# Patient Record
Sex: MALE | Race: WHITE | Employment: OTHER | URBAN - METROPOLITAN AREA
[De-identification: names, ages, dates, MRNs, and addresses within clinical notes are randomized per-mention and may not be internally consistent; named-entity substitution may affect disease eponyms.]

---

## 2017-03-31 ENCOUNTER — CARE COORDINATOR VISIT (OUTPATIENT)
Dept: FAMILY MEDICINE CLINIC | Age: 77
End: 2017-03-31

## 2017-03-31 RX ORDER — LANCETS
EACH MISCELLANEOUS
Qty: 100 EACH | Refills: 3 | Status: SHIPPED | OUTPATIENT
Start: 2017-03-31 | End: 2018-08-03 | Stop reason: SDUPTHER

## 2017-06-07 RX ORDER — LEVOTHYROXINE SODIUM 88 UG/1
TABLET ORAL
Qty: 90 TABLET | Refills: 0 | Status: SHIPPED | OUTPATIENT
Start: 2017-06-07 | End: 2017-09-09 | Stop reason: SDUPTHER

## 2017-06-07 RX ORDER — ISOSORBIDE MONONITRATE 30 MG/1
30 TABLET, EXTENDED RELEASE ORAL DAILY
Qty: 90 TABLET | Refills: 0 | Status: SHIPPED | OUTPATIENT
Start: 2017-06-07 | End: 2017-09-09 | Stop reason: SDUPTHER

## 2017-06-08 DIAGNOSIS — I10 BENIGN ESSENTIAL HYPERTENSION: Chronic | ICD-10-CM

## 2017-06-09 RX ORDER — LOSARTAN POTASSIUM AND HYDROCHLOROTHIAZIDE 25; 100 MG/1; MG/1
TABLET ORAL
Qty: 90 TABLET | Refills: 1 | Status: SHIPPED | OUTPATIENT
Start: 2017-06-09 | End: 2017-06-21

## 2017-06-09 RX ORDER — PRAVASTATIN SODIUM 40 MG
TABLET ORAL
Qty: 90 TABLET | Refills: 1 | Status: SHIPPED | OUTPATIENT
Start: 2017-06-09 | End: 2017-12-19 | Stop reason: SDUPTHER

## 2017-06-09 RX ORDER — CLOPIDOGREL BISULFATE 75 MG/1
TABLET ORAL
Qty: 90 TABLET | Refills: 1 | Status: SHIPPED | OUTPATIENT
Start: 2017-06-09 | End: 2017-12-19 | Stop reason: SDUPTHER

## 2017-06-09 RX ORDER — METOPROLOL SUCCINATE 50 MG/1
TABLET, EXTENDED RELEASE ORAL
Qty: 90 TABLET | Refills: 1 | Status: SHIPPED | OUTPATIENT
Start: 2017-06-09 | End: 2017-12-13 | Stop reason: SDUPTHER

## 2017-06-20 ENCOUNTER — OFFICE VISIT (OUTPATIENT)
Dept: FAMILY MEDICINE CLINIC | Age: 77
End: 2017-06-20

## 2017-06-20 VITALS
WEIGHT: 192 LBS | HEIGHT: 71 IN | DIASTOLIC BLOOD PRESSURE: 72 MMHG | OXYGEN SATURATION: 97 % | BODY MASS INDEX: 26.88 KG/M2 | HEART RATE: 63 BPM | SYSTOLIC BLOOD PRESSURE: 130 MMHG | TEMPERATURE: 97.7 F

## 2017-06-20 DIAGNOSIS — I65.23 BILATERAL CAROTID ARTERY OCCLUSION: ICD-10-CM

## 2017-06-20 DIAGNOSIS — R09.89 DIMINISHED PULSES IN LOWER EXTREMITY: ICD-10-CM

## 2017-06-20 DIAGNOSIS — E78.00 PURE HYPERCHOLESTEROLEMIA: ICD-10-CM

## 2017-06-20 DIAGNOSIS — E06.3 HYPOTHYROIDISM, ACQUIRED, AUTOIMMUNE: ICD-10-CM

## 2017-06-20 DIAGNOSIS — E11.9 TYPE 2 DIABETES MELLITUS WITHOUT COMPLICATION, WITHOUT LONG-TERM CURRENT USE OF INSULIN (HCC): Primary | ICD-10-CM

## 2017-06-20 DIAGNOSIS — I10 BENIGN ESSENTIAL HYPERTENSION: ICD-10-CM

## 2017-06-20 DIAGNOSIS — I25.118 ATHEROSCLEROSIS OF NATIVE CORONARY ARTERY OF NATIVE HEART WITH STABLE ANGINA PECTORIS (HCC): ICD-10-CM

## 2017-06-20 LAB
A/G RATIO: 1.6 (ref 1.1–2.2)
ALBUMIN SERPL-MCNC: 4.4 G/DL (ref 3.4–5)
ALP BLD-CCNC: 80 U/L (ref 40–129)
ALT SERPL-CCNC: 21 U/L (ref 10–40)
ANION GAP SERPL CALCULATED.3IONS-SCNC: 15 MMOL/L (ref 3–16)
AST SERPL-CCNC: 16 U/L (ref 15–37)
BASOPHILS ABSOLUTE: 0 K/UL (ref 0–0.2)
BASOPHILS RELATIVE PERCENT: 0.6 %
BILIRUB SERPL-MCNC: 0.6 MG/DL (ref 0–1)
BUN BLDV-MCNC: 14 MG/DL (ref 7–20)
CALCIUM SERPL-MCNC: 9.8 MG/DL (ref 8.3–10.6)
CHLORIDE BLD-SCNC: 89 MMOL/L (ref 99–110)
CHOLESTEROL, TOTAL: 203 MG/DL (ref 0–199)
CO2: 25 MMOL/L (ref 21–32)
CREAT SERPL-MCNC: 0.9 MG/DL (ref 0.8–1.3)
EOSINOPHILS ABSOLUTE: 0.5 K/UL (ref 0–0.6)
EOSINOPHILS RELATIVE PERCENT: 5.9 %
GFR AFRICAN AMERICAN: >60
GFR NON-AFRICAN AMERICAN: >60
GLOBULIN: 2.8 G/DL
GLUCOSE BLD-MCNC: 113 MG/DL (ref 70–99)
HCT VFR BLD CALC: 37.7 % (ref 40.5–52.5)
HDLC SERPL-MCNC: 46 MG/DL (ref 40–60)
HEMOGLOBIN: 12.8 G/DL (ref 13.5–17.5)
LDL CHOLESTEROL CALCULATED: 121 MG/DL
LYMPHOCYTES ABSOLUTE: 1.4 K/UL (ref 1–5.1)
LYMPHOCYTES RELATIVE PERCENT: 17.4 %
MCH RBC QN AUTO: 31 PG (ref 26–34)
MCHC RBC AUTO-ENTMCNC: 33.8 G/DL (ref 31–36)
MCV RBC AUTO: 91.5 FL (ref 80–100)
MONOCYTES ABSOLUTE: 0.6 K/UL (ref 0–1.3)
MONOCYTES RELATIVE PERCENT: 7.5 %
NEUTROPHILS ABSOLUTE: 5.4 K/UL (ref 1.7–7.7)
NEUTROPHILS RELATIVE PERCENT: 68.6 %
PDW BLD-RTO: 13.5 % (ref 12.4–15.4)
PLATELET # BLD: 202 K/UL (ref 135–450)
PMV BLD AUTO: 9.5 FL (ref 5–10.5)
POTASSIUM SERPL-SCNC: 4.3 MMOL/L (ref 3.5–5.1)
RBC # BLD: 4.12 M/UL (ref 4.2–5.9)
SODIUM BLD-SCNC: 129 MMOL/L (ref 136–145)
TOTAL PROTEIN: 7.2 G/DL (ref 6.4–8.2)
TRIGL SERPL-MCNC: 178 MG/DL (ref 0–150)
TSH REFLEX: 4.04 UIU/ML (ref 0.27–4.2)
VITAMIN B-12: 508 PG/ML (ref 211–911)
VLDLC SERPL CALC-MCNC: 36 MG/DL
WBC # BLD: 7.8 K/UL (ref 4–11)

## 2017-06-20 PROCEDURE — 99214 OFFICE O/P EST MOD 30 MIN: CPT | Performed by: FAMILY MEDICINE

## 2017-06-20 PROCEDURE — 36415 COLL VENOUS BLD VENIPUNCTURE: CPT | Performed by: FAMILY MEDICINE

## 2017-06-21 LAB
ESTIMATED AVERAGE GLUCOSE: 137 MG/DL
HBA1C MFR BLD: 6.4 %

## 2017-06-22 RX ORDER — LOSARTAN POTASSIUM 100 MG/1
100 TABLET ORAL DAILY
Qty: 90 TABLET | Refills: 1 | Status: SHIPPED | OUTPATIENT
Start: 2017-06-22 | End: 2017-12-19 | Stop reason: SDUPTHER

## 2017-06-23 ENCOUNTER — HOSPITAL ENCOUNTER (OUTPATIENT)
Dept: VASCULAR LAB | Age: 77
Discharge: HOME | End: 2017-06-23
Attending: FAMILY MEDICINE | Admitting: FAMILY MEDICINE

## 2017-06-23 DIAGNOSIS — I65.23 OCCLUSION AND STENOSIS OF BILATERAL CAROTID ARTERIES: ICD-10-CM

## 2017-08-01 ENCOUNTER — OFFICE VISIT (OUTPATIENT)
Dept: CARDIOLOGY CLINIC | Age: 77
End: 2017-08-01

## 2017-08-01 VITALS
SYSTOLIC BLOOD PRESSURE: 120 MMHG | BODY MASS INDEX: 25.9 KG/M2 | HEIGHT: 71 IN | WEIGHT: 185 LBS | OXYGEN SATURATION: 97 % | HEART RATE: 72 BPM | DIASTOLIC BLOOD PRESSURE: 66 MMHG

## 2017-08-01 DIAGNOSIS — E78.00 PURE HYPERCHOLESTEROLEMIA: ICD-10-CM

## 2017-08-01 DIAGNOSIS — I25.118 ATHEROSCLEROSIS OF NATIVE CORONARY ARTERY OF NATIVE HEART WITH STABLE ANGINA PECTORIS (HCC): ICD-10-CM

## 2017-08-01 DIAGNOSIS — I10 BENIGN ESSENTIAL HYPERTENSION: Primary | ICD-10-CM

## 2017-08-01 DIAGNOSIS — I65.23 BILATERAL CAROTID ARTERY OCCLUSION: ICD-10-CM

## 2017-08-01 PROCEDURE — 99214 OFFICE O/P EST MOD 30 MIN: CPT | Performed by: INTERNAL MEDICINE

## 2017-08-01 RX ORDER — HYDROCHLOROTHIAZIDE 25 MG/1
25 TABLET ORAL DAILY
Qty: 90 TABLET | Refills: 3 | Status: SHIPPED | OUTPATIENT
Start: 2017-08-01 | End: 2017-08-11

## 2017-08-11 ENCOUNTER — OFFICE VISIT (OUTPATIENT)
Dept: FAMILY MEDICINE CLINIC | Age: 77
End: 2017-08-11

## 2017-08-11 VITALS
TEMPERATURE: 98.1 F | HEART RATE: 72 BPM | SYSTOLIC BLOOD PRESSURE: 126 MMHG | WEIGHT: 186 LBS | BODY MASS INDEX: 25.94 KG/M2 | DIASTOLIC BLOOD PRESSURE: 78 MMHG | OXYGEN SATURATION: 98 %

## 2017-08-11 DIAGNOSIS — E87.1 HYPONATREMIA: ICD-10-CM

## 2017-08-11 DIAGNOSIS — I10 BENIGN ESSENTIAL HYPERTENSION: Primary | ICD-10-CM

## 2017-08-11 DIAGNOSIS — M25.561 ACUTE PAIN OF RIGHT KNEE: ICD-10-CM

## 2017-08-11 LAB
ANION GAP SERPL CALCULATED.3IONS-SCNC: 17 MMOL/L (ref 3–16)
BUN BLDV-MCNC: 19 MG/DL (ref 7–20)
CALCIUM SERPL-MCNC: 9.8 MG/DL (ref 8.3–10.6)
CHLORIDE BLD-SCNC: 97 MMOL/L (ref 99–110)
CO2: 25 MMOL/L (ref 21–32)
CREAT SERPL-MCNC: 0.9 MG/DL (ref 0.8–1.3)
GFR AFRICAN AMERICAN: >60
GFR NON-AFRICAN AMERICAN: >60
GLUCOSE BLD-MCNC: 119 MG/DL (ref 70–99)
POTASSIUM SERPL-SCNC: 4.4 MMOL/L (ref 3.5–5.1)
SODIUM BLD-SCNC: 139 MMOL/L (ref 136–145)

## 2017-08-11 PROCEDURE — 36415 COLL VENOUS BLD VENIPUNCTURE: CPT | Performed by: FAMILY MEDICINE

## 2017-08-11 PROCEDURE — 99213 OFFICE O/P EST LOW 20 MIN: CPT | Performed by: FAMILY MEDICINE

## 2017-09-09 RX ORDER — ISOSORBIDE MONONITRATE 30 MG/1
TABLET, EXTENDED RELEASE ORAL
Qty: 90 TABLET | Refills: 1 | Status: SHIPPED | OUTPATIENT
Start: 2017-09-09 | End: 2017-12-18

## 2017-09-09 RX ORDER — LEVOTHYROXINE SODIUM 88 UG/1
TABLET ORAL
Qty: 90 TABLET | Refills: 1 | Status: SHIPPED | OUTPATIENT
Start: 2017-09-09 | End: 2018-03-02 | Stop reason: SDUPTHER

## 2017-09-11 RX ORDER — LEVOTHYROXINE SODIUM 88 UG/1
TABLET ORAL
Qty: 90 TABLET | Refills: 0 | Status: SHIPPED | OUTPATIENT
Start: 2017-09-11 | End: 2017-12-19 | Stop reason: SDUPTHER

## 2017-09-11 RX ORDER — ISOSORBIDE MONONITRATE 30 MG/1
30 TABLET, EXTENDED RELEASE ORAL DAILY
Qty: 90 TABLET | Refills: 0 | Status: SHIPPED | OUTPATIENT
Start: 2017-09-11 | End: 2018-01-11 | Stop reason: SDUPTHER

## 2017-12-13 RX ORDER — METOPROLOL SUCCINATE 50 MG/1
TABLET, EXTENDED RELEASE ORAL
Qty: 90 TABLET | Refills: 1 | Status: ON HOLD | OUTPATIENT
Start: 2017-12-13 | End: 2018-02-05 | Stop reason: HOSPADM

## 2017-12-19 ENCOUNTER — OFFICE VISIT (OUTPATIENT)
Dept: FAMILY MEDICINE CLINIC | Age: 77
End: 2017-12-19

## 2017-12-19 VITALS
HEART RATE: 72 BPM | DIASTOLIC BLOOD PRESSURE: 72 MMHG | TEMPERATURE: 97.6 F | BODY MASS INDEX: 26.22 KG/M2 | OXYGEN SATURATION: 99 % | WEIGHT: 188 LBS | SYSTOLIC BLOOD PRESSURE: 136 MMHG

## 2017-12-19 DIAGNOSIS — E06.3 HYPOTHYROIDISM, ACQUIRED, AUTOIMMUNE: ICD-10-CM

## 2017-12-19 DIAGNOSIS — I25.118 ATHEROSCLEROSIS OF NATIVE CORONARY ARTERY OF NATIVE HEART WITH STABLE ANGINA PECTORIS (HCC): ICD-10-CM

## 2017-12-19 DIAGNOSIS — E11.9 TYPE 2 DIABETES MELLITUS WITHOUT COMPLICATION, WITHOUT LONG-TERM CURRENT USE OF INSULIN (HCC): ICD-10-CM

## 2017-12-19 DIAGNOSIS — I10 BENIGN ESSENTIAL HYPERTENSION: ICD-10-CM

## 2017-12-19 DIAGNOSIS — R06.09 DYSPNEA ON EXERTION: Primary | ICD-10-CM

## 2017-12-19 DIAGNOSIS — E55.9 VITAMIN D DEFICIENCY DISEASE: ICD-10-CM

## 2017-12-19 LAB
BASOPHILS ABSOLUTE: 0 K/UL (ref 0–0.2)
BASOPHILS RELATIVE PERCENT: 0.6 %
EOSINOPHILS ABSOLUTE: 0.3 K/UL (ref 0–0.6)
EOSINOPHILS RELATIVE PERCENT: 4.5 %
HBA1C MFR BLD: 6.3 %
HCT VFR BLD CALC: 38.5 % (ref 40.5–52.5)
HEMOGLOBIN: 12.8 G/DL (ref 13.5–17.5)
LYMPHOCYTES ABSOLUTE: 1.3 K/UL (ref 1–5.1)
LYMPHOCYTES RELATIVE PERCENT: 16.8 %
MCH RBC QN AUTO: 31.2 PG (ref 26–34)
MCHC RBC AUTO-ENTMCNC: 33.2 G/DL (ref 31–36)
MCV RBC AUTO: 94.1 FL (ref 80–100)
MONOCYTES ABSOLUTE: 0.5 K/UL (ref 0–1.3)
MONOCYTES RELATIVE PERCENT: 6.9 %
NEUTROPHILS ABSOLUTE: 5.5 K/UL (ref 1.7–7.7)
NEUTROPHILS RELATIVE PERCENT: 71.2 %
PDW BLD-RTO: 13.5 % (ref 12.4–15.4)
PLATELET # BLD: 202 K/UL (ref 135–450)
PMV BLD AUTO: 9.6 FL (ref 5–10.5)
RBC # BLD: 4.09 M/UL (ref 4.2–5.9)
TSH REFLEX: 3.35 UIU/ML (ref 0.27–4.2)
VITAMIN D 25-HYDROXY: 40.6 NG/ML
WBC # BLD: 7.7 K/UL (ref 4–11)

## 2017-12-19 PROCEDURE — G8427 DOCREV CUR MEDS BY ELIG CLIN: HCPCS | Performed by: FAMILY MEDICINE

## 2017-12-19 PROCEDURE — 1123F ACP DISCUSS/DSCN MKR DOCD: CPT | Performed by: FAMILY MEDICINE

## 2017-12-19 PROCEDURE — 1036F TOBACCO NON-USER: CPT | Performed by: FAMILY MEDICINE

## 2017-12-19 PROCEDURE — 93000 ELECTROCARDIOGRAM COMPLETE: CPT | Performed by: FAMILY MEDICINE

## 2017-12-19 PROCEDURE — 36415 COLL VENOUS BLD VENIPUNCTURE: CPT | Performed by: FAMILY MEDICINE

## 2017-12-19 PROCEDURE — G8417 CALC BMI ABV UP PARAM F/U: HCPCS | Performed by: FAMILY MEDICINE

## 2017-12-19 PROCEDURE — 83036 HEMOGLOBIN GLYCOSYLATED A1C: CPT | Performed by: FAMILY MEDICINE

## 2017-12-19 PROCEDURE — 99214 OFFICE O/P EST MOD 30 MIN: CPT | Performed by: FAMILY MEDICINE

## 2017-12-19 PROCEDURE — G8598 ASA/ANTIPLAT THER USED: HCPCS | Performed by: FAMILY MEDICINE

## 2017-12-19 PROCEDURE — G8483 FLU IMM NO ADMIN DOC REA: HCPCS | Performed by: FAMILY MEDICINE

## 2017-12-19 PROCEDURE — 4040F PNEUMOC VAC/ADMIN/RCVD: CPT | Performed by: FAMILY MEDICINE

## 2017-12-19 RX ORDER — CLOPIDOGREL BISULFATE 75 MG/1
TABLET ORAL
Qty: 90 TABLET | Refills: 1 | Status: ON HOLD | OUTPATIENT
Start: 2017-12-19 | End: 2018-01-18 | Stop reason: HOSPADM

## 2017-12-19 RX ORDER — PRAVASTATIN SODIUM 40 MG
TABLET ORAL
Qty: 90 TABLET | Refills: 1 | Status: SHIPPED | OUTPATIENT
Start: 2017-12-19 | End: 2018-03-02 | Stop reason: SDUPTHER

## 2017-12-19 RX ORDER — LOSARTAN POTASSIUM 100 MG/1
100 TABLET ORAL DAILY
Qty: 90 TABLET | Refills: 1 | Status: ON HOLD | OUTPATIENT
Start: 2017-12-19 | End: 2018-02-05 | Stop reason: HOSPADM

## 2017-12-19 ASSESSMENT — PATIENT HEALTH QUESTIONNAIRE - PHQ9
SUM OF ALL RESPONSES TO PHQ QUESTIONS 1-9: 0
1. LITTLE INTEREST OR PLEASURE IN DOING THINGS: 0
SUM OF ALL RESPONSES TO PHQ9 QUESTIONS 1 & 2: 0
2. FEELING DOWN, DEPRESSED OR HOPELESS: 0

## 2017-12-19 NOTE — PROGRESS NOTES
Assessment and plan  1. Dyspnea on exertion - ?angina equivalent  EKG 12 Lead    Stress test, myoview   2. Type 2 diabetes mellitus without complication, without long-term current use of insulin (HCC) Stable  POCT glycosylated hemoglobin (Hb A1C)   3. Benign essential hypertension Stable  CBC Auto Differential   4. Hypothyroidism, acquired, autoimmune  - status pending result of lab TSH with Reflex   5. Atherosclerosis of native coronary artery of native heart with stable angina pectoris ([facility] [address]     6. Vitamin D deficiency disease  - status pending result of lab Vitamin D 25 Hydroxy     Healthy Family prevention recommendations given. Continue all current prescription medications as listed below. RTC 6 months or sooner prn Pending results of above. Subjective  Patient returns for reevaluation of his medical problems including coronary artery disease, diabetes, hypertension, hypothyroidism, vitamin D deficiency. His A1c this morning is 6.3. His blood pressures been good. He denies any fatigue. He has not been having any chest pain. However, he does report a one-month history of dyspnea on exertion. He gets it whenever he walks and her [place]. However it's not consistent and that he does not always experienced this dyspnea. It only occurs with exertion that is outside. He denies any associated chest pain, chest tightness, shoulder or jaw symptoms. When he stops and rests the dyspnea resolves fairly promptly. He has noted when he does some of his other kind of exercises in the house that he has not gotten chest pain or shortness of breath out of the ordinary. Perhaps this is related to the colder air. He denies any worsening fatigue or increased edema.     Social History   Substance Use Topics    Smoking status: Former Smoker     Packs/day: 2.00     Years: 20.00     Types: Cigarettes     Quit date: 1/1/1975    Smokeless tobacco: Never Used    Alcohol use No     Past history:  He did see his eye

## 2017-12-27 ENCOUNTER — HOSPITAL ENCOUNTER (OUTPATIENT)
Dept: NON INVASIVE DIAGNOSTICS | Age: 77
Discharge: HOME | End: 2017-12-27
Attending: FAMILY MEDICINE | Admitting: FAMILY MEDICINE

## 2017-12-27 DIAGNOSIS — R06.09 OTHER FORMS OF DYSPNEA: ICD-10-CM

## 2017-12-27 LAB
LV EF: 47 %
LVEF MODALITY: NORMAL

## 2018-01-11 ENCOUNTER — OFFICE VISIT (OUTPATIENT)
Dept: CARDIOLOGY CLINIC | Age: 78
End: 2018-01-11

## 2018-01-11 VITALS
BODY MASS INDEX: 26.46 KG/M2 | HEART RATE: 67 BPM | DIASTOLIC BLOOD PRESSURE: 60 MMHG | OXYGEN SATURATION: 96 % | SYSTOLIC BLOOD PRESSURE: 156 MMHG | HEIGHT: 71 IN | WEIGHT: 189 LBS

## 2018-01-11 DIAGNOSIS — I10 BENIGN ESSENTIAL HYPERTENSION: Primary | ICD-10-CM

## 2018-01-11 DIAGNOSIS — I65.23 BILATERAL CAROTID ARTERY OCCLUSION: ICD-10-CM

## 2018-01-11 DIAGNOSIS — I25.119 CORONARY ARTERY DISEASE INVOLVING NATIVE CORONARY ARTERY OF NATIVE HEART WITH ANGINA PECTORIS (HCC): ICD-10-CM

## 2018-01-11 DIAGNOSIS — E78.2 MIXED HYPERLIPIDEMIA: ICD-10-CM

## 2018-01-11 DIAGNOSIS — R06.02 SOB (SHORTNESS OF BREATH): ICD-10-CM

## 2018-01-11 DIAGNOSIS — R07.2 PRECORDIAL PAIN: ICD-10-CM

## 2018-01-11 PROCEDURE — G8417 CALC BMI ABV UP PARAM F/U: HCPCS | Performed by: INTERNAL MEDICINE

## 2018-01-11 PROCEDURE — G8598 ASA/ANTIPLAT THER USED: HCPCS | Performed by: INTERNAL MEDICINE

## 2018-01-11 PROCEDURE — G8483 FLU IMM NO ADMIN DOC REA: HCPCS | Performed by: INTERNAL MEDICINE

## 2018-01-11 PROCEDURE — 99215 OFFICE O/P EST HI 40 MIN: CPT | Performed by: INTERNAL MEDICINE

## 2018-01-11 PROCEDURE — 4040F PNEUMOC VAC/ADMIN/RCVD: CPT | Performed by: INTERNAL MEDICINE

## 2018-01-11 PROCEDURE — G8427 DOCREV CUR MEDS BY ELIG CLIN: HCPCS | Performed by: INTERNAL MEDICINE

## 2018-01-11 PROCEDURE — 1123F ACP DISCUSS/DSCN MKR DOCD: CPT | Performed by: INTERNAL MEDICINE

## 2018-01-11 PROCEDURE — 1036F TOBACCO NON-USER: CPT | Performed by: INTERNAL MEDICINE

## 2018-01-11 RX ORDER — M-VIT,TX,IRON,MINS/CALC/FOLIC 27MG-0.4MG
1 TABLET ORAL DAILY
COMMUNITY
End: 2018-02-14

## 2018-01-11 RX ORDER — CHLORAL HYDRATE 500 MG
1000 CAPSULE ORAL DAILY
COMMUNITY
End: 2018-02-19

## 2018-01-11 RX ORDER — ISOSORBIDE MONONITRATE 30 MG/1
60 TABLET, EXTENDED RELEASE ORAL DAILY
Qty: 90 TABLET | Refills: 3 | Status: ON HOLD | OUTPATIENT
Start: 2018-01-11 | End: 2018-02-05 | Stop reason: HOSPADM

## 2018-01-11 NOTE — PROGRESS NOTES
[address]   Cardiac Followup    Referring Provider:  Anat Tavera MD     Chief Complaint   Patient presents with    Follow-up    Abnormal Test Results     from Dr Tamy Perera of [facility]    Chest Pain     tight-hx    Shoulder Pain     right      Subjective: Mr Evie Mahan being seen today for cardiology follow up of his CAD, HTN, HLD; c/o chest pain     History of Present Illness:    Mr. Evie Mahan is a 71KO male hx of embolic R. CVA in 1/78, also hx of CAD s/p 4 stents prior (most recent October 2000), HTN, DM, mild carotid artery disease, hyperlipidemia, and hypothyroidism. He had some myalgias with statins but he is taking 40 mg pravachol with coenzyme Q10 and tolerating. Note ECHO in August 2009 showed normal EF=55% with mild diastolic noncompliance. Note nuclear stress test in February 2006. He states he is able to walk with minimal leg cramping. He did trial off of his statins for a short time and also tried lower dose 20mg but this did not have any relief. He is back on 40mg tablet. Admitted to [facility]. 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 Most recent EKG 04/12/2016 showed NSR; first degree AVB; possible LVH. Note most recent ECHO 04/12/2016 with EF of 55-60%, mild MR/TR. Most recent carotid Doppler studies 04/13/2016 with no noted stenosis bilaterally (no major change from 8/14). Patient is currently on Plavix and ASA with plavix being new. Note event monitor 05/03-06/02/2016 with NSR, with occasional PVC's and overall normal study. Patient reports to leaving the house that day and had Left sided numbness/weakness acutely. Recent BLE LUCY 6/23/17 Right LUCY 1.33 left LUCY 1.31  Carotid US 6/23/2017 less than 50% bilateral.  He reports PCP stopped his HCTZ in past and is not sure why? His blood pressure is elevated today 156/60.  He states his home blood pressure (TOPROL-XL) 50 MG XL tablet TAKE 1 TABLET BY MOUTH ONCE DAILY 6/20/12  Yes Rudy Tomlin MD   levothyroxine (SYNTHROID) 75 MCG tablet TAKE 1 TABLET BY MOUTH ONCE DAILY 2/8/12  Yes Rudy Tomlin MD   losartan-hydrochlorothiazide (HYZAAR) 100-25 MG per tablet Take 1 tablet by mouth daily for 360 days. 1/18/12 1/12/13 Yes Johnnie Mauro MD   aspirin 81 MG EC tablet Take 81 mg by mouth daily. 2 daily     Yes Historical Provider, MD   Blood Glucose Monitoring Suppl (ONE TOUCH ULTRA) JUAN FRANCISCO by Does not apply route. 6/22/10  Yes Rudy Tomlin MD   Multiple Vitamins-Minerals (MULTIVITAL PO) Take  by mouth daily. 5/24/10  Yes Historical Provider, MD   Omega-3 Fatty Acids (FISH OIL) 1200 MG CAPS Take  by mouth 2 times daily. 5/24/10  Yes Historical Provider, MD   Ascorbic Acid (VITAMIN C) 500 MG tablet Take 500 mg by mouth daily. Yes Historical Provider, MD   Cholecalciferol (VITAMIN D3) 1000 UNIT CAPS Take  by mouth daily. 5/24/10  Yes Historical Provider, MD   vitamin E 200 UNIT capsule Take 200 Units by mouth daily. Yes Historical Provider, MD        Allergies:  Lipitor and Simvastatin     Review of Systems:   · Constitutional: there has been no unanticipated weight loss. There's been no change in energy level, sleep pattern, or activity level. · Eyes: No visual changes or diplopia. No scleral icterus. · ENT: No Headaches, hearing loss or vertigo. No mouth sores or sore throat. · Cardiovascular: Reviewed in HPI  · Respiratory: No cough or wheezing, no sputum production. No hematemesis. · Gastrointestinal: No abdominal pain, appetite loss, blood in stools. No change in bowel or bladder habits. · Genitourinary: No dysuria, trouble voiding, or hematuria. · Musculoskeletal:  No gait disturbance, weakness or joint complaints. · Integumentary: No rash or pruritis. · Neurological: No headache, diplopia, change in muscle strength, numbness or tingling.  No change in gait, balance, coordination, mood, affect, concerning for unstable angina clinically since NO pain for several years and new now. Typical symptoms for ischemic pain with exertion and known history of remote stents. Most recent exercise stress test 12/27/17 showed small area of ischemia lateral apex, EF 47% with hypokinesis. He needs invasive LHC to assess coronary arteries directly. 2. DM w/o Complication Type II : managed per PCP; Most recent TahN0d=5.4 on 6/24/17 which is stable. 3. Carotid artery occlusion :   Carotid Doppler studies 04/13/2016 with no noted stenosis bilaterally (no major change from 8/14). Most recent  Carotid US 6/23/2017 showed moderate disease but less than 50% bilateral. No neuro symptoms concerning at this time. 4. Cerebrovascular disease : Admitted to [facility]. 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 Stable and doing better overall and will continue present medical regimen. Will continue DAPT with plavix and baby aspirin. 5. Hypertension : Stable and will continue present medical regimen. 6. Hyperlipidemia:  Most recent labs from 6/20/17 .     HDL46  not at goal but close. Unable to increase statin therapy due to leg cramping. Plan:   1. Based on these findings above (see #1) I recommend left heart cath for definitive evaluation of coronary arteries. Risks, benefits, expectations, and alternative treatments were discussed. Questions appropriately answered. Dru Kareen agrees to proceed and verbalized understanding. 2. Medications reviewed. Take sl Nitro for persistent chest pain (sit down when you take it as it can drop your blood pressure. If after taking 3 doses - 1 every 5 minutes - if pain is not relieved go to ED or call 911)  3. Increase Imdur to 60 mg daily for both BP lowering and anti-anginal effects.    4. Follow up with me after cardiac

## 2018-01-11 NOTE — PATIENT INSTRUCTIONS
Plan:   1. Based on these findings I recommend left heart cath for definitive evaluation of coronary arteries. Risks, benefits, expectations, and alternative treatments were discussed. Questions appropriately answered. Ellan Gaucher agrees to proceed and verbalized understanding. 2. Medications reviewed. Take sl Nitro for persistent chest pain (sit down when you take it as it can drop your blood pressure. If after taking 3 doses - 1 every 5 minutes - if pain is not relieved go to ED or call 911)  3. Increase Imdur to 60 mg daily  4.  Follow up with me after cardiac cath

## 2018-01-12 ENCOUNTER — TELEPHONE (OUTPATIENT)
Dept: CARDIOLOGY CLINIC | Age: 78
End: 2018-01-12

## 2018-01-12 NOTE — TELEPHONE ENCOUNTER
Spoke to pt. Informed patient to hold metformin 48 hours prior to procedure. NPO after 12 midnight. Take all other medications including plavix and BP meds the morning of procedure with sips of water.   KATHRYN

## 2018-01-16 ENCOUNTER — TELEPHONE (OUTPATIENT)
Dept: CARDIOLOGY CLINIC | Age: 78
End: 2018-01-16

## 2018-01-24 ENCOUNTER — OFFICE VISIT (OUTPATIENT)
Dept: CARDIOTHORACIC SURGERY | Age: 78
End: 2018-01-24

## 2018-01-24 VITALS
DIASTOLIC BLOOD PRESSURE: 62 MMHG | WEIGHT: 189.5 LBS | SYSTOLIC BLOOD PRESSURE: 142 MMHG | BODY MASS INDEX: 26.53 KG/M2 | OXYGEN SATURATION: 98 % | HEIGHT: 71 IN | HEART RATE: 68 BPM | TEMPERATURE: 97 F

## 2018-01-24 DIAGNOSIS — I10 ESSENTIAL HYPERTENSION: ICD-10-CM

## 2018-01-24 DIAGNOSIS — I63.9 ACUTE ISCHEMIC STROKE (HCC): Primary | ICD-10-CM

## 2018-01-24 PROCEDURE — 99204 OFFICE O/P NEW MOD 45 MIN: CPT | Performed by: THORACIC SURGERY (CARDIOTHORACIC VASCULAR SURGERY)

## 2018-01-24 NOTE — PROGRESS NOTES
Review of Systems:  Constitutional:  No fatigue, night sweats, headaches, weight loss. Eyes:  + glasses. No glaucoma, cataracts. ENMT:  No nosebleeds, deviated septum. Cardiac:  + shoulder pain with exertion. + history of heart disease with stent placement x 2 in 2000. No arrhythmias. Vascular:  No claudication, varicosities. GI:  No PUD, heartburn. :  No kidney stones, frequent UTIs  Musculoskeletal:  No arthritis, gout. Respiratory: + SOB with exertion. No emphysema, asthma. Integumentary:  No dermatitis, itching, rash. Neurological:  + history of CVA and TIA. No seizures. Psychiatric:  No depression, anxiety. Endocrine: + diabetes with neuropathy. No thyroid issues. Hematologic:  No bleeding, easy bruising. Immunologic:  No known cancer, steroid therapies.

## 2018-01-25 ENCOUNTER — TELEPHONE (OUTPATIENT)
Dept: CARDIOTHORACIC SURGERY | Age: 78
End: 2018-01-25

## 2018-01-26 NOTE — PROGRESS NOTES
Date Taking? Authorizing Provider   Multiple Vitamins-Minerals (THERAPEUTIC MULTIVITAMIN-MINERALS) tablet Take 1 tablet by mouth daily   Yes Historical Provider, MD   Omega-3 Fatty Acids (FISH OIL) 1000 MG CAPS Take 1,000 mg by mouth daily   Yes Historical Provider, MD   isosorbide mononitrate (IMDUR) 30 MG extended release tablet Take 2 tablets by mouth daily 1/11/18  Yes Nikko Rangel MD   pravastatin (PRAVACHOL) 40 MG tablet TAKE 1 TABLET EVERY DAY 12/19/17  Yes Danielle Lyman MD   metFORMIN (GLUCOPHAGE) 1000 MG tablet TAKE 1 TABLET TWICE DAILY WITH MEALS 12/19/17  Yes Danielle Lyman MD   losartan (COZAAR) 100 MG tablet Take 1 tablet by mouth daily 12/19/17  Yes Danielle Lyman MD   metoprolol succinate (TOPROL XL) 50 MG extended release tablet TAKE 1 TABLET EVERY DAY 12/13/17  Yes Danielle Lyman MD   glucose blood VI test strips (ACCU-CHEK DIONTE PLUS) strip Test two times daily DX: E11.9 12/13/17  Yes Danielle Lyman MD   levothyroxine (SYNTHROID) 88 MCG tablet TAKE 1 TABLET EVERY DAY 9/9/17  Yes Danielle Lyman MD   ACCU-CHEK FASTCLIX LANCETS MISC Test BID  Dx: E11.9 3/31/17  Yes Danielle Lyman MD   Blood Glucose Monitoring Suppl (ACCU-CHEK DIONTE) JUAN FRANCISCO Test BID  Dx: E11.9 3/31/17  Yes Danielle Lyman MD   aspirin 81 MG tablet Take 81 mg by mouth daily   Yes Historical Provider, MD   acetaminophen (TYLENOL) 500 MG tablet Take 2 tablets by mouth 3 times daily as needed for Pain or Fever 4/14/16  Yes Dru Durand MD   nitroGLYCERIN (NITROSTAT) 0.4 MG SL tablet Place 1 tablet under the tongue every 5 minutes as needed for Chest pain 2/5/16  Yes Nikko Rangel MD   Cholecalciferol (VITAMIN D) 2000 UNITS CAPS capsule Take  by mouth daily. Yes Historical Provider, MD   ONE TOUCH ULTRASOFT LANCETS MISC CHECK 2 TIMES DAILY 11/26/14  Yes Danielle Lyman MD   Coenzyme Q10 (CO Q 10 PO) Take 1 capsule by mouth daily.      Yes Historical Provider, MD   Blood Glucose Monitoring Suppl (ONE TOUCH ULTRA) JUAN FRANCISCO by Does BP RUE:  BP LUE:   Admission Weight: 189 lb 8 oz (86 kg)   Hand dominance:    General appearance: NAD, well nourished  Eyes: anicteric, PERRLA  ENMT: no scars or lesions, no nasal deformity, normal dentition, no cyanosis of oral mucosa  Neck: no masses, no thyroid enlargement, no JVD. Respiratory: effort is unlabored, symmetric, no crackles, wheezes or rubs. No palpable/percussable abnormalities. Cardiovascular: regular, no murmur. PMI normal, no thrill. No carotid bruits. No edema or varicosities. Abdominal aorta cannot be appreciated given body habitus. GI: abdomen soft, nondistended, no organomegaly. No masses. Lymphatic: no cervical/supraclavicular adenopathy  Musculoskeletal: strength and tone normal. Full ROM. No scoliosis. Extremities: warm and pink. No clubbing or petechiae. Skin: no dermatitis or ulceration. No nodularity or induration. Neuro: CN grossly intact. Sensation and motor function grossly intact. Psychiatric: oriented, appropriate mood/affect. MEDICAL DECISION MAKING/TESTING  Studies personally reviewed. Cath:     LM 20%  LAD 70% mid              D1 90% prox  Cx 90% mid              OM2 side branch 90% ostium  RCA 80% prox, 95% mid  Collaterals LAD to RCA  Multiple stents noted to be patent  LVEF 50-55%       Echo: Left ventricle systolic function is mildly reduced with an estimated   ejection fraction of 50-55%. There is mild hypokinesis of the mid   anteroseptal wall.   Mild concentric left ventricular hypertrophy.   Normal left ventricular diastolic filling pressure.   Mild mitral regurgitation. CXR:     CT:     Carotid duplex:       Labs:   CBC: No results for input(s): WBC, HGB, HCT, MCV, PLT in the last 72 hours. BMP: No results for input(s): NA, K, CL, CO2, PHOS, BUN, CREATININE, CALCIUM, MG in the last 72 hours. Cardiac Enzymes: No results for input(s): CKTOTAL, CKMB, CKMBINDEX, TROPONINI in the last 72 hours.   PT/INR: No results for input(s): PROTIME, INR in the last 72 hours. APTT: No results for input(s): APTT in the last 72 hours. Liver Profile:  Lab Results   Component Value Date    AST 14 01/18/2018    ALT 17 01/18/2018    BILIDIR 0.14 03/13/2013    BILITOT 0.5 01/18/2018    ALKPHOS 82 01/18/2018     Lab Results   Component Value Date    CHOL 203 06/20/2017    HDL 46 06/20/2017    HDL 39 01/18/2012    TRIG 178 06/20/2017     UA:   Lab Results   Component Value Date    NITRITE n 02/15/2013    COLORU yellow 02/15/2013    PHUR 5.5 02/15/2013    CLARITYU clear 02/15/2013    SPECGRAV 1.020 02/15/2013    LEUKOCYTESUR n 02/15/2013    BILIRUBINUR n 02/15/2013    BLOODU n 02/15/2013    GLUCOSEU trace 02/15/2013       History obtained: chart, pt    Risk factors:     STS Cardiac Surgery Risk profile:  CABG     Mortality:   Morbidity and Mortality:    Long LOS:    Short LOS:    Permanent Stroke:    Prolonged Ventilation:    Deep Sternal Infection:    Renal Failure:    Reoperation:      Diagnosis:  Proximal coronary artery disease    Plan: As discussed with the patient's patient have multiple affected both vessels will require coronary artery bypass ×4 LIMA to LAD reverse saphenous vein to PDA and reverse saphenous vein to diagonal and reverse saphenous vein to OM patient understand the risk and benefit include not limited to bleeding infection stroke and death agreed to proceed      Typical periop/postop course reviewed including initial limitations on driving/heavy lifting. Risks, benefits and postoperative complications discussed including bleeding, infection, stroke, death, postop pulmonary and renal issues.     Pj Fermin MD FACS

## 2018-01-28 PROBLEM — I20.0 UNSTABLE ANGINA (HCC): Status: ACTIVE | Noted: 2018-01-28

## 2018-01-30 PROBLEM — R07.9 CHEST PAIN WITH HIGH RISK FOR CARDIAC ETIOLOGY: Status: ACTIVE | Noted: 2018-01-11

## 2018-02-05 PROBLEM — I20.0 UNSTABLE ANGINA (HCC): Status: RESOLVED | Noted: 2018-01-28 | Resolved: 2018-02-05

## 2018-02-09 ENCOUNTER — TELEPHONE (OUTPATIENT)
Dept: CARDIOTHORACIC SURGERY | Age: 78
End: 2018-02-09

## 2018-02-09 NOTE — TELEPHONE ENCOUNTER
Contacted pt to discuss his recovery thus far. States is doing well. Denies shortness of breath. Using IS 3-4 times daily and no longer has a productive cough. States incisions are healing well without redness or drainage. HR regular. Denies palpitations. Minimal edema in feet. Appetite fair and having BMs at home. Was constipated in the hospital.  Biggest complaint is inability to sleep. Suggested that he take pain med prior to going to bed and first thing in the am.  Also suggested he limit naps during the day. Encouraged increased use of IS and to increase his activity.   Has f/u 2/13/18.  Renata Hinds

## 2018-02-13 ENCOUNTER — OFFICE VISIT (OUTPATIENT)
Dept: CARDIOTHORACIC SURGERY | Age: 78
End: 2018-02-13

## 2018-02-13 VITALS
HEIGHT: 71 IN | BODY MASS INDEX: 24.47 KG/M2 | DIASTOLIC BLOOD PRESSURE: 66 MMHG | TEMPERATURE: 98 F | SYSTOLIC BLOOD PRESSURE: 136 MMHG | OXYGEN SATURATION: 98 % | WEIGHT: 174.8 LBS | HEART RATE: 92 BPM

## 2018-02-13 DIAGNOSIS — I10 ESSENTIAL HYPERTENSION: ICD-10-CM

## 2018-02-13 DIAGNOSIS — Z48.812 AFTERCARE FOLLOWING SURGERY OF THE CIRCULATORY SYSTEM: Primary | ICD-10-CM

## 2018-02-13 DIAGNOSIS — Z95.1 S/P CABG X 4: ICD-10-CM

## 2018-02-13 DIAGNOSIS — E78.2 MIXED HYPERLIPIDEMIA: ICD-10-CM

## 2018-02-13 DIAGNOSIS — I25.10 CORONARY ARTERY DISEASE INVOLVING NATIVE CORONARY ARTERY OF NATIVE HEART WITHOUT ANGINA PECTORIS: ICD-10-CM

## 2018-02-13 PROCEDURE — 99024 POSTOP FOLLOW-UP VISIT: CPT | Performed by: THORACIC SURGERY (CARDIOTHORACIC VASCULAR SURGERY)

## 2018-02-13 NOTE — PROGRESS NOTES
Progress Note    CC:  Postoperative follow-up    S/P  CABG surgery. Subjective:  He feels great. He has no aches and pains. His eating and sleeping habits are normal slow to return towards normal but her head in the right direction. He remains quite pleased with the results as to how he feels. Vital Signs:                                                 /66 (Site: Left Arm, Position: Sitting, Cuff Size: Medium Adult)   Pulse 92   Temp 98 °F (36.7 °C) (Oral)   Ht 5' 11\" (1.803 m)   Wt 174 lb 12.8 oz (79.3 kg)   SpO2 98%   BMI 24.38 kg/m²        CV:   Regular rate and rhythm with no rubs or murmurs. Pulm: Clear lung fields with no rales or rhonchi. Incisions:    Sternal incision is clean, dry and intact. Sternum is stable. Abd:  Soft  Ext: Leg incision is clean, dry and intact. No peripheral edema. Assessment/Plan:  Overall doing very well post CABG surgery. We discussed secondary risk prevention for cardiovascular disease. I gave the patient a copy of our protocol for the secondary risk prevention of cardiovascular disease. The patient may increase activities as he feels comfortable doing so. Follow-up with Efrain Mc and Ligia as prescribed. Follow-up with me in 1 month.     Dai Osuna MD  2/13/2018  3:47 PM

## 2018-02-13 NOTE — PATIENT INSTRUCTIONS
week, preferably daily. Make an appointment with your cardiologist Dr. Rodrigo Kitchen 3-4 weeks after your surgery. [x] WEIGHT REDUCTION: Your ideal body mass index is 18.5-24.9 kg/m2. Your Body mass index is 24.38 kg/m². You may calculate this by using the following formula: (weight in pounds X 0.45) / (height in inches X 0.0254) squared. A waist circumference of < 40 inches for men and < 35 inches for women is recommended. A 10% weight reduction can lower your risk of future events. Goal ~ 165 lbs. [x] DIABETES: Your HbA1c should be <7%. Diet, exercise, weight reduction and proper medications can reduce your body's tendency toward high blood sugar. Check with your PCP for assistance. [x] PROPER MEDICATIONS:  [x] Aspirin  [x] ACE inhibitor / ARB  (-opril / -sartan)  [x] Beta-blocker (-olol or -ilol)  [x] Statin    Risk factor management works. The person for whom this is most important is YOU. Post this information on your refrigerator or other prominent place as a reminder to you. Please call or write if you have further questions. We want you and your operation to last for many more years.     MAY LIFT MORE THAN 5 LBS ON 3/2/18

## 2018-02-19 ENCOUNTER — OFFICE VISIT (OUTPATIENT)
Dept: FAMILY MEDICINE CLINIC | Age: 78
End: 2018-02-19

## 2018-02-19 VITALS
WEIGHT: 176 LBS | DIASTOLIC BLOOD PRESSURE: 68 MMHG | OXYGEN SATURATION: 99 % | TEMPERATURE: 98 F | HEART RATE: 75 BPM | SYSTOLIC BLOOD PRESSURE: 126 MMHG | BODY MASS INDEX: 24.55 KG/M2

## 2018-02-19 DIAGNOSIS — I21.4 NSTEMI (NON-ST ELEVATED MYOCARDIAL INFARCTION) (HCC): Primary | ICD-10-CM

## 2018-02-19 DIAGNOSIS — I10 ESSENTIAL HYPERTENSION: ICD-10-CM

## 2018-02-19 DIAGNOSIS — R23.1 PALLOR: ICD-10-CM

## 2018-02-19 DIAGNOSIS — I25.10 CORONARY ARTERY DISEASE INVOLVING NATIVE CORONARY ARTERY OF NATIVE HEART WITHOUT ANGINA PECTORIS: ICD-10-CM

## 2018-02-19 DIAGNOSIS — R07.89 OTHER CHEST PAIN: ICD-10-CM

## 2018-02-19 PROCEDURE — 99214 OFFICE O/P EST MOD 30 MIN: CPT | Performed by: FAMILY MEDICINE

## 2018-02-19 PROCEDURE — 1123F ACP DISCUSS/DSCN MKR DOCD: CPT | Performed by: FAMILY MEDICINE

## 2018-02-19 PROCEDURE — 1111F DSCHRG MED/CURRENT MED MERGE: CPT | Performed by: FAMILY MEDICINE

## 2018-02-19 PROCEDURE — G8598 ASA/ANTIPLAT THER USED: HCPCS | Performed by: FAMILY MEDICINE

## 2018-02-19 PROCEDURE — G8427 DOCREV CUR MEDS BY ELIG CLIN: HCPCS | Performed by: FAMILY MEDICINE

## 2018-02-19 PROCEDURE — G8420 CALC BMI NORM PARAMETERS: HCPCS | Performed by: FAMILY MEDICINE

## 2018-02-19 PROCEDURE — 36415 COLL VENOUS BLD VENIPUNCTURE: CPT | Performed by: FAMILY MEDICINE

## 2018-02-19 PROCEDURE — 4040F PNEUMOC VAC/ADMIN/RCVD: CPT | Performed by: FAMILY MEDICINE

## 2018-02-19 PROCEDURE — 1036F TOBACCO NON-USER: CPT | Performed by: FAMILY MEDICINE

## 2018-02-19 PROCEDURE — G8483 FLU IMM NO ADMIN DOC REA: HCPCS | Performed by: FAMILY MEDICINE

## 2018-02-20 LAB
BASOPHILS ABSOLUTE: 0.1 K/UL (ref 0–0.2)
BASOPHILS RELATIVE PERCENT: 0.7 %
EOSINOPHILS ABSOLUTE: 0.6 K/UL (ref 0–0.6)
EOSINOPHILS RELATIVE PERCENT: 6 %
HCT VFR BLD CALC: 33.9 % (ref 40.5–52.5)
HEMOGLOBIN: 11.4 G/DL (ref 13.5–17.5)
LYMPHOCYTES ABSOLUTE: 1.2 K/UL (ref 1–5.1)
LYMPHOCYTES RELATIVE PERCENT: 11.2 %
MCH RBC QN AUTO: 31.6 PG (ref 26–34)
MCHC RBC AUTO-ENTMCNC: 33.6 G/DL (ref 31–36)
MCV RBC AUTO: 94.2 FL (ref 80–100)
MONOCYTES ABSOLUTE: 0.8 K/UL (ref 0–1.3)
MONOCYTES RELATIVE PERCENT: 8 %
NEUTROPHILS ABSOLUTE: 7.7 K/UL (ref 1.7–7.7)
NEUTROPHILS RELATIVE PERCENT: 74.1 %
PDW BLD-RTO: 13 % (ref 12.4–15.4)
PLATELET # BLD: 413 K/UL (ref 135–450)
PMV BLD AUTO: 9.7 FL (ref 5–10.5)
RBC # BLD: 3.6 M/UL (ref 4.2–5.9)
WBC # BLD: 10.5 K/UL (ref 4–11)

## 2018-03-02 RX ORDER — PRAVASTATIN SODIUM 40 MG
TABLET ORAL
Qty: 90 TABLET | Refills: 1 | Status: SHIPPED | OUTPATIENT
Start: 2018-03-02 | End: 2018-05-23 | Stop reason: SDUPTHER

## 2018-03-02 RX ORDER — LEVOTHYROXINE SODIUM 88 UG/1
88 TABLET ORAL DAILY
Qty: 90 TABLET | Refills: 1 | Status: SHIPPED | OUTPATIENT
Start: 2018-03-02 | End: 2018-05-23 | Stop reason: SDUPTHER

## 2018-03-05 ENCOUNTER — OFFICE VISIT (OUTPATIENT)
Dept: CARDIOLOGY CLINIC | Age: 78
End: 2018-03-05

## 2018-03-05 VITALS
OXYGEN SATURATION: 99 % | SYSTOLIC BLOOD PRESSURE: 118 MMHG | HEIGHT: 71 IN | WEIGHT: 182.5 LBS | HEART RATE: 96 BPM | DIASTOLIC BLOOD PRESSURE: 78 MMHG | BODY MASS INDEX: 25.55 KG/M2

## 2018-03-05 DIAGNOSIS — Z95.1 S/P CABG X 4: ICD-10-CM

## 2018-03-05 DIAGNOSIS — I25.10 CORONARY ARTERY DISEASE INVOLVING NATIVE CORONARY ARTERY OF NATIVE HEART WITHOUT ANGINA PECTORIS: Primary | ICD-10-CM

## 2018-03-05 DIAGNOSIS — E78.2 MIXED HYPERLIPIDEMIA: ICD-10-CM

## 2018-03-05 DIAGNOSIS — I10 ESSENTIAL HYPERTENSION: ICD-10-CM

## 2018-03-05 PROCEDURE — G8598 ASA/ANTIPLAT THER USED: HCPCS | Performed by: INTERNAL MEDICINE

## 2018-03-05 PROCEDURE — G8427 DOCREV CUR MEDS BY ELIG CLIN: HCPCS | Performed by: INTERNAL MEDICINE

## 2018-03-05 PROCEDURE — G8483 FLU IMM NO ADMIN DOC REA: HCPCS | Performed by: INTERNAL MEDICINE

## 2018-03-05 PROCEDURE — G8417 CALC BMI ABV UP PARAM F/U: HCPCS | Performed by: INTERNAL MEDICINE

## 2018-03-05 PROCEDURE — 1036F TOBACCO NON-USER: CPT | Performed by: INTERNAL MEDICINE

## 2018-03-05 PROCEDURE — 1111F DSCHRG MED/CURRENT MED MERGE: CPT | Performed by: INTERNAL MEDICINE

## 2018-03-05 PROCEDURE — 4040F PNEUMOC VAC/ADMIN/RCVD: CPT | Performed by: INTERNAL MEDICINE

## 2018-03-05 PROCEDURE — 1123F ACP DISCUSS/DSCN MKR DOCD: CPT | Performed by: INTERNAL MEDICINE

## 2018-03-05 PROCEDURE — 99214 OFFICE O/P EST MOD 30 MIN: CPT | Performed by: INTERNAL MEDICINE

## 2018-03-05 RX ORDER — FERROUS SULFATE 325(65) MG
325 TABLET ORAL
COMMUNITY

## 2018-03-05 RX ORDER — LOSARTAN POTASSIUM 25 MG/1
25 TABLET ORAL DAILY
Qty: 90 TABLET | Refills: 3 | Status: SHIPPED | OUTPATIENT
Start: 2018-03-05 | End: 2018-05-04 | Stop reason: DRUGHIGH

## 2018-03-05 RX ORDER — MULTIVIT WITH MINERALS/LUTEIN
250 TABLET ORAL 2 TIMES DAILY
COMMUNITY
End: 2018-05-21

## 2018-03-05 RX ORDER — METOPROLOL TARTRATE 50 MG/1
50 TABLET, FILM COATED ORAL 2 TIMES DAILY
Qty: 180 TABLET | Refills: 3 | Status: SHIPPED | OUTPATIENT
Start: 2018-03-05 | End: 2019-02-05 | Stop reason: SDUPTHER

## 2018-03-05 RX ORDER — ASPIRIN 81 MG/1
81 TABLET ORAL DAILY
Qty: 30 TABLET | Refills: 0 | Status: SHIPPED
Start: 2018-03-05

## 2018-03-05 NOTE — PROGRESS NOTES
Coenzyme Q10 (CO Q 10 PO) Take 1 capsule by mouth daily. Yes Historical Provider, MD   NITROSTAT 0.4 MG SL tablet PLACE 1 TABLET UNDER THE TONGUE EVERY 5 MINUTES ASNEEDED 7/12/12  Yes Vera Caban MD   metoprolol (TOPROL-XL) 50 MG XL tablet TAKE 1 TABLET BY MOUTH ONCE DAILY 6/20/12  Yes Vera Caban MD   levothyroxine (SYNTHROID) 75 MCG tablet TAKE 1 TABLET BY MOUTH ONCE DAILY 2/8/12  Yes Vera Caban MD   losartan-hydrochlorothiazide (HYZAAR) 100-25 MG per tablet Take 1 tablet by mouth daily for 360 days. 1/18/12 1/12/13 Yes Marv Hanna MD   aspirin 81 MG EC tablet Take 81 mg by mouth daily. 2 daily     Yes Historical Provider, MD   Blood Glucose Monitoring Suppl (ONE TOUCH ULTRA) JUAN FRANCISCO by Does not apply route. 6/22/10  Yes Vera Caban MD   Multiple Vitamins-Minerals (MULTIVITAL PO) Take  by mouth daily. 5/24/10  Yes Historical Provider, MD   Omega-3 Fatty Acids (FISH OIL) 1200 MG CAPS Take  by mouth 2 times daily. 5/24/10  Yes Historical Provider, MD   Ascorbic Acid (VITAMIN C) 500 MG tablet Take 500 mg by mouth daily. Yes Historical Provider, MD   Cholecalciferol (VITAMIN D3) 1000 UNIT CAPS Take  by mouth daily. 5/24/10  Yes Historical Provider, MD   vitamin E 200 UNIT capsule Take 200 Units by mouth daily. Yes Historical Provider, MD        Allergies:  Lipitor and Simvastatin     Review of Systems:   · Constitutional: there has been no unanticipated weight loss. There's been no change in energy level, sleep pattern, or activity level. · Eyes: No visual changes or diplopia. No scleral icterus. · ENT: No Headaches, hearing loss or vertigo. No mouth sores or sore throat. · Cardiovascular: Reviewed in HPI  · Respiratory: No cough or wheezing, no sputum production. No hematemesis. · Gastrointestinal: No abdominal pain, appetite loss, blood in stools. No change in bowel or bladder habits. · Genitourinary: No dysuria, trouble voiding, or hematuria.   · Musculoskeletal:  No gait

## 2018-03-05 NOTE — PATIENT INSTRUCTIONS
Plan:   1. No need for cardiac testing at this time. 2.   OK to wear compression stockings if needed keep feet elevated while sitting  3. Referral to cardiac rehab  4. Will decrease Aspirin to 81 mg 1 daily and increase Metoprolol to 50 mg twice a day  5.  Follow up in 3 months

## 2018-03-07 NOTE — COMMUNICATION BODY
[facility], [place]   Cardiac Followup    Referring Provider:  Ranulfo Ball MD     Chief Complaint   Patient presents with    Follow-Up from Hospital     s/p CABG x 4 1/31/18      Subjective: Mr Jenny Mandel being seen today for cardiology follow up of his CAD, HTN, HLD;   recent CABG; no complaints today     History of Present Illness:    Mr. Jenny Mandel is a 11EB male hx of embolic R. CVA in 4/34, also hx of CAD s/p 4 stents prior (most recent October 2000), HTN, DM, mild carotid artery disease, hyperlipidemia, and hypothyroidism. He had some myalgias with statins but he is taking 40 mg pravachol with coenzyme Q10 and tolerating. Note ECHO in August 2009 showed normal EF=55% with mild diastolic noncompliance. Note nuclear stress test in February 2006. He states he is able to walk with minimal leg cramping. He did trial off of his statins for a short time and also tried lower dose 20mg but this did not have any relief. He is back on 40mg tablet. Admitted to [facility]. 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 Most recent EKG 04/12/2016 showed NSR; first degree AVB; possible LVH. Note most recent ECHO 04/12/2016 with EF of 55-60%, mild MR/TR. Patient is currently on Plavix and ASA with plavix being new due to CVA. Note event monitor 05/03-06/02/2016 with NSR, with occasional PVC's and overall normal study. Hw reports to leaving the house that day and had left-sided numbness/weakness acutely. Most recent BLE LUCY 6/23/17 Right LUCY 1.33 left LUCY 1.31  Carotid US 6/23/2017 less than 50% bilateral (no change from 4/16 study). Most recent exercise stress test 12/27/17 showed small area of ischemia lateral apex, EF 47% with hypokinesis. This test was ordered by Dr. Andrae Fortune for chest pain. Patient complained of new-onset chest and arm pain, starting in November.  Due to abnormal nuc stress he underwent most

## 2018-03-13 ENCOUNTER — OFFICE VISIT (OUTPATIENT)
Dept: CARDIOTHORACIC SURGERY | Age: 78
End: 2018-03-13

## 2018-03-13 VITALS
DIASTOLIC BLOOD PRESSURE: 74 MMHG | SYSTOLIC BLOOD PRESSURE: 132 MMHG | BODY MASS INDEX: 25.7 KG/M2 | HEART RATE: 71 BPM | TEMPERATURE: 97.4 F | WEIGHT: 183.6 LBS | OXYGEN SATURATION: 99 % | HEIGHT: 71 IN

## 2018-03-13 DIAGNOSIS — Z48.812 AFTERCARE FOLLOWING SURGERY OF THE CIRCULATORY SYSTEM: Primary | ICD-10-CM

## 2018-03-13 DIAGNOSIS — I10 ESSENTIAL HYPERTENSION: ICD-10-CM

## 2018-03-13 DIAGNOSIS — I25.10 CORONARY ARTERY DISEASE INVOLVING NATIVE CORONARY ARTERY OF NATIVE HEART WITHOUT ANGINA PECTORIS: ICD-10-CM

## 2018-03-13 DIAGNOSIS — Z95.1 S/P CABG X 4: ICD-10-CM

## 2018-03-13 DIAGNOSIS — E78.2 MIXED HYPERLIPIDEMIA: ICD-10-CM

## 2018-03-13 PROCEDURE — 99024 POSTOP FOLLOW-UP VISIT: CPT | Performed by: THORACIC SURGERY (CARDIOTHORACIC VASCULAR SURGERY)

## 2018-04-02 ENCOUNTER — HOSPITAL ENCOUNTER (OUTPATIENT)
Dept: OTHER | Age: 78
Discharge: HOME | End: 2018-04-30
Attending: INTERNAL MEDICINE | Admitting: INTERNAL MEDICINE

## 2018-04-09 ENCOUNTER — OFFICE VISIT (OUTPATIENT)
Dept: FAMILY MEDICINE CLINIC | Age: 78
End: 2018-04-09

## 2018-04-09 VITALS
BODY MASS INDEX: 26.43 KG/M2 | WEIGHT: 188.8 LBS | DIASTOLIC BLOOD PRESSURE: 68 MMHG | TEMPERATURE: 98 F | HEIGHT: 71 IN | OXYGEN SATURATION: 99 % | SYSTOLIC BLOOD PRESSURE: 132 MMHG | HEART RATE: 79 BPM

## 2018-04-09 DIAGNOSIS — M79.89 SWELLING OF BOTH LOWER EXTREMITIES: ICD-10-CM

## 2018-04-09 DIAGNOSIS — I10 ESSENTIAL HYPERTENSION: Primary | ICD-10-CM

## 2018-04-09 DIAGNOSIS — R06.09 DYSPNEA ON EXERTION: ICD-10-CM

## 2018-04-09 LAB
GLUCOSE BLD-MCNC: 79 MG/DL (ref 70–99)
PERFORMED ON: NORMAL

## 2018-04-09 PROCEDURE — G8417 CALC BMI ABV UP PARAM F/U: HCPCS | Performed by: FAMILY MEDICINE

## 2018-04-09 PROCEDURE — G8598 ASA/ANTIPLAT THER USED: HCPCS | Performed by: FAMILY MEDICINE

## 2018-04-09 PROCEDURE — 1123F ACP DISCUSS/DSCN MKR DOCD: CPT | Performed by: FAMILY MEDICINE

## 2018-04-09 PROCEDURE — 36415 COLL VENOUS BLD VENIPUNCTURE: CPT | Performed by: FAMILY MEDICINE

## 2018-04-09 PROCEDURE — 1036F TOBACCO NON-USER: CPT | Performed by: FAMILY MEDICINE

## 2018-04-09 PROCEDURE — 99213 OFFICE O/P EST LOW 20 MIN: CPT | Performed by: FAMILY MEDICINE

## 2018-04-09 PROCEDURE — G8427 DOCREV CUR MEDS BY ELIG CLIN: HCPCS | Performed by: FAMILY MEDICINE

## 2018-04-09 PROCEDURE — 4040F PNEUMOC VAC/ADMIN/RCVD: CPT | Performed by: FAMILY MEDICINE

## 2018-04-09 RX ORDER — CHLORAL HYDRATE 500 MG
1000 CAPSULE ORAL DAILY
COMMUNITY
End: 2018-05-04

## 2018-04-09 RX ORDER — M-VIT,TX,IRON,MINS/CALC/FOLIC 27MG-0.4MG
1 TABLET ORAL DAILY
COMMUNITY
End: 2018-04-09

## 2018-04-09 RX ORDER — VITAMIN B COMPLEX
TABLET ORAL
COMMUNITY
End: 2018-07-02

## 2018-04-09 RX ORDER — HYDROCHLOROTHIAZIDE 25 MG/1
25 TABLET ORAL DAILY
COMMUNITY
End: 2018-04-09

## 2018-04-09 RX ORDER — CHLORTHALIDONE 25 MG/1
25 TABLET ORAL DAILY
Qty: 30 TABLET | Refills: 5 | Status: SHIPPED | OUTPATIENT
Start: 2018-04-09 | End: 2018-05-04

## 2018-04-10 LAB
ANION GAP SERPL CALCULATED.3IONS-SCNC: 17 MMOL/L (ref 3–16)
BASOPHILS ABSOLUTE: 0.1 K/UL (ref 0–0.2)
BASOPHILS RELATIVE PERCENT: 0.9 %
BUN BLDV-MCNC: 20 MG/DL (ref 7–20)
CALCIUM SERPL-MCNC: 9 MG/DL (ref 8.3–10.6)
CHLORIDE BLD-SCNC: 93 MMOL/L (ref 99–110)
CO2: 25 MMOL/L (ref 21–32)
CREAT SERPL-MCNC: 0.9 MG/DL (ref 0.8–1.3)
EOSINOPHILS ABSOLUTE: 0.4 K/UL (ref 0–0.6)
EOSINOPHILS RELATIVE PERCENT: 4.1 %
GFR AFRICAN AMERICAN: >60
GFR NON-AFRICAN AMERICAN: >60
GLUCOSE BLD-MCNC: 121 MG/DL (ref 70–99)
HCT VFR BLD CALC: 33.2 % (ref 40.5–52.5)
HEMOGLOBIN: 11.1 G/DL (ref 13.5–17.5)
LYMPHOCYTES ABSOLUTE: 1.4 K/UL (ref 1–5.1)
LYMPHOCYTES RELATIVE PERCENT: 13.2 %
MCH RBC QN AUTO: 29.3 PG (ref 26–34)
MCHC RBC AUTO-ENTMCNC: 33.5 G/DL (ref 31–36)
MCV RBC AUTO: 87.3 FL (ref 80–100)
MONOCYTES ABSOLUTE: 0.7 K/UL (ref 0–1.3)
MONOCYTES RELATIVE PERCENT: 6.4 %
NEUTROPHILS ABSOLUTE: 7.8 K/UL (ref 1.7–7.7)
NEUTROPHILS RELATIVE PERCENT: 75.4 %
PDW BLD-RTO: 15.3 % (ref 12.4–15.4)
PLATELET # BLD: 298 K/UL (ref 135–450)
PMV BLD AUTO: 9.6 FL (ref 5–10.5)
POTASSIUM SERPL-SCNC: 4.4 MMOL/L (ref 3.5–5.1)
PRO-BNP: 1658 PG/ML (ref 0–449)
RBC # BLD: 3.8 M/UL (ref 4.2–5.9)
SODIUM BLD-SCNC: 135 MMOL/L (ref 136–145)
WBC # BLD: 10.3 K/UL (ref 4–11)

## 2018-04-11 LAB
GLUCOSE BLD-MCNC: 104 MG/DL (ref 70–99)
PERFORMED ON: ABNORMAL

## 2018-04-18 LAB
GLUCOSE BLD-MCNC: 108 MG/DL (ref 70–99)
PERFORMED ON: ABNORMAL

## 2018-05-01 ENCOUNTER — HOSPITAL ENCOUNTER (OUTPATIENT)
Dept: OTHER | Age: 78
Discharge: HOME | End: 2018-05-31
Attending: INTERNAL MEDICINE | Admitting: INTERNAL MEDICINE

## 2018-05-04 ENCOUNTER — HOSPITAL ENCOUNTER (OUTPATIENT)
Dept: OTHER | Age: 78
Discharge: HOME | End: 2018-05-04
Attending: FAMILY MEDICINE | Admitting: FAMILY MEDICINE

## 2018-05-04 ENCOUNTER — OFFICE VISIT (OUTPATIENT)
Dept: FAMILY MEDICINE CLINIC | Age: 78
End: 2018-05-04

## 2018-05-04 VITALS
TEMPERATURE: 98.1 F | WEIGHT: 191 LBS | BODY MASS INDEX: 27.02 KG/M2 | OXYGEN SATURATION: 98 % | SYSTOLIC BLOOD PRESSURE: 134 MMHG | DIASTOLIC BLOOD PRESSURE: 70 MMHG | HEART RATE: 79 BPM

## 2018-05-04 DIAGNOSIS — R06.02 SOB (SHORTNESS OF BREATH): ICD-10-CM

## 2018-05-04 DIAGNOSIS — R25.2 BILATERAL LEG CRAMPS: ICD-10-CM

## 2018-05-04 DIAGNOSIS — I73.9 CLAUDICATION (HCC): ICD-10-CM

## 2018-05-04 DIAGNOSIS — I10 ESSENTIAL HYPERTENSION: Primary | ICD-10-CM

## 2018-05-04 DIAGNOSIS — M79.89 SWELLING OF BOTH LOWER EXTREMITIES: ICD-10-CM

## 2018-05-04 PROBLEM — R07.9 CHEST PAIN WITH HIGH RISK FOR CARDIAC ETIOLOGY: Status: RESOLVED | Noted: 2018-01-11 | Resolved: 2018-05-04

## 2018-05-04 PROCEDURE — G8598 ASA/ANTIPLAT THER USED: HCPCS | Performed by: FAMILY MEDICINE

## 2018-05-04 PROCEDURE — 4040F PNEUMOC VAC/ADMIN/RCVD: CPT | Performed by: FAMILY MEDICINE

## 2018-05-04 PROCEDURE — G8417 CALC BMI ABV UP PARAM F/U: HCPCS | Performed by: FAMILY MEDICINE

## 2018-05-04 PROCEDURE — 1123F ACP DISCUSS/DSCN MKR DOCD: CPT | Performed by: FAMILY MEDICINE

## 2018-05-04 PROCEDURE — 1036F TOBACCO NON-USER: CPT | Performed by: FAMILY MEDICINE

## 2018-05-04 PROCEDURE — 99214 OFFICE O/P EST MOD 30 MIN: CPT | Performed by: FAMILY MEDICINE

## 2018-05-04 PROCEDURE — G8427 DOCREV CUR MEDS BY ELIG CLIN: HCPCS | Performed by: FAMILY MEDICINE

## 2018-05-04 RX ORDER — LOSARTAN POTASSIUM 25 MG/1
50 TABLET ORAL DAILY
Qty: 1 TABLET | Refills: 0 | Status: SHIPPED
Start: 2018-05-04 | End: 2018-07-03 | Stop reason: DRUGHIGH

## 2018-05-04 RX ORDER — FUROSEMIDE 40 MG/1
40 TABLET ORAL 2 TIMES DAILY PRN
Qty: 60 TABLET | Refills: 5 | Status: SHIPPED | OUTPATIENT
Start: 2018-05-04 | End: 2018-11-21 | Stop reason: SDUPTHER

## 2018-05-07 ENCOUNTER — TELEPHONE (OUTPATIENT)
Dept: FAMILY MEDICINE CLINIC | Age: 78
End: 2018-05-07

## 2018-05-14 ENCOUNTER — HOSPITAL ENCOUNTER (OUTPATIENT)
Dept: VASCULAR LAB | Age: 78
Discharge: HOME | End: 2018-05-14
Attending: FAMILY MEDICINE | Admitting: FAMILY MEDICINE

## 2018-05-14 DIAGNOSIS — I73.9 PERIPHERAL VASCULAR DISEASE (HCC): ICD-10-CM

## 2018-05-21 ENCOUNTER — OFFICE VISIT (OUTPATIENT)
Dept: FAMILY MEDICINE CLINIC | Age: 78
End: 2018-05-21

## 2018-05-21 VITALS
HEART RATE: 80 BPM | OXYGEN SATURATION: 98 % | DIASTOLIC BLOOD PRESSURE: 72 MMHG | BODY MASS INDEX: 25.89 KG/M2 | SYSTOLIC BLOOD PRESSURE: 128 MMHG | WEIGHT: 183 LBS | TEMPERATURE: 98.7 F

## 2018-05-21 DIAGNOSIS — M79.89 SWELLING OF BOTH LOWER EXTREMITIES: ICD-10-CM

## 2018-05-21 DIAGNOSIS — I10 ESSENTIAL HYPERTENSION: ICD-10-CM

## 2018-05-21 DIAGNOSIS — R06.02 SHORT OF BREATH ON EXERTION: ICD-10-CM

## 2018-05-21 DIAGNOSIS — M79.604 PAIN IN BOTH LOWER EXTREMITIES: ICD-10-CM

## 2018-05-21 DIAGNOSIS — M79.605 PAIN IN BOTH LOWER EXTREMITIES: ICD-10-CM

## 2018-05-21 DIAGNOSIS — J90 PLEURAL EFFUSION: Primary | ICD-10-CM

## 2018-05-21 LAB
ANION GAP SERPL CALCULATED.3IONS-SCNC: 16 MMOL/L (ref 3–16)
BUN BLDV-MCNC: 20 MG/DL (ref 7–20)
CALCIUM SERPL-MCNC: 9.6 MG/DL (ref 8.3–10.6)
CHLORIDE BLD-SCNC: 94 MMOL/L (ref 99–110)
CO2: 29 MMOL/L (ref 21–32)
CREAT SERPL-MCNC: 1 MG/DL (ref 0.8–1.3)
GFR AFRICAN AMERICAN: >60
GFR NON-AFRICAN AMERICAN: >60
GLUCOSE BLD-MCNC: 139 MG/DL (ref 70–99)
POTASSIUM SERPL-SCNC: 4.8 MMOL/L (ref 3.5–5.1)
SODIUM BLD-SCNC: 139 MMOL/L (ref 136–145)

## 2018-05-21 PROCEDURE — G8598 ASA/ANTIPLAT THER USED: HCPCS | Performed by: FAMILY MEDICINE

## 2018-05-21 PROCEDURE — G8417 CALC BMI ABV UP PARAM F/U: HCPCS | Performed by: FAMILY MEDICINE

## 2018-05-21 PROCEDURE — 1036F TOBACCO NON-USER: CPT | Performed by: FAMILY MEDICINE

## 2018-05-21 PROCEDURE — 4040F PNEUMOC VAC/ADMIN/RCVD: CPT | Performed by: FAMILY MEDICINE

## 2018-05-21 PROCEDURE — G8427 DOCREV CUR MEDS BY ELIG CLIN: HCPCS | Performed by: FAMILY MEDICINE

## 2018-05-21 PROCEDURE — 1123F ACP DISCUSS/DSCN MKR DOCD: CPT | Performed by: FAMILY MEDICINE

## 2018-05-21 PROCEDURE — 99214 OFFICE O/P EST MOD 30 MIN: CPT | Performed by: FAMILY MEDICINE

## 2018-05-21 PROCEDURE — 36415 COLL VENOUS BLD VENIPUNCTURE: CPT | Performed by: FAMILY MEDICINE

## 2018-05-23 RX ORDER — PRAVASTATIN SODIUM 40 MG
TABLET ORAL
Qty: 90 TABLET | Refills: 1 | Status: SHIPPED | OUTPATIENT
Start: 2018-05-23 | End: 2018-07-03

## 2018-05-23 RX ORDER — LEVOTHYROXINE SODIUM 88 UG/1
TABLET ORAL
Qty: 90 TABLET | Refills: 1 | Status: SHIPPED | OUTPATIENT
Start: 2018-05-23 | End: 2019-03-13 | Stop reason: SDUPTHER

## 2018-05-24 ENCOUNTER — HOSPITAL ENCOUNTER (OUTPATIENT)
Dept: OTHER | Age: 78
Discharge: HOME | End: 2018-05-24
Attending: FAMILY MEDICINE | Admitting: FAMILY MEDICINE

## 2018-05-24 DIAGNOSIS — J91.8 PLEURAL EFFUSION ASSOCIATED WITH PANCREATITIS: ICD-10-CM

## 2018-05-24 DIAGNOSIS — K85.90 PLEURAL EFFUSION ASSOCIATED WITH PANCREATITIS: ICD-10-CM

## 2018-06-01 ENCOUNTER — HOSPITAL ENCOUNTER (OUTPATIENT)
Dept: OTHER | Age: 78
Discharge: HOME | End: 2018-06-30
Attending: INTERNAL MEDICINE | Admitting: INTERNAL MEDICINE

## 2018-06-05 ENCOUNTER — OFFICE VISIT (OUTPATIENT)
Dept: CARDIOLOGY CLINIC | Age: 78
End: 2018-06-05

## 2018-06-05 VITALS
WEIGHT: 179 LBS | HEART RATE: 77 BPM | BODY MASS INDEX: 25.62 KG/M2 | SYSTOLIC BLOOD PRESSURE: 134 MMHG | OXYGEN SATURATION: 97 % | DIASTOLIC BLOOD PRESSURE: 68 MMHG | HEIGHT: 70 IN

## 2018-06-05 DIAGNOSIS — Z95.1 S/P CABG X 4: ICD-10-CM

## 2018-06-05 DIAGNOSIS — E78.2 MIXED HYPERLIPIDEMIA: ICD-10-CM

## 2018-06-05 DIAGNOSIS — I10 ESSENTIAL HYPERTENSION: ICD-10-CM

## 2018-06-05 DIAGNOSIS — I25.10 CORONARY ARTERY DISEASE INVOLVING NATIVE CORONARY ARTERY OF NATIVE HEART WITHOUT ANGINA PECTORIS: Primary | ICD-10-CM

## 2018-06-05 PROCEDURE — 99214 OFFICE O/P EST MOD 30 MIN: CPT | Performed by: INTERNAL MEDICINE

## 2018-06-05 PROCEDURE — G8598 ASA/ANTIPLAT THER USED: HCPCS | Performed by: INTERNAL MEDICINE

## 2018-06-05 PROCEDURE — 1036F TOBACCO NON-USER: CPT | Performed by: INTERNAL MEDICINE

## 2018-06-05 PROCEDURE — 4040F PNEUMOC VAC/ADMIN/RCVD: CPT | Performed by: INTERNAL MEDICINE

## 2018-06-05 PROCEDURE — G8417 CALC BMI ABV UP PARAM F/U: HCPCS | Performed by: INTERNAL MEDICINE

## 2018-06-05 PROCEDURE — G8427 DOCREV CUR MEDS BY ELIG CLIN: HCPCS | Performed by: INTERNAL MEDICINE

## 2018-06-05 PROCEDURE — 1123F ACP DISCUSS/DSCN MKR DOCD: CPT | Performed by: INTERNAL MEDICINE

## 2018-06-05 RX ORDER — METOLAZONE 5 MG/1
5 TABLET ORAL
Qty: 30 TABLET | Refills: 3 | Status: SHIPPED | OUTPATIENT
Start: 2018-06-06 | End: 2018-12-11 | Stop reason: SDUPTHER

## 2018-06-05 RX ORDER — MAGNESIUM OXIDE 400 MG/1
400 TABLET ORAL DAILY
COMMUNITY

## 2018-06-05 RX ORDER — ASCORBIC ACID 500 MG
500 TABLET ORAL DAILY
COMMUNITY
End: 2018-07-03

## 2018-06-21 LAB
GLUCOSE BLD-MCNC: 129 MG/DL (ref 70–99)
PERFORMED ON: ABNORMAL

## 2018-07-01 ENCOUNTER — HOSPITAL ENCOUNTER (OUTPATIENT)
Dept: OTHER | Age: 78
Discharge: HOME | End: 2018-07-01
Attending: INTERNAL MEDICINE | Admitting: INTERNAL MEDICINE

## 2018-07-03 ENCOUNTER — OFFICE VISIT (OUTPATIENT)
Dept: FAMILY MEDICINE CLINIC | Age: 78
End: 2018-07-03

## 2018-07-03 VITALS
TEMPERATURE: 98.1 F | DIASTOLIC BLOOD PRESSURE: 70 MMHG | WEIGHT: 180 LBS | BODY MASS INDEX: 25.83 KG/M2 | OXYGEN SATURATION: 98 % | HEART RATE: 68 BPM | SYSTOLIC BLOOD PRESSURE: 134 MMHG

## 2018-07-03 DIAGNOSIS — I25.10 CORONARY ARTERY DISEASE INVOLVING NATIVE CORONARY ARTERY OF NATIVE HEART WITHOUT ANGINA PECTORIS: ICD-10-CM

## 2018-07-03 DIAGNOSIS — E06.3 HYPOTHYROIDISM, ACQUIRED, AUTOIMMUNE: ICD-10-CM

## 2018-07-03 DIAGNOSIS — R06.02 SOB (SHORTNESS OF BREATH): ICD-10-CM

## 2018-07-03 DIAGNOSIS — I10 ESSENTIAL HYPERTENSION: ICD-10-CM

## 2018-07-03 DIAGNOSIS — E11.9 TYPE 2 DIABETES MELLITUS WITHOUT COMPLICATION, WITHOUT LONG-TERM CURRENT USE OF INSULIN (HCC): Primary | ICD-10-CM

## 2018-07-03 DIAGNOSIS — E78.00 PURE HYPERCHOLESTEROLEMIA: ICD-10-CM

## 2018-07-03 LAB — HBA1C MFR BLD: 6.6 %

## 2018-07-03 PROCEDURE — 36415 COLL VENOUS BLD VENIPUNCTURE: CPT | Performed by: FAMILY MEDICINE

## 2018-07-03 PROCEDURE — 4040F PNEUMOC VAC/ADMIN/RCVD: CPT | Performed by: FAMILY MEDICINE

## 2018-07-03 PROCEDURE — 1123F ACP DISCUSS/DSCN MKR DOCD: CPT | Performed by: FAMILY MEDICINE

## 2018-07-03 PROCEDURE — G8598 ASA/ANTIPLAT THER USED: HCPCS | Performed by: FAMILY MEDICINE

## 2018-07-03 PROCEDURE — G8427 DOCREV CUR MEDS BY ELIG CLIN: HCPCS | Performed by: FAMILY MEDICINE

## 2018-07-03 PROCEDURE — 99214 OFFICE O/P EST MOD 30 MIN: CPT | Performed by: FAMILY MEDICINE

## 2018-07-03 PROCEDURE — 1036F TOBACCO NON-USER: CPT | Performed by: FAMILY MEDICINE

## 2018-07-03 PROCEDURE — 83036 HEMOGLOBIN GLYCOSYLATED A1C: CPT | Performed by: FAMILY MEDICINE

## 2018-07-03 PROCEDURE — G8417 CALC BMI ABV UP PARAM F/U: HCPCS | Performed by: FAMILY MEDICINE

## 2018-07-03 RX ORDER — LOSARTAN POTASSIUM 50 MG/1
50 TABLET ORAL DAILY
COMMUNITY
End: 2018-07-03 | Stop reason: SDUPTHER

## 2018-07-03 RX ORDER — LOSARTAN POTASSIUM 50 MG/1
50 TABLET ORAL DAILY
Qty: 90 TABLET | Refills: 1 | Status: SHIPPED | OUTPATIENT
Start: 2018-07-03 | End: 2018-10-22 | Stop reason: DRUGHIGH

## 2018-07-03 RX ORDER — PRAVASTATIN SODIUM 20 MG
20 TABLET ORAL DAILY
COMMUNITY
End: 2019-05-08 | Stop reason: ALTCHOICE

## 2018-07-03 RX ORDER — EZETIMIBE 10 MG/1
10 TABLET ORAL DAILY
Qty: 90 TABLET | Refills: 3 | Status: SHIPPED | OUTPATIENT
Start: 2018-07-03 | End: 2018-12-12 | Stop reason: SDUPTHER

## 2018-07-03 RX ORDER — EZETIMIBE 10 MG/1
10 TABLET ORAL DAILY
Qty: 90 TABLET | Refills: 3 | Status: SHIPPED | OUTPATIENT
Start: 2018-07-03 | End: 2018-07-03 | Stop reason: SDUPTHER

## 2018-07-03 NOTE — PATIENT INSTRUCTIONS
Please read the healthy family handout that you were given and share it with your family. Please compare this printed medication list with your medications at home to be sure they are the same. If you have any medications that are different please contact us immediately at [phone]. Also review your allergies that we have listed, these may also include medications that you have not been able to tolerate, make sure everything listed is correct. If you have any allergies that are different please contact us immediately at [phone].

## 2018-07-03 NOTE — PROGRESS NOTES
Assessment and plan  1. Type 2 diabetes mellitus without complication, without long-term current use of insulin (HCC)  Stable. At follow-up we'll discuss the addition of Jarndiance. - POCT glycosylated hemoglobin (Hb A1C)  - Vitamin B12  -  DIABETES FOOT EXAM    2. SOB (shortness of breath)  He is clearly improved with fluid reduction. I'm suspicious he has diastolic CHF. Reviewed use of Zaroxolyn t.i.w. p.r.n.  - Handicap placard  - Basic Metabolic Panel  - Brain Natriuretic Peptide    3. Coronary artery disease involving native coronary artery of native heart without angina pectoris  Stable    4. Pure hypercholesterolemia  Statin intolerance. Continue pravastatin 20 mg a day, add Zetia 10 mg a day    5. Essential hypertension  Stable    6. Hypothyroidism, acquired, autoimmune   - status pending result of lab  - TSH with Reflex    Healthy Family prevention recommendations given. Continue all current prescription medications as listed below. I recommended the Shingrix vaccine series. I also recommended Prevnar and Pneumovax. However he continues to decline all immunizations. RTC 4 months or sooner prn. Subjective  Patient returns for reevaluation of his medical problems including diabetes, hypothyroidism, dyspnea, pleural effusion, hypertension, hypercholesterolemia. His A1c today is 6.6%. He reports his dyspnea is clearly improving although he has residual symptoms. He saw Dr. Corine Flaherty who added Zaroxolyn to be used p.r.n. However the patient did not under stand how he was to take it so he has not started it yet. He continues on furosemide. He reports his edema is better. He is taking deeper breast and is less dyspneic but still has residual symptoms. His blood pressures been good. His last lipid his LDL was 81. However reports he had to reduce his pravastatin down to 20 mg due to myalgias. They've now improved. He  reports that he quit smoking about 43 years ago.  His smoking use tablet Take 20 mg by mouth daily      metFORMIN (GLUCOPHAGE) 1000 MG tablet TAKE 1 TABLET TWICE DAILY WITH MEALS 180 tablet 1    losartan (COZAAR) 50 MG tablet Take 1 tablet by mouth daily 90 tablet 1    ezetimibe (ZETIA) 10 MG tablet Take 1 tablet by mouth daily For cholesterol 90 tablet 3    magnesium oxide (MAG-OX) 400 MG tablet Take 400 mg by mouth daily      levothyroxine (SYNTHROID) 88 MCG tablet TAKE 1 TABLET EVERY DAY 90 tablet 1    furosemide (LASIX) 40 MG tablet Take 1 tablet by mouth 2 times daily as needed (swelling) 60 tablet 5    ferrous sulfate 325 (65 Fe) MG tablet Take 325 mg by mouth 2 times daily      aspirin 81 MG EC tablet Take 1 tablet by mouth daily 30 tablet 0    metoprolol tartrate (LOPRESSOR) 50 MG tablet Take 1 tablet by mouth 2 times daily 180 tablet 3    Cholecalciferol (VITAMIN D) 2000 UNITS CAPS capsule Take  by mouth daily. The note was completed using Dragon voice recognition transcription. Every effort was made to ensure accuracy; however, inadvertent  transcription errors may be present despite my best efforts to edit errors.

## 2018-07-04 LAB
ANION GAP SERPL CALCULATED.3IONS-SCNC: 15 MMOL/L (ref 3–16)
BUN BLDV-MCNC: 25 MG/DL (ref 7–20)
CALCIUM SERPL-MCNC: 10 MG/DL (ref 8.3–10.6)
CHLORIDE BLD-SCNC: 95 MMOL/L (ref 99–110)
CO2: 27 MMOL/L (ref 21–32)
CREAT SERPL-MCNC: 1 MG/DL (ref 0.8–1.3)
GFR AFRICAN AMERICAN: >60
GFR NON-AFRICAN AMERICAN: >60
GLUCOSE BLD-MCNC: 86 MG/DL (ref 70–99)
POTASSIUM SERPL-SCNC: 4.8 MMOL/L (ref 3.5–5.1)
PRO-BNP: 1568 PG/ML (ref 0–449)
SODIUM BLD-SCNC: 137 MMOL/L (ref 136–145)
TSH REFLEX: 3.38 UIU/ML (ref 0.27–4.2)
VITAMIN B-12: 582 PG/ML (ref 211–911)

## 2018-07-16 ENCOUNTER — HOSPITAL ENCOUNTER (OUTPATIENT)
Dept: CARDIAC REHAB | Age: 78
Setting detail: THERAPIES SERIES
Discharge: HOME | End: 2018-07-16
Payer: MEDICARE

## 2018-07-16 PROCEDURE — 93798 PHYS/QHP OP CAR RHAB W/ECG: CPT

## 2018-07-18 ENCOUNTER — HOSPITAL ENCOUNTER (OUTPATIENT)
Dept: CARDIAC REHAB | Age: 78
Setting detail: THERAPIES SERIES
Discharge: HOME | End: 2018-07-18
Payer: MEDICARE

## 2018-07-18 PROCEDURE — 93798 PHYS/QHP OP CAR RHAB W/ECG: CPT

## 2018-07-27 ENCOUNTER — APPOINTMENT (OUTPATIENT)
Dept: CARDIAC REHAB | Age: 78
End: 2018-07-27
Payer: MEDICARE

## 2018-07-30 ENCOUNTER — APPOINTMENT (OUTPATIENT)
Dept: CARDIAC REHAB | Age: 78
End: 2018-07-30
Payer: MEDICARE

## 2018-08-03 RX ORDER — LANCETS
EACH MISCELLANEOUS
Qty: 200 EACH | Refills: 3 | Status: SHIPPED | OUTPATIENT
Start: 2018-08-03 | End: 2018-09-18

## 2018-09-07 ENCOUNTER — TELEPHONE (OUTPATIENT)
Dept: FAMILY MEDICINE CLINIC | Age: 78
End: 2018-09-07

## 2018-09-07 NOTE — TELEPHONE ENCOUNTER
Maye NP with [facility] through patient's [facility] ins started seeing patient today, patient has 2+ pitted edema states he has not taken his furosemide 40 mg, or metolazone 5 mg x 3 days he tells her even without taking those medications he us up every 2 1/2 hrs peeing so that is why is stopped the water pills she said there was history of patient being on flomax in the past did not know if you would consider putting him back on, please advise.

## 2018-09-10 NOTE — TELEPHONE ENCOUNTER
Spoke to patient states he is working it out, it is getting better, weighs himself daily today up 2 lbs so he took his medication, as far as edema, only when he is on his feet to much then he comes in and puts them up for awhile, he appreciates you checking on him and he said he will call if he feels he needs anything.

## 2018-09-12 ENCOUNTER — TELEPHONE (OUTPATIENT)
Dept: FAMILY MEDICINE CLINIC | Age: 78
End: 2018-09-12

## 2018-09-12 NOTE — TELEPHONE ENCOUNTER
Nurse from patient's insurance had called last week and was advising of patient having increased urination at night. We had contacted the patient back on Monday and he had advised that he was doing ok. He called today and said he was up 4 times in the night last night. He said he does have swelling about everyday but tries to take the water pill earlier in the day.   He wasn't sure if you would want to put him on medication for this or not or do you want him to schedule appt to discuss

## 2018-09-18 ENCOUNTER — OFFICE VISIT (OUTPATIENT)
Dept: FAMILY MEDICINE CLINIC | Age: 78
End: 2018-09-18

## 2018-09-18 VITALS
DIASTOLIC BLOOD PRESSURE: 76 MMHG | HEART RATE: 64 BPM | BODY MASS INDEX: 26.11 KG/M2 | SYSTOLIC BLOOD PRESSURE: 152 MMHG | TEMPERATURE: 98.1 F | OXYGEN SATURATION: 98 % | WEIGHT: 182 LBS

## 2018-09-18 DIAGNOSIS — R06.02 SHORTNESS OF BREATH: Primary | ICD-10-CM

## 2018-09-18 PROBLEM — I50.32 CHRONIC DIASTOLIC CONGESTIVE HEART FAILURE (HCC): Status: ACTIVE | Noted: 2018-01-11

## 2018-09-18 PROCEDURE — 4040F PNEUMOC VAC/ADMIN/RCVD: CPT | Performed by: FAMILY MEDICINE

## 2018-09-18 PROCEDURE — 1101F PT FALLS ASSESS-DOCD LE1/YR: CPT | Performed by: FAMILY MEDICINE

## 2018-09-18 PROCEDURE — 99213 OFFICE O/P EST LOW 20 MIN: CPT | Performed by: FAMILY MEDICINE

## 2018-09-18 PROCEDURE — 1123F ACP DISCUSS/DSCN MKR DOCD: CPT | Performed by: FAMILY MEDICINE

## 2018-09-18 PROCEDURE — G8417 CALC BMI ABV UP PARAM F/U: HCPCS | Performed by: FAMILY MEDICINE

## 2018-09-18 PROCEDURE — 1036F TOBACCO NON-USER: CPT | Performed by: FAMILY MEDICINE

## 2018-09-18 PROCEDURE — G8427 DOCREV CUR MEDS BY ELIG CLIN: HCPCS | Performed by: FAMILY MEDICINE

## 2018-09-18 PROCEDURE — G8598 ASA/ANTIPLAT THER USED: HCPCS | Performed by: FAMILY MEDICINE

## 2018-09-18 ASSESSMENT — PATIENT HEALTH QUESTIONNAIRE - PHQ9
SUM OF ALL RESPONSES TO PHQ9 QUESTIONS 1 & 2: 0
SUM OF ALL RESPONSES TO PHQ QUESTIONS 1-9: 0
1. LITTLE INTEREST OR PLEASURE IN DOING THINGS: 0
SUM OF ALL RESPONSES TO PHQ QUESTIONS 1-9: 0
2. FEELING DOWN, DEPRESSED OR HOPELESS: 0

## 2018-09-18 NOTE — PROGRESS NOTES
Assessment and plan  1. Shortness of breath  Encouraged to take his metoprolol is alone every Monday Wednesday Friday in a.m., furosemide 40 mg every day, and around the middle that they elevate his legs above the level of his heart. Also use compression stockings. Take a second furosemide in the early afternoon only if needed    Return to clinic or call prn if these symptoms worsen or fail to improve and resolve as discussed    Subjective  Several weeks ago the patient developed increasing shortness of breath wheezing and fluid retention. He developed nocturia ×4-5. The insurance that a visiting nurse who recommended tamsulosin. However the patient reports just in the last week he has noted some improvement in all of symptoms in his nocturia is now down to a manageable level. However he does still have some symptomatic edema in his exercise tolerance is not quite back to where it had been when he finished cardiac rehab. He does have compression stockings and has noted that they do help. He also had stopped his metolazone. Objective  BP (!) 152/76   Pulse 64   Temp 98.1 °F (36.7 °C) (Oral)   Wt 182 lb (82.6 kg)   SpO2 98%   BMI 26.11 kg/m²   Patient is alert, oriented, and in no acute distress. Psych: mood and affect unremarkable                speech and thought processes intact  Neck:  No masses, trachea midline, phonation normal, thyroid unremarkable              No significant cervical or supraclavicular adenopathy  Chest:  No deformity of thorax              Respirations easy and unlabored with equal breath sounds              Lungs clear  Heart: Regular rhythm no murmur rub or gallop. He has 1-2+ pretibial pitting edema. Leyda Stanley MD    The note was completed using Dragon voice recognition transcription. Every effort was made to ensure accuracy; however, inadvertent  transcription errors may be present despite my best efforts to edit errors.

## 2018-10-22 ENCOUNTER — OFFICE VISIT (OUTPATIENT)
Dept: FAMILY MEDICINE CLINIC | Age: 78
End: 2018-10-22
Payer: MEDICARE

## 2018-10-22 VITALS
OXYGEN SATURATION: 97 % | TEMPERATURE: 98.1 F | DIASTOLIC BLOOD PRESSURE: 80 MMHG | BODY MASS INDEX: 26 KG/M2 | WEIGHT: 181.2 LBS | HEART RATE: 62 BPM | SYSTOLIC BLOOD PRESSURE: 152 MMHG

## 2018-10-22 DIAGNOSIS — R06.02 SHORTNESS OF BREATH: ICD-10-CM

## 2018-10-22 DIAGNOSIS — I10 ESSENTIAL HYPERTENSION: Primary | ICD-10-CM

## 2018-10-22 PROCEDURE — 1123F ACP DISCUSS/DSCN MKR DOCD: CPT | Performed by: FAMILY MEDICINE

## 2018-10-22 PROCEDURE — G8427 DOCREV CUR MEDS BY ELIG CLIN: HCPCS | Performed by: FAMILY MEDICINE

## 2018-10-22 PROCEDURE — 99214 OFFICE O/P EST MOD 30 MIN: CPT | Performed by: FAMILY MEDICINE

## 2018-10-22 PROCEDURE — G8598 ASA/ANTIPLAT THER USED: HCPCS | Performed by: FAMILY MEDICINE

## 2018-10-22 PROCEDURE — 1036F TOBACCO NON-USER: CPT | Performed by: FAMILY MEDICINE

## 2018-10-22 PROCEDURE — 4040F PNEUMOC VAC/ADMIN/RCVD: CPT | Performed by: FAMILY MEDICINE

## 2018-10-22 PROCEDURE — 1101F PT FALLS ASSESS-DOCD LE1/YR: CPT | Performed by: FAMILY MEDICINE

## 2018-10-22 PROCEDURE — G8483 FLU IMM NO ADMIN DOC REA: HCPCS | Performed by: FAMILY MEDICINE

## 2018-10-22 PROCEDURE — G8417 CALC BMI ABV UP PARAM F/U: HCPCS | Performed by: FAMILY MEDICINE

## 2018-10-22 RX ORDER — ALBUTEROL SULFATE 90 UG/1
1-2 AEROSOL, METERED RESPIRATORY (INHALATION) EVERY 4 HOURS PRN
Qty: 1 INHALER | Refills: 5 | Status: SHIPPED | OUTPATIENT
Start: 2018-10-22 | End: 2019-05-31 | Stop reason: SDUPTHER

## 2018-10-22 RX ORDER — MONTELUKAST SODIUM 10 MG/1
10 TABLET ORAL NIGHTLY
Qty: 30 TABLET | Refills: 5 | Status: SHIPPED | OUTPATIENT
Start: 2018-10-22 | End: 2019-05-08 | Stop reason: ALTCHOICE

## 2018-10-22 RX ORDER — INHALER, ASSIST DEVICES
SPACER (EA) MISCELLANEOUS
Qty: 1 DEVICE | Refills: 5 | Status: SHIPPED | OUTPATIENT
Start: 2018-10-22 | End: 2018-11-06

## 2018-10-22 RX ORDER — LOSARTAN POTASSIUM 50 MG/1
50 TABLET ORAL 2 TIMES DAILY
Qty: 1 TABLET | Refills: 1 | Status: SHIPPED
Start: 2018-10-22 | End: 2018-11-14 | Stop reason: SDUPTHER

## 2018-10-22 NOTE — PROGRESS NOTES
Assessment and plan  1. Essential hypertension-uncontrolled  Increase losartan up to 50 mg b.i.d.    2. Shortness of breath  I advised him I'm suspicious he has allergies and asthma. Start Singulair 10 mg q.h.s., albuterol with a spacer 1-2 puffs every 4 hours PRN, Mucinex at bedtime    Return to clinic as scheduled in 2 weeks or call prn    Subjective  Patient presents out of concern that his blood pressures been consistently above 568 systolic at home. He has some increased weakness. In addition for the past month he's been waking up about 3-4 hours into his sleep with chest tightness congestion and cough that produces thick white phlegm. He does note some wheezing. He started taking some guaifenesin and reports that that does help. He has some minor ongoing dyspnea on exertion during the day. He denies any fever or chills. He has some intermittent sneezing and head congestion. He denies any chest pain or tightness, denies any palpitations. Current meds include losartan 50 mg q.d., Lasix 40 b.i.d., metoprolol 50 b.i.d. He does think he may have had asthma as a child. He also has noted more allergy symptoms    Review of systems: As above    Objective  BP (!) 152/80   Pulse 62   Temp 98.1 °F (36.7 °C) (Oral)   Wt 181 lb 3.2 oz (82.2 kg)   SpO2 97%   BMI 26.00 kg/m²   Patient is alert, oriented, and in no acute distress.   His weight is down 1 pound since last visit  Eyes:  Conjunctivae and lids are clear             Pupils are equal, round, and reactive, irises nondeformed  Ears:  External ears and nose unremarkable, canals are clear, TMs clear   Mouth:  Lips, gums, tongue, oral mucosa unremarkable  Throat: Palates unremarkable               Pharynx is mildly irritated  Nose: clear  Neck:  No masses, trachea midline, phonation normal, thyroid unremarkable              No significant cervical or supraclavicular adenopathy  Chest:  No deformity of thorax              Respirations easy and unlabored

## 2018-11-06 ENCOUNTER — OFFICE VISIT (OUTPATIENT)
Dept: FAMILY MEDICINE CLINIC | Age: 78
End: 2018-11-06
Payer: MEDICARE

## 2018-11-06 VITALS
BODY MASS INDEX: 25.97 KG/M2 | WEIGHT: 181 LBS | DIASTOLIC BLOOD PRESSURE: 71 MMHG | HEART RATE: 62 BPM | SYSTOLIC BLOOD PRESSURE: 139 MMHG | TEMPERATURE: 97.5 F | OXYGEN SATURATION: 98 %

## 2018-11-06 DIAGNOSIS — I10 ESSENTIAL HYPERTENSION: ICD-10-CM

## 2018-11-06 DIAGNOSIS — I25.10 CORONARY ARTERY DISEASE INVOLVING NATIVE CORONARY ARTERY OF NATIVE HEART WITHOUT ANGINA PECTORIS: ICD-10-CM

## 2018-11-06 DIAGNOSIS — E11.9 TYPE 2 DIABETES MELLITUS WITHOUT COMPLICATION, WITHOUT LONG-TERM CURRENT USE OF INSULIN (HCC): Primary | ICD-10-CM

## 2018-11-06 DIAGNOSIS — R06.02 SHORTNESS OF BREATH: ICD-10-CM

## 2018-11-06 DIAGNOSIS — E78.2 MIXED HYPERLIPIDEMIA: ICD-10-CM

## 2018-11-06 LAB
A/G RATIO: 1.3 (ref 1.1–2.2)
ALBUMIN SERPL-MCNC: 4.1 G/DL (ref 3.4–5)
ALP BLD-CCNC: 164 U/L (ref 40–129)
ALT SERPL-CCNC: 23 U/L (ref 10–40)
ANION GAP SERPL CALCULATED.3IONS-SCNC: 16 MMOL/L (ref 3–16)
AST SERPL-CCNC: 21 U/L (ref 15–37)
BASOPHILS ABSOLUTE: 0 K/UL (ref 0–0.2)
BASOPHILS RELATIVE PERCENT: 0.7 %
BILIRUB SERPL-MCNC: 0.6 MG/DL (ref 0–1)
BUN BLDV-MCNC: 18 MG/DL (ref 7–20)
CALCIUM SERPL-MCNC: 9.9 MG/DL (ref 8.3–10.6)
CHLORIDE BLD-SCNC: 91 MMOL/L (ref 99–110)
CHOLESTEROL, TOTAL: 159 MG/DL (ref 0–199)
CO2: 25 MMOL/L (ref 21–32)
CREAT SERPL-MCNC: 1 MG/DL (ref 0.8–1.3)
EOSINOPHILS ABSOLUTE: 0.3 K/UL (ref 0–0.6)
EOSINOPHILS RELATIVE PERCENT: 4.4 %
GFR AFRICAN AMERICAN: >60
GFR NON-AFRICAN AMERICAN: >60
GLOBULIN: 3.2 G/DL
GLUCOSE BLD-MCNC: 91 MG/DL (ref 70–99)
HBA1C MFR BLD: 6.5 %
HCT VFR BLD CALC: 38.2 % (ref 40.5–52.5)
HDLC SERPL-MCNC: 51 MG/DL (ref 40–60)
HEMOGLOBIN: 12.8 G/DL (ref 13.5–17.5)
LDL CHOLESTEROL CALCULATED: 91 MG/DL
LYMPHOCYTES ABSOLUTE: 0.7 K/UL (ref 1–5.1)
LYMPHOCYTES RELATIVE PERCENT: 10.2 %
MCH RBC QN AUTO: 31.3 PG (ref 26–34)
MCHC RBC AUTO-ENTMCNC: 33.5 G/DL (ref 31–36)
MCV RBC AUTO: 93.4 FL (ref 80–100)
MONOCYTES ABSOLUTE: 0.6 K/UL (ref 0–1.3)
MONOCYTES RELATIVE PERCENT: 8.9 %
NEUTROPHILS ABSOLUTE: 5 K/UL (ref 1.7–7.7)
NEUTROPHILS RELATIVE PERCENT: 75.8 %
PDW BLD-RTO: 14.3 % (ref 12.4–15.4)
PLATELET # BLD: 217 K/UL (ref 135–450)
PMV BLD AUTO: 9.5 FL (ref 5–10.5)
POTASSIUM SERPL-SCNC: 5 MMOL/L (ref 3.5–5.1)
RBC # BLD: 4.09 M/UL (ref 4.2–5.9)
SODIUM BLD-SCNC: 132 MMOL/L (ref 136–145)
TOTAL PROTEIN: 7.3 G/DL (ref 6.4–8.2)
TRIGL SERPL-MCNC: 87 MG/DL (ref 0–150)
VLDLC SERPL CALC-MCNC: 17 MG/DL
WBC # BLD: 6.6 K/UL (ref 4–11)

## 2018-11-06 PROCEDURE — 1101F PT FALLS ASSESS-DOCD LE1/YR: CPT | Performed by: FAMILY MEDICINE

## 2018-11-06 PROCEDURE — 1123F ACP DISCUSS/DSCN MKR DOCD: CPT | Performed by: FAMILY MEDICINE

## 2018-11-06 PROCEDURE — G8598 ASA/ANTIPLAT THER USED: HCPCS | Performed by: FAMILY MEDICINE

## 2018-11-06 PROCEDURE — 36415 COLL VENOUS BLD VENIPUNCTURE: CPT | Performed by: FAMILY MEDICINE

## 2018-11-06 PROCEDURE — G8483 FLU IMM NO ADMIN DOC REA: HCPCS | Performed by: FAMILY MEDICINE

## 2018-11-06 PROCEDURE — G8417 CALC BMI ABV UP PARAM F/U: HCPCS | Performed by: FAMILY MEDICINE

## 2018-11-06 PROCEDURE — G8427 DOCREV CUR MEDS BY ELIG CLIN: HCPCS | Performed by: FAMILY MEDICINE

## 2018-11-06 PROCEDURE — 4040F PNEUMOC VAC/ADMIN/RCVD: CPT | Performed by: FAMILY MEDICINE

## 2018-11-06 PROCEDURE — 83036 HEMOGLOBIN GLYCOSYLATED A1C: CPT | Performed by: FAMILY MEDICINE

## 2018-11-06 PROCEDURE — 1036F TOBACCO NON-USER: CPT | Performed by: FAMILY MEDICINE

## 2018-11-06 PROCEDURE — 99214 OFFICE O/P EST MOD 30 MIN: CPT | Performed by: FAMILY MEDICINE

## 2018-11-14 ENCOUNTER — TELEPHONE (OUTPATIENT)
Dept: FAMILY MEDICINE CLINIC | Age: 78
End: 2018-11-14

## 2018-11-14 RX ORDER — LOSARTAN POTASSIUM 100 MG/1
100 TABLET ORAL DAILY
Qty: 90 TABLET | Refills: 1 | Status: SHIPPED | OUTPATIENT
Start: 2018-11-14 | End: 2019-05-21 | Stop reason: SDUPTHER

## 2018-11-14 NOTE — TELEPHONE ENCOUNTER
Patient is needing a refill of losartan he is requesting the 100 mg once daily instead of taking 2 of the 50 mg, but he is also wanting to know if you could maybe switch him to a combination pill that he could have the water pill with it?    Needs 90 day supply to [facility]

## 2018-11-21 RX ORDER — FUROSEMIDE 40 MG/1
40 TABLET ORAL 2 TIMES DAILY PRN
Qty: 180 TABLET | Refills: 1 | Status: ON HOLD | OUTPATIENT
Start: 2018-11-21 | End: 2019-05-12 | Stop reason: HOSPADM

## 2018-12-11 ENCOUNTER — OFFICE VISIT (OUTPATIENT)
Dept: CARDIOLOGY CLINIC | Age: 78
End: 2018-12-11
Payer: MEDICARE

## 2018-12-11 VITALS
HEIGHT: 70 IN | OXYGEN SATURATION: 97 % | BODY MASS INDEX: 25.91 KG/M2 | HEART RATE: 66 BPM | DIASTOLIC BLOOD PRESSURE: 68 MMHG | WEIGHT: 181 LBS | SYSTOLIC BLOOD PRESSURE: 116 MMHG

## 2018-12-11 DIAGNOSIS — Z95.1 S/P CABG X 4: ICD-10-CM

## 2018-12-11 DIAGNOSIS — E78.2 MIXED HYPERLIPIDEMIA: ICD-10-CM

## 2018-12-11 DIAGNOSIS — I10 ESSENTIAL HYPERTENSION: ICD-10-CM

## 2018-12-11 DIAGNOSIS — I25.10 CORONARY ARTERY DISEASE INVOLVING NATIVE CORONARY ARTERY OF NATIVE HEART WITHOUT ANGINA PECTORIS: Primary | ICD-10-CM

## 2018-12-11 PROCEDURE — 99214 OFFICE O/P EST MOD 30 MIN: CPT | Performed by: INTERNAL MEDICINE

## 2018-12-11 PROCEDURE — 4040F PNEUMOC VAC/ADMIN/RCVD: CPT | Performed by: INTERNAL MEDICINE

## 2018-12-11 PROCEDURE — G8483 FLU IMM NO ADMIN DOC REA: HCPCS | Performed by: INTERNAL MEDICINE

## 2018-12-11 PROCEDURE — 1101F PT FALLS ASSESS-DOCD LE1/YR: CPT | Performed by: INTERNAL MEDICINE

## 2018-12-11 PROCEDURE — G8417 CALC BMI ABV UP PARAM F/U: HCPCS | Performed by: INTERNAL MEDICINE

## 2018-12-11 PROCEDURE — 1036F TOBACCO NON-USER: CPT | Performed by: INTERNAL MEDICINE

## 2018-12-11 PROCEDURE — G8598 ASA/ANTIPLAT THER USED: HCPCS | Performed by: INTERNAL MEDICINE

## 2018-12-11 PROCEDURE — G8427 DOCREV CUR MEDS BY ELIG CLIN: HCPCS | Performed by: INTERNAL MEDICINE

## 2018-12-11 PROCEDURE — 1123F ACP DISCUSS/DSCN MKR DOCD: CPT | Performed by: INTERNAL MEDICINE

## 2018-12-11 RX ORDER — METOLAZONE 5 MG/1
5 TABLET ORAL
Qty: 45 TABLET | Refills: 3 | Status: ON HOLD | OUTPATIENT
Start: 2018-12-12 | End: 2019-05-10

## 2018-12-11 NOTE — PATIENT INSTRUCTIONS
Rolan Mcmullen MD   metoprolol (TOPROL-XL) 50 MG XL tablet TAKE 1 TABLET BY MOUTH ONCE DAILY 6/20/12  Yes Oriana Lorenz MD   levothyroxine (SYNTHROID) 75 MCG tablet TAKE 1 TABLET BY MOUTH ONCE DAILY 2/8/12  Yes Oriana Lorenz MD   losartan-hydrochlorothiazide (HYZAAR) 100-25 MG per tablet Take 1 tablet by mouth daily for 360 days. 1/18/12 1/12/13 Yes Cuca Cao MD   aspirin 81 MG EC tablet Take 81 mg by mouth daily. 2 daily     Yes Historical Provider, MD   Blood Glucose Monitoring Suppl (ONE TOUCH ULTRA) JUAN FRANCISCO by Does not apply route. 6/22/10  Yes Oriana Lorenz MD   Multiple Vitamins-Minerals (MULTIVITAL PO) Take  by mouth daily. 5/24/10  Yes Historical Provider, MD   Omega-3 Fatty Acids (FISH OIL) 1200 MG CAPS Take  by mouth 2 times daily. 5/24/10  Yes Historical Provider, MD   Ascorbic Acid (VITAMIN C) 500 MG tablet Take 500 mg by mouth daily. Yes Historical Provider, MD   Cholecalciferol (VITAMIN D3) 1000 UNIT CAPS Take  by mouth daily. 5/24/10  Yes Historical Provider, MD   vitamin E 200 UNIT capsule Take 200 Units by mouth daily. Yes Historical Provider, MD        Allergies:  Lipitor and Simvastatin     Review of Systems:   · Constitutional: there has been no unanticipated weight loss. There's been no change in energy level, sleep pattern, or activity level. · Eyes: No visual changes or diplopia. No scleral icterus. · ENT: No Headaches, hearing loss or vertigo. No mouth sores or sore throat. · Cardiovascular: Reviewed in HPI  · Respiratory: No cough or wheezing, no sputum production. No hematemesis. · Gastrointestinal: No abdominal pain, appetite loss, blood in stools. No change in bowel or bladder habits. · Genitourinary: No dysuria, trouble voiding, or hematuria. · Musculoskeletal:  No gait disturbance, weakness or joint complaints. · Integumentary: No rash or pruritis. · Neurological: No headache, diplopia, change in muscle strength, numbness or tingling.  No change in gait, balance, been reviewed with patient. All questions answered. Thank you for allowing me to participate in the care of this individual.    Alfredo Suero.  Pete Mahmood M.D., [facility] [place]

## 2018-12-11 NOTE — PROGRESS NOTES
New-onset exertional CP since November concerning for unstable angina clinically since NO pain for several years and new now. He underwent most recent exercise stress test 12/27/17 showed small area of ischemia lateral apex, EF 47% with hypokinesis. He needs invasive C to assess coronary arteries directly. Found multi-vessel CAD on LHC. 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 Chest pain prior to surgery resolved. DM w/o Complication Type II : managed per PCP. HA1C=6.5 on 11/6/18.   3. Carotid artery occlusion :   Carotid Doppler studies 04/13/2016 with no noted stenosis bilaterally (no major change from 8/14). Most recent  Carotid US 6/23/2017 showed moderate disease but less than 50% bilateral. No neuro symptoms concerning at this time. 4. Cerebrovascular disease: Resolved. Admitted to [facility]. 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 Stable and doing better overall and will continue present medical regimen. He remains on baby aspirin. 5. Hypertension : Stable and will continue present medical regimen. 6. Hyperlipidemia:  Most recent labs from 11/6/18 see results above which I personally reviewed. Note LDL at goal 91. He tolerates pravachol 20mg qhs and leg cramps are improved. Plan:   1. Continue current medications  2. Try to elevate your legs while resting and wear compression stockings daily, avoid added salt and do not eat salty food, limit fluid to 2 liters per day  3. Metolazone 5 mg as needed for swelling - may use up to 3 times a week or may also take extra Lasix tablet  4. Follow up in 6 months  5. Risk factor modification was discussed including the importance and management of lipids, BP, diet, exercise, and tobacco cessation if current smoker.   6. Medications reviewed and refilled as warranted    Cost of prescription medications and patient compliance have

## 2018-12-12 RX ORDER — EZETIMIBE 10 MG/1
10 TABLET ORAL DAILY
Qty: 90 TABLET | Refills: 3 | Status: SHIPPED | OUTPATIENT
Start: 2018-12-12 | End: 2019-05-08

## 2018-12-12 NOTE — TELEPHONE ENCOUNTER
From: Red Gresham  Sent: 12/12/2018 9:23 AM EST  Subject: Medication Renewal Request    Red Gresham would like a refill of the following medications:     ezetimibe (ZETIA) 10 MG tablet Baron Kay MD]   Patient Comment: Should I be taking this medicine?     Preferred pharmacy: [address] [place] [address] [phone] - F [phone]

## 2018-12-28 ENCOUNTER — OFFICE VISIT (OUTPATIENT)
Dept: FAMILY MEDICINE CLINIC | Age: 78
End: 2018-12-28
Payer: MEDICARE

## 2018-12-28 VITALS
TEMPERATURE: 98.1 F | BODY MASS INDEX: 26.9 KG/M2 | DIASTOLIC BLOOD PRESSURE: 72 MMHG | HEART RATE: 84 BPM | SYSTOLIC BLOOD PRESSURE: 148 MMHG | WEIGHT: 187.5 LBS | OXYGEN SATURATION: 97 %

## 2018-12-28 DIAGNOSIS — J20.9 ACUTE BRONCHITIS, UNSPECIFIED ORGANISM: Primary | ICD-10-CM

## 2018-12-28 PROCEDURE — 1101F PT FALLS ASSESS-DOCD LE1/YR: CPT | Performed by: NURSE PRACTITIONER

## 2018-12-28 PROCEDURE — 1123F ACP DISCUSS/DSCN MKR DOCD: CPT | Performed by: NURSE PRACTITIONER

## 2018-12-28 PROCEDURE — G8598 ASA/ANTIPLAT THER USED: HCPCS | Performed by: NURSE PRACTITIONER

## 2018-12-28 PROCEDURE — G8427 DOCREV CUR MEDS BY ELIG CLIN: HCPCS | Performed by: NURSE PRACTITIONER

## 2018-12-28 PROCEDURE — G8417 CALC BMI ABV UP PARAM F/U: HCPCS | Performed by: NURSE PRACTITIONER

## 2018-12-28 PROCEDURE — 1036F TOBACCO NON-USER: CPT | Performed by: NURSE PRACTITIONER

## 2018-12-28 PROCEDURE — G8483 FLU IMM NO ADMIN DOC REA: HCPCS | Performed by: NURSE PRACTITIONER

## 2018-12-28 PROCEDURE — 99214 OFFICE O/P EST MOD 30 MIN: CPT | Performed by: NURSE PRACTITIONER

## 2018-12-28 PROCEDURE — 4040F PNEUMOC VAC/ADMIN/RCVD: CPT | Performed by: NURSE PRACTITIONER

## 2018-12-28 RX ORDER — PREDNISONE 20 MG/1
40 TABLET ORAL DAILY
Qty: 20 TABLET | Refills: 0 | Status: SHIPPED | OUTPATIENT
Start: 2018-12-28 | End: 2019-01-07

## 2018-12-28 RX ORDER — DOXYCYCLINE HYCLATE 100 MG/1
100 CAPSULE ORAL 2 TIMES DAILY
Qty: 20 CAPSULE | Refills: 0 | Status: SHIPPED | OUTPATIENT
Start: 2018-12-28 | End: 2019-01-07

## 2019-01-30 RX ORDER — BLOOD SUGAR DIAGNOSTIC
STRIP MISCELLANEOUS
Qty: 200 STRIP | Refills: 3 | Status: SHIPPED | OUTPATIENT
Start: 2019-01-30 | End: 2019-05-27

## 2019-02-05 RX ORDER — METOPROLOL TARTRATE 50 MG/1
50 TABLET, FILM COATED ORAL 2 TIMES DAILY
Qty: 180 TABLET | Refills: 3 | Status: SHIPPED | OUTPATIENT
Start: 2019-02-05 | End: 2020-02-27

## 2019-03-13 RX ORDER — LEVOTHYROXINE SODIUM 88 UG/1
TABLET ORAL
Qty: 90 TABLET | Refills: 1 | Status: SHIPPED | OUTPATIENT
Start: 2019-03-13 | End: 2019-08-24 | Stop reason: SDUPTHER

## 2019-05-08 ENCOUNTER — HOSPITAL ENCOUNTER (OUTPATIENT)
Age: 79
Discharge: HOME | End: 2019-05-08
Payer: MEDICARE

## 2019-05-08 ENCOUNTER — OFFICE VISIT (OUTPATIENT)
Dept: FAMILY MEDICINE CLINIC | Age: 79
End: 2019-05-08
Payer: MEDICARE

## 2019-05-08 ENCOUNTER — HOSPITAL ENCOUNTER (OUTPATIENT)
Dept: GENERAL RADIOLOGY | Age: 79
Discharge: HOME | End: 2019-05-08
Payer: MEDICARE

## 2019-05-08 VITALS
BODY MASS INDEX: 26.34 KG/M2 | SYSTOLIC BLOOD PRESSURE: 129 MMHG | DIASTOLIC BLOOD PRESSURE: 68 MMHG | TEMPERATURE: 99.1 F | OXYGEN SATURATION: 96 % | HEART RATE: 74 BPM | WEIGHT: 183.6 LBS

## 2019-05-08 DIAGNOSIS — R06.02 SOB (SHORTNESS OF BREATH): ICD-10-CM

## 2019-05-08 DIAGNOSIS — E11.9 TYPE 2 DIABETES MELLITUS WITHOUT COMPLICATION, WITHOUT LONG-TERM CURRENT USE OF INSULIN (HCC): Primary | ICD-10-CM

## 2019-05-08 DIAGNOSIS — I65.23 BILATERAL CAROTID ARTERY OCCLUSION: ICD-10-CM

## 2019-05-08 DIAGNOSIS — I25.10 CORONARY ARTERY DISEASE INVOLVING NATIVE CORONARY ARTERY OF NATIVE HEART WITHOUT ANGINA PECTORIS: ICD-10-CM

## 2019-05-08 DIAGNOSIS — L57.0 ACTINIC KERATOSIS: ICD-10-CM

## 2019-05-08 DIAGNOSIS — E06.3 HYPOTHYROIDISM, ACQUIRED, AUTOIMMUNE: ICD-10-CM

## 2019-05-08 DIAGNOSIS — E78.00 PURE HYPERCHOLESTEROLEMIA: ICD-10-CM

## 2019-05-08 DIAGNOSIS — I10 ESSENTIAL HYPERTENSION: ICD-10-CM

## 2019-05-08 LAB — HBA1C MFR BLD: 6.4 %

## 2019-05-08 PROCEDURE — G8427 DOCREV CUR MEDS BY ELIG CLIN: HCPCS | Performed by: FAMILY MEDICINE

## 2019-05-08 PROCEDURE — 36415 COLL VENOUS BLD VENIPUNCTURE: CPT | Performed by: FAMILY MEDICINE

## 2019-05-08 PROCEDURE — 1123F ACP DISCUSS/DSCN MKR DOCD: CPT | Performed by: FAMILY MEDICINE

## 2019-05-08 PROCEDURE — 83036 HEMOGLOBIN GLYCOSYLATED A1C: CPT | Performed by: FAMILY MEDICINE

## 2019-05-08 PROCEDURE — G8417 CALC BMI ABV UP PARAM F/U: HCPCS | Performed by: FAMILY MEDICINE

## 2019-05-08 PROCEDURE — 17003 DESTRUCT PREMALG LES 2-14: CPT | Performed by: FAMILY MEDICINE

## 2019-05-08 PROCEDURE — 4040F PNEUMOC VAC/ADMIN/RCVD: CPT | Performed by: FAMILY MEDICINE

## 2019-05-08 PROCEDURE — 1036F TOBACCO NON-USER: CPT | Performed by: FAMILY MEDICINE

## 2019-05-08 PROCEDURE — 99214 OFFICE O/P EST MOD 30 MIN: CPT | Performed by: FAMILY MEDICINE

## 2019-05-08 PROCEDURE — 71046 X-RAY EXAM CHEST 2 VIEWS: CPT

## 2019-05-08 PROCEDURE — 17000 DESTRUCT PREMALG LESION: CPT | Performed by: FAMILY MEDICINE

## 2019-05-08 PROCEDURE — G8599 NO ASA/ANTIPLAT THER USE RNG: HCPCS | Performed by: FAMILY MEDICINE

## 2019-05-08 RX ORDER — PRAVASTATIN SODIUM 40 MG
20 TABLET ORAL DAILY
COMMUNITY
End: 2019-09-05 | Stop reason: SDUPTHER

## 2019-05-08 ASSESSMENT — PATIENT HEALTH QUESTIONNAIRE - PHQ9
1. LITTLE INTEREST OR PLEASURE IN DOING THINGS: 0
SUM OF ALL RESPONSES TO PHQ9 QUESTIONS 1 & 2: 0
SUM OF ALL RESPONSES TO PHQ QUESTIONS 1-9: 0
SUM OF ALL RESPONSES TO PHQ QUESTIONS 1-9: 0
2. FEELING DOWN, DEPRESSED OR HOPELESS: 0

## 2019-05-08 NOTE — PROGRESS NOTES
Assessment and plan  1. Type 2 diabetes mellitus without complication, without long-term current use of insulin (HCC)  Stable  - POCT glycosylated hemoglobin (Hb A1C)    2. Essential hypertension  Stable  - Comprehensive Metabolic Panel    3. Coronary artery disease involving native coronary artery of native heart without angina pectoris  Stable  - CBC Auto Differential    4. SOB (shortness of breath)  Nocturnal asthma versus mild CHF. Further plans pending result  - XR CHEST STANDARD (2 VW); Future    5. Hypothyroidism, acquired, autoimmune   - status pending result of lab  - TSH with Reflex    6. Bilateral carotid artery occlusion  - Ultrasound carotid doppler; Future    7. Hypercholesteerolemia   Likely adverse effect of pravastatin. Hold for 2 weeks. If symptoms resolve resume at half a tab a day    8. Actinic keratosis  Lesion on the crown of head and left ear pinna were both thoroughly frozen with liquid nitrogen. Patient tolerated the procedure well. Healthy Family prevention recommendations given. Continue all current prescription medications as listed below. RTC 6 months or sooner prn. Subjective  Patient returns for reevaluation of his medical problems including diabetes coronary artery disease hypertension hypothyroidism and carotid artery stenosis. His A1c is 6.4%. His blood pressures have been good and he denies any angina. He has had some fatigue and is due to have his TSH checked. It has also been over a year since he had carotid Dopplers done. He has no cerebrovascular symptoms. He has several issues. One is he is redeveloping muscle cramps in his legs particularly when he walks. He had increased his pravastatin from 20-40 mg and he thought it got worse when he did this. He is also for the last 6 weeks redeveloped nocturnal dyspnea.   He's felt some chest tightness and wheezing with it.  3 nights ago he started using albuterol before bedtime and thought it helped for 2-3 tablet Take 1 tablet by mouth 2 times daily 180 tablet 3    ACCU-CHEK DIONTE PLUS strip TEST TWO TIMES DAILY 200 strip 3    metFORMIN (GLUCOPHAGE) 1000 MG tablet TAKE 1 TABLET TWICE DAILY WITH MEALS 180 tablet 1    metolazone (ZAROXOLYN) 5 MG tablet Take 1 tablet by mouth three times a week May take as needed for edema. May take up to 3 times in 1 week. 45 tablet 3    furosemide (LASIX) 40 MG tablet Take 1 tablet by mouth 2 times daily as needed (swelling) 180 tablet 1    losartan (COZAAR) 100 MG tablet Take 1 tablet by mouth daily 90 tablet 1    albuterol sulfate HFA (PROVENTIL HFA) 108 (90 Base) MCG/ACT inhaler Inhale 1-2 puffs into the lungs every 4 hours as needed for Wheezing or Shortness of Breath May substitute ProAir or Ventolin 1 Inhaler 5    aspirin 81 MG EC tablet Take 1 tablet by mouth daily 30 tablet 0    magnesium oxide (MAG-OX) 400 MG tablet Take 400 mg by mouth daily      ferrous sulfate 325 (65 Fe) MG tablet Take 325 mg by mouth 2 times daily      Cholecalciferol (VITAMIN D) 2000 UNITS CAPS capsule Take  by mouth daily. No current facility-administered medications for this visit. The note was completed using Dragon voice recognition transcription. Every effort was made to ensure accuracy; however, inadvertent  transcription errors may be present despite my best efforts to edit errors.

## 2019-05-08 NOTE — PATIENT INSTRUCTIONS
Please read the healthy family handout that you were given and share it with your family. Please compare this printed medication list with your medications at home to be sure they are the same. If you have any medications that are different please contact us immediately at [phone]. Also review your allergies that we have listed, these may also include medications that you have not been able to tolerate, make sure everything listed is correct. If you have any allergies that are different please contact us immediately at [phone].

## 2019-05-09 ENCOUNTER — HOSPITAL ENCOUNTER (INPATIENT)
Age: 79
LOS: 4 days | Discharge: HOME | DRG: 641 | End: 2019-05-13
Attending: EMERGENCY MEDICINE | Admitting: INTERNAL MEDICINE
Payer: MEDICARE

## 2019-05-09 DIAGNOSIS — E87.8 CHLORIDE, DECREASED LEVEL: ICD-10-CM

## 2019-05-09 DIAGNOSIS — E87.1 HYPONATREMIA: Primary | ICD-10-CM

## 2019-05-09 LAB
A/G RATIO: 1.2 (ref 1.1–2.2)
A/G RATIO: 1.3 (ref 1.1–2.2)
ALBUMIN SERPL-MCNC: 4.3 G/DL (ref 3.4–5)
ALBUMIN SERPL-MCNC: 4.4 G/DL (ref 3.4–5)
ALP BLD-CCNC: 151 U/L (ref 40–129)
ALP BLD-CCNC: 158 U/L (ref 40–129)
ALT SERPL-CCNC: 19 U/L (ref 10–40)
ALT SERPL-CCNC: 19 U/L (ref 10–40)
ANION GAP SERPL CALCULATED.3IONS-SCNC: 13 MMOL/L (ref 3–16)
ANION GAP SERPL CALCULATED.3IONS-SCNC: 14 MMOL/L (ref 3–16)
ANION GAP SERPL CALCULATED.3IONS-SCNC: 16 MMOL/L (ref 3–16)
AST SERPL-CCNC: 19 U/L (ref 15–37)
AST SERPL-CCNC: 19 U/L (ref 15–37)
BASOPHILS ABSOLUTE: 0 K/UL (ref 0–0.2)
BASOPHILS ABSOLUTE: 0 K/UL (ref 0–0.2)
BASOPHILS RELATIVE PERCENT: 0.2 %
BASOPHILS RELATIVE PERCENT: 0.3 %
BILIRUB SERPL-MCNC: 0.5 MG/DL (ref 0–1)
BILIRUB SERPL-MCNC: 0.5 MG/DL (ref 0–1)
BILIRUBIN URINE: NEGATIVE
BLOOD, URINE: NEGATIVE
BUN BLDV-MCNC: 20 MG/DL (ref 7–20)
BUN BLDV-MCNC: 21 MG/DL (ref 7–20)
BUN BLDV-MCNC: 23 MG/DL (ref 7–20)
CALCIUM SERPL-MCNC: 10 MG/DL (ref 8.3–10.6)
CALCIUM SERPL-MCNC: 9.2 MG/DL (ref 8.3–10.6)
CALCIUM SERPL-MCNC: 9.9 MG/DL (ref 8.3–10.6)
CHLORIDE BLD-SCNC: 78 MMOL/L (ref 99–110)
CHLORIDE BLD-SCNC: 80 MMOL/L (ref 99–110)
CHLORIDE BLD-SCNC: 81 MMOL/L (ref 99–110)
CLARITY: CLEAR
CO2: 26 MMOL/L (ref 21–32)
CO2: 28 MMOL/L (ref 21–32)
CO2: 28 MMOL/L (ref 21–32)
COLOR: YELLOW
CREAT SERPL-MCNC: 1 MG/DL (ref 0.8–1.3)
CREAT SERPL-MCNC: 1 MG/DL (ref 0.8–1.3)
CREAT SERPL-MCNC: 1.4 MG/DL (ref 0.8–1.3)
EKG ATRIAL RATE: 67 BPM
EKG DIAGNOSIS: NORMAL
EKG P AXIS: 72 DEGREES
EKG P-R INTERVAL: 216 MS
EKG Q-T INTERVAL: 422 MS
EKG QRS DURATION: 128 MS
EKG QTC CALCULATION (BAZETT): 445 MS
EKG R AXIS: -14 DEGREES
EKG T AXIS: 59 DEGREES
EKG VENTRICULAR RATE: 67 BPM
EOSINOPHILS ABSOLUTE: 0.2 K/UL (ref 0–0.6)
EOSINOPHILS ABSOLUTE: 0.2 K/UL (ref 0–0.6)
EOSINOPHILS RELATIVE PERCENT: 2.1 %
EOSINOPHILS RELATIVE PERCENT: 2.2 %
GFR AFRICAN AMERICAN: 59
GFR AFRICAN AMERICAN: >60
GFR AFRICAN AMERICAN: >60
GFR NON-AFRICAN AMERICAN: 49
GFR NON-AFRICAN AMERICAN: >60
GFR NON-AFRICAN AMERICAN: >60
GLOBULIN: 3.4 G/DL
GLOBULIN: 3.8 G/DL
GLUCOSE BLD-MCNC: 111 MG/DL (ref 70–99)
GLUCOSE BLD-MCNC: 113 MG/DL (ref 70–99)
GLUCOSE BLD-MCNC: 122 MG/DL (ref 70–99)
GLUCOSE BLD-MCNC: 128 MG/DL (ref 70–99)
GLUCOSE BLD-MCNC: 92 MG/DL (ref 70–99)
GLUCOSE URINE: NEGATIVE MG/DL
HCT VFR BLD CALC: 35.1 % (ref 40.5–52.5)
HCT VFR BLD CALC: 37.2 % (ref 40.5–52.5)
HEMOGLOBIN: 12.4 G/DL (ref 13.5–17.5)
HEMOGLOBIN: 12.9 G/DL (ref 13.5–17.5)
IRON SATURATION: 26 % (ref 20–50)
IRON: 83 UG/DL (ref 59–158)
KETONES, URINE: NEGATIVE MG/DL
LEUKOCYTE ESTERASE, URINE: NEGATIVE
LYMPHOCYTES ABSOLUTE: 0.6 K/UL (ref 1–5.1)
LYMPHOCYTES ABSOLUTE: 0.6 K/UL (ref 1–5.1)
LYMPHOCYTES RELATIVE PERCENT: 6.6 %
LYMPHOCYTES RELATIVE PERCENT: 7.1 %
MAGNESIUM: 2 MG/DL (ref 1.8–2.4)
MCH RBC QN AUTO: 31.6 PG (ref 26–34)
MCH RBC QN AUTO: 32.2 PG (ref 26–34)
MCHC RBC AUTO-ENTMCNC: 34.7 G/DL (ref 31–36)
MCHC RBC AUTO-ENTMCNC: 35.2 G/DL (ref 31–36)
MCV RBC AUTO: 90.8 FL (ref 80–100)
MCV RBC AUTO: 91.4 FL (ref 80–100)
MICROSCOPIC EXAMINATION: NORMAL
MONOCYTES ABSOLUTE: 0.4 K/UL (ref 0–1.3)
MONOCYTES ABSOLUTE: 0.5 K/UL (ref 0–1.3)
MONOCYTES RELATIVE PERCENT: 4.8 %
MONOCYTES RELATIVE PERCENT: 4.8 %
NEUTROPHILS ABSOLUTE: 7.8 K/UL (ref 1.7–7.7)
NEUTROPHILS ABSOLUTE: 8.3 K/UL (ref 1.7–7.7)
NEUTROPHILS RELATIVE PERCENT: 85.8 %
NEUTROPHILS RELATIVE PERCENT: 86.1 %
NITRITE, URINE: NEGATIVE
PDW BLD-RTO: 12.9 % (ref 12.4–15.4)
PDW BLD-RTO: 13.1 % (ref 12.4–15.4)
PERFORMED ON: ABNORMAL
PERFORMED ON: NORMAL
PH UA: 6 (ref 5–8)
PHOSPHORUS: 3.1 MG/DL (ref 2.5–4.9)
PLATELET # BLD: 262 K/UL (ref 135–450)
PLATELET # BLD: 266 K/UL (ref 135–450)
PMV BLD AUTO: 8.6 FL (ref 5–10.5)
PMV BLD AUTO: 8.7 FL (ref 5–10.5)
POTASSIUM SERPL-SCNC: 3.9 MMOL/L (ref 3.5–5.1)
POTASSIUM SERPL-SCNC: 3.9 MMOL/L (ref 3.5–5.1)
POTASSIUM SERPL-SCNC: 4.2 MMOL/L (ref 3.5–5.1)
PROTEIN UA: NEGATIVE MG/DL
RBC # BLD: 3.84 M/UL (ref 4.2–5.9)
RBC # BLD: 4.1 M/UL (ref 4.2–5.9)
SODIUM BLD-SCNC: 120 MMOL/L (ref 136–145)
SODIUM BLD-SCNC: 121 MMOL/L (ref 136–145)
SODIUM BLD-SCNC: 123 MMOL/L (ref 136–145)
SODIUM BLD-SCNC: 123 MMOL/L (ref 136–145)
SPECIFIC GRAVITY UA: 1.01 (ref 1–1.03)
T4 FREE: 1.6 NG/DL (ref 0.9–1.8)
TOTAL IRON BINDING CAPACITY: 322 UG/DL (ref 260–445)
TOTAL PROTEIN: 7.7 G/DL (ref 6.4–8.2)
TOTAL PROTEIN: 8.2 G/DL (ref 6.4–8.2)
TSH REFLEX: 4.27 UIU/ML (ref 0.27–4.2)
TSH SERPL DL<=0.05 MIU/L-ACNC: 3.69 UIU/ML (ref 0.27–4.2)
URINE REFLEX TO CULTURE: NORMAL
URINE TYPE: NORMAL
UROBILINOGEN, URINE: 0.2 E.U./DL
WBC # BLD: 9.1 K/UL (ref 4–11)
WBC # BLD: 9.6 K/UL (ref 4–11)

## 2019-05-09 PROCEDURE — 97530 THERAPEUTIC ACTIVITIES: CPT

## 2019-05-09 PROCEDURE — 83540 ASSAY OF IRON: CPT

## 2019-05-09 PROCEDURE — 83930 ASSAY OF BLOOD OSMOLALITY: CPT

## 2019-05-09 PROCEDURE — 2580000003 HC RX 258: Performed by: INTERNAL MEDICINE

## 2019-05-09 PROCEDURE — 93010 ELECTROCARDIOGRAM REPORT: CPT | Performed by: INTERNAL MEDICINE

## 2019-05-09 PROCEDURE — 6370000000 HC RX 637 (ALT 250 FOR IP): Performed by: INTERNAL MEDICINE

## 2019-05-09 PROCEDURE — 97161 PT EVAL LOW COMPLEX 20 MIN: CPT

## 2019-05-09 PROCEDURE — 83735 ASSAY OF MAGNESIUM: CPT

## 2019-05-09 PROCEDURE — 82607 VITAMIN B-12: CPT

## 2019-05-09 PROCEDURE — 83036 HEMOGLOBIN GLYCOSYLATED A1C: CPT

## 2019-05-09 PROCEDURE — 1200000000 HC SEMI PRIVATE

## 2019-05-09 PROCEDURE — 2580000003 HC RX 258: Performed by: PHYSICIAN ASSISTANT

## 2019-05-09 PROCEDURE — 97116 GAIT TRAINING THERAPY: CPT

## 2019-05-09 PROCEDURE — 82746 ASSAY OF FOLIC ACID SERUM: CPT

## 2019-05-09 PROCEDURE — 80053 COMPREHEN METABOLIC PANEL: CPT

## 2019-05-09 PROCEDURE — 84295 ASSAY OF SERUM SODIUM: CPT

## 2019-05-09 PROCEDURE — 93005 ELECTROCARDIOGRAM TRACING: CPT | Performed by: EMERGENCY MEDICINE

## 2019-05-09 PROCEDURE — 99284 EMERGENCY DEPT VISIT MOD MDM: CPT

## 2019-05-09 PROCEDURE — 84100 ASSAY OF PHOSPHORUS: CPT

## 2019-05-09 PROCEDURE — 85025 COMPLETE CBC W/AUTO DIFF WBC: CPT

## 2019-05-09 PROCEDURE — 83550 IRON BINDING TEST: CPT

## 2019-05-09 PROCEDURE — 81003 URINALYSIS AUTO W/O SCOPE: CPT

## 2019-05-09 PROCEDURE — 36415 COLL VENOUS BLD VENIPUNCTURE: CPT

## 2019-05-09 PROCEDURE — 97165 OT EVAL LOW COMPLEX 30 MIN: CPT

## 2019-05-09 PROCEDURE — 84443 ASSAY THYROID STIM HORMONE: CPT

## 2019-05-09 RX ORDER — SODIUM CHLORIDE 0.9 % (FLUSH) 0.9 %
10 SYRINGE (ML) INJECTION EVERY 12 HOURS SCHEDULED
Status: DISCONTINUED | OUTPATIENT
Start: 2019-05-09 | End: 2019-05-13 | Stop reason: HOSPADM

## 2019-05-09 RX ORDER — LEVOTHYROXINE SODIUM 88 UG/1
88 TABLET ORAL DAILY
Status: DISCONTINUED | OUTPATIENT
Start: 2019-05-09 | End: 2019-05-13 | Stop reason: HOSPADM

## 2019-05-09 RX ORDER — SODIUM CHLORIDE 9 MG/ML
1000 INJECTION, SOLUTION INTRAVENOUS CONTINUOUS
Status: DISCONTINUED | OUTPATIENT
Start: 2019-05-09 | End: 2019-05-10

## 2019-05-09 RX ORDER — PRAVASTATIN SODIUM 40 MG
40 TABLET ORAL NIGHTLY
Status: DISCONTINUED | OUTPATIENT
Start: 2019-05-09 | End: 2019-05-13 | Stop reason: HOSPADM

## 2019-05-09 RX ORDER — DEXTROSE MONOHYDRATE 50 MG/ML
100 INJECTION, SOLUTION INTRAVENOUS PRN
Status: DISCONTINUED | OUTPATIENT
Start: 2019-05-09 | End: 2019-05-13 | Stop reason: HOSPADM

## 2019-05-09 RX ORDER — DEXTROSE MONOHYDRATE 25 G/50ML
12.5 INJECTION, SOLUTION INTRAVENOUS PRN
Status: DISCONTINUED | OUTPATIENT
Start: 2019-05-09 | End: 2019-05-13 | Stop reason: HOSPADM

## 2019-05-09 RX ORDER — FERROUS SULFATE 325(65) MG
325 TABLET ORAL 2 TIMES DAILY
Status: DISCONTINUED | OUTPATIENT
Start: 2019-05-09 | End: 2019-05-13 | Stop reason: HOSPADM

## 2019-05-09 RX ORDER — LOSARTAN POTASSIUM 100 MG/1
100 TABLET ORAL DAILY
Status: DISCONTINUED | OUTPATIENT
Start: 2019-05-09 | End: 2019-05-13 | Stop reason: HOSPADM

## 2019-05-09 RX ORDER — NICOTINE POLACRILEX 4 MG
15 LOZENGE BUCCAL PRN
Status: DISCONTINUED | OUTPATIENT
Start: 2019-05-09 | End: 2019-05-13 | Stop reason: HOSPADM

## 2019-05-09 RX ORDER — ACETAMINOPHEN 325 MG/1
650 TABLET ORAL EVERY 4 HOURS PRN
Status: DISCONTINUED | OUTPATIENT
Start: 2019-05-09 | End: 2019-05-13 | Stop reason: HOSPADM

## 2019-05-09 RX ORDER — METOPROLOL TARTRATE 50 MG/1
50 TABLET, FILM COATED ORAL 2 TIMES DAILY
Status: DISCONTINUED | OUTPATIENT
Start: 2019-05-09 | End: 2019-05-13 | Stop reason: HOSPADM

## 2019-05-09 RX ORDER — ALBUTEROL SULFATE 90 UG/1
2 AEROSOL, METERED RESPIRATORY (INHALATION) EVERY 4 HOURS PRN
Status: DISCONTINUED | OUTPATIENT
Start: 2019-05-09 | End: 2019-05-13 | Stop reason: HOSPADM

## 2019-05-09 RX ORDER — SODIUM CHLORIDE 0.9 % (FLUSH) 0.9 %
10 SYRINGE (ML) INJECTION PRN
Status: DISCONTINUED | OUTPATIENT
Start: 2019-05-09 | End: 2019-05-13 | Stop reason: HOSPADM

## 2019-05-09 RX ORDER — ASPIRIN 81 MG/1
81 TABLET ORAL DAILY
Status: DISCONTINUED | OUTPATIENT
Start: 2019-05-10 | End: 2019-05-13 | Stop reason: HOSPADM

## 2019-05-09 RX ORDER — ONDANSETRON 2 MG/ML
4 INJECTION INTRAMUSCULAR; INTRAVENOUS EVERY 6 HOURS PRN
Status: DISCONTINUED | OUTPATIENT
Start: 2019-05-09 | End: 2019-05-13 | Stop reason: HOSPADM

## 2019-05-09 RX ADMIN — METFORMIN HYDROCHLORIDE 1000 MG: 500 TABLET ORAL at 18:49

## 2019-05-09 RX ADMIN — SODIUM CHLORIDE 1000 ML: 9 INJECTION, SOLUTION INTRAVENOUS at 22:24

## 2019-05-09 RX ADMIN — PRAVASTATIN SODIUM 40 MG: 40 TABLET ORAL at 21:18

## 2019-05-09 RX ADMIN — METOPROLOL TARTRATE 50 MG: 50 TABLET ORAL at 21:18

## 2019-05-09 RX ADMIN — FERROUS SULFATE TAB 325 MG (65 MG ELEMENTAL FE) 325 MG: 325 (65 FE) TAB at 21:18

## 2019-05-09 RX ADMIN — SODIUM CHLORIDE 1000 ML: 9 INJECTION, SOLUTION INTRAVENOUS at 14:57

## 2019-05-09 ASSESSMENT — ENCOUNTER SYMPTOMS
DIARRHEA: 0
VOMITING: 0
SHORTNESS OF BREATH: 0
COLOR CHANGE: 0
NAUSEA: 0
BACK PAIN: 0
ABDOMINAL PAIN: 0
COUGH: 0

## 2019-05-09 NOTE — ED PROVIDER NOTES
headaches. All other systems reviewed and are negative. Exceptas noted above in the ROS, all other systems were reviewed and negative. 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    Vitamin D deficiency disease 12/15         SURGICAL HISTORY:      Past Surgical History:   Procedure Laterality Date    CORONARY ANGIOPLASTY WITH STENT PLACEMENT  08/14/2000    RX Tristar 2.5 x 13 mCX  RX Tristar 2.5 x 13 pCX    CORONARY ANGIOPLASTY WITH STENT PLACEMENT  10/19/2000    Tetra 2.75 x 18 CX  Tetra 3.0 x 13 CX    OTHER SURGICAL HISTORY  1-23-12    phaco emulsification cataract right eye    TOOTH EXTRACTION           CURRENT MEDICATIONS:       Previous Medications    ACCU-CHEK DIONTE PLUS STRIP    TEST TWO TIMES DAILY    ALBUTEROL SULFATE HFA (PROVENTIL HFA) 108 (90 BASE) MCG/ACT INHALER    Inhale 1-2 puffs into the lungs every 4 hours as needed for Wheezing or Shortness of Breath May substitute ProAir or Ventolin    ASPIRIN 81 MG EC TABLET    Take 1 tablet by mouth daily    CHOLECALCIFEROL (VITAMIN D) 2000 UNITS CAPS CAPSULE    Take  by mouth daily.     FERROUS SULFATE 325 (65 FE) MG TABLET    Take 325 mg by mouth 2 times daily    FUROSEMIDE (LASIX) 40 MG TABLET    Take 1 tablet by mouth 2 times daily as needed (swelling)    LEVOTHYROXINE (SYNTHROID) 88 MCG TABLET    TAKE 1 TABLET EVERY DAY    LOSARTAN (COZAAR) 100 MG TABLET    Take 1 tablet by mouth daily    MAGNESIUM OXIDE (MAG-OX) 400 MG TABLET    Take 400 mg by mouth daily    METFORMIN (GLUCOPHAGE) 1000 MG TABLET    TAKE 1 TABLET TWICE DAILY WITH MEALS    METOLAZONE (ZAROXOLYN) 5 MG TABLET    Take 1 tablet by mouth three times a week May take 05/09/19 1334 -- 05/09/19 1334 05/09/19 1334 05/09/19 1334 -- 05/09/19 1334   (!) 184/86 97.8 °F (36.6 °C)  71 14 96 %  183 lb (83 kg)       Physical Exam    CONSTITUTIONAL: Awake and alert. Cooperative. Well-developed. Well-nourished. Non-toxic. No acute distress. HENT: Normocephalic. Atraumatic. External ears normal, without discharge. No nasal discharge. Oropharynx clear. Mucous membranes moist.  EYES: Conjunctiva non-injected. No scleral icterus. PERRL. EOM's grossly intact. NECK: Supple. Normal ROM. CARDIOVASCULAR: RRR. No Murmer. Intact distal pulses. PULMONARY/CHEST WALL: Effort normal. No tachypnea. Lungs clear to ausculation. ABDOMEN: Normal BS. Soft. Nondistended. No tenderness to palpate. No guarding. /ANORECTAL: Not assessed  MUSKULOSKELETAL: Normal ROM. No acute deformities. No edema. No tenderness to palpate. SKIN: Warm and dry. No rash. NEUROLOGICAL: Alert and oriented x 3. GCS 15. CN II-XII grossly intact. Strength is 5/5 in all extremities and sensation is intact. Normal gait.    PSYCHIATRIC: Normal affect        DIAGNOSTICRESULTS:     LABS:    Results for orders placed or performed during the hospital encounter of 05/09/19   CBC auto differential   Result Value Ref Range    WBC 9.6 4.0 - 11.0 K/uL    RBC 4.10 (L) 4.20 - 5.90 M/uL    Hemoglobin 12.9 (L) 13.5 - 17.5 g/dL    Hematocrit 37.2 (L) 40.5 - 52.5 %    MCV 90.8 80.0 - 100.0 fL    MCH 31.6 26.0 - 34.0 pg    MCHC 34.7 31.0 - 36.0 g/dL    RDW 12.9 12.4 - 15.4 %    Platelets 675 811 - 576 K/uL    MPV 8.6 5.0 - 10.5 fL    Neutrophils % 86.1 %    Lymphocytes % 6.6 %    Monocytes % 4.8 %    Eosinophils % 2.2 %    Basophils % 0.3 %    Neutrophils # 8.3 (H) 1.7 - 7.7 K/uL    Lymphocytes # 0.6 (L) 1.0 - 5.1 K/uL    Monocytes # 0.5 0.0 - 1.3 K/uL    Eosinophils # 0.2 0.0 - 0.6 K/uL    Basophils # 0.0 0.0 - 0.2 K/uL   Comprehensive metabolic panel   Result Value Ref Range    Sodium 120 (L) 136 - 145 mmol/L    Potassium 3.9 3.5 - 5.1 mmol/L Chloride 78 (L) 99 - 110 mmol/L    CO2 26 21 - 32 mmol/L    Anion Gap 16 3 - 16    Glucose 128 (H) 70 - 99 mg/dL    BUN 21 (H) 7 - 20 mg/dL    CREATININE 1.0 0.8 - 1.3 mg/dL    GFR Non-African American >60 >60    GFR African American >60 >60    Calcium 9.9 8.3 - 10.6 mg/dL    Total Protein 8.2 6.4 - 8.2 g/dL    Alb 4.4 3.4 - 5.0 g/dL    Albumin/Globulin Ratio 1.2 1.1 - 2.2    Total Bilirubin 0.5 0.0 - 1.0 mg/dL    Alkaline Phosphatase 151 (H) 40 - 129 U/L    ALT 19 10 - 40 U/L    AST 19 15 - 37 U/L    Globulin 3.8 g/dL   Magnesium   Result Value Ref Range    Magnesium 2.00 1.80 - 2.40 mg/dL   Phosphorus   Result Value Ref Range    Phosphorus 3.1 2.5 - 4.9 mg/dL   EKG 12 Lead   Result Value Ref Range    Ventricular Rate 67 BPM    Atrial Rate 67 BPM    P-R Interval 216 ms    QRS Duration 128 ms    Q-T Interval 422 ms    QTc Calculation (Bazett) 445 ms    P Axis 72 degrees    R Axis -14 degrees    T Axis 59 degrees    Diagnosis       Sinus rhythm with 1st degree A-V blockNon-specific intra-ventricular conduction blockCannot rule out Anteroseptal infarct , age undeterminedAbnormal ECGWhen compared with ECG of 03-FEB-2018 08:32,AL interval has increasedQRS duration has increasedMinimal criteria for Anteroseptal infarct are now PresentNonspecific T wave abnormality now evident in Inferior leads         RADIOLOGY:  All x-ray studies areviewed/reviewed by me. Formal interpretations per the radiologist are as follows:      Xr Chest Standard (2 Vw)    Result Date: 5/8/2019  EXAMINATION: TWO XRAY VIEWS OF THE CHEST 5/8/2019 4:28 pm COMPARISON: 05/24/2018 HISTORY: ORDERING SYSTEM PROVIDED HISTORY: SOB (shortness of breath) TECHNOLOGIST PROVIDED HISTORY: Reason for exam:->n/a Ordering Physician Provided Reason for Exam: sob Acuity: Acute Type of Exam: Initial FINDINGS: Status post coronary bypass. Heart size and pulmonary vessels stable. Small to moderate left pleural effusion with left basilar atelectasis.   Small right pleural effusion. These appear unchanged. Stable left larger than right pleural effusions with left basilar atelectasis       EKG: The Ekg interpreted by me in the absence of a cardiologist shows. normal sinus rhythm with a rate of 67      See also interpretation by Berkley Kaplan MD.      PROCEDURES:   N/A    CRITICAL CARE TIME:     None    CONSULTS:  IP CONSULT TO HOSPITALIST  IP CONSULT TO NEPHROLOGY      EMERGENCY DEPARTMENT COURSE and DIFFERENTIAL DIAGNOSIS/MDM:   Vitals:    Vitals:    05/09/19 1334 05/09/19 1336   BP:  (!) 184/86   Pulse: 71    Resp: 14    Temp: 97.8 °F (36.6 °C)    SpO2: 96%    Weight: 183 lb (83 kg)        Patient was given the following medications:  Medications   0.9 % sodium chloride infusion (has no administration in time range)         Patient was evaluated by both myself and Berkley Kaplan MD.  The patient was sent in by his PCP secondary to outpatient hyponatremia. The sodium level yesterday was reported to be 123. On repeat labs today the patient continues to be hyponatremic. Sodium is 120 and chloride is 78. His remaining labs including a CBC, CMP all within the magnesium and phosphorus levels are unremarkable. I will need to consult the hospitalist for admission. He is started on a normal saline infusion of 125 mL per hour. I spoke with Dr. Patience Longo. We thoroughly discussed the history, physical exam, laboratory and imaging studies, as well as, emergency department course. Based upon that discussion, we've decided to admit Prince Fernandez for further observation and evaluation of Rosalino Blanc's hyponatremia. As I have deemed necessary from their history, physical, and studies, I have considered and evaluated Prince Fernandez for the following diagnoses:  Electrolyte abnormality, anemia, kidney injury, arrhythmia, UTI, infection/sepsis. FINAL IMPRESSION:      1. Hyponatremia    2.  Chloride, decreased level          DISPOSITION/PLAN:   DISPOSITION Admitted                   (Please note thatportions of this note were completed with a voice recognition program.  Efforts were made to edit the dictations, but occasionally words are mis-transcribed.)    Jenny Austin PA-C (electronicallysigned)              Bessy Duque [place]  05/09/19 [id number]

## 2019-05-09 NOTE — ED NOTES
[address] [facility] Andriy Fischer called back      Margie Gonzalez  05/09/19 [id number]

## 2019-05-09 NOTE — LETTER
[address] [address]  Phone: [phone]             May 10, 2019    Patient: Jeovany Butcher   YOB: 1940   Date of Visit: 5/9/2019       To Whom It May Concern:    Patrick Fraga was seen and treated in our facility  beginning 5/9/2019 until 5/13/19. Please excuse his family from school.        Sincerely,       Deonna Mueller RN         Signature:__________________________________

## 2019-05-09 NOTE — PROGRESS NOTES
Occupational Therapy   Occupational Therapy Initial Assessment/Treatment/Discharge  Date: 2019   Patient Name: Juan Francisco Ding  MRN: [id number]     : 1940    Date of Service: 2019    Discharge Recommendations:  Home with assist PRN       Assessment   Performance deficits / Impairments: Decreased functional mobility ; Decreased high-level IADLs;Decreased safe awareness  Assessment: Pt 67 yo male admitted for low sodium levels. Pt currently S-mod I level for all transfers and ambulation. Pt verbalizes that he is functioning at baseline. No skilled OT services needed at this time. Prognosis: Good  Decision Making: Low Complexity  Patient Education: role of OT, safety, discharge planning  No Skilled OT: At baseline function  REQUIRES OT FOLLOW UP: No  Activity Tolerance  Activity Tolerance: Patient Tolerated treatment well  Safety Devices  Safety Devices in place: Yes  Type of devices: Call light within reach; Chair alarm in place; Left in chair;Gait belt;Nurse notified           Patient Diagnosis(es): The primary encounter diagnosis was Hyponatremia. A diagnosis of Chloride, decreased level was also pertinent to this visit. has a past medical history of Cerebrovascular disease, Family history of factor V deficiency, Ischemic cerebrovascular accident (CVA) of frontal lobe ([facility] [address] Routine health maintenance, Vasovagal reaction, and Vitamin D deficiency disease. has a past surgical history that includes Tooth Extraction; Coronary angioplasty with stent (2000); other surgical history (12); and Coronary angioplasty with stent (10/19/2000).            Restrictions  Restrictions/Precautions  Restrictions/Precautions: Up as Tolerated, Fall Risk  Position Activity Restriction  Other position/activity restrictions: Tele, IV    Subjective   General  Chart Reviewed: Yes  Patient assessed for rehabilitation services?: Yes  Family / Caregiver Present: Yes(daughter and spouse)  Subjective  Subjective: Pt agreeable to therapy  General Comment  Comments: RN approved therapy   Pain: Pt denies pain  Height and Weight  Height: 5' 11\" (180.3 cm)  Oxygen Therapy  SpO2: 98 %  O2 Device: None (Room air)  Social/Functional History  Social/Functional History  Lives With: Spouse(42 yo son at home)  Type of Home: House  Home Layout: Two level, Able to Live on Main level with bedroom/bathroom  Home Access: Stairs to enter with rails  Entrance Stairs - Number of Steps: 4  Bathroom Shower/Tub: Tub/Shower unit, Shower chair with back  Bathroom Toilet: Standard  Bathroom Equipment: Hand-held shower  Home Equipment: Rolling walker, Cane, [employer]  ADL Assistance: Independent  Homemaking Assistance: Independent  Homemaking Responsibilities: Yes  Meal Prep Responsibility: Secondary  Laundry Responsibility: Secondary  Cleaning Responsibility: Secondary  Shopping Responsibility: Secondary  Ambulation Assistance: Independent(take cane for long distances )  Transfer Assistance: Independent  Active : Yes  Occupation: Retired  Type of occupation: supervisor   Leisure & Hobbies: fish, watch tv       Objective   Vision: Impaired  Vision Exceptions: Wears glasses at all times  Hearing: Within functional limits    Orientation  Overall Orientation Status: Within Functional Limits  Observation/Palpation  Posture: Good  Balance  Sitting Balance: Independent  Standing Balance: Modified independent   Standing Balance  Time: ~1 minute, ~10 minutes   Activity: bathroom mobility, functional ambulation  Functional Mobility  Functional - Mobility Device: No device  Activity: To/from bathroom; Other  Assist Level: Supervision  Toilet Transfers  Toilet - Technique: Ambulating  Equipment Used: Standard toilet  Toilet Transfer: Supervision  ADL  Grooming: Modified independent   Toileting: Modified independent   Tone RUE  RUE Tone: Normotonic  Tone LUE  LUE Tone: Normotonic  Coordination  Movements Are Fluid And Coordinated: Yes     Bed mobility  Rolling to Right: Independent  Supine to Sit: Modified independent  Sit to Supine: Unable to assess  Scooting: Modified independent  Comment: Pt in bedside chair at end of session  Transfers  Sit to stand: Independent  Stand to sit: Independent     Cognition  Overall Cognitive Status: WFL                 LUE AROM (degrees)  LUE AROM : Exceptions  L Shoulder Flexion 0-180: ~90  RUE AROM (degrees)  RUE AROM : WFL  LUE Strength  Gross LUE Strength: WFL  RUE Strength  Gross RUE Strength: WFL                   Plan   Plan  Times per week: 1 x only      AM-PAC Score        AM-[facility] Inpatient Daily Activity Raw Score: 22  AM-PAC Inpatient ADL T-Scale Score : 47.1  ADL Inpatient CMS 0-100% Score: 25.8  ADL Inpatient CMS G-Code Modifier : CJ    Goals  Short term goals  Time Frame for Short term goals: 1 x only  Short term goal 1: Pt will complete functional transfer with S . GOAL MET  Short term goal 2: Pt will complete bathroom mobility with S. GOAL MET  Patient Goals   Patient goals : Pt wants to go home.         Therapy Time   Individual Concurrent Group Co-treatment   Time In 0098         Time Out 1700         Minutes 28         Timed Code Treatment Minutes: 18 Minutes(10 minutes for evaluation)       MARCELLA Miller/YONY

## 2019-05-09 NOTE — PROGRESS NOTES
Thanks for consulting [facility]ph[facility] for the care of Binu Burrows. Low sodium   Appears to be diuretics related shantal metolazone  Repeat stat  Reviewed labs and note   Urine lytes unlikely to be helpful in diagnosis  Agree with iv fluids  Repeat sodium stat    Full consult will follow  Please call with questions at-  24 Hrs Answering service [phone]  Perfect serve, or cell phone 7 am - [address] [facility], MD   [facility] nephrology  [facility]

## 2019-05-09 NOTE — H&P
Hospital Medicine History & Physical      PCP: Sascha Mary MD    Date of Admission: 5/9/2019    Date of Service: Pt seen/examined on 05/09/19 and Admitted to Inpatient with expected LOS greater than two midnights due to medical therapy of hyponatremia    Chief Complaint:  Edema, abnormal labs      History Of Present Illness:       66 y.o. male who presented to [facility] with abnormal labs. Patient went to see the primary care physician for regular follow-up on chronic medical issues. A long other issues, patient also has chronic ankle edema and is on diuretics. Last known left ventricular ejection fraction was about 50-55%. Is not aware of any congestive heart failure diagnosis. Currently patient has no other complaints. Was called by the primary care physician earlier today and was directed to the emergency room because of extremely low sodium. Sodium was repeated in the emergency room and again was found to be low. At that point, patient was admitted and hospitalist service was consulted.   Comfortable on complaint free at the time of this evaluation    Past Medical History:          Diagnosis Date    Cerebrovascular disease 1997    TIA     Family history of factor V deficiency     daughter and grand daughter    Ischemic cerebrovascular accident (CVA) of frontal lobe ([address] 4/16    Routine health maintenance     refuses immunizations; declines PSA    Vasovagal reaction     to needles    Vitamin D deficiency disease 12/15       Past Surgical History:          Procedure Laterality Date    CORONARY ANGIOPLASTY WITH STENT PLACEMENT  08/14/2000    RX Tristar 2.5 x 13 mCX  RX Tristar 2.5 x 13 pCX    CORONARY ANGIOPLASTY WITH STENT PLACEMENT  10/19/2000    Tetra 2.75 x 18 CX  Tetra 3.0 x 13 CX    OTHER SURGICAL HISTORY  1-23-12    phaco emulsification cataract right eye    TOOTH EXTRACTION         Medications Prior to Admission:      Prior to Admission medications    Medication Sig Start Date End Date Taking? Authorizing Provider   pravastatin (PRAVACHOL) 40 MG tablet Take 40 mg by mouth daily    Historical Provider, MD   levothyroxine (SYNTHROID) 88 MCG tablet TAKE 1 TABLET EVERY DAY 3/13/19   Earnest Espinosa MD   metoprolol tartrate (LOPRESSOR) 50 MG tablet Take 1 tablet by mouth 2 times daily 2/5/19   Gaynelle Siemens, MD   ACCU-CHEK DIONTE PLUS strip TEST TWO TIMES DAILY 1/30/19   Earnest Espinosa MD   metFORMIN (GLUCOPHAGE) 1000 MG tablet TAKE 1 TABLET TWICE DAILY WITH MEALS 1/2/19   Earnest Espinosa MD   metolazone (ZAROXOLYN) 5 MG tablet Take 1 tablet by mouth three times a week May take as needed for edema. May take up to 3 times in 1 week. 12/12/18   Gaynelle Siemens, MD   furosemide (LASIX) 40 MG tablet Take 1 tablet by mouth 2 times daily as needed (swelling) 11/21/18   Earnest Espinosa MD   losartan (COZAAR) 100 MG tablet Take 1 tablet by mouth daily 11/14/18   Earnest Espinosa MD   albuterol sulfate HFA (PROVENTIL HFA) 108 (90 Base) MCG/ACT inhaler Inhale 1-2 puffs into the lungs every 4 hours as needed for Wheezing or Shortness of Breath May substitute ProAir or Ventolin 10/22/18   Earnest Espinosa MD   magnesium oxide (MAG-OX) 400 MG tablet Take 400 mg by mouth daily    Historical Provider, MD   ferrous sulfate 325 (65 Fe) MG tablet Take 325 mg by mouth 2 times daily    Historical Provider, MD   aspirin 81 MG EC tablet Take 1 tablet by mouth daily 3/5/18   Gaynelle Siemens, MD   Cholecalciferol (VITAMIN D) 2000 UNITS CAPS capsule Take  by mouth daily. Historical Provider, MD       Allergies:  Lipitor and Simvastatin    Social History:      The patient currently lives at home with family    TOBACCO:   reports that he quit smoking about 44 years ago. His smoking use included cigarettes. He has a 40.00 pack-year smoking history. He has never used smokeless tobacco.  ETOH:   reports that he does not drink alcohol. Family History:      Reviewed in detail and negative for DM, CAD, Cancer, CVA. 19   BILITOT 0.5 0.5   ALKPHOS 158* 151*     No results for input(s): INR in the last 72 hours. No results for input(s): Otis Milch in the last 72 hours. Urinalysis:      Lab Results   Component Value Date    NITRU Negative 01/30/2018    BLOODU Negative 01/30/2018    SPECGRAV 1.015 01/30/2018    GLUCOSEU Negative 01/30/2018       Radiology:     CXR: I have reviewed the CXR with the following interpretation: Not done today  EKG:  I have reviewed the EKG with the following interpretation: Sinus rhythm with first-degree AV block    No orders to display       ASSESSMENT:    C/Brett Bashir 1106 Problems    Diagnosis Date Noted    Hyponatremia [E87.1] 05/09/2019    Mixed hyperlipidemia [E78.2] 01/11/2018    Type 2 diabetes mellitus without complication, without long-term current use of insulin ([facility] [address] [E11.9]     Coronary artery disease involving native coronary artery of native heart without angina pectoris [I25.10]     Hypothyroidism, acquired, 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 Patient directed to the emergency room by the primary care physician because of low sodium  Most likely hypovolemic  Diuretics on hold  Starting IV fluids with normal saline  Nephrology consulted for further insight  Repeat basic metabolic panel later tonight    Lower extremity edema  This may be chronic with a component of venous insufficiency. Patient's last echo was in January 2018 and showed a left ventricular ejection fraction of 50-55%. I will repeat echocardiogram to evaluate the necessity of aggressive diuresis. To the extent of what I could find in the medical records, patient does not have a diagnosis of systolic congestive heart failure. Hypothyroidism  Continue home dose of Synthroid  Repeat TSH ordered. Hypertension  Monitor vitals. Continue same management for now except for diuretics.     Coronary artery disease  Asymptomatic and

## 2019-05-09 NOTE — ED PROVIDER NOTES
I independently evaluated and obtained a history and physical on Brook Summers. All diagnostic, treatment, and disposition assistants were made to myself in conjunction the advanced practice provider. For further details of this patient's emergency department encounter, please see the advanced practice provider's documentation. History: 66 y.o. M who is on lasix for CHF who presents after having a low sodium on an outpatient lab visit. The patient denies any seizures, altered mental status, or symptoms at this time. Physician Exam: Pleasant elderly  male in no acute distress. Regular rate and rhythm. Intact distal pulses. 5 out of 5 strength in all 4 extremities. Normal mental status. MDM:  Laboratory evaluation shows hyponatremia at 120. The patient is asymptomatic at this time with no signs of neurologic deficit or cardiac dysrhythmia. I feel the likely culprit is probably the patient's loop diuretic. The patient is given a gentle infusion of normal saline attempt to slowly increase his sodium avoiding correctly too quickly given risk of CPM. The patient expresses understanding and agreement with this plan and is admitted for further care. FINAL IMPRESSION      1. Hyponatremia    2.  Chloride, decreased level               Sirena Lund MD  05/09/19 [id number]

## 2019-05-10 LAB
ALBUMIN SERPL-MCNC: 3.8 G/DL (ref 3.4–5)
ALP BLD-CCNC: 126 U/L (ref 40–129)
ALT SERPL-CCNC: 16 U/L (ref 10–40)
ANION GAP SERPL CALCULATED.3IONS-SCNC: 10 MMOL/L (ref 3–16)
ANION GAP SERPL CALCULATED.3IONS-SCNC: 10 MMOL/L (ref 3–16)
ANION GAP SERPL CALCULATED.3IONS-SCNC: 11 MMOL/L (ref 3–16)
ANION GAP SERPL CALCULATED.3IONS-SCNC: 12 MMOL/L (ref 3–16)
AST SERPL-CCNC: 18 U/L (ref 15–37)
BASOPHILS ABSOLUTE: 0 K/UL (ref 0–0.2)
BASOPHILS RELATIVE PERCENT: 0.4 %
BILIRUB SERPL-MCNC: 0.7 MG/DL (ref 0–1)
BILIRUBIN DIRECT: <0.2 MG/DL (ref 0–0.3)
BILIRUBIN, INDIRECT: NORMAL MG/DL (ref 0–1)
BUN BLDV-MCNC: 15 MG/DL (ref 7–20)
BUN BLDV-MCNC: 16 MG/DL (ref 7–20)
BUN BLDV-MCNC: 16 MG/DL (ref 7–20)
BUN BLDV-MCNC: 17 MG/DL (ref 7–20)
CALCIUM SERPL-MCNC: 9.1 MG/DL (ref 8.3–10.6)
CALCIUM SERPL-MCNC: 9.2 MG/DL (ref 8.3–10.6)
CALCIUM SERPL-MCNC: 9.2 MG/DL (ref 8.3–10.6)
CALCIUM SERPL-MCNC: 9.6 MG/DL (ref 8.3–10.6)
CHLORIDE BLD-SCNC: 85 MMOL/L (ref 99–110)
CHLORIDE BLD-SCNC: 87 MMOL/L (ref 99–110)
CHLORIDE BLD-SCNC: 87 MMOL/L (ref 99–110)
CHLORIDE BLD-SCNC: 89 MMOL/L (ref 99–110)
CO2: 26 MMOL/L (ref 21–32)
CO2: 27 MMOL/L (ref 21–32)
CO2: 28 MMOL/L (ref 21–32)
CO2: 28 MMOL/L (ref 21–32)
CORTISOL TOTAL: 13.4 UG/DL
CORTISOL TOTAL: 18.6 UG/DL
CREAT SERPL-MCNC: 0.7 MG/DL (ref 0.8–1.3)
CREAT SERPL-MCNC: 0.8 MG/DL (ref 0.8–1.3)
CREAT SERPL-MCNC: 0.8 MG/DL (ref 0.8–1.3)
CREAT SERPL-MCNC: 0.9 MG/DL (ref 0.8–1.3)
EOSINOPHILS ABSOLUTE: 0.2 K/UL (ref 0–0.6)
EOSINOPHILS RELATIVE PERCENT: 3.2 %
ESTIMATED AVERAGE GLUCOSE: 148.5 MG/DL
FOLATE: >20 NG/ML (ref 4.78–24.2)
GFR AFRICAN AMERICAN: >60
GFR NON-AFRICAN AMERICAN: >60
GLUCOSE BLD-MCNC: 105 MG/DL (ref 70–99)
GLUCOSE BLD-MCNC: 116 MG/DL (ref 70–99)
GLUCOSE BLD-MCNC: 117 MG/DL (ref 70–99)
GLUCOSE BLD-MCNC: 130 MG/DL (ref 70–99)
GLUCOSE BLD-MCNC: 173 MG/DL (ref 70–99)
GLUCOSE BLD-MCNC: 91 MG/DL (ref 70–99)
GLUCOSE BLD-MCNC: 97 MG/DL (ref 70–99)
GLUCOSE BLD-MCNC: 99 MG/DL (ref 70–99)
HBA1C MFR BLD: 6.8 %
HCT VFR BLD CALC: 34.1 % (ref 40.5–52.5)
HEMOGLOBIN: 11.9 G/DL (ref 13.5–17.5)
LYMPHOCYTES ABSOLUTE: 0.5 K/UL (ref 1–5.1)
LYMPHOCYTES RELATIVE PERCENT: 8.2 %
MAGNESIUM: 1.7 MG/DL (ref 1.8–2.4)
MCH RBC QN AUTO: 31.7 PG (ref 26–34)
MCHC RBC AUTO-ENTMCNC: 34.8 G/DL (ref 31–36)
MCV RBC AUTO: 91 FL (ref 80–100)
MONOCYTES ABSOLUTE: 0.5 K/UL (ref 0–1.3)
MONOCYTES RELATIVE PERCENT: 7.6 %
NEUTROPHILS ABSOLUTE: 5.1 K/UL (ref 1.7–7.7)
NEUTROPHILS RELATIVE PERCENT: 80.6 %
OSMOLALITY: 259 MOSM/KG (ref 278–305)
PDW BLD-RTO: 12.8 % (ref 12.4–15.4)
PERFORMED ON: ABNORMAL
PERFORMED ON: NORMAL
PLATELET # BLD: 238 K/UL (ref 135–450)
PMV BLD AUTO: 8.3 FL (ref 5–10.5)
POTASSIUM SERPL-SCNC: 3.8 MMOL/L (ref 3.5–5.1)
POTASSIUM SERPL-SCNC: 3.9 MMOL/L (ref 3.5–5.1)
POTASSIUM SERPL-SCNC: 4.3 MMOL/L (ref 3.5–5.1)
POTASSIUM SERPL-SCNC: 4.5 MMOL/L (ref 3.5–5.1)
PRO-BNP: 1229 PG/ML (ref 0–449)
RBC # BLD: 3.74 M/UL (ref 4.2–5.9)
SODIUM BLD-SCNC: 122 MMOL/L (ref 136–145)
SODIUM BLD-SCNC: 125 MMOL/L (ref 136–145)
SODIUM BLD-SCNC: 126 MMOL/L (ref 136–145)
SODIUM BLD-SCNC: 127 MMOL/L (ref 136–145)
SODIUM URINE: 89 MMOL/L
TOTAL PROTEIN: 7.1 G/DL (ref 6.4–8.2)
VITAMIN B-12: 877 PG/ML (ref 211–911)
WBC # BLD: 6.3 K/UL (ref 4–11)

## 2019-05-10 PROCEDURE — 84300 ASSAY OF URINE SODIUM: CPT

## 2019-05-10 PROCEDURE — 82533 TOTAL CORTISOL: CPT

## 2019-05-10 PROCEDURE — 80048 BASIC METABOLIC PNL TOTAL CA: CPT

## 2019-05-10 PROCEDURE — 83930 ASSAY OF BLOOD OSMOLALITY: CPT

## 2019-05-10 PROCEDURE — 83735 ASSAY OF MAGNESIUM: CPT

## 2019-05-10 PROCEDURE — 83880 ASSAY OF NATRIURETIC PEPTIDE: CPT

## 2019-05-10 PROCEDURE — 36415 COLL VENOUS BLD VENIPUNCTURE: CPT

## 2019-05-10 PROCEDURE — 2580000003 HC RX 258: Performed by: INTERNAL MEDICINE

## 2019-05-10 PROCEDURE — 83935 ASSAY OF URINE OSMOLALITY: CPT

## 2019-05-10 PROCEDURE — 6370000000 HC RX 637 (ALT 250 FOR IP): Performed by: INTERNAL MEDICINE

## 2019-05-10 PROCEDURE — 80076 HEPATIC FUNCTION PANEL: CPT

## 2019-05-10 PROCEDURE — 85025 COMPLETE CBC W/AUTO DIFF WBC: CPT

## 2019-05-10 PROCEDURE — 1200000000 HC SEMI PRIVATE

## 2019-05-10 PROCEDURE — 6360000002 HC RX W HCPCS: Performed by: INTERNAL MEDICINE

## 2019-05-10 RX ORDER — TOLVAPTAN 15 MG/1
7.5 TABLET ORAL ONCE
Status: COMPLETED | OUTPATIENT
Start: 2019-05-10 | End: 2019-05-10

## 2019-05-10 RX ADMIN — METFORMIN HYDROCHLORIDE 1000 MG: 500 TABLET ORAL at 18:33

## 2019-05-10 RX ADMIN — FERROUS SULFATE TAB 325 MG (65 MG ELEMENTAL FE) 325 MG: 325 (65 FE) TAB at 22:20

## 2019-05-10 RX ADMIN — MAGNESIUM GLUCONATE 500 MG ORAL TABLET 400 MG: 500 TABLET ORAL at 10:05

## 2019-05-10 RX ADMIN — FERROUS SULFATE TAB 325 MG (65 MG ELEMENTAL FE) 325 MG: 325 (65 FE) TAB at 10:05

## 2019-05-10 RX ADMIN — SODIUM CHLORIDE 1000 ML: 9 INJECTION, SOLUTION INTRAVENOUS at 06:47

## 2019-05-10 RX ADMIN — METOPROLOL TARTRATE 50 MG: 50 TABLET ORAL at 22:16

## 2019-05-10 RX ADMIN — ENOXAPARIN SODIUM 40 MG: 40 INJECTION SUBCUTANEOUS at 10:05

## 2019-05-10 RX ADMIN — METOPROLOL TARTRATE 50 MG: 50 TABLET ORAL at 10:05

## 2019-05-10 RX ADMIN — ASPIRIN 81 MG: 81 TABLET, COATED ORAL at 10:06

## 2019-05-10 RX ADMIN — METFORMIN HYDROCHLORIDE 1000 MG: 500 TABLET ORAL at 10:05

## 2019-05-10 RX ADMIN — PRAVASTATIN SODIUM 40 MG: 40 TABLET ORAL at 22:16

## 2019-05-10 RX ADMIN — Medication 10 ML: at 22:17

## 2019-05-10 RX ADMIN — LOSARTAN POTASSIUM 100 MG: 100 TABLET, FILM COATED ORAL at 10:05

## 2019-05-10 RX ADMIN — LEVOTHYROXINE SODIUM 88 MCG: 0.09 TABLET ORAL at 10:05

## 2019-05-10 RX ADMIN — TOLVAPTAN 7.5 MG: 15 TABLET ORAL at 10:05

## 2019-05-10 NOTE — PROGRESS NOTES
EXTRACTION         Level of Consciousness: Alert, Oriented, and Cooperative = 0    Level of Activity: Walking unassisted = 0    Respiratory Pattern: Regular Pattern; RR 8-20 = 0    Breath Sounds: Clear = 0    Sputum   ,  ,    Cough: Strong, spontaneous, non-productive = 0    Vital Signs   /71   Pulse 66   Temp 97.8 °F (36.6 °C) (Oral)   Resp 14   Ht 5' 11\" (1.803 m)   Wt 183 lb (83 kg)   SpO2 96%   BMI 25.52 kg/m²   SPO2 (COPD values may differ): Greater than or equal to 92% on room air = 0    Peak Flow (asthma only): not applicable = 0    RSI: 0-4 = See once and convert to home regimen or discontinue        Plan       Goals: medication delivery, mobilize retained secretions, volume expansion and improve oxygenation    Patient/caregiver was educated on the proper method of use for Respiratory Care Devices:  Yes      Level of patient/caregiver understanding able to:   ? Verbalize understanding   ? Demonstrate understanding       ? Teach back        ? Needs reinforcement       ? No available caregiver               ? Other:     Response to education:  Excellent     Is patient being placed on Home Treatment Regimen? Yes     Does the patient have everything they need prior to discharge? Yes     Comments: patient assessed and chart reviewed     Plan of Care: patient will be placed on home regimen unless condition worsens     Electronically signed by Renae Sanchez RCP on 5/9/2019 at 8:29 PM    Respiratory Protocol Guidelines     1. Assessment and treatment by Respiratory Therapy will be initiated for medication and therapeutic interventions upon initiation of aerosolized medication. 2. Physician will be contacted for respiratory rate (RR) greater than 35 breaths per minute. Therapy will be held for heart rate (HR) greater than 140 beats per minute, pending direction from physician.   3. Bronchodilators will be administered via Metered Dose Inhaler (MDI) with spacer when the following criteria are met:  a. Alert and cooperative     b. HR < 140 bpm  c. RR < 30 bpm                d. Can demonstrate a 2-3 second inspiratory hold  4. Bronchodilators will be administered via Hand Held Nebulizer [facility]) to patients when ANY of the following criteria are met  a. Incognizant or uncooperative          b. Patients treated with HHN at Home        c. Unable to demonstrate proper use of MDI with spacer     d. RR > 30 bpm   5. Bronchodilators will be delivered via Metered Dose Inhaler (MDI), HHN, Aerogen to intubated patients on mechanical ventilation. 6. Inhalation medication orders will be delivered and/or substituted as outlined below. Aerosolized Medications Ordering and Administration Guidelines:    1. All Medications will be ordered by a physician, and their frequency and/or modality will be adjusted as defined by the patients Respiratory Severity Index (RSI) score. 2. If the patient does not have documented COPD, consider discontinuing anticholinergics when RSI is less than 9.  3. If the bronchospasm worsens (increased RSI), then the bronchodilator frequency can be increased to a maximum of every 4 hours. If greater than every 4 hours is required, the physician will be contacted. 4. If the bronchospasm improves, the frequency of the bronchodilator can be decreased, based on the patient's RSI, but not less than home treatment regimen frequency. 5. Bronchodilator(s) will be discontinued if patient has a RSI less than 9 and has received no scheduled or as needed treatment for 72  Hrs. Patients Ordered on a Mucolytic Agent:    1. Must always be administered with a bronchodilator. 2. Discontinue if patient experiences worsened bronchospasm, or secretions have lessened to the point that the patient is able to clear them with a cough. Anti-inflammatory and Combination Medications:    1.  If the patient lacks prior history of lung disease, is not using inhaled anti-inflammatory medication at home, and lacks

## 2019-05-10 NOTE — CONSULTS
[facility]    [facility]              [phone]                 Plan :     Start samsca  No fluid restriction noted  He was on lasix and metolazone, no obvious hypovolemia  Appears idiosyncratic reaction to metolazone- may need to label as allergy  Goal of sodium 125 by pm today, and 128 by am tomorrow   RN to call us with each result for now  Dr Scarlet Amezquita will assume care of this patient 5/11/19- 5/12/19 and he is on call tonight- will sign out to him     Assessment :     Hyponatremia  Sodium was 120 on admission, now 122  Sodium was 132 on 11/18,   Not coming up fast  Got fluids, has edema, will DC fluid now and start samsca  And monitor sodium frequent  Has edema- diuretics held on admission     Urine- lytes ordered,   No alcoholism  Will check TSH- known hypothyroidism, cortisol     Was on metolazone and lasix- in future, would avoid thiazides if possible, may  Need to optimized with loop diuretics like torsemide  On losartan- rarely associated with hyponatrmia     Hypertension   BP: (133)/(76)  Pulse:  [62]   BP stable  Diuretics on hold    HFpEF ? Was on lasix and metolazone at home  Edema 2 +  Ech: 8/18- EF normal, mild LVH  Normal LV filling pressure  May have chronic venous congestion  Will check BNP  Repeat echo is pending      [facility] would like to thank Maira Gonzalez MD   for opportunity to serve this patient      Please call with questions at-   24 Hrs Answering service [phone] or  7 am- 5 pm via Perfect serve or cell phone  Dr.Sudhir Gayathri Talley          CC/reason for consult :     hyponatremia     HPI :     Rene Smith is a 66 y.o. male presented to   the hospital on 5/9/2019 with very low sodium. He went to PCP and got the labs done. His sodium  Was noted to be 120 because of which he was sent  To the ED. He is known to have CHF, followed by cardiology. On furosemide and metolazone at home. Denies,nausea vomiting, diarrhea, alcohol intake.   Also denies education: Not on file    Highest education level: Not on file   Occupational History    Not on file   Social Needs    Financial resource strain: Not on file    Food insecurity:     Worry: Not on file     Inability: Not on file    Transportation needs:     Medical: Not on file     Non-medical: Not on file   Tobacco Use    Smoking status: Former Smoker     Packs/day: 2.00     Years: 20.00     Pack years: 40.00     Types: Cigarettes     Last attempt to quit: 1975     Years since quittin.3    Smokeless tobacco: Never Used   Substance and Sexual Activity    Alcohol use: No    Drug use: No    Sexual activity: Not Currently   Lifestyle    Physical activity:     Days per week: Not on file     Minutes per session: Not on file    Stress: Not on file   Relationships    Social connections:     Talks on phone: Not on file     Gets together: Not on file     Attends Judaism service: Not on file     Active member of club or organization: Not on file     Attends meetings of clubs or organizations: Not on file     Relationship status: Not on file    Intimate partner violence:     Fear of current or ex partner: Not on file     Emotionally abused: Not on file     Physically abused: Not on file     Forced sexual activity: Not on file   Other Topics Concern    Not on file   Social History Narrative    Not on file           Problem Relation Age of Onset    Emphysema Mother     Stroke Father     Other Daughter         Factor V deficiency    Other Grandchild         Factor V deficiency          Medication:     Scheduled Meds:   tolvaptan  7.5 mg Oral Once    aspirin  81 mg Oral Daily    ferrous sulfate  325 mg Oral BID    levothyroxine  88 mcg Oral Daily    losartan  100 mg Oral Daily    magnesium oxide  400 mg Oral Daily    metFORMIN  1,000 mg Oral BID     metoprolol tartrate  50 mg Oral BID    pravastatin  40 mg Oral Nightly    sodium chloride flush  10 mL Intravenous 2 times per day    enoxaparin  40 mg Subcutaneous Daily    insulin lispro  0-12 Units Subcutaneous TID     insulin lispro  0-6 Units Subcutaneous Nightly     Continuous Infusions:   dextrose       PRN Meds:.albuterol sulfate HFA, sodium chloride flush, magnesium hydroxide, ondansetron, glucose, dextrose, glucagon (rDNA), dextrose, acetaminophen       Vitals :     Vitals:    05/10/19 0319   BP: 133/76   Pulse: 62   Resp: 16   Temp: 97.8 °F (36.6 °C)   SpO2: 97%       I & O :       Intake/Output Summary (Last 24 hours) at 5/10/2019 1525  Last data filed at 5/10/2019 0319  Gross per 24 hour   Intake 240 ml   Output 1475 ml   Net -1235 ml        Physical Examination :     General appearance: Anxious- no, distressed- no, in good spirits- yes  Very comfortable, conversant  HEENT: Lips- normal, teeth- ok , oral mucosa- moist  Neck : Mass- no, appears symmetrical, JVD- not visible  Respiratory: Respiratory effort-  Normal , wheeze- no, crackles - no  Cardiovascular:  Ausculation- No M/R/G, Edema 2+  Abdomen: visible mass- no, distention- no, scar- no, tenderness- no                            hepatosplenomegaly-  no  Musculoskeletal:  clubbing no,cyanosis- no , digital ischemia- no                           muscle strength- grossly normal , tone - grossly normal  Skin: rashes- no , ulcers- no, induration- no, tightening - no  Psychiatric:  Judgement and insight- normal           AAO X 3     LABS:     Recent Labs     05/08/19  1544 05/09/19  1324 05/10/19  0716   WBC 9.1 9.6 6.3   HGB 12.4* 12.9* 11.9*   HCT 35.1* 37.2* 34.1*    266 238     Recent Labs     05/09/19  1324 05/09/19  1720 05/10/19  0716   * 123*  121* 122*   K 3.9 3.9 3.9   CL 78* 80* 85*   CO2 26 28 27   BUN 21* 20 15   CREATININE 1.0 1.0 0.7*   GLUCOSE 128* 111* 105*   MG 2.00  --  1.70*   PHOS 3.1  --   --

## 2019-05-10 NOTE — PROGRESS NOTES
cyanosis or edema bilaterally. Full range of motion without deformity. Skin: Skin color, texture, turgor normal.  No rashes or lesions. Neurologic:  Neurovascularly intact without any focal sensory/motor deficits. Cranial nerves: II-XII intact, grossly non-focal.  Psychiatric: Alert and oriented, thought content appropriate, normal insight  Capillary Refill: Brisk,< 3 seconds   Peripheral Pulses: +2 palpable, equal bilaterally       Labs:   Recent Labs     05/08/19  1544 05/09/19  1324 05/10/19  0716   WBC 9.1 9.6 6.3   HGB 12.4* 12.9* 11.9*   HCT 35.1* 37.2* 34.1*    266 238     Recent Labs     05/09/19  1324 05/09/19  1720 05/10/19  0716   * 123*  121* 122*   K 3.9 3.9 3.9   CL 78* 80* 85*   CO2 26 28 27   BUN 21* 20 15   CREATININE 1.0 1.0 0.7*   CALCIUM 9.9 9.2 9.1   PHOS 3.1  --   --      Recent Labs     05/08/19  1544 05/09/19  1324 05/10/19  0716   AST 19 19 18   ALT 19 19 16   BILIDIR  --   --  <0.2   BILITOT 0.5 0.5 0.7   ALKPHOS 158* 151* 126     No results for input(s): INR in the last 72 hours. No results for input(s): Jasper Daily in the last 72 hours. 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   Patient directed to the emergency room by

## 2019-05-11 LAB
ANION GAP SERPL CALCULATED.3IONS-SCNC: 10 MMOL/L (ref 3–16)
ANION GAP SERPL CALCULATED.3IONS-SCNC: 10 MMOL/L (ref 3–16)
ANION GAP SERPL CALCULATED.3IONS-SCNC: 11 MMOL/L (ref 3–16)
ANION GAP SERPL CALCULATED.3IONS-SCNC: 9 MMOL/L (ref 3–16)
BUN BLDV-MCNC: 16 MG/DL (ref 7–20)
BUN BLDV-MCNC: 17 MG/DL (ref 7–20)
CALCIUM SERPL-MCNC: 8.8 MG/DL (ref 8.3–10.6)
CALCIUM SERPL-MCNC: 9.3 MG/DL (ref 8.3–10.6)
CALCIUM SERPL-MCNC: 9.4 MG/DL (ref 8.3–10.6)
CALCIUM SERPL-MCNC: 9.5 MG/DL (ref 8.3–10.6)
CHLORIDE BLD-SCNC: 91 MMOL/L (ref 99–110)
CHLORIDE BLD-SCNC: 91 MMOL/L (ref 99–110)
CHLORIDE BLD-SCNC: 92 MMOL/L (ref 99–110)
CHLORIDE BLD-SCNC: 93 MMOL/L (ref 99–110)
CO2: 27 MMOL/L (ref 21–32)
CO2: 27 MMOL/L (ref 21–32)
CO2: 28 MMOL/L (ref 21–32)
CO2: 29 MMOL/L (ref 21–32)
CREAT SERPL-MCNC: 0.8 MG/DL (ref 0.8–1.3)
CREAT SERPL-MCNC: 0.8 MG/DL (ref 0.8–1.3)
CREAT SERPL-MCNC: 0.9 MG/DL (ref 0.8–1.3)
CREAT SERPL-MCNC: 1 MG/DL (ref 0.8–1.3)
GFR AFRICAN AMERICAN: >60
GFR NON-AFRICAN AMERICAN: >60
GLUCOSE BLD-MCNC: 105 MG/DL (ref 70–99)
GLUCOSE BLD-MCNC: 109 MG/DL (ref 70–99)
GLUCOSE BLD-MCNC: 111 MG/DL (ref 70–99)
GLUCOSE BLD-MCNC: 112 MG/DL (ref 70–99)
GLUCOSE BLD-MCNC: 120 MG/DL (ref 70–99)
GLUCOSE BLD-MCNC: 122 MG/DL (ref 70–99)
GLUCOSE BLD-MCNC: 124 MG/DL (ref 70–99)
GLUCOSE BLD-MCNC: 89 MG/DL (ref 70–99)
PERFORMED ON: ABNORMAL
PERFORMED ON: NORMAL
POTASSIUM SERPL-SCNC: 3.9 MMOL/L (ref 3.5–5.1)
POTASSIUM SERPL-SCNC: 4.2 MMOL/L (ref 3.5–5.1)
POTASSIUM SERPL-SCNC: 4.5 MMOL/L (ref 3.5–5.1)
POTASSIUM SERPL-SCNC: 4.5 MMOL/L (ref 3.5–5.1)
SODIUM BLD-SCNC: 129 MMOL/L (ref 136–145)
SODIUM BLD-SCNC: 129 MMOL/L (ref 136–145)
SODIUM BLD-SCNC: 130 MMOL/L (ref 136–145)
SODIUM BLD-SCNC: 130 MMOL/L (ref 136–145)

## 2019-05-11 PROCEDURE — 80048 BASIC METABOLIC PNL TOTAL CA: CPT

## 2019-05-11 PROCEDURE — 1200000000 HC SEMI PRIVATE

## 2019-05-11 PROCEDURE — 36415 COLL VENOUS BLD VENIPUNCTURE: CPT

## 2019-05-11 PROCEDURE — 6370000000 HC RX 637 (ALT 250 FOR IP): Performed by: INTERNAL MEDICINE

## 2019-05-11 PROCEDURE — 2580000003 HC RX 258: Performed by: INTERNAL MEDICINE

## 2019-05-11 PROCEDURE — 6360000002 HC RX W HCPCS: Performed by: INTERNAL MEDICINE

## 2019-05-11 RX ORDER — GUAIFENESIN 600 MG/1
600 TABLET, EXTENDED RELEASE ORAL 2 TIMES DAILY
Status: DISCONTINUED | OUTPATIENT
Start: 2019-05-11 | End: 2019-05-13 | Stop reason: HOSPADM

## 2019-05-11 RX ADMIN — Medication 10 ML: at 09:08

## 2019-05-11 RX ADMIN — METOPROLOL TARTRATE 50 MG: 50 TABLET ORAL at 09:08

## 2019-05-11 RX ADMIN — ASPIRIN 81 MG: 81 TABLET, COATED ORAL at 09:08

## 2019-05-11 RX ADMIN — FERROUS SULFATE TAB 325 MG (65 MG ELEMENTAL FE) 325 MG: 325 (65 FE) TAB at 21:25

## 2019-05-11 RX ADMIN — LOSARTAN POTASSIUM 100 MG: 100 TABLET, FILM COATED ORAL at 09:07

## 2019-05-11 RX ADMIN — GUAIFENESIN 600 MG: 600 TABLET, EXTENDED RELEASE ORAL at 21:25

## 2019-05-11 RX ADMIN — FERROUS SULFATE TAB 325 MG (65 MG ELEMENTAL FE) 325 MG: 325 (65 FE) TAB at 09:07

## 2019-05-11 RX ADMIN — ENOXAPARIN SODIUM 40 MG: 40 INJECTION SUBCUTANEOUS at 09:08

## 2019-05-11 RX ADMIN — Medication 10 ML: at 21:26

## 2019-05-11 RX ADMIN — MAGNESIUM GLUCONATE 500 MG ORAL TABLET 400 MG: 500 TABLET ORAL at 09:07

## 2019-05-11 RX ADMIN — PRAVASTATIN SODIUM 40 MG: 40 TABLET ORAL at 21:25

## 2019-05-11 RX ADMIN — LEVOTHYROXINE SODIUM 88 MCG: 0.09 TABLET ORAL at 09:07

## 2019-05-11 RX ADMIN — METFORMIN HYDROCHLORIDE 1000 MG: 500 TABLET ORAL at 09:07

## 2019-05-11 RX ADMIN — METOPROLOL TARTRATE 50 MG: 50 TABLET ORAL at 21:25

## 2019-05-11 RX ADMIN — METFORMIN HYDROCHLORIDE 1000 MG: 500 TABLET ORAL at 18:00

## 2019-05-11 RX ADMIN — GUAIFENESIN 600 MG: 600 TABLET, EXTENDED RELEASE ORAL at 09:07

## 2019-05-11 NOTE — PLAN OF CARE
Problem: Falls - Risk of:  Goal: Will remain free from falls  Description  Will remain free from falls  Outcome: Ongoing     Problem: Falls - Risk of:  Goal: Absence of physical injury  Description  Absence of physical injury  Outcome: Ongoing   Bed in low position, side rails up  X2,encouraged to call before getting out of bed,verbalized understanding. Call light remains in reach. bed alarm remains activated, nonskid socks on

## 2019-05-11 NOTE — PROGRESS NOTES
Hospitalist Progress Note      PCP: Earnest Espinosa MD    Date of Admission: 5/9/2019    Chief Complaint: Abnormal lab, hyponatremia, fatigue    Subjective: no new c/o. Medications:  Reviewed    Infusion Medications    dextrose       Scheduled Medications    guaiFENesin  600 mg Oral BID    aspirin  81 mg Oral Daily    ferrous sulfate  325 mg Oral BID    levothyroxine  88 mcg Oral Daily    losartan  100 mg Oral Daily    magnesium oxide  400 mg Oral Daily    metFORMIN  1,000 mg Oral BID WC    metoprolol tartrate  50 mg Oral BID    pravastatin  40 mg Oral Nightly    sodium chloride flush  10 mL Intravenous 2 times per day    enoxaparin  40 mg Subcutaneous Daily    insulin lispro  0-12 Units Subcutaneous TID WC    insulin lispro  0-6 Units Subcutaneous Nightly     PRN Meds: albuterol sulfate HFA, sodium chloride flush, magnesium hydroxide, ondansetron, glucose, dextrose, glucagon (rDNA), dextrose, acetaminophen      Intake/Output Summary (Last 24 hours) at 5/11/2019 0723  Last data filed at 5/11/2019 0345  Gross per 24 hour   Intake 1170 ml   Output 750 ml   Net 420 ml       Physical Exam Performed:    /66   Pulse 65   Temp 98 °F (36.7 °C) (Oral)   Resp 16   Ht 5' 11\" (1.803 m)   Wt 183 lb (83 kg)   SpO2 91%   BMI 25.52 kg/m²     General appearance: No apparent distress, appears stated age and cooperative. HEENT: Pupils equal, round, and reactive to light. Conjunctivae/corneas clear. Neck: Supple, with full range of motion. No jugular venous distention. Trachea midline. Respiratory:  Normal respiratory effort. Clear to auscultation, bilaterally without Rales/Wheezes/Rhonchi. Cardiovascular: Regular rate and rhythm with normal S1/S2 without murmurs, rubs or gallops. Abdomen: Soft, non-tender, non-distended with normal bowel sounds. Musculoskeletal: No clubbing, cyanosis or edema bilaterally. Full range of motion without deformity.   Skin: Skin color, texture, turgor normal.  No rashes or lesions. Neurologic:  Neurovascularly intact without any focal sensory/motor deficits. Cranial nerves: II-XII intact, grossly non-focal.  Psychiatric: Alert and oriented, thought content appropriate, normal insight  Capillary Refill: Brisk,< 3 seconds   Peripheral Pulses: +2 palpable, equal bilaterally       Labs:   Recent Labs     05/08/19  1544 05/09/19  1324 05/10/19  0716   WBC 9.1 9.6 6.3   HGB 12.4* 12.9* 11.9*   HCT 35.1* 37.2* 34.1*    266 238     Recent Labs     05/09/19  1324  05/10/19  1615 05/10/19  1913 05/11/19  0149   *   < > 126* 127* 129*   K 3.9   < > 4.3 4.5 3.9   CL 78*   < > 87* 89* 93*   CO2 26   < > 28 28 27   BUN 21*   < > 16 16 16   CREATININE 1.0   < > 0.8 0.9 0.8   CALCIUM 9.9   < > 9.6 9.2 8.8   PHOS 3.1  --   --   --   --     < > = values in this interval not displayed. Recent Labs     05/08/19  1544 05/09/19  1324 05/10/19  0716   AST 19 19 18   ALT 19 19 16   BILIDIR  --   --  <0.2   BILITOT 0.5 0.5 0.7   ALKPHOS 158* 151* 126     No results for input(s): INR in the last 72 hours. No results for input(s): Dani Gal in the last 72 hours. 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   Diuretics held and placed on IVF. Nephrology consulted and appreciated. Will continue to follow serial labs - improving. Reviewed and documented as above.     Lower extremity edema - This may be chronic with a component of venous insufficiency. Patient's last echo was in January 2018 and showed a left ventricular ejection fraction of 50-55%. Repeat echocardiogram ordered. HTN/CAD - w/ known CAD but no evidence of active signs/sxs of ischemia/failure. Currently controlled on home meds w/ vitals reviewed and documented as above. HyperLipidemia - controlled on home Statin. Continue, w/ f/u and med adjustment w/ PCP     HypoThyroid - clinically euthyroid on oral replacement therapy. Continue, w/ outpt monitoring as previously arranged.      DM2 - controlled on home oral antiGlycemics - continued. Follow FSBS/SSI Medium Regimen. DVT Prophylaxis: LMWH  Diet: DIET CARB CONTROL; Carb Control: 4 carb choices (60 gms)/meal  Code Status: Full Code    PT/OT Eval Status: Not ordered. Dispo - likely to home Sunday/Monday 12/13 May pending further clinical improvement.      Terry Bishop MD

## 2019-05-11 NOTE — PROGRESS NOTES
family    positives in bold   Constitutional:  fever, chills, weakness, weight change, fatigue  Skin:  rash, pruritus, hair loss, bruising, dry skin, petechiae  Head, Face, Neck   headaches, swelling,  cervical adenopathy  Respiratory: shortness of breath, cough, or wheezing  Cardiovascular: chest pain, palpitations, dizzy, edema  Gastrointestinal: nausea, vomiting, diarrhea, constipation,belly pain    Yellow skin, blood in stool  Musculoskeletal:  back pain, muscle weakness, gait problems,       joint pain or swelling. Genitourinary:  dysuria, poor urine flow, flank pain, blood in urine  Neurologic:  vertigo, TIA'S, syncope, seizures, focal weakness  Psychosocial:  insomnia, anxiety, or depression. Additional positive findings:                          All other remaining systems are negative.         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 ([facility] [address] 4/16    Routine health maintenance     refuses immunizations; declines PSA    Vasovagal reaction     to needles    Vitamin D deficiency disease 12/15       Past Surgical History:   Procedure Laterality Date    CORONARY ANGIOPLASTY WITH STENT PLACEMENT  08/14/2000    RX Tristar 2.5 x 13 mCX  RX Tristar 2.5 x 13 pCX    CORONARY ANGIOPLASTY WITH STENT PLACEMENT  10/19/2000    Tetra 2.75 x 18 CX  Tetra 3.0 x 13 CX    OTHER SURGICAL HISTORY  1-23-12    phaco emulsification cataract right eye    TOOTH EXTRACTION         Social History     Socioeconomic History    Marital status:      Spouse name: Not on file    Number of children: Not on file    Years of education: Not on file    Highest education level: Not on file   Occupational History    Not on file   Social Needs    Financial resource strain: Not on file    Food insecurity:     Worry: Not on file Inability: Not on file    Transportation needs:     Medical: Not on file     Non-medical: Not on file   Tobacco Use    Smoking status: Former Smoker     Packs/day: 2.00     Years: 20.00     Pack years: 40.00     Types: Cigarettes     Last attempt to quit: 1975     Years since quittin.3    Smokeless tobacco: Never Used   Substance and Sexual Activity    Alcohol use: No    Drug use: No    Sexual activity: Not Currently   Lifestyle    Physical activity:     Days per week: Not on file     Minutes per session: Not on file    Stress: Not on file   Relationships    Social connections:     Talks on phone: Not on file     Gets together: Not on file     Attends Hoahaoism service: Not on file     Active member of club or organization: Not on file     Attends meetings of clubs or organizations: Not on file     Relationship status: Not on file    Intimate partner violence:     Fear of current or ex partner: Not on file     Emotionally abused: Not on file     Physically abused: Not on file     Forced sexual activity: Not on file   Other Topics Concern    Not on file   Social History Narrative    Not on file           Problem Relation Age of Onset    Emphysema Mother     Stroke Father     Other Daughter         Factor V deficiency    Other Grandchild         Factor V deficiency          Medication:     Scheduled Meds:   guaiFENesin  600 mg Oral BID    aspirin  81 mg Oral Daily    ferrous sulfate  325 mg Oral BID    levothyroxine  88 mcg Oral Daily    losartan  100 mg Oral Daily    magnesium oxide  400 mg Oral Daily    metFORMIN  1,000 mg Oral BID WC    metoprolol tartrate  50 mg Oral BID    pravastatin  40 mg Oral Nightly    sodium chloride flush  10 mL Intravenous 2 times per day    enoxaparin  40 mg Subcutaneous Daily    insulin lispro  0-12 Units Subcutaneous TID WC    insulin lispro  0-6 Units Subcutaneous Nightly     Continuous Infusions:   dextrose       PRN Meds:.albuterol sulfate HFA, sodium chloride flush, magnesium hydroxide, ondansetron, glucose, dextrose, glucagon (rDNA), dextrose, acetaminophen       Vitals :     Vitals:    05/11/19 0705   BP: (!) 160/77   Pulse: 63   Resp: 18   Temp: 97.6 °F (36.4 °C)   SpO2: 96%       I & O :       Intake/Output Summary (Last 24 hours) at 5/11/2019 1520  Last data filed at 5/11/2019 0345  Gross per 24 hour   Intake 690 ml   Output 750 ml   Net -60 ml        Physical Examination :     General appearance: Anxious- no, distressed- no, in good spirits- yes  Very comfortable, conversant  HEENT: Lips- normal, teeth- ok , oral mucosa- moist  Neck : Mass- no, appears symmetrical, JVD- not visible  Respiratory: Respiratory effort-  Normal , wheeze- no, crackles - no  Cardiovascular: Ausculation- No M/R/G, Edema 2+  Abdomen: visible mass- no, distention- no, scar- no, tenderness- no                            hepatosplenomegaly-  no  Musculoskeletal:  clubbing no,cyanosis- no , digital ischemia- no                           muscle strength- grossly normal , tone - grossly normal  Skin: rashes- no , ulcers- no, induration- no, tightening - no  Psychiatric:  Judgement and insight- normal           AAO X 3     LABS:     Recent Labs     05/08/19  1544 05/09/19  1324 05/10/19  0716   WBC 9.1 9.6 6.3   HGB 12.4* 12.9* 11.9*   HCT 35.1* 37.2* 34.1*    266 238     Recent Labs     05/09/19  1324  05/10/19  0716  05/11/19  0149 05/11/19  0747 05/11/19  1200   *   < > 122*   < > 129* 130* 130*   K 3.9   < > 3.9   < > 3.9 4.5 4.5   CL 78*   < > 85*   < > 93* 92* 91*   CO2 26   < > 27   < > 27 28 29   BUN 21*   < > 15   < > 16 16 16   CREATININE 1.0   < > 0.7*   < > 0.8 0.8 1.0   GLUCOSE 128*   < > 105*   < > 112* 120* 105*   MG 2.00  --  1.70*  --   --   --   --    PHOS 3.1  --   --   --   --   --   --     < > = values in this interval not displayed.

## 2019-05-12 LAB
ANION GAP SERPL CALCULATED.3IONS-SCNC: 9 MMOL/L (ref 3–16)
BUN BLDV-MCNC: 16 MG/DL (ref 7–20)
CALCIUM SERPL-MCNC: 9.7 MG/DL (ref 8.3–10.6)
CHLORIDE BLD-SCNC: 92 MMOL/L (ref 99–110)
CO2: 30 MMOL/L (ref 21–32)
CREAT SERPL-MCNC: 1 MG/DL (ref 0.8–1.3)
GFR AFRICAN AMERICAN: >60
GFR NON-AFRICAN AMERICAN: >60
GLUCOSE BLD-MCNC: 104 MG/DL (ref 70–99)
GLUCOSE BLD-MCNC: 118 MG/DL (ref 70–99)
GLUCOSE BLD-MCNC: 151 MG/DL (ref 70–99)
GLUCOSE BLD-MCNC: 156 MG/DL (ref 70–99)
OSMOLALITY URINE: 434 MOSM/KG (ref 390–1070)
OSMOLALITY: 266 MOSM/KG (ref 278–305)
PERFORMED ON: ABNORMAL
POTASSIUM SERPL-SCNC: 4.4 MMOL/L (ref 3.5–5.1)
SODIUM BLD-SCNC: 131 MMOL/L (ref 136–145)

## 2019-05-12 PROCEDURE — 6360000002 HC RX W HCPCS: Performed by: INTERNAL MEDICINE

## 2019-05-12 PROCEDURE — 1200000000 HC SEMI PRIVATE

## 2019-05-12 PROCEDURE — 2580000003 HC RX 258: Performed by: INTERNAL MEDICINE

## 2019-05-12 PROCEDURE — 80048 BASIC METABOLIC PNL TOTAL CA: CPT

## 2019-05-12 PROCEDURE — 6370000000 HC RX 637 (ALT 250 FOR IP): Performed by: INTERNAL MEDICINE

## 2019-05-12 PROCEDURE — 36415 COLL VENOUS BLD VENIPUNCTURE: CPT

## 2019-05-12 RX ORDER — AMLODIPINE BESYLATE 5 MG/1
5 TABLET ORAL DAILY
Status: DISCONTINUED | OUTPATIENT
Start: 2019-05-12 | End: 2019-05-13 | Stop reason: HOSPADM

## 2019-05-12 RX ADMIN — GUAIFENESIN 600 MG: 600 TABLET, EXTENDED RELEASE ORAL at 07:58

## 2019-05-12 RX ADMIN — ENOXAPARIN SODIUM 40 MG: 40 INJECTION SUBCUTANEOUS at 07:57

## 2019-05-12 RX ADMIN — METFORMIN HYDROCHLORIDE 1000 MG: 500 TABLET ORAL at 18:48

## 2019-05-12 RX ADMIN — Medication 10 ML: at 07:58

## 2019-05-12 RX ADMIN — FERROUS SULFATE TAB 325 MG (65 MG ELEMENTAL FE) 325 MG: 325 (65 FE) TAB at 07:58

## 2019-05-12 RX ADMIN — LEVOTHYROXINE SODIUM 88 MCG: 0.09 TABLET ORAL at 07:58

## 2019-05-12 RX ADMIN — INSULIN LISPRO 1 UNITS: 100 INJECTION, SOLUTION INTRAVENOUS; SUBCUTANEOUS at 21:04

## 2019-05-12 RX ADMIN — LOSARTAN POTASSIUM 100 MG: 100 TABLET, FILM COATED ORAL at 07:58

## 2019-05-12 RX ADMIN — METFORMIN HYDROCHLORIDE 1000 MG: 500 TABLET ORAL at 07:58

## 2019-05-12 RX ADMIN — ASPIRIN 81 MG: 81 TABLET, COATED ORAL at 07:58

## 2019-05-12 RX ADMIN — AMLODIPINE BESYLATE 5 MG: 5 TABLET ORAL at 15:26

## 2019-05-12 RX ADMIN — FERROUS SULFATE TAB 325 MG (65 MG ELEMENTAL FE) 325 MG: 325 (65 FE) TAB at 21:04

## 2019-05-12 RX ADMIN — METOPROLOL TARTRATE 50 MG: 50 TABLET ORAL at 07:58

## 2019-05-12 RX ADMIN — METOPROLOL TARTRATE 50 MG: 50 TABLET ORAL at 21:04

## 2019-05-12 RX ADMIN — MAGNESIUM GLUCONATE 500 MG ORAL TABLET 400 MG: 500 TABLET ORAL at 07:58

## 2019-05-12 RX ADMIN — Medication 10 ML: at 21:11

## 2019-05-12 RX ADMIN — PRAVASTATIN SODIUM 40 MG: 40 TABLET ORAL at 21:04

## 2019-05-12 RX ADMIN — GUAIFENESIN 600 MG: 600 TABLET, EXTENDED RELEASE ORAL at 21:04

## 2019-05-12 NOTE — PROGRESS NOTES
deficits. Cranial nerves: II-XII intact, grossly non-focal.  Psychiatric: Alert and oriented, thought content appropriate, normal insight  Capillary Refill: Brisk,< 3 seconds   Peripheral Pulses: +2 palpable, equal bilaterally       Labs:   Recent Labs     05/09/19  1324 05/10/19  0716   WBC 9.6 6.3   HGB 12.9* 11.9*   HCT 37.2* 34.1*    238     Recent Labs     05/09/19  1324  05/11/19  0747 05/11/19  1200 05/11/19  1931   *   < > 130* 130* 129*   K 3.9   < > 4.5 4.5 4.2   CL 78*   < > 92* 91* 91*   CO2 26   < > 28 29 27   BUN 21*   < > 16 16 17   CREATININE 1.0   < > 0.8 1.0 0.9   CALCIUM 9.9   < > 9.4 9.5 9.3   PHOS 3.1  --   --   --   --     < > = values in this interval not displayed. Recent Labs     05/09/19  1324 05/10/19  0716   AST 19 18   ALT 19 16   BILIDIR  --  <0.2   BILITOT 0.5 0.7   ALKPHOS 151* 126     No results for input(s): INR in the last 72 hours. No results for input(s): Mike Adamsin in the last 72 hours. 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 Diuretics held and placed on IVF. Nephrology consulted and appreciated. Will continue to follow serial labs - improving.   Reviewed and documented as above.     Lower

## 2019-05-12 NOTE — PROGRESS NOTES
[facility]    [facility]              [phone]                 Plan :     SP samsca X 1  No fluid restriction noted  He was on lasix and metolazone, no obvious hypovolemia  Appears idiosyncratic reaction to metolazone- may need to label as allergy  Sodium is 131  Start amlodipine 5mg qd         Assessment :     Hyponatremia  Sodium was 120 on admission, now 131  Sodium was 132 on 11/18,   Not coming up fast  Got fluids, has edema, will DC fluid now and start samsca  And monitor sodium frequent  Has edema- diuretics held on admission     Urine- lytes ordered,   No alcoholism  Cortisol and TSH is normal    Was on metolazone and lasix- in future, would avoid thiazides if possible, may  Need to optimized with loop diuretics like torsemide  On losartan- rarely associated with hyponatrmia     Hypertension   BP: (183)/(77)  Pulse:  [64]   BP stable  Diuretics on hold    HFpEF ? Was on lasix and metolazone at home  Edema 2 +  Ech: 8/18- EF normal, mild LVH  Normal LV filling pressure  May have chronic venous congestion  Will check BNP  Repeat echo is pending      [facility] would like to thank Makenna Velazquez MD   for opportunity to serve this patient      Please call with questions at-   24 Hrs Answering service [phone] or  7 am- 5 pm via Perfect serve or cell phone  [phone]          CC/reason for consult :     hyponatremia     HPI :     Cachorro Kelley is a 66 y.o. male presented to   the hospital on 5/9/2019 with very low sodium. He went to PCP and got the labs done. His sodium  Was noted to be 120 because of which he was sent  To the ED. He is known to have CHF, followed by cardiology. On furosemide and metolazone at home. Denies,nausea vomiting, diarrhea, alcohol intake. Also denies liver problem. Eating and drinking good. His diuretics has been DCed and on NS  We are consulted for hyponatremia and related issues.      Interval History:     Sodium not insecurity:     Worry: Not on file     Inability: Not on file    Transportation needs:     Medical: Not on file     Non-medical: Not on file   Tobacco Use    Smoking status: Former Smoker     Packs/day: 2.00     Years: 20.00     Pack years: 40.00     Types: Cigarettes     Last attempt to quit: 1975     Years since quittin.3    Smokeless tobacco: Never Used   Substance and Sexual Activity    Alcohol use: No    Drug use: No    Sexual activity: Not Currently   Lifestyle    Physical activity:     Days per week: Not on file     Minutes per session: Not on file    Stress: Not on file   Relationships    Social connections:     Talks on phone: Not on file     Gets together: Not on file     Attends Nondenominational service: Not on file     Active member of club or organization: Not on file     Attends meetings of clubs or organizations: Not on file     Relationship status: Not on file    Intimate partner violence:     Fear of current or ex partner: Not on file     Emotionally abused: Not on file     Physically abused: Not on file     Forced sexual activity: Not on file   Other Topics Concern    Not on file   Social History Narrative    Not on file           Problem Relation Age of Onset    Emphysema Mother     Stroke Father     Other Daughter         Factor V deficiency    Other Grandchild         Factor V deficiency          Medication:     Scheduled Meds:   guaiFENesin  600 mg Oral BID    aspirin  81 mg Oral Daily    ferrous sulfate  325 mg Oral BID    levothyroxine  88 mcg Oral Daily    losartan  100 mg Oral Daily    magnesium oxide  400 mg Oral Daily    metFORMIN  1,000 mg Oral BID WC    metoprolol tartrate  50 mg Oral BID    pravastatin  40 mg Oral Nightly    sodium chloride flush  10 mL Intravenous 2 times per day    enoxaparin  40 mg Subcutaneous Daily    insulin lispro  0-12 Units Subcutaneous TID WC    insulin lispro  0-6 Units Subcutaneous Nightly     Continuous Infusions:   dextrose PRN Meds:.albuterol sulfate HFA, sodium chloride flush, magnesium hydroxide, ondansetron, glucose, dextrose, glucagon (rDNA), dextrose, acetaminophen       Vitals :     Vitals:    05/12/19 0750   BP: (!) 183/77   Pulse: 64   Resp: 16   Temp: 97.8 °F (36.6 °C)   SpO2: 97%       I & O :     No intake or output data in the 24 hours ending 05/12/19 1350     Physical Examination :     General appearance: Anxious- no, distressed- no, in good spirits- yes  Very comfortable, conversant  HEENT: Lips- normal, teeth- ok , oral mucosa- moist  Neck : Mass- no, appears symmetrical, JVD- not visible  Respiratory: Respiratory effort-  Normal , wheeze- no, crackles - no  Cardiovascular: Ausculation- No M/R/G, Edema 2+  Abdomen: visible mass- no, distention- no, scar- no, tenderness- no                            hepatosplenomegaly-  no  Musculoskeletal:  clubbing no,cyanosis- no , digital ischemia- no                           muscle strength- grossly normal , tone - grossly normal  Skin: rashes- no , ulcers- no, induration- no, tightening - no  Psychiatric:  Judgement and insight- normal           AAO X 3     LABS:     Recent Labs     05/10/19  0716   WBC 6.3   HGB 11.9*   HCT 34.1*        Recent Labs     05/10/19  0716  05/11/19  1200 05/11/19  1931 05/12/19  0842   *   < > 130* 129* 131*   K 3.9   < > 4.5 4.2 4.4   CL 85*   < > 91* 91* 92*   CO2 27   < > 29 27 30   BUN 15   < > 16 17 16   CREATININE 0.7*   < > 1.0 0.9 1.0   GLUCOSE 105*   < > 105* 111* 156*   MG 1.70*  --   --   --   --     < > = values in this interval not displayed.

## 2019-05-12 NOTE — PLAN OF CARE
Problem: Falls - Risk of:  Goal: Will remain free from falls  Description  Will remain free from falls  Outcome: Ongoing  Note:   Patient encouraged to call out when needing assistance. Bed locked and in the lowest position. Bedside table, pt belongings, along with call light within reach. Verbal understanding from pt.

## 2019-05-13 VITALS
RESPIRATION RATE: 16 BRPM | DIASTOLIC BLOOD PRESSURE: 83 MMHG | OXYGEN SATURATION: 96 % | HEIGHT: 71 IN | WEIGHT: 183 LBS | TEMPERATURE: 97.9 F | BODY MASS INDEX: 25.62 KG/M2 | SYSTOLIC BLOOD PRESSURE: 154 MMHG | HEART RATE: 64 BPM

## 2019-05-13 LAB
ALBUMIN SERPL-MCNC: 3.5 G/DL (ref 3.4–5)
ANION GAP SERPL CALCULATED.3IONS-SCNC: 8 MMOL/L (ref 3–16)
BUN BLDV-MCNC: 19 MG/DL (ref 7–20)
CALCIUM SERPL-MCNC: 9.3 MG/DL (ref 8.3–10.6)
CHLORIDE BLD-SCNC: 94 MMOL/L (ref 99–110)
CO2: 28 MMOL/L (ref 21–32)
CREAT SERPL-MCNC: 0.9 MG/DL (ref 0.8–1.3)
GFR AFRICAN AMERICAN: >60
GFR NON-AFRICAN AMERICAN: >60
GLUCOSE BLD-MCNC: 102 MG/DL (ref 70–99)
GLUCOSE BLD-MCNC: 96 MG/DL (ref 70–99)
GLUCOSE BLD-MCNC: 98 MG/DL (ref 70–99)
HCT VFR BLD CALC: 34.7 % (ref 40.5–52.5)
HEMOGLOBIN: 11.7 G/DL (ref 13.5–17.5)
MCH RBC QN AUTO: 31.5 PG (ref 26–34)
MCHC RBC AUTO-ENTMCNC: 33.7 G/DL (ref 31–36)
MCV RBC AUTO: 93.4 FL (ref 80–100)
PDW BLD-RTO: 13.3 % (ref 12.4–15.4)
PERFORMED ON: NORMAL
PERFORMED ON: NORMAL
PHOSPHORUS: 3.5 MG/DL (ref 2.5–4.9)
PLATELET # BLD: 258 K/UL (ref 135–450)
PMV BLD AUTO: 8 FL (ref 5–10.5)
POTASSIUM SERPL-SCNC: 4.1 MMOL/L (ref 3.5–5.1)
RBC # BLD: 3.71 M/UL (ref 4.2–5.9)
SODIUM BLD-SCNC: 130 MMOL/L (ref 136–145)
WBC # BLD: 6.4 K/UL (ref 4–11)

## 2019-05-13 PROCEDURE — 6370000000 HC RX 637 (ALT 250 FOR IP): Performed by: INTERNAL MEDICINE

## 2019-05-13 PROCEDURE — 80069 RENAL FUNCTION PANEL: CPT

## 2019-05-13 PROCEDURE — 6360000002 HC RX W HCPCS: Performed by: INTERNAL MEDICINE

## 2019-05-13 PROCEDURE — 2580000003 HC RX 258: Performed by: INTERNAL MEDICINE

## 2019-05-13 PROCEDURE — 85027 COMPLETE CBC AUTOMATED: CPT

## 2019-05-13 PROCEDURE — 36415 COLL VENOUS BLD VENIPUNCTURE: CPT

## 2019-05-13 RX ORDER — AMLODIPINE BESYLATE 5 MG/1
5 TABLET ORAL DAILY
Qty: 30 TABLET | Refills: 0 | Status: SHIPPED | OUTPATIENT
Start: 2019-05-14 | End: 2019-06-05 | Stop reason: SDUPTHER

## 2019-05-13 RX ADMIN — METFORMIN HYDROCHLORIDE 1000 MG: 500 TABLET ORAL at 08:26

## 2019-05-13 RX ADMIN — METOPROLOL TARTRATE 50 MG: 50 TABLET ORAL at 08:26

## 2019-05-13 RX ADMIN — MAGNESIUM GLUCONATE 500 MG ORAL TABLET 400 MG: 500 TABLET ORAL at 08:26

## 2019-05-13 RX ADMIN — FERROUS SULFATE TAB 325 MG (65 MG ELEMENTAL FE) 325 MG: 325 (65 FE) TAB at 08:26

## 2019-05-13 RX ADMIN — Medication 10 ML: at 08:25

## 2019-05-13 RX ADMIN — ENOXAPARIN SODIUM 40 MG: 40 INJECTION SUBCUTANEOUS at 08:25

## 2019-05-13 RX ADMIN — LOSARTAN POTASSIUM 100 MG: 100 TABLET, FILM COATED ORAL at 08:26

## 2019-05-13 RX ADMIN — AMLODIPINE BESYLATE 5 MG: 5 TABLET ORAL at 08:26

## 2019-05-13 RX ADMIN — ASPIRIN 81 MG: 81 TABLET, COATED ORAL at 08:26

## 2019-05-13 RX ADMIN — GUAIFENESIN 600 MG: 600 TABLET, EXTENDED RELEASE ORAL at 08:26

## 2019-05-13 RX ADMIN — LEVOTHYROXINE SODIUM 88 MCG: 0.09 TABLET ORAL at 08:26

## 2019-05-13 NOTE — PROGRESS NOTES
[facility]    [facility]              [phone]                 Plan :     Got a dose of samsca- now sodium going up spontaneously  Labeled as allergy to metolazone, cancelled the medication  However, he might be able to go back on that in future with frequent monitoring  Off fluids  Very minimal edema  Ok to go back to loop diuretics in future, low dose but would avoid metolazone  OK to DC from renal point of view-  and follow up with PCP In a week- to call with sob or worsening edema  Amlodipine is known to cause edema too, but due to losartan the effect is nullified generally  Will forward this to Dr oCrnel De La Fuente, and Dr Antonia Romero :     Hyponatremia  Sodium was 120 on admission, now 130  Sodium was 132 on 11/18,   Not coming up fast  Got fluids, has 1-2 + edema, fluids on hold     Urine- sodium 89( was on diuretics at home)  No alcoholism  TSH normal with thyroid replacement, cortisol normal at 18.6    Was on metolazone and lasix- in future, would avoid thiazides if possible, may  Need to optimized with loop diuretics like torsemide  On losartan- rarely associated with hyponatrmia     Hypertension   BP: (117-119)/(77-86)  Pulse:  [70-73]   BP stable  Diuretics on hold    HFpEF ? Was on lasix and metolazone at home  Edema 2 +  Ech: 8/18- EF normal, mild LVH  Normal LV filling pressure  May have chronic venous congestion  Pro- BNP is 1, 229  Repeat echo is pending      [facility] would like to thank Paul Ambrocio MD   for opportunity to serve this patient      Please call with questions at-   24 Hrs Answering service [phone] or  7 am- 5 pm via Perfect serve or cell phone  Dr.Sudhir Torres Persons          CC/reason for consult :     hyponatremia     HPI :     From consult note-     Benny Hernandez is a 66 y.o. male presented to   the hospital on 5/9/2019 with very low sodium. He went to PCP and got the labs done.  His sodium  Was noted to be 120 because of which he was sent  To the ED. He is known to have CHF, followed by cardiology. On furosemide and metolazone at home. Denies,nausea vomiting, diarrhea, alcohol intake. Also denies liver problem. Eating and drinking good. His diuretics has been DCed and on NS  We are consulted for hyponatremia and related issues. Interval History:     Sodium coming up    ROS:     Seen with- no family  SOB- none  Edema- +ve  Nausea/vomiting- none  Poor appetite-No  Confusion- no  Urinary complaints- no  Any other complaints- no  All other ROS are reviewed and are Negative      Medication:     Scheduled Meds:   amLODIPine  5 mg Oral Daily    guaiFENesin  600 mg Oral BID    aspirin  81 mg Oral Daily    ferrous sulfate  325 mg Oral BID    levothyroxine  88 mcg Oral Daily    losartan  100 mg Oral Daily    magnesium oxide  400 mg Oral Daily    metFORMIN  1,000 mg Oral BID WC    metoprolol tartrate  50 mg Oral BID    pravastatin  40 mg Oral Nightly    sodium chloride flush  10 mL Intravenous 2 times per day    enoxaparin  40 mg Subcutaneous Daily    insulin lispro  0-12 Units Subcutaneous TID WC    insulin lispro  0-6 Units Subcutaneous Nightly     Continuous Infusions:   dextrose       PRN Meds:.albuterol sulfate HFA, sodium chloride flush, magnesium hydroxide, ondansetron, glucose, dextrose, glucagon (rDNA), dextrose, acetaminophen       Vitals :     Vitals:    05/13/19 0824   BP: 117/77   Pulse: 73   Resp: 16   Temp: 97.8 °F (36.6 °C)   SpO2: 97%       I & O :     No intake or output data in the 24 hours ending 05/13/19 0857     Physical Examination :     General appearance: Anxious- no, distressed- no, in good spirits- yes  Very comfortable, conversant  HEENT: Lips- normal, teeth- ok , oral mucosa- moist  Neck : Mass- no, appears symmetrical, JVD- not visible  Respiratory: Respiratory effort-  Normal , wheeze- no, crackles - no  Cardiovascular:  Ausculation- No M/R/G, Edema 2+  Abdomen: visible mass- no, distention- no, scar- no, tenderness- no                            hepatosplenomegaly-  no  Musculoskeletal:  clubbing no,cyanosis- no , digital ischemia- no                           muscle strength- grossly normal , tone - grossly normal  Skin: rashes- no , ulcers- no, induration- no, tightening - no  Psychiatric:  Judgement and insight- normal           AAO X 3     LABS:     Recent Labs     05/13/19  0606   WBC 6.4   HGB 11.7*   HCT 34.7*        Recent Labs     05/11/19  1931 05/12/19  0842 05/13/19  0606   * 131* 130*   K 4.2 4.4 4.1   CL 91* 92* 94*   CO2 27 30 28   BUN 17 16 19   CREATININE 0.9 1.0 0.9   GLUCOSE 111* 156* 102*   PHOS  --   --  3.5

## 2019-05-13 NOTE — PROGRESS NOTES
Discharged patient to home. Instructions given with new prescription. No questions voiced. Discharged home with wife.

## 2019-05-13 NOTE — PLAN OF CARE
Problem: Falls - Risk of:  Goal: Will remain free from falls  Description  Will remain free from falls  5/13/2019 0129 by Demetris Swartz RN  Outcome: Ongoing  Note:   Patient encouraged to call out when needing assistance. Bed locked and in the lowest position. Bedside table, pt belongings, along with call light within reach. Verbal understanding from pt.

## 2019-05-13 NOTE — PLAN OF CARE
Problem: Falls - Risk of:  Goal: Will remain free from falls  Description  Will remain free from falls  5/13/2019 1338 by Mayra Torres RN  Outcome: Ongoing  Note:   Bed in lowest position, wheels locked, 2/4 side rails up, nonskid footwear on. Bed/ chair check alarm in place, call light within reach. Pt instructed to call out when needing assistance. Pt stated understanding. Nurse will continue to monitor.

## 2019-05-13 NOTE — PROGRESS NOTES
Hospitalist Progress Note      PCP: Kirsten Bermudez MD    Date of Admission: 5/9/2019    Chief Complaint: Abnormal lab, hyponatremia, fatigue    Subjective: no new c/o. Medications:  Reviewed    Infusion Medications    dextrose       Scheduled Medications    amLODIPine  5 mg Oral Daily    guaiFENesin  600 mg Oral BID    aspirin  81 mg Oral Daily    ferrous sulfate  325 mg Oral BID    levothyroxine  88 mcg Oral Daily    losartan  100 mg Oral Daily    magnesium oxide  400 mg Oral Daily    metFORMIN  1,000 mg Oral BID WC    metoprolol tartrate  50 mg Oral BID    pravastatin  40 mg Oral Nightly    sodium chloride flush  10 mL Intravenous 2 times per day    enoxaparin  40 mg Subcutaneous Daily    insulin lispro  0-12 Units Subcutaneous TID WC    insulin lispro  0-6 Units Subcutaneous Nightly     PRN Meds: albuterol sulfate HFA, sodium chloride flush, magnesium hydroxide, ondansetron, glucose, dextrose, glucagon (rDNA), dextrose, acetaminophen    No intake or output data in the 24 hours ending 05/13/19 0839    Physical Exam Performed:    /77   Pulse 73   Temp 97.8 °F (36.6 °C) (Oral)   Resp 16   Ht 5' 11\" (1.803 m)   Wt 183 lb (83 kg)   SpO2 97%   BMI 25.52 kg/m²     General appearance: No apparent distress, appears stated age and cooperative. HEENT: Pupils equal, round, and reactive to light. Conjunctivae/corneas clear. Neck: Supple, with full range of motion. No jugular venous distention. Trachea midline. Respiratory:  Normal respiratory effort. Clear to auscultation, bilaterally without Rales/Wheezes/Rhonchi. Cardiovascular: Regular rate and rhythm with normal S1/S2 without murmurs, rubs or gallops. Abdomen: Soft, non-tender, non-distended with normal bowel sounds. Musculoskeletal: No clubbing, cyanosis or edema bilaterally. Full range of motion without deformity. Skin: Skin color, texture, turgor normal.  No rashes or lesions.   Neurologic:  Neurovascularly intact without any focal sensory/motor deficits. Cranial nerves: II-XII intact, grossly non-focal.  Psychiatric: Alert and oriented, thought content appropriate, normal insight  Capillary Refill: Brisk,< 3 seconds   Peripheral Pulses: +2 palpable, equal bilaterally       Labs:   Recent Labs     05/13/19  0606   WBC 6.4   HGB 11.7*   HCT 34.7*        Recent Labs     05/11/19  1931 05/12/19  0842 05/13/19  0606   * 131* 130*   K 4.2 4.4 4.1   CL 91* 92* 94*   CO2 27 30 28   BUN 17 16 19   CREATININE 0.9 1.0 0.9   CALCIUM 9.3 9.7 9.3   PHOS  --   --  3.5     No results for input(s): AST, ALT, BILIDIR, BILITOT, ALKPHOS in the last 72 hours. No results for input(s): INR in the last 72 hours. No results for input(s): Kodi Reasoner in the last 72 hours. 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 Diuretics held and placed on IVF. Nephrology consulted and appreciated. Will continue to follow serial labs - improved and stable. Reviewed and documented as above.     Lower extremity edema - This may be chronic with a component of venous insufficiency. Patient's last echo was in January 2018 and showed a left ventricular ejection fraction of 50-55%.   Repeat echocardiogram NOT ordered. HTN/CAD - w/ known CAD but no evidence of active signs/sxs of ischemia/failure. Currently controlled on home meds w/ vitals reviewed and documented as above. Norvasc added 12 May. HyperLipidemia - controlled on home Statin. Continue, w/ f/u and med adjustment w/ PCP     HypoThyroid - clinically euthyroid on oral replacement therapy. Continue, w/ outpt monitoring as previously arranged.      DM2 - controlled on home oral antiGlycemics - continued. Follow FSBS/SSI Medium Regimen. DVT Prophylaxis: LMWH  Diet: DIET CARB CONTROL; Carb Control: 4 carb choices (60 gms)/meal  Code Status: Full Code    PT/OT Eval Status: Ordered and pending.      Dispo - likely to home Monday 13 May pending further clinical improvement and Nephrology recs as well as PT/OT eval.     Paul Ambrocio MD

## 2019-05-14 ENCOUNTER — TELEPHONE (OUTPATIENT)
Dept: FAMILY MEDICINE CLINIC | Age: 79
End: 2019-05-14

## 2019-05-14 NOTE — DISCHARGE SUMMARY
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 Hospital Course:       HypoNatremia - of unclear etiology, likely hypovolemic. Diuretics held and placed on IVF. Nephrology consulted and appreciated. Followed serial labs - improved and stable.      Lower extremity edema - This may be chronic with a component of venous insufficiency. Patient's last echo was in January 2018 and showed a left ventricular ejection fraction of 50-55%. Repeat echocardiogram NOT ordered.      HTN/CAD - w/ known CAD but no evidence of active signs/sxs of ischemia/failure. Currently controlled on home meds. Norvasc added 12 May.      HyperLipidemia - controlled on home Statin. Continue, w/ f/u and med adjustment w/ PCP     HypoThyroid - clinically euthyroid on oral replacement therapy. Continue, w/ outpt monitoring as previously arranged.      DM2 - controlled on home oral antiGlycemics - continued. Followed FSBS/SSI Medium Regimen.        Labs:  For convenience and continuity at follow-up the following most recent labs are provided:      CBC: Lab Results   Component Value Date    WBC 6.4 05/13/2019    HGB 11.7 05/13/2019    HCT 34.7 05/13/2019     05/13/2019       Renal:    Lab Results   Component Value Date     05/13/2019    K 4.1 05/13/2019    K 3.9 02/05/2018    CL 94 05/13/2019    CO2 28 05/13/2019    BUN 19 05/13/2019    CREATININE 0.9 05/13/2019    CREATININE 1.1 02/02/2012    CALCIUM 9.3 05/13/2019    PHOS 3.5 05/13/2019         Significant Diagnostic Studies    Radiology:   No orders to display          Consults:     IP CONSULT TO HOSPITALIST  IP CONSULT TO NEPHROLOGY    Disposition:  home     Condition at Discharge: Stable    Discharge Instructions/Follow-up:  W/ PCP 2 weeks, subspecialists as arranged.      Code Status:  Full Code    Activity: activity as tolerated    Diet: regular diet      Discharge Medications:     Discharge Medication List as of 5/13/2019  3:20 PM           Details   amLODIPine (NORVASC) 5 MG tablet Take 1 tablet by mouth daily, Disp-30 tablet, R-0Print              Details   pravastatin (PRAVACHOL) 40 MG tablet Take 40 mg by mouth dailyHistorical Med      levothyroxine (SYNTHROID) 88 MCG tablet TAKE 1 TABLET EVERY DAY, Disp-90 tablet, R-1Normal      metoprolol tartrate (LOPRESSOR) 50 MG tablet Take 1 tablet by mouth 2 times daily, Disp-180 tablet, R-3Normal      ACCU-CHEK DIONTE PLUS strip TEST TWO TIMES DAILY, Disp-200 strip, R-3Normal      metFORMIN (GLUCOPHAGE) 1000 MG tablet TAKE 1 TABLET TWICE DAILY WITH MEALS, Disp-180 tablet, R-1Normal      losartan (COZAAR) 100 MG tablet Take 1 tablet by mouth daily, Disp-90 tablet, R-1This is a dose changeNormal      albuterol sulfate HFA (PROVENTIL HFA) 108 (90 Base) MCG/ACT inhaler Inhale 1-2 puffs into the lungs every 4 hours as needed for Wheezing or Shortness of Breath May substitute ProAir or Ventolin, Disp-1 Inhaler, R-5Normal      magnesium oxide (MAG-OX) 400 MG tablet Take 400 mg by mouth dailyHistorical Med      ferrous sulfate 325 (65 Fe) MG tablet Take 325 mg by mouth 2 times dailyHistorical Med      aspirin 81 MG EC tablet Take 1 tablet by mouth daily, Disp-30 tablet, R-0Adjust Sig      Cholecalciferol (VITAMIN D) 2000 UNITS CAPS capsule Take  by mouth daily. Time Spent on discharge is more than 30 minutes in the examination, evaluation, counseling and review of medications and discharge plan. Signed:    Jacobo Granados MD   5/14/2019      Thank you Earnest Espinosa MD for the opportunity to be involved in this patient's care. If you have any questions or concerns please feel free to contact me at [phone].

## 2019-05-14 NOTE — TELEPHONE ENCOUNTER
[facility] Trans[facility] Initial Follow Up Call    Outreach made within 2 business days of discharge: Yes    Patient: Nathan Robin Patient : 1940   MRN: [id number]  Reason for Admission: There are no discharge diagnoses documented for the most recent discharge. Discharge Date: 19       Spoke with: Patient     Discharge department/facility: [facility] Interactive Patient Contact:  Was patient able to fill all prescriptions: Yes  Was patient instructed to bring all medications to the follow-up visit: Yes  Is patient taking all medications as directed in the discharge summary?  Yes  Does patient understand their discharge instructions: Yes  Does patient have questions or concerns that need addressed prior to 7-14 day follow up office visit: no    Scheduled appointment with PCP within 7-14 days    Follow Up  Future Appointments   Date Time Provider Getachew Cottrell   2019 12:00 PM MD KULDEEP Garcia [facility]   2019  1:45 PM Kash Lazaro MD [facility]   2019  8:00 AM MD KULDEEP Garcia [facility]       Brittany Ahuja MA

## 2019-05-18 ENCOUNTER — HOSPITAL ENCOUNTER (OUTPATIENT)
Age: 79
Discharge: HOME | End: 2019-05-18
Payer: MEDICARE

## 2019-05-18 ENCOUNTER — HOSPITAL ENCOUNTER (OUTPATIENT)
Dept: GENERAL RADIOLOGY | Age: 79
Discharge: HOME | End: 2019-05-18
Payer: MEDICARE

## 2019-05-18 DIAGNOSIS — R06.00 DYSPNEA, UNSPECIFIED TYPE: ICD-10-CM

## 2019-05-18 PROCEDURE — 71046 X-RAY EXAM CHEST 2 VIEWS: CPT

## 2019-05-21 ENCOUNTER — OFFICE VISIT (OUTPATIENT)
Dept: FAMILY MEDICINE CLINIC | Age: 79
End: 2019-05-21
Payer: MEDICARE

## 2019-05-21 VITALS
SYSTOLIC BLOOD PRESSURE: 152 MMHG | OXYGEN SATURATION: 95 % | BODY MASS INDEX: 27.22 KG/M2 | WEIGHT: 195.2 LBS | HEART RATE: 72 BPM | TEMPERATURE: 97.5 F | DIASTOLIC BLOOD PRESSURE: 72 MMHG

## 2019-05-21 DIAGNOSIS — R06.01 ORTHOPNEA: Primary | ICD-10-CM

## 2019-05-21 DIAGNOSIS — R60.9 PITTING EDEMA: ICD-10-CM

## 2019-05-21 LAB
A/G RATIO: 1.4 (ref 1.1–2.2)
ALBUMIN SERPL-MCNC: 4.1 G/DL (ref 3.4–5)
ALP BLD-CCNC: 146 U/L (ref 40–129)
ALT SERPL-CCNC: 17 U/L (ref 10–40)
ANION GAP SERPL CALCULATED.3IONS-SCNC: 14 MMOL/L (ref 3–16)
AST SERPL-CCNC: 16 U/L (ref 15–37)
BASOPHILS ABSOLUTE: 0 K/UL (ref 0–0.2)
BASOPHILS RELATIVE PERCENT: 0.5 %
BILIRUB SERPL-MCNC: 0.4 MG/DL (ref 0–1)
BUN BLDV-MCNC: 16 MG/DL (ref 7–20)
CALCIUM SERPL-MCNC: 9.4 MG/DL (ref 8.3–10.6)
CHLORIDE BLD-SCNC: 92 MMOL/L (ref 99–110)
CO2: 25 MMOL/L (ref 21–32)
CREAT SERPL-MCNC: 1 MG/DL (ref 0.8–1.3)
EOSINOPHILS ABSOLUTE: 0.3 K/UL (ref 0–0.6)
EOSINOPHILS RELATIVE PERCENT: 4.4 %
GFR AFRICAN AMERICAN: >60
GFR NON-AFRICAN AMERICAN: >60
GLOBULIN: 3 G/DL
GLUCOSE BLD-MCNC: 150 MG/DL (ref 70–99)
HCT VFR BLD CALC: 34.4 % (ref 40.5–52.5)
HEMOGLOBIN: 11.9 G/DL (ref 13.5–17.5)
LYMPHOCYTES ABSOLUTE: 0.4 K/UL (ref 1–5.1)
LYMPHOCYTES RELATIVE PERCENT: 6.4 %
MCH RBC QN AUTO: 32.3 PG (ref 26–34)
MCHC RBC AUTO-ENTMCNC: 34.7 G/DL (ref 31–36)
MCV RBC AUTO: 93.2 FL (ref 80–100)
MONOCYTES ABSOLUTE: 0.5 K/UL (ref 0–1.3)
MONOCYTES RELATIVE PERCENT: 6.7 %
NEUTROPHILS ABSOLUTE: 5.6 K/UL (ref 1.7–7.7)
NEUTROPHILS RELATIVE PERCENT: 82 %
PDW BLD-RTO: 13.6 % (ref 12.4–15.4)
PLATELET # BLD: 248 K/UL (ref 135–450)
PMV BLD AUTO: 8.6 FL (ref 5–10.5)
POTASSIUM SERPL-SCNC: 5 MMOL/L (ref 3.5–5.1)
PRO-BNP: 1301 PG/ML (ref 0–449)
RBC # BLD: 3.69 M/UL (ref 4.2–5.9)
SODIUM BLD-SCNC: 131 MMOL/L (ref 136–145)
TOTAL PROTEIN: 7.1 G/DL (ref 6.4–8.2)
WBC # BLD: 6.9 K/UL (ref 4–11)

## 2019-05-21 PROCEDURE — 1111F DSCHRG MED/CURRENT MED MERGE: CPT | Performed by: NURSE PRACTITIONER

## 2019-05-21 PROCEDURE — 1036F TOBACCO NON-USER: CPT | Performed by: NURSE PRACTITIONER

## 2019-05-21 PROCEDURE — G8427 DOCREV CUR MEDS BY ELIG CLIN: HCPCS | Performed by: NURSE PRACTITIONER

## 2019-05-21 PROCEDURE — 1123F ACP DISCUSS/DSCN MKR DOCD: CPT | Performed by: NURSE PRACTITIONER

## 2019-05-21 PROCEDURE — 99214 OFFICE O/P EST MOD 30 MIN: CPT | Performed by: NURSE PRACTITIONER

## 2019-05-21 PROCEDURE — 4040F PNEUMOC VAC/ADMIN/RCVD: CPT | Performed by: NURSE PRACTITIONER

## 2019-05-21 PROCEDURE — G8417 CALC BMI ABV UP PARAM F/U: HCPCS | Performed by: NURSE PRACTITIONER

## 2019-05-21 PROCEDURE — G8598 ASA/ANTIPLAT THER USED: HCPCS | Performed by: NURSE PRACTITIONER

## 2019-05-21 PROCEDURE — 36415 COLL VENOUS BLD VENIPUNCTURE: CPT | Performed by: NURSE PRACTITIONER

## 2019-05-21 RX ORDER — FUROSEMIDE 20 MG/1
20 TABLET ORAL DAILY
Qty: 2 TABLET | Refills: 0 | Status: SHIPPED | OUTPATIENT
Start: 2019-05-21 | End: 2019-05-27

## 2019-05-21 RX ORDER — LOSARTAN POTASSIUM 100 MG/1
100 TABLET ORAL DAILY
Qty: 90 TABLET | Refills: 1 | Status: SHIPPED | OUTPATIENT
Start: 2019-05-21 | End: 2019-09-05 | Stop reason: SDUPTHER

## 2019-05-21 NOTE — PROGRESS NOTES
Patient: Sascha Luna is a 66 y.o. male who presents today with the following Chief Complaint(s):  Chief Complaint   Patient presents with    Other     chest congestion, SOB and bilateral leg swelling          Assessment & Plan:  1. Orthopnea  Chest x-ray dated 5/18/19 worsening bilateral pleural effusion from chest x-ray dated 10 days prior. He has had a 12 pound weight gain in the past 2 weeks. Symptoms are likely related to heart failure. Evaluate BNP, CMP and CBC  Questionable adverse reaction of amlodipine? Symptoms were present prior to starting amlodipine  Follow-up with PCP in one week or sooner  - losartan (COZAAR) 100 MG tablet; Take 1 tablet by mouth daily  Dispense: 90 tablet; Refill: 1  - Comprehensive Metabolic Panel  - Brain Natriuretic Peptide  - CBC Auto Differential    2. Pitting edema  See #1  - furosemide (LASIX) 20 MG tablet; Take 1 tablet by mouth daily for 2 days  Dispense: 2 tablet; Refill: 0        HPI  Ivana Gann is in the office with continued shortness of breath, weight gain and bilateral leg swelling. He was admitted to the hospital 12 days ago with hyponatremia, renal insufficiency and shortness of breath. He was on Lasix and Zaroxolyn. Medications were discontinued in the hospital and he was started on amlodipine. He was discharged home after hyponatremia improved. Reports his shortness of breath was not addressed while he was in the hospital.  He had a nurse practitioner come to the home after discharge for a visit. He ordered a chest x-ray that he has not heard the results. He has had significant weight gain in the past month. Not changed his eating habits. He is unable to sleep in bed. He has to sleep sitting up. Reports a moist cough and wheezing. Denies fever, chest pain, palpitations, headache, dizziness, decreased urine output or muscle cramps. Has an appointment with his cardiologist next month.     Current Outpatient Medications   Medication Sig Dispense Refill    amLODIPine (NORVASC) 5 MG tablet Take 1 tablet by mouth daily 30 tablet 0    pravastatin (PRAVACHOL) 40 MG tablet Take 40 mg by mouth daily      levothyroxine (SYNTHROID) 88 MCG tablet TAKE 1 TABLET EVERY DAY 90 tablet 1    metoprolol tartrate (LOPRESSOR) 50 MG tablet Take 1 tablet by mouth 2 times daily 180 tablet 3    ACCU-CHEK DIONTE PLUS strip TEST TWO TIMES DAILY 200 strip 3    metFORMIN (GLUCOPHAGE) 1000 MG tablet TAKE 1 TABLET TWICE DAILY WITH MEALS 180 tablet 1    losartan (COZAAR) 100 MG tablet Take 1 tablet by mouth daily 90 tablet 1    albuterol sulfate HFA (PROVENTIL HFA) 108 (90 Base) MCG/ACT inhaler Inhale 1-2 puffs into the lungs every 4 hours as needed for Wheezing or Shortness of Breath May substitute ProAir or Ventolin 1 Inhaler 5    magnesium oxide (MAG-OX) 400 MG tablet Take 400 mg by mouth daily      ferrous sulfate 325 (65 Fe) MG tablet Take 325 mg by mouth 2 times daily      aspirin 81 MG EC tablet Take 1 tablet by mouth daily 30 tablet 0    Cholecalciferol (VITAMIN D) 2000 UNITS CAPS capsule Take  by mouth daily. No current facility-administered medications for this visit. Patient's past medical history, surgical history, family history, medications,  and allergies  were all reviewed and updated as appropriate today. Review of Systems  See HPI    Physical Exam   Constitutional: He is oriented to person, place, and time. He appears well-developed. Non-toxic appearance. No distress. HENT:   Head: Normocephalic and atraumatic. Eyes: EOM are normal.   Neck: Normal range of motion. Neck supple. Cardiovascular: Normal rate, regular rhythm, normal heart sounds and intact distal pulses. No murmur heard. Pulmonary/Chest: Effort normal. No respiratory distress. He has decreased breath sounds. Musculoskeletal: He exhibits edema (2+ bilateral pitting edema). Neurological: He is alert and oriented to person, place, and time.    Skin: Skin is warm and

## 2019-05-21 NOTE — PATIENT INSTRUCTIONS
Please compare this printed medication list with your medications at home to be sure they are the same. If you have any medications that are different please contact us immediately at [phone]. Also review your allergies that we have listed, these may also include medications that you have not been able to tolerate, make sure everything listed is correct. If you have any allergies that are different please contact us immediately at [phone].

## 2019-05-22 RX ORDER — FUROSEMIDE 20 MG/1
20 TABLET ORAL DAILY
Qty: 5 TABLET | Refills: 0 | Status: SHIPPED | OUTPATIENT
Start: 2019-05-22 | End: 2019-05-28 | Stop reason: ALTCHOICE

## 2019-05-24 ENCOUNTER — APPOINTMENT (OUTPATIENT)
Dept: GENERAL RADIOLOGY | Age: 79
End: 2019-05-24
Payer: MEDICARE

## 2019-05-24 ENCOUNTER — HOSPITAL ENCOUNTER (EMERGENCY)
Age: 79
Discharge: HOME | End: 2019-05-24
Attending: EMERGENCY MEDICINE
Payer: MEDICARE

## 2019-05-24 ENCOUNTER — TELEPHONE (OUTPATIENT)
Dept: FAMILY MEDICINE CLINIC | Age: 79
End: 2019-05-24

## 2019-05-24 VITALS
SYSTOLIC BLOOD PRESSURE: 155 MMHG | HEART RATE: 74 BPM | WEIGHT: 189 LBS | OXYGEN SATURATION: 97 % | DIASTOLIC BLOOD PRESSURE: 49 MMHG | RESPIRATION RATE: 18 BRPM | BODY MASS INDEX: 26.46 KG/M2 | HEIGHT: 71 IN | TEMPERATURE: 97.7 F

## 2019-05-24 DIAGNOSIS — J45.909 REACTIVE AIRWAY DISEASE WITHOUT COMPLICATION, UNSPECIFIED ASTHMA SEVERITY, UNSPECIFIED WHETHER PERSISTENT: ICD-10-CM

## 2019-05-24 DIAGNOSIS — I50.9 ACUTE ON CHRONIC CONGESTIVE HEART FAILURE, UNSPECIFIED HEART FAILURE TYPE (HCC): Primary | ICD-10-CM

## 2019-05-24 DIAGNOSIS — R06.2 WHEEZING: ICD-10-CM

## 2019-05-24 LAB
A/G RATIO: 1.2 (ref 1.1–2.2)
ALBUMIN SERPL-MCNC: 4.3 G/DL (ref 3.4–5)
ALP BLD-CCNC: 158 U/L (ref 40–129)
ALT SERPL-CCNC: 18 U/L (ref 10–40)
ANION GAP SERPL CALCULATED.3IONS-SCNC: 12 MMOL/L (ref 3–16)
AST SERPL-CCNC: 18 U/L (ref 15–37)
BASOPHILS ABSOLUTE: 0 K/UL (ref 0–0.2)
BASOPHILS RELATIVE PERCENT: 0.5 %
BILIRUB SERPL-MCNC: 0.3 MG/DL (ref 0–1)
BILIRUBIN URINE: NEGATIVE
BLOOD, URINE: NEGATIVE
BUN BLDV-MCNC: 16 MG/DL (ref 7–20)
CALCIUM SERPL-MCNC: 9.8 MG/DL (ref 8.3–10.6)
CHLORIDE BLD-SCNC: 90 MMOL/L (ref 99–110)
CLARITY: CLEAR
CO2: 28 MMOL/L (ref 21–32)
COLOR: YELLOW
CREAT SERPL-MCNC: 0.9 MG/DL (ref 0.8–1.3)
EKG ATRIAL RATE: 72 BPM
EKG DIAGNOSIS: NORMAL
EKG P AXIS: 26 DEGREES
EKG P-R INTERVAL: 190 MS
EKG Q-T INTERVAL: 392 MS
EKG QRS DURATION: 112 MS
EKG QTC CALCULATION (BAZETT): 429 MS
EKG R AXIS: -15 DEGREES
EKG T AXIS: 92 DEGREES
EKG VENTRICULAR RATE: 72 BPM
EOSINOPHILS ABSOLUTE: 0.4 K/UL (ref 0–0.6)
EOSINOPHILS RELATIVE PERCENT: 4.5 %
GFR AFRICAN AMERICAN: >60
GFR NON-AFRICAN AMERICAN: >60
GLOBULIN: 3.5 G/DL
GLUCOSE BLD-MCNC: 151 MG/DL (ref 70–99)
GLUCOSE URINE: NEGATIVE MG/DL
HCT VFR BLD CALC: 36.4 % (ref 40.5–52.5)
HEMOGLOBIN: 12.2 G/DL (ref 13.5–17.5)
KETONES, URINE: NEGATIVE MG/DL
LEUKOCYTE ESTERASE, URINE: NEGATIVE
LYMPHOCYTES ABSOLUTE: 0.7 K/UL (ref 1–5.1)
LYMPHOCYTES RELATIVE PERCENT: 8.5 %
MAGNESIUM: 2.1 MG/DL (ref 1.8–2.4)
MCH RBC QN AUTO: 31 PG (ref 26–34)
MCHC RBC AUTO-ENTMCNC: 33.4 G/DL (ref 31–36)
MCV RBC AUTO: 92.6 FL (ref 80–100)
MICROSCOPIC EXAMINATION: NORMAL
MONOCYTES ABSOLUTE: 0.6 K/UL (ref 0–1.3)
MONOCYTES RELATIVE PERCENT: 7.5 %
NEUTROPHILS ABSOLUTE: 6.2 K/UL (ref 1.7–7.7)
NEUTROPHILS RELATIVE PERCENT: 79 %
NITRITE, URINE: NEGATIVE
PDW BLD-RTO: 13.6 % (ref 12.4–15.4)
PH UA: 6 (ref 5–8)
PLATELET # BLD: 262 K/UL (ref 135–450)
PMV BLD AUTO: 8.1 FL (ref 5–10.5)
POTASSIUM SERPL-SCNC: 4.1 MMOL/L (ref 3.5–5.1)
PRO-BNP: 1278 PG/ML (ref 0–449)
PROTEIN UA: NEGATIVE MG/DL
RBC # BLD: 3.94 M/UL (ref 4.2–5.9)
SODIUM BLD-SCNC: 130 MMOL/L (ref 136–145)
SPECIFIC GRAVITY UA: <=1.005 (ref 1–1.03)
TOTAL PROTEIN: 7.8 G/DL (ref 6.4–8.2)
TROPONIN: <0.01 NG/ML
TROPONIN: <0.01 NG/ML
URINE TYPE: NORMAL
UROBILINOGEN, URINE: 0.2 E.U./DL
WBC # BLD: 7.9 K/UL (ref 4–11)

## 2019-05-24 PROCEDURE — 83880 ASSAY OF NATRIURETIC PEPTIDE: CPT

## 2019-05-24 PROCEDURE — 81003 URINALYSIS AUTO W/O SCOPE: CPT

## 2019-05-24 PROCEDURE — 84484 ASSAY OF TROPONIN QUANT: CPT

## 2019-05-24 PROCEDURE — 6370000000 HC RX 637 (ALT 250 FOR IP): Performed by: EMERGENCY MEDICINE

## 2019-05-24 PROCEDURE — 85025 COMPLETE CBC W/AUTO DIFF WBC: CPT

## 2019-05-24 PROCEDURE — 80053 COMPREHEN METABOLIC PANEL: CPT

## 2019-05-24 PROCEDURE — 93010 ELECTROCARDIOGRAM REPORT: CPT | Performed by: INTERNAL MEDICINE

## 2019-05-24 PROCEDURE — 6360000002 HC RX W HCPCS: Performed by: EMERGENCY MEDICINE

## 2019-05-24 PROCEDURE — 99285 EMERGENCY DEPT VISIT HI MDM: CPT

## 2019-05-24 PROCEDURE — 96374 THER/PROPH/DIAG INJ IV PUSH: CPT

## 2019-05-24 PROCEDURE — 71046 X-RAY EXAM CHEST 2 VIEWS: CPT

## 2019-05-24 PROCEDURE — 83735 ASSAY OF MAGNESIUM: CPT

## 2019-05-24 PROCEDURE — 93005 ELECTROCARDIOGRAM TRACING: CPT | Performed by: EMERGENCY MEDICINE

## 2019-05-24 RX ORDER — FUROSEMIDE 10 MG/ML
40 INJECTION INTRAMUSCULAR; INTRAVENOUS ONCE
Status: COMPLETED | OUTPATIENT
Start: 2019-05-24 | End: 2019-05-24

## 2019-05-24 RX ORDER — IPRATROPIUM BROMIDE AND ALBUTEROL SULFATE 2.5; .5 MG/3ML; MG/3ML
1 SOLUTION RESPIRATORY (INHALATION) ONCE
Status: COMPLETED | OUTPATIENT
Start: 2019-05-24 | End: 2019-05-24

## 2019-05-24 RX ADMIN — IPRATROPIUM BROMIDE AND ALBUTEROL SULFATE 1 AMPULE: .5; 3 SOLUTION RESPIRATORY (INHALATION) at 16:20

## 2019-05-24 RX ADMIN — FUROSEMIDE 40 MG: 10 INJECTION, SOLUTION INTRAMUSCULAR; INTRAVENOUS at 17:19

## 2019-05-24 NOTE — ED PROVIDER NOTES
[facility]  ED  EMERGENCY DEPARTMENT ENCOUNTER        Pt Name: Eliot Osborn  MRN: [id number]  [place] 1940  Date of evaluation: 5/24/2019  Provider: Brett Murcia MD  PCP: Luis Manuel Duran MD      [address] [place] [address]       Chief Complaint   Patient presents with    Shortness of Breath     SOB, worse when flat, symptoms x2 weeks. hx of CHF       HISTORY OFPRESENT ILLNESS   (Location/Symptom, Timing/Onset, Context/Setting, Quality, Duration, Modifying Factors,Severity)  Note limiting factors. Eliot Osborn is a 66 y.o. male presenting today due to concern for shortness of breath that has been present for the last 3 weeks but gradually worsening, especially when he lies flat and concern that his congestive heart failure is worsening. He states he is put on 17 pounds over the last 2 weeks. He also complains about bilateral lower extremity swelling. Denies any history of blood clots and no unilateral leg swelling with both sides being relatively equal.  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 Denies any trouble with urination. He had been off Lasix but had a prescription called into him 2 days ago that will last until Monday for Lasix. He states he needs to sleep in a chair currently due to the shortness of breath. He used to smoke but quit. Denies any chest pain or abdominal pain. No headache. No falls or trauma. No fever or chills. Again, his only concern is shortness of breath so he was told to be evaluated in the emergency department by his primary doctor. REVIEW OF SYSTEMS    (2-9 systems for level 4, 10 or more for level 5)     Review of Systems   Constitutional: Positive for fatigue. Negative for chills, diaphoresis and fever. HENT: Negative for congestion. Respiratory: Positive for cough, shortness of breath and wheezing. Negative for chest tightness. Cardiovascular: Positive for leg swelling. Negative for chest pain and palpitations.    Gastrointestinal: Negative for abdominal pain, nausea and vomiting. Genitourinary: Negative for flank pain. Musculoskeletal: Negative for back pain and neck pain. Skin: Negative for color change and wound. Neurological: Negative for syncope, weakness, light-headedness and headaches. Psychiatric/Behavioral: Negative for confusion. Positives and Pertinent negatives as per HPI.       PASTMEDICAL HISTORY     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 ([facility] [address] 04/2016    TIA in 1997    Routine health maintenance     refuses immunizations; declines PSA    Vasovagal reaction     to needles    Vitamin D deficiency disease 12/15         SURGICAL HISTORY       Past Surgical History:   Procedure Laterality Date    CORONARY ANGIOPLASTY WITH STENT PLACEMENT  08/14/2000    RX Tristar 2.5 x 13 mCX  RX Tristar 2.5 x 13 pCX    CORONARY ANGIOPLASTY WITH STENT PLACEMENT  10/19/2000    Tetra 2.75 x 18 CX  Tetra 3.0 x 13 CX    OTHER SURGICAL HISTORY  1-23-12    phaco emulsification cataract right eye    TOOTH EXTRACTION           CURRENT MEDICATIONS       Discharge Medication List as of 5/24/2019  6:35 PM      CONTINUE these medications which have NOT CHANGED    Details   furosemide (LASIX) 20 MG tablet Take 1 tablet by mouth daily for 5 days, Disp-5 tablet, R-0Normal      losartan (COZAAR) 100 MG tablet Take 1 tablet by mouth daily, Disp-90 tablet, R-1This is a dose changeNormal      amLODIPine (NORVASC) 5 MG tablet Take 1 tablet by mouth daily, Disp-30 tablet, R-0Print      pravastatin (PRAVACHOL) 40 MG tablet Take 40 mg by mouth dailyHistorical Med      levothyroxine (SYNTHROID) 88 MCG tablet TAKE 1 TABLET EVERY DAY, Disp-90 tablet, R-1Normal      metoprolol tartrate (LOPRESSOR) 50 MG tablet Take 1 tablet by mouth 2 times daily, Disp-180 tablet, R-3Normal      ACCU-CHEK DIONTE PLUS strip TEST TWO TIMES DAILY, Disp-200 strip, R-3Normal      metFORMIN (GLUCOPHAGE) 1000 MG tablet TAKE 1 TABLET TWICE DAILY WITH MEALS, Disp-180 tablet, R-1Normal      albuterol sulfate HFA (PROVENTIL HFA) 108 (90 Base) MCG/ACT inhaler Inhale 1-2 puffs into the lungs every 4 hours as needed for Wheezing or Shortness of Breath May substitute ProAir or Ventolin, Disp-1 Inhaler, R-5Normal      magnesium oxide (MAG-OX) 400 MG tablet Take 400 mg by mouth dailyHistorical Med      ferrous sulfate 325 (65 Fe) MG tablet Take 325 mg by mouth 2 times dailyHistorical Med      aspirin 81 MG EC tablet Take 1 tablet by mouth daily, Disp-30 tablet, R-0Adjust Sig      Cholecalciferol (VITAMIN D) 2000 UNITS CAPS capsule Take  by mouth daily.              ALLERGIES     Metolazone; Lipitor; and Simvastatin    FAMILY HISTORY       Family History   Problem Relation Age of Onset    Emphysema Mother     Stroke Father     Other Daughter         Factor V deficiency    Other Grandchild         Factor V deficiency          SOCIAL HISTORY       Social History     Socioeconomic History    Marital status:      Spouse name: None    Number of children: None    Years of education: None    Highest education level: None   Occupational History    None   Social Needs    Financial resource strain: None    Food insecurity:     Worry: None     Inability: None    Transportation needs:     Medical: None     Non-medical: None   Tobacco Use    Smoking status: Former Smoker     Packs/day: 2.00     Years: 20.00     Pack years: 40.00     Types: Cigarettes     Last attempt to quit: 1975     Years since quittin.4    Smokeless tobacco: Never Used   Substance and Sexual Activity    Alcohol use: No    Drug use: No    Sexual activity: Not Currently   Lifestyle    Physical activity:     Days per week: None     Minutes per session: None    Stress: None   Relationships    Social connections: Talks on phone: None     Gets together: None     Attends Restorationism service: None     Active member of club or organization: None     Attends meetings of clubs or organizations: None     Relationship status: None    Intimate partner violence:     Fear of current or ex partner: None     Emotionally abused: None     Physically abused: None     Forced sexual activity: None   Other Topics Concern    None   Social History Narrative    None       SCREENINGS                PHYSICAL EXAM    (up to 7 for level 4, 8 or more for level 5)     ED Triage Vitals [05/24/19 1459]   BP Temp Temp Source Pulse Resp SpO2 Height Weight   (!) 175/82 97.7 °F (36.5 °C) Oral 73 18 96 % 5' 11\" (1.803 m) 189 lb (85.7 kg)       Physical Exam   Constitutional: He is oriented to person, place, and time. He appears well-developed and well-nourished. He is active and cooperative. Non-toxic appearance. He does not have a sickly appearance. He does not appear ill. No distress. HENT:   Head: Normocephalic and atraumatic. Nose: Nose normal.   Mouth/Throat: Mucous membranes are normal. No oropharyngeal exudate. Eyes: Pupils are equal, round, and reactive to light. Right eye exhibits no discharge. Left eye exhibits no discharge. No scleral icterus. Neck: Trachea normal, normal range of motion and full passive range of motion without pain. Neck supple. No neck rigidity. No tracheal deviation, no edema, no erythema and normal range of motion present. Cardiovascular: Normal rate, regular rhythm, intact distal pulses and normal pulses. Exam reveals no gallop and no friction rub. Pulmonary/Chest: Effort normal. No accessory muscle usage or stridor. No tachypnea and no bradypnea. No respiratory distress. He has no decreased breath sounds. He has wheezes (end-expiratory) in the right upper field, the right middle field, the right lower field, the left upper field, the left middle field and the left lower field. He has no rhonchi. He has no rales. He exhibits no tenderness. Abdominal: Soft. Bowel sounds are normal. He exhibits no distension. There is no tenderness. There is no rebound and no guarding. Musculoskeletal: Normal range of motion. He exhibits no edema, tenderness or deformity. Neurological: He is alert and oriented to person, place, and time. He has normal strength. He displays no atrophy and no tremor. No sensory deficit. He exhibits normal muscle tone. He displays no seizure activity. GCS eye subscore is 4. GCS verbal subscore is 5. GCS motor subscore is 6. Skin: Skin is warm and dry. No rash noted. He is not diaphoretic. No erythema. No pallor. Psychiatric: He has a normal mood and affect. Nursing note and vitals reviewed.           DIAGNOSTIC RESULTS   :    Labs Reviewed   COMPREHENSIVE METABOLIC PANEL - Abnormal; Notable for the following components:       Result Value    Sodium 130 (*)     Chloride 90 (*)     Glucose 151 (*)     Alkaline Phosphatase 158 (*)     All other components within normal limits    Narrative:     Performed at:  [place] [address] [employer]   Phone [phone]   CBC WITH AUTO DIFFERENTIAL - Abnormal; Notable for the following components:    RBC 3.94 (*)     Hemoglobin 12.2 (*)     Hematocrit 36.4 (*)     Lymphocytes # 0.7 (*)     All other components within normal limits    Narrative:     Performed at:  [place] [address] [employer]   Phone [phone] - Abnormal; Notable for the following components:    Pro-BNP 1,278 (*)     All other components within normal limits    Narrative:     Performed at:  [place] [address] [employer]   Phone [phone]   TROPONIN    Narrative:     Performed at:  [place] [address] [employer]   Phone [phone] Narrative:     Performed at:  [facility]) - [address],  [place], [address]   Phone [phone]   MAGNESIUM    Narrative:     Performed at:  [facility]) - [facility]  [address],  [place], [address] [employer]   Phone [phone]   TROPONIN    Narrative:     Performed at:  [facility]) - [address],  [place], [address]   Phone [phone]       All other labs were within normal range or not returned asof this dictation. EKG: All EKG's are interpreted by the Emergency Department Physician who either signs or Co-signs this chart in the absence of a cardiologist.    The Ekg interpreted by me shows  normal sinus rhythm with a rate of 72  Axis is   Normal  QTc is  normal  Intervals and Durations are unremarkable. ST Segments: no acute change and nonspecific changes  No significant change from prior EKG dated - 5/9/19  No STEMI, poor baseline in lead V3 but no obvious sign of STEMI based on Sgarbossa criteria           RADIOLOGY:   Non-plain film images such as CT, Ultrasound and MRI are read by the radiologist. Manual Harden images are visualized and preliminarily interpreted by the  ED Provider with the belowfindings:        Interpretation per the Radiologist below, if available at the time of this note:    XR CHEST STANDARD (2 VW)   Final Result   No significant change in pulmonary edema and effusions. PROCEDURES   Unless otherwise noted below, none     Procedures    CRITICAL CARE TIME   N/A    CONSULTS: Spoke with Dr. Ena Vogel at [phone] and he stated he will have his office work him into the schedule early this upcoming week due to concern for CHF exacerbation, but otherwise, based on story felt that the patient could go home if he is improved.   IP CONSULT TO CARDIOLOGY    EMERGENCY DEPARTMENT COURSE and DIFFERENTIAL DIAGNOSIS/MDM:   Vitals:    Vitals:    05/24/19 1554 05/24/19 1604 05/24/19 1718 05/24/19 1851   BP:   (!) 152/70 (!) 155/49   Pulse: 73 69 71 74   Resp: 21 21 19 18   Temp:       TempSrc:       SpO2: 99% 97% 100% 97%   Weight:       Height:           Patient was given the following medications:  Medications   ipratropium-albuterol (DUONEB) nebulizer solution 1 ampule (1 ampule Inhalation Given 5/24/19 1620)   furosemide (LASIX) injection 40 mg (40 mg Intravenous Given 5/24/19 1719)     Patient was evaluated due to concern for worsening shortness of breath for the last 3 weeks. He was hospitalized 2 weeks ago but states they did not address that at this time. He did have initial wheezing on exam and was given a DuoNeb and states this helped greatly with his shortness of breath. He was ambulated following this and had no issues with shortness of breath. In regards to the weight gain, I did give him some Lasix as well since I felt that this would be helpful. He has never been diagnosed with COPD, and therefore I recommended he follow up with pulmonology for further assessment of this. However, with the weight gain and trouble lying flat, I feel that congestive heart failure is the most likely culprit and therefore recommended he follow up with cardiology urgently this upcoming week over the next 3-5 days. Troponin was negative and shortness of breath has been constant for 3 weeks and story not suggestive of acute coronary syndrome. No history of pulmonary embolism and since symptoms greatly improved with DuoNeb and Lasix, I do not feel that further assessment for PE is necessary at this time. He knows to return immediately to the emergency department if he develops worsening shortness of breath with chest pain, or other concerning symptoms, but otherwise follow up with cardiology within the next couple of days. He was well-appearing and in no acute distress at time of discharge and felt comfortable with this plan. The patient tolerated their visit well.    The patient and / or the

## 2019-05-24 NOTE — ED NOTES
Ambulate patient in ed hallway,about 100feet.  Patent 02 was 94-97% and pulse was 87     Sudha Fong  05/24/19 1703

## 2019-05-24 NOTE — ED NOTES
[address] Cardiology called @ [phone]  RE: CHF exacerbation per Dr. George Gill. Dr. Mohit Desai returned call @ [phone], spoke to Dr. George Gill.      Georgianna Boeck  05/24/19 9515

## 2019-05-24 NOTE — TELEPHONE ENCOUNTER
Called pt to confirm his appt for Tues with Dr Sherie Garcia. Pt stated he was just in to see Akhil Castellon on 05/21/2019 for leg swelling, SOB, chest sera. and pt states his sxs has gotten worse. Pt states he is unable to lay down at night. He cannot breathe. Has a lot of wheezing. Pt wanted to come in today but I don not see any openings. Please Advise.  Thank You

## 2019-05-25 ASSESSMENT — ENCOUNTER SYMPTOMS
COUGH: 1
BACK PAIN: 0
CHEST TIGHTNESS: 0
COLOR CHANGE: 0
SHORTNESS OF BREATH: 1
WHEEZING: 1
NAUSEA: 0
VOMITING: 0
ABDOMINAL PAIN: 0

## 2019-05-27 ENCOUNTER — TELEPHONE (OUTPATIENT)
Dept: CARDIOLOGY | Age: 79
End: 2019-05-27

## 2019-05-27 NOTE — TELEPHONE ENCOUNTER
Patient was in ER w/ HF and was hoping to get in to see dr Felix Luke or np in office in next 1-2 weeks. Thanks.

## 2019-05-28 ENCOUNTER — TELEPHONE (OUTPATIENT)
Dept: PULMONOLOGY | Age: 79
End: 2019-05-28

## 2019-05-28 ENCOUNTER — OFFICE VISIT (OUTPATIENT)
Dept: FAMILY MEDICINE CLINIC | Age: 79
End: 2019-05-28
Payer: MEDICARE

## 2019-05-28 VITALS
SYSTOLIC BLOOD PRESSURE: 144 MMHG | BODY MASS INDEX: 26.92 KG/M2 | WEIGHT: 193 LBS | HEART RATE: 70 BPM | TEMPERATURE: 98 F | DIASTOLIC BLOOD PRESSURE: 79 MMHG | OXYGEN SATURATION: 93 %

## 2019-05-28 DIAGNOSIS — I10 ESSENTIAL HYPERTENSION: ICD-10-CM

## 2019-05-28 DIAGNOSIS — E87.1 HYPONATREMIA: Primary | ICD-10-CM

## 2019-05-28 DIAGNOSIS — R60.0 EDEMA OF BOTH LOWER EXTREMITIES: ICD-10-CM

## 2019-05-28 DIAGNOSIS — R06.02 SHORTNESS OF BREATH: ICD-10-CM

## 2019-05-28 PROCEDURE — 99214 OFFICE O/P EST MOD 30 MIN: CPT | Performed by: FAMILY MEDICINE

## 2019-05-28 PROCEDURE — G8598 ASA/ANTIPLAT THER USED: HCPCS | Performed by: FAMILY MEDICINE

## 2019-05-28 PROCEDURE — G8427 DOCREV CUR MEDS BY ELIG CLIN: HCPCS | Performed by: FAMILY MEDICINE

## 2019-05-28 PROCEDURE — 1111F DSCHRG MED/CURRENT MED MERGE: CPT | Performed by: FAMILY MEDICINE

## 2019-05-28 PROCEDURE — 36415 COLL VENOUS BLD VENIPUNCTURE: CPT | Performed by: FAMILY MEDICINE

## 2019-05-28 PROCEDURE — 1036F TOBACCO NON-USER: CPT | Performed by: FAMILY MEDICINE

## 2019-05-28 PROCEDURE — 1123F ACP DISCUSS/DSCN MKR DOCD: CPT | Performed by: FAMILY MEDICINE

## 2019-05-28 PROCEDURE — G8417 CALC BMI ABV UP PARAM F/U: HCPCS | Performed by: FAMILY MEDICINE

## 2019-05-28 PROCEDURE — 4040F PNEUMOC VAC/ADMIN/RCVD: CPT | Performed by: FAMILY MEDICINE

## 2019-05-28 RX ORDER — TORSEMIDE 20 MG/1
20 TABLET ORAL DAILY
Qty: 30 TABLET | Refills: 5 | Status: SHIPPED | OUTPATIENT
Start: 2019-05-28 | End: 2019-09-18 | Stop reason: SDUPTHER

## 2019-05-28 NOTE — PATIENT INSTRUCTIONS
Please read the healthy family handout that you were given and share it with your family. Please compare this printed medication list with your medications at home to be sure they are the same. If you have any medications that are different please contact us immediately at [phone]. Also review your allergies that we have listed, these may also include medications that you have not been able to tolerate, make sure everything listed is correct. If you have any allergies that are different please contact us immediately at [phone].

## 2019-05-28 NOTE — PROGRESS NOTES
Assessment and plan  1. Hyponatremia  My status. I advised him if it was unchanged it was highly unlikely to be due to his diuretic. Possibly his losartan. - VT DISCHARGE MEDS RECONCILED W/ CURRENT OUTPATIENT MED LIST  - Basic Metabolic Panel    2. Shortness of breath  Clinically he has congestive heart failure. Torsemide 20 mg q.d.  - ECHO Complete 2D W Doppler W Color; Future  - Brain Natriuretic Peptide  - Follow-up with cardiology next week    3. Edema of both lower extremities  CHF versus venous insufficiency  - torsemide as above    4. Essential hypertension  Question control. No change in plans pending above    Healthy Family prevention recommendations given. Continue all current prescription medications as listed below. RTC as scheduled    Subjective  Patient returns for reevaluation of his hyponatremia, edema, shortness of breath. Earlier this month he was found to have hyponatremia with a sodium of 120 and was admitted. It was felt his hyponatremia was due to a diuretic and so both his diuretics including furosemide and Zaroxolyn were discontinued. At discharge his sodium was 130. However he developed acutely worsening shortness of breath, edema in his lower extremities, and orthopnea. He returned to the emergency room for reevaluation on May 24 and his sodium was unchanged at 1:30. He was not given any other treatment at that time. He has dyspnea on exertion, significant orthopnea, and swelling up to his knees. I reviewed his labs. He does have a mildly elevated alkaline phosphatase but this is not new. He denies any sense of swelling or ascites of his abdomen. His current antihypertensives include losartan 100 mg q.d., amlodipine 5 mg q.d., metoprolol 50 mg b.i.d. His chest x-ray was interpreted as showing pulmonary effusions and pulmonary edema.     Medications: see list below  Allergies   Allergen Reactions    Metolazone      Sodium 120 on 5/19, was on metolazone, no hypovolemic 1 TABLET TWICE DAILY WITH MEALS 180 tablet 1    albuterol sulfate HFA (PROVENTIL HFA) 108 (90 Base) MCG/ACT inhaler Inhale 1-2 puffs into the lungs every 4 hours as needed for Wheezing or Shortness of Breath May substitute ProAir or Ventolin 1 Inhaler 5    magnesium oxide (MAG-OX) 400 MG tablet Take 400 mg by mouth daily      ferrous sulfate 325 (65 Fe) MG tablet Take 325 mg by mouth 2 times daily      aspirin 81 MG EC tablet Take 1 tablet by mouth daily 30 tablet 0    Cholecalciferol (VITAMIN D) 2000 UNITS CAPS capsule Take  by mouth daily. No current facility-administered medications for this visit. The note was completed using Dragon voice recognition transcription. Every effort was made to ensure accuracy; however, inadvertent  transcription errors may be present despite my best efforts to edit errors.

## 2019-05-29 LAB
ANION GAP SERPL CALCULATED.3IONS-SCNC: 14 MMOL/L (ref 3–16)
BUN BLDV-MCNC: 19 MG/DL (ref 7–20)
CALCIUM SERPL-MCNC: 9.9 MG/DL (ref 8.3–10.6)
CHLORIDE BLD-SCNC: 90 MMOL/L (ref 99–110)
CO2: 26 MMOL/L (ref 21–32)
CREAT SERPL-MCNC: 0.8 MG/DL (ref 0.8–1.3)
GFR AFRICAN AMERICAN: >60
GFR NON-AFRICAN AMERICAN: >60
GLUCOSE BLD-MCNC: 86 MG/DL (ref 70–99)
POTASSIUM SERPL-SCNC: 5.4 MMOL/L (ref 3.5–5.1)
PRO-BNP: 1286 PG/ML (ref 0–449)
SODIUM BLD-SCNC: 130 MMOL/L (ref 136–145)

## 2019-05-31 RX ORDER — ALBUTEROL SULFATE 90 UG/1
1-2 AEROSOL, METERED RESPIRATORY (INHALATION) EVERY 4 HOURS PRN
Qty: 3 INHALER | Refills: 1 | Status: SHIPPED | OUTPATIENT
Start: 2019-05-31 | End: 2020-05-12

## 2019-06-03 NOTE — PROGRESS NOTES
[address]   Cardiology Note              Date:  June 5, 2019  Patientname: Prince Fernandez  YOB: 1940    Primary Care physician: Mattie Downey MD    HISTORY OF PRESENT ILLNESS: Prince Fernandez is a 66 y.o. male with a PMH of embolic R. CVA, CAD s/p 4 stents most recent 10/00, s/p CABG x4 1/18 with Dr. Ozzie Melendez, HTN, BM, HLD, BLE edema who follows Dr. Evelio Mccabe in office. He was admitted to the hospital on 5/9/19 with hyponatremia, started on amlopdipine and his lasix and metolazone noted to be discontinued at discharge. He was recently in the ER on 5/24 for shortness of breath that gradually worsened over 3 weeks, especially when he was laying flat. Over a 2 week period prior to ER visit he reported 17 lbs weight gain with BLE edema. His primary care physician started him on Lasix 20 mg. He received 40 mg of IV lasix and a DuoNeb  which helped his SOB. He was advised by the ER physician to follow up with pulmonology for further assessment of SOB. His troponin was negative, no concern for ACS. Pt was discharged after with instructions to follow up with cardiology as well as pulmonology. He followed up with Mattie Downey MD and he was started on torsemide 20 mg daily and echo was also ordered. Today he presents for follow up after ER visit for increased shortness of breath and BLE. He stated the last two days he has been feeling much better. Denies any shortness of breath, just congestion that he started taking Mucinex for. He also has been sneezing more recently. His weight is down 8-9lbs since starting torsemide. His weight has been around 173-174 lbs. He stated his BLE edema has improved. He remains unable to lay flat in the bed due to the congestion. Last night was the first night he tried it in a while and he was able to sleep in bed with one pillow for a couple hours, after that he ended up back in the recliner. He canceled his follow up with pulmonology.   He called to schedule an ECHO and did not due to it costing 250$, he did not ask if they were able to take payments for this. His b/p at home has improved since starting the amlodipine when he was previoulsy in the hospital.  He stated it has been raging 128-130/69-80's. His blood sugars have been running below 100 every am.      Emily Brown describes symptoms including orthopnea, fatigue, edema but denies chest pain, palpitations, PND, exertional chest pressure/discomfort, early saiety, syncope. 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 Past Surgical History:   has a past surgical history that includes Tooth Extraction; Coronary angioplasty with stent (08/14/2000); other surgical history (1-23-12); and Coronary angioplasty with stent (10/19/2000). Home Medications:    Prior to Admission medications    Medication Sig Start Date End Date Taking?  Authorizing Provider   Coenzyme Q10 (COQ10 PO) Take by mouth   Yes Historical Provider, MD   Ascorbic Acid (VITAMIN C PO) Take by mouth 2 times daily   Yes Historical Provider, MD   guaiFENesin 400 MG tablet Take 400 mg by mouth 4 times daily as needed for Cough   Yes Historical Provider, MD   amLODIPine (NORVASC) 5 MG tablet Take 1 tablet by mouth daily 6/5/19 12/2/19 Yes AICHA Carmona - CNP   albuterol sulfate HFA (PROVENTIL HFA) 108 (90 Base) MCG/ACT inhaler Inhale 1-2 puffs into the lungs every 4 hours as needed for Wheezing or Shortness of Breath May substitute ProAir or Ventolin 5/31/19  Yes Marialuisa Olea MD   torsemide (DEMADEX) 20 MG tablet Take 1 tablet by mouth daily 5/28/19  Yes Marialuisa Olea MD   losartan (COZAAR) 100 MG tablet Take 1 tablet by mouth daily 5/21/19  Yes AICHA Mackey - CNP   pravastatin (PRAVACHOL) 40 MG tablet Take 40 mg by mouth daily   Yes Historical Provider, MD   levothyroxine (SYNTHROID) 88 MCG tablet TAKE 1 TABLET EVERY DAY 3/13/19  Yes Gilmar Negron MD   metoprolol tartrate (LOPRESSOR) 50 MG tablet Take 1 tablet by mouth 2 times daily 2/5/19  Yes Luis Monzon MD   metFORMIN (GLUCOPHAGE) 1000 MG tablet TAKE 1 TABLET TWICE DAILY WITH MEALS 1/2/19  Yes Gilmar Negron MD   magnesium oxide (MAG-OX) 400 MG tablet Take 400 mg by mouth daily   Yes Historical Provider, MD   ferrous sulfate 325 (65 Fe) MG tablet Take 325 mg by mouth 2 times daily   Yes Historical Provider, MD   aspirin 81 MG EC tablet Take 1 tablet by mouth daily 3/5/18  Yes Luis Monzon MD   Cholecalciferol (VITAMIN D) 2000 UNITS CAPS capsule Take  by mouth daily. Yes Historical Provider, MD       Allergies:  Metolazone; Lipitor; and Simvastatin    Social History:   reports that he quit smoking about 44 years ago. His smoking use included cigarettes. He has a 40.00 pack-year smoking history. He has never used smokeless tobacco. He reports that he does not drink alcohol or use drugs. Family History: family history includes Emphysema in his mother; Other in his daughter and grandchild; Stroke in his father. Review of Systems   Review of Systems   Constitutional: Positive for activity change and fatigue. Negative for appetite change. HENT: Positive for congestion, postnasal drip, rhinorrhea and sneezing. Eyes: Negative for itching. Respiratory: Positive for cough. Negative for apnea, chest tightness, shortness of breath and wheezing. Cardiovascular: Positive for leg swelling. Negative for chest pain and palpitations. Neurological: Negative for dizziness, syncope, weakness and light-headedness. Psychiatric/Behavioral: Positive for sleep disturbance.        OBJECTIVE:    Vital signs:    /66   Pulse 64   Wt 185 lb 6.4 oz (84.1 kg)   SpO2 97%   BMI 25.86 kg/m²      Physical Exam:  Constitutional:  Comfortable and alert, NAD, appears 50-55%   CABG      Cardiology Labs Reviewed:     Lab Data:  Most recent lab results below reviewed in office    CBC:   Lab Results   Component Value Date    WBC 7.9 05/24/2019    WBC 6.9 05/21/2019    WBC 6.4 05/13/2019    RBC 3.94 05/24/2019    RBC 3.69 05/21/2019    RBC 3.71 05/13/2019    HGB 12.2 05/24/2019    HGB 11.9 05/21/2019    HGB 11.7 05/13/2019    HCT 36.4 05/24/2019    HCT 34.4 05/21/2019    HCT 34.7 05/13/2019    MCV 92.6 05/24/2019    MCV 93.2 05/21/2019    MCV 93.4 05/13/2019    RDW 13.6 05/24/2019    RDW 13.6 05/21/2019    RDW 13.3 05/13/2019     05/24/2019     05/21/2019     05/13/2019     BMP:  Lab Results   Component Value Date     05/28/2019     05/24/2019     05/21/2019    K 5.4 05/28/2019    K 4.1 05/24/2019    K 5.0 05/21/2019    K 3.9 02/05/2018    K 4.0 02/04/2018    K 4.2 02/03/2018    CL 90 05/28/2019    CL 90 05/24/2019    CL 92 05/21/2019    CO2 26 05/28/2019    CO2 28 05/24/2019    CO2 25 05/21/2019    PHOS 3.5 05/13/2019    PHOS 3.1 05/09/2019    PHOS 3.1 01/31/2018    BUN 19 05/28/2019    BUN 16 05/24/2019    BUN 16 05/21/2019    CREATININE 0.8 05/28/2019    CREATININE 0.9 05/24/2019    CREATININE 1.0 05/21/2019    CREATININE 1.1 02/02/2012     BNP:   Lab Results   Component Value Date    PROBNP 1,286 05/28/2019    PROBNP 1,278 05/24/2019    PROBNP 1,301 05/21/2019     FASTING LIPID PANEL:  Lab Results   Component Value Date    HDL 51 11/06/2018    HDL 39 01/18/2012    LDLDIRECT 109 03/13/2013    LDLCALC 91 11/06/2018    TRIG 87 11/06/2018     LIVER PROFILE:No results for input(s): AST, ALT, ALB in the last 72 hours. Reviewed all labs and imaging today    Assessment:   1. CAD: stable denies chest pain    -s/p CABG x4 with pedicled LIMA to LAD, sequential Greater Saphenous VG to  Diag 1 then on to OM, separate single Greater SVG to distal RCA. 1/31/2018  2. SOB: improved  3. BLE edema: improved, almost to baseline per patient.  hx of echo 2009 with mild diastolic non compliance, most recent echo 2018 EF 89% without diastolic dysfunction. ?CHF vs venous insufficiency   4. HTN: controlled  5. HLD: on pravachol  6. DM: per primary team A1C- 6.8 (5/9/2019)      Plan:   1. Schedule echo as previously ordered  2. Norvasc refilled  3. BMP & BNP next Wednesday  4. Follow up with Pulmonary   5. Follow up with Dr. Anais Barr in 3 months per patient request  6. Ok to take Claritin   7. Compression stalkings, elevate BLE as much as possible   8. Daily weight, low sodium diet, 2000 ml fluid restriction   9. Continue to monitor blood pressure at home  10. Continue all heart medication      Nguyễn Petersen, APRN-KENRICK  [address]  [phone]       QUALITY MEASURES  1. Tobacco Cessation Counseling: NA  2. Retake of BP if >140/90:   NA  3. Documentation to PCP/referring for new patient:  Sent to PCP at close of office visit  4. CAD patient on anti-platelet: Yes  5. CAD patient on STATIN therapy:  Yes  6.  Patient with CHF and aFib on anticoagulation:  NA

## 2019-06-05 ENCOUNTER — OFFICE VISIT (OUTPATIENT)
Dept: CARDIOLOGY CLINIC | Age: 79
End: 2019-06-05
Payer: MEDICARE

## 2019-06-05 VITALS
DIASTOLIC BLOOD PRESSURE: 66 MMHG | HEART RATE: 64 BPM | OXYGEN SATURATION: 97 % | WEIGHT: 185.4 LBS | SYSTOLIC BLOOD PRESSURE: 128 MMHG | BODY MASS INDEX: 25.86 KG/M2

## 2019-06-05 DIAGNOSIS — R60.0 LOCALIZED EDEMA: ICD-10-CM

## 2019-06-05 DIAGNOSIS — Z95.1 S/P CABG X 4: ICD-10-CM

## 2019-06-05 DIAGNOSIS — R06.02 SHORTNESS OF BREATH: Primary | ICD-10-CM

## 2019-06-05 DIAGNOSIS — I25.10 CORONARY ARTERY DISEASE INVOLVING NATIVE CORONARY ARTERY OF NATIVE HEART WITHOUT ANGINA PECTORIS: ICD-10-CM

## 2019-06-05 DIAGNOSIS — I10 ESSENTIAL HYPERTENSION: ICD-10-CM

## 2019-06-05 PROCEDURE — 99214 OFFICE O/P EST MOD 30 MIN: CPT | Performed by: NURSE PRACTITIONER

## 2019-06-05 RX ORDER — AMLODIPINE BESYLATE 5 MG/1
5 TABLET ORAL DAILY
Qty: 30 TABLET | Refills: 0 | Status: SHIPPED | OUTPATIENT
Start: 2019-06-05 | End: 2019-06-05 | Stop reason: SDUPTHER

## 2019-06-05 RX ORDER — GUAIFENESIN 400 MG/1
400 TABLET ORAL DAILY
COMMUNITY

## 2019-06-05 RX ORDER — AMLODIPINE BESYLATE 5 MG/1
5 TABLET ORAL DAILY
Qty: 90 TABLET | Refills: 1 | Status: SHIPPED | OUTPATIENT
Start: 2019-06-05 | End: 2019-09-05 | Stop reason: SDUPTHER

## 2019-06-05 ASSESSMENT — ENCOUNTER SYMPTOMS
COUGH: 1
WHEEZING: 0
RHINORRHEA: 1
CHEST TIGHTNESS: 0
EYE ITCHING: 0
SHORTNESS OF BREATH: 0
APNEA: 0

## 2019-06-05 NOTE — LETTER
[address]   Cardiology Note              Date:  June 5, 2019  Patientname: Leonardo Livingston  YOB: 1940    Primary Care physician: Jefe Varela MD    HISTORY OF PRESENT ILLNESS: Leonardo Livingston is a 66 y.o. male with a PMH of embolic R. CVA, CAD s/p 4 stents most recent 10/00, s/p CABG x4 1/18 with Dr. Dorian Cleary, HTN, BM, HLD, BLE edema who follows Dr. Fransisco Barton in office. He was admitted to the hospital on 5/9/19 with hyponatremia, started on amlopdipine and his lasix and metolazone noted to be discontinued at discharge. He was recently in the ER on 5/24 for shortness of breath that gradually worsened over 3 weeks, especially when he was laying flat. Over a 2 week period prior to ER visit he reported 17 lbs weight gain with BLE edema. His primary care physician started him on Lasix 20 mg. He received 40 mg of IV lasix and a DuoNeb  which helped his SOB. He was advised by the ER physician to follow up with pulmonology for further assessment of SOB. His troponin was negative, no concern for ACS. Pt was discharged after with instructions to follow up with cardiology as well as pulmonology. He followed up with Jefe Varela MD and he was started on torsemide 20 mg daily and echo was also ordered. Today he presents for follow up after ER visit for increased shortness of breath and BLE. He stated the last two days he has been feeling much better. Denies any shortness of breath, just congestion that he started taking Mucinex for. He also has been sneezing more recently. His weight is down 8-9lbs since starting torsemide. His weight has been around 173-174 lbs. He stated his BLE edema has improved. He remains unable to lay flat in the bed due to the congestion. Last night was the first night he tried it in a while and he was able to sleep in bed with one pillow for a couple hours, after that he ended up back in the recliner.   He canceled his follow up with pulmonology. He called to schedule an ECHO and did not due to it costing 250$, he did not ask if they were able to take payments for this. His b/p at home has improved since starting the amlodipine when he was previoulsy in the hospital.  He stated it has been raging 128-130/69-80's. His blood sugars have been running below 100 every am.      Yasmin Bridges describes symptoms including orthopnea, fatigue, edema but denies chest pain, palpitations, PND, exertional chest pressure/discomfort, early saiety, syncope. 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 Past Surgical History:   has a past surgical history that includes Tooth Extraction; Coronary angioplasty with stent (08/14/2000); other surgical history (1-23-12); and Coronary angioplasty with stent (10/19/2000). Home Medications:    Prior to Admission medications    Medication Sig Start Date End Date Taking?  Authorizing Provider   Coenzyme Q10 (COQ10 PO) Take by mouth   Yes Historical Provider, MD   Ascorbic Acid (VITAMIN C PO) Take by mouth 2 times daily   Yes Historical Provider, MD   guaiFENesin 400 MG tablet Take 400 mg by mouth 4 times daily as needed for Cough   Yes Historical Provider, MD   amLODIPine (NORVASC) 5 MG tablet Take 1 tablet by mouth daily 6/5/19 12/2/19 Yes Juanjo Norman APRN - CNP   albuterol sulfate HFA (PROVENTIL HFA) 108 (90 Base) MCG/ACT inhaler Inhale 1-2 puffs into the lungs every 4 hours as needed for Wheezing or Shortness of Breath May substitute ProAir or Ventolin 5/31/19  Yes Saray Nunn MD   torsemide (DEMADEX) 20 MG tablet Take 1 tablet by mouth daily 5/28/19  Yes Saray Nunn MD   losartan (COZAAR) 100 MG tablet Take 1 tablet by mouth daily 5/21/19  Yes Rj Llanos APRN - CNP pravastatin (PRAVACHOL) 40 MG tablet Take 40 mg by mouth daily   Yes Historical Provider, MD   levothyroxine (SYNTHROID) 88 MCG tablet TAKE 1 TABLET EVERY DAY 3/13/19  Yes Jonathan Sanchez MD   metoprolol tartrate (LOPRESSOR) 50 MG tablet Take 1 tablet by mouth 2 times daily 2/5/19  Yes Huber Vera MD   metFORMIN (GLUCOPHAGE) 1000 MG tablet TAKE 1 TABLET TWICE DAILY WITH MEALS 1/2/19  Yes Jonathan Sanchez MD   magnesium oxide (MAG-OX) 400 MG tablet Take 400 mg by mouth daily   Yes Historical Provider, MD   ferrous sulfate 325 (65 Fe) MG tablet Take 325 mg by mouth 2 times daily   Yes Historical Provider, MD   aspirin 81 MG EC tablet Take 1 tablet by mouth daily 3/5/18  Yes Huber Vera MD   Cholecalciferol (VITAMIN D) 2000 UNITS CAPS capsule Take  by mouth daily. Yes Historical Provider, MD       Allergies:  Metolazone; Lipitor; and Simvastatin    Social History:   reports that he quit smoking about 44 years ago. His smoking use included cigarettes. He has a 40.00 pack-year smoking history. He has never used smokeless tobacco. He reports that he does not drink alcohol or use drugs. Family History: family history includes Emphysema in his mother; Other in his daughter and grandchild; Stroke in his father. Review of Systems   Review of Systems   Constitutional: Positive for activity change and fatigue. Negative for appetite change. HENT: Positive for congestion, postnasal drip, rhinorrhea and sneezing. Eyes: Negative for itching. Respiratory: Positive for cough. Negative for apnea, chest tightness, shortness of breath and wheezing. Cardiovascular: Positive for leg swelling. Negative for chest pain and palpitations. Neurological: Negative for dizziness, syncope, weakness and light-headedness. Psychiatric/Behavioral: Positive for sleep disturbance.        OBJECTIVE:    Vital signs:    /66   Pulse 64   Wt 185 lb 6.4 oz (84.1 kg)   SpO2 97%   BMI 25.86 kg/m²       Physical Exam: Constitutional:  Comfortable and alert, NAD, appears stated age  Eyes: PERRL, sclera nonicteric  Neck:  Supple, no masses, no thyroidmegaly, no JVD  Skin:  Warm and dry; no rash or lesions  Heart:  Regular, normal apex, S1 and S2 normal, no M/G/R  Lungs:  Normal respiratory effort; diminished bibasilar; no wheezing/rhonchi/rales  Abdomen: soft, non tender, + bowel sounds  Extremities:  Pretibial 1+ pitting edema BLE no cyanosis; no clubbing  Neuro: alert and oriented, moves legs and arms equally, normal mood and affect    Data Reviewed:    EKG 5/24/2019:  Normal sinus rhythmAnteroseptal infarct (cited on or before 28-JAN-2018)Abnormal ECG  When compared with ECG of 09-MAY-2019 14:29  ,No significant change was foundConfirmed by Nikko Man MD, [facility] ([facility]) on 5/24/2019 4:48:43     Echo 1/18/2018:  Left ventricle systolic function is mildly reduced with an estimated  ejection fraction of 50-55%. There is mild hypokinesis of the mid  anteroseptal wall. Mild concentric left ventricular hypertrophy. Normal left ventricular diastolic filling pressure. Mild mitral regurgitation. Echo 4/12/2016:  Normal left ventricle size with mild concentric left ventricular   hypertrophy. Left ventricular function is normal with ejection fraction estimated at   55-60 %. No regional wall motion abnormalities are noted. Normal diastolic function. Trivial mitral regurgitation   Mild aortic regurgitation   Mild tricuspid regurgitation with RVSP estimated at 35 mmHg. The left atrium is mildly dilated. NM Stress test 12/27/2017: Abnormal low risk myocardial perfusion study.    There is a small area of ischemia with in the lateral apex.    The estimated left ventricular function is 47% with hypokinesis of the    septum.      Coronary angiogram 1/18/2018:  Procedures: LHC, LVG, CA, sedation   LM 20%  LAD 70% mid              D1 90% prox  Cx 90% mid              OM2 side branch 90% ostium  RCA 80% prox, 95% mid Collaterals LAD to RCA  Multiple stents noted to be patent  LVEF 50-55%   CABG      Cardiology Labs Reviewed:     Lab Data:  Most recent lab results below reviewed in office    CBC:   Lab Results   Component Value Date    WBC 7.9 05/24/2019    WBC 6.9 05/21/2019    WBC 6.4 05/13/2019    RBC 3.94 05/24/2019    RBC 3.69 05/21/2019    RBC 3.71 05/13/2019    HGB 12.2 05/24/2019    HGB 11.9 05/21/2019    HGB 11.7 05/13/2019    HCT 36.4 05/24/2019    HCT 34.4 05/21/2019    HCT 34.7 05/13/2019    MCV 92.6 05/24/2019    MCV 93.2 05/21/2019    MCV 93.4 05/13/2019    RDW 13.6 05/24/2019    RDW 13.6 05/21/2019    RDW 13.3 05/13/2019     05/24/2019     05/21/2019     05/13/2019     BMP:  Lab Results   Component Value Date     05/28/2019     05/24/2019     05/21/2019    K 5.4 05/28/2019    K 4.1 05/24/2019    K 5.0 05/21/2019    K 3.9 02/05/2018    K 4.0 02/04/2018    K 4.2 02/03/2018    CL 90 05/28/2019    CL 90 05/24/2019    CL 92 05/21/2019    CO2 26 05/28/2019    CO2 28 05/24/2019    CO2 25 05/21/2019    PHOS 3.5 05/13/2019    PHOS 3.1 05/09/2019    PHOS 3.1 01/31/2018    BUN 19 05/28/2019    BUN 16 05/24/2019    BUN 16 05/21/2019    CREATININE 0.8 05/28/2019    CREATININE 0.9 05/24/2019    CREATININE 1.0 05/21/2019    CREATININE 1.1 02/02/2012     BNP:   Lab Results   Component Value Date    PROBNP 1,286 05/28/2019    PROBNP 1,278 05/24/2019    PROBNP 1,301 05/21/2019     FASTING LIPID PANEL:  Lab Results   Component Value Date    HDL 51 11/06/2018    HDL 39 01/18/2012    LDLDIRECT 109 03/13/2013    LDLCALC 91 11/06/2018    TRIG 87 11/06/2018     LIVER PROFILE:No results for input(s): AST, ALT, ALB in the last 72 hours. Reviewed all labs and imaging today    Assessment:   1. CAD: stable denies chest pain    -s/p CABG x4 with pedicled LIMA to LAD, sequential Greater Saphenous VG to  Diag 1 then on to OM, separate single Greater SVG to distal RCA.  1/31/2018  2.  SOB: improved 3. BLE edema: improved, almost to baseline per patient. hx of echo 2009 with mild diastolic non compliance, most recent echo 2018 EF 96% without diastolic dysfunction. ?CHF vs venous insufficiency   4. HTN: controlled  5. HLD: on pravachol  6. DM: per primary team A1C- 6.8 (5/9/2019)      Plan:   1. Schedule echo as previously ordered  2. Norvasc refilled  3. BMP & BNP next Wednesday  4. Follow up with Pulmonary   5. Follow up with Dr. Marion Zimmerman in 3 months per patient request  6. Ok to take Claritin   7. Compression stalkings, elevate BLE as much as possible   8. Daily weight, low sodium diet, 2000 ml fluid restriction   9. Continue to monitor blood pressure at home  10. Continue all heart medication      AICHA Rivas-CNP  [facility]  [phone]       QUALITY MEASURES  1. Tobacco Cessation Counseling: NA  2. Retake of BP if >140/90:   NA  3. Documentation to PCP/referring for new patient:  Sent to PCP at close of office visit  4. CAD patient on anti-platelet: Yes  5. CAD patient on STATIN therapy:  Yes  6.  Patient with CHF and aFib on anticoagulation:  NA

## 2019-06-05 NOTE — PATIENT INSTRUCTIONS
Plan:   1. Schedule echo  2. Norvasc refilled  3. BMP & BNP next Wednesday  4. Follow up with Pulmonary   5. Follow up with Dr. Jaxon Olivas in 3 months   6. Ok to take Claritin   7. Compression stalkings, elevate BLE as much as possible   8. Daily weight, low sodium diet, 2000 ml fluid restriction   9. Continue to monitor blood pressure at home  10.  If you gain 3 lbs in a day, 5 lbs in a week, increased swelling, or increased shortness of breath, call office

## 2019-06-10 ENCOUNTER — TELEPHONE (OUTPATIENT)
Dept: CARDIOLOGY CLINIC | Age: 79
End: 2019-06-10

## 2019-06-10 ENCOUNTER — HOSPITAL ENCOUNTER (OUTPATIENT)
Age: 79
Discharge: HOME | End: 2019-06-10
Payer: MEDICARE

## 2019-06-10 DIAGNOSIS — R06.02 SHORTNESS OF BREATH: ICD-10-CM

## 2019-06-10 LAB
ANION GAP SERPL CALCULATED.3IONS-SCNC: 13 MMOL/L (ref 3–16)
BUN BLDV-MCNC: 18 MG/DL (ref 7–20)
CALCIUM SERPL-MCNC: 9.9 MG/DL (ref 8.3–10.6)
CHLORIDE BLD-SCNC: 92 MMOL/L (ref 99–110)
CO2: 30 MMOL/L (ref 21–32)
CREAT SERPL-MCNC: 1.1 MG/DL (ref 0.8–1.3)
GFR AFRICAN AMERICAN: >60
GFR NON-AFRICAN AMERICAN: >60
GLUCOSE BLD-MCNC: 130 MG/DL (ref 70–99)
POTASSIUM SERPL-SCNC: 4.3 MMOL/L (ref 3.5–5.1)
PRO-BNP: 1286 PG/ML (ref 0–449)
SODIUM BLD-SCNC: 135 MMOL/L (ref 136–145)

## 2019-06-10 PROCEDURE — 36415 COLL VENOUS BLD VENIPUNCTURE: CPT

## 2019-06-10 PROCEDURE — 83880 ASSAY OF NATRIURETIC PEPTIDE: CPT

## 2019-06-10 PROCEDURE — 80048 BASIC METABOLIC PNL TOTAL CA: CPT

## 2019-06-10 NOTE — TELEPHONE ENCOUNTER
Spoke with Damian Hunter, relayed lab results per NP Frankie Blanchard. Pt verbalized understanding of results. He has not scheduled echo yet. He is waiting until he has the copay to scheduled it. Pt has the number to scheduling.

## 2019-06-10 NOTE — TELEPHONE ENCOUNTER
----- Message from AICHA Ro CNP sent at 6/10/2019  3:24 PM EDT -----  Labs overall are stable. Sodium and Potassium are better. The heart failure number remains the same, has he scheduled his echocardiogram yet?    Thanks,  AICHA Ro CNP

## 2019-06-13 ENCOUNTER — HOSPITAL ENCOUNTER (OUTPATIENT)
Dept: NON INVASIVE DIAGNOSTICS | Age: 79
Discharge: HOME | End: 2019-06-13
Payer: MEDICARE

## 2019-06-13 DIAGNOSIS — R06.02 SHORTNESS OF BREATH: ICD-10-CM

## 2019-06-13 LAB
LV EF: 58 %
LVEF MODALITY: NORMAL

## 2019-06-13 PROCEDURE — 93306 TTE W/DOPPLER COMPLETE: CPT

## 2019-06-19 ENCOUNTER — OFFICE VISIT (OUTPATIENT)
Dept: PULMONOLOGY | Age: 79
End: 2019-06-19
Payer: MEDICARE

## 2019-06-19 VITALS
OXYGEN SATURATION: 98 % | BODY MASS INDEX: 25.2 KG/M2 | HEIGHT: 71 IN | WEIGHT: 180 LBS | DIASTOLIC BLOOD PRESSURE: 57 MMHG | RESPIRATION RATE: 18 BRPM | SYSTOLIC BLOOD PRESSURE: 115 MMHG | HEART RATE: 66 BPM

## 2019-06-19 DIAGNOSIS — J41.0 SIMPLE CHRONIC BRONCHITIS (HCC): ICD-10-CM

## 2019-06-19 DIAGNOSIS — J90 PLEURAL EFFUSION ON LEFT: Primary | ICD-10-CM

## 2019-06-19 DIAGNOSIS — G47.10 EXCESSIVE SOMNOLENCE DISORDER: ICD-10-CM

## 2019-06-19 DIAGNOSIS — I50.41 ACUTE COMBINED SYSTOLIC AND DIASTOLIC CHF, NYHA CLASS 2 (HCC): ICD-10-CM

## 2019-06-19 PROCEDURE — 3023F SPIROM DOC REV: CPT | Performed by: INTERNAL MEDICINE

## 2019-06-19 PROCEDURE — G8427 DOCREV CUR MEDS BY ELIG CLIN: HCPCS | Performed by: INTERNAL MEDICINE

## 2019-06-19 PROCEDURE — G8417 CALC BMI ABV UP PARAM F/U: HCPCS | Performed by: INTERNAL MEDICINE

## 2019-06-19 PROCEDURE — 99204 OFFICE O/P NEW MOD 45 MIN: CPT | Performed by: INTERNAL MEDICINE

## 2019-06-19 PROCEDURE — G8926 SPIRO NO PERF OR DOC: HCPCS | Performed by: INTERNAL MEDICINE

## 2019-06-19 RX ORDER — FLUTICASONE FUROATE AND VILANTEROL 100; 25 UG/1; UG/1
1 POWDER RESPIRATORY (INHALATION) DAILY
Qty: 1 EACH | Refills: 0
Start: 2019-06-19 | End: 2019-09-24

## 2019-06-19 ASSESSMENT — ENCOUNTER SYMPTOMS
DIARRHEA: 0
STRIDOR: 0
EYE ITCHING: 0
SHORTNESS OF BREATH: 1
SORE THROAT: 0
CHOKING: 0
ABDOMINAL PAIN: 0
CONSTIPATION: 0
VOICE CHANGE: 0
CHEST TIGHTNESS: 0
EYE DISCHARGE: 0
EYE PAIN: 0

## 2019-06-19 NOTE — PATIENT INSTRUCTIONS
Remember to bring all pulmonary medications to your next appointment with the office. Please keep all of your future appointments scheduled by [facility] Pulmonary office. Out of respect for other patients and providers, you may be asked to reschedule your appointment if you arrive later than your scheduled appointment time. Appointments cancelled less than 24hrs in advance will be considered a no show. Patients with three missed appointments within 1 year or four missed appointments within 2 years can be dismissed from the practice.

## 2019-06-19 NOTE — PROGRESS NOTES
Pulmonary Outpatient Note   Janet Serra MD       2019    Chief Complaint:  New Patient ([facility] ED 19) and Shortness of Breath     HPI:   66y.o. year old male here for further evaluation of shortness of breath. For the past month he has been experiencing worsening shortness of breath symptoms on a daily basis, triggered with exertion but also occurring if he lays flat (orthopnea). He describes a daily productive cough with clear sputum. Was given steroids which had helped, also was given an albuterol MDI which provided no relief. He denies fevers, chills, or sick contacts. Is under care of cardiology for CHF. He recently had an echo I reviewed which commented on a large right pleural effusion and severe mixed CHF findings.      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 ([address] 2016    TIA in     Routine health maintenance     refuses immunizations; declines PSA    Vasovagal reaction     to needles    Vitamin D deficiency disease 12/15       Past Surgical History:   Procedure Laterality Date    CORONARY ANGIOPLASTY WITH STENT PLACEMENT  2000    RX Tristar 2.5 x 13 mCX  RX Tristar 2.5 x 13 pCX    CORONARY ANGIOPLASTY WITH STENT PLACEMENT  10/19/2000    Tetra 2.75 x 18 CX  Tetra 3.0 x 13 CX    OTHER SURGICAL HISTORY  12    phaco emulsification cataract right eye    TOOTH EXTRACTION         Social History     Tobacco Use    Smoking status: Former Smoker     Packs/day: 2.00     Years: 20.00     Pack years: 40.00     Types: Cigarettes     Last attempt to quit: 1975     Years since quittin.4    Smokeless tobacco: Never Used   Substance Use Topics    Alcohol use: No          Family History   Problem Relation Age of Onset    Emphysema Mother     Stroke Father     Other Daughter         Factor V deficiency    Other Grandchild         Factor V deficiency         Current Outpatient Medications:     fluticasone-vilanterol (BREO ELLIPTA) 100-25 MCG/INH AEPB inhaler, Inhale 1 puff into the lungs daily, Disp: 1 each, Rfl: 0    Coenzyme Q10 (COQ10 PO), Take by mouth, Disp: , Rfl:     Ascorbic Acid (VITAMIN C PO), Take by mouth 2 times daily, Disp: , Rfl:     guaiFENesin 400 MG tablet, Take 400 mg by mouth 4 times daily as needed for Cough, Disp: , Rfl:     amLODIPine (NORVASC) 5 MG tablet, Take 1 tablet by mouth daily, Disp: 90 tablet, Rfl: 1    albuterol sulfate HFA (PROVENTIL HFA) 108 (90 Base) MCG/ACT inhaler, Inhale 1-2 puffs into the lungs every 4 hours as needed for Wheezing or Shortness of Breath May substitute ProAir or Ventolin, Disp: 3 Inhaler, Rfl: 1    torsemide (DEMADEX) 20 MG tablet, Take 1 tablet by mouth daily, Disp: 30 tablet, Rfl: 5    losartan (COZAAR) 100 MG tablet, Take 1 tablet by mouth daily, Disp: 90 tablet, Rfl: 1    pravastatin (PRAVACHOL) 40 MG tablet, Take 40 mg by mouth daily, Disp: , Rfl:     levothyroxine (SYNTHROID) 88 MCG tablet, TAKE 1 TABLET EVERY DAY, Disp: 90 tablet, Rfl: 1    metoprolol tartrate (LOPRESSOR) 50 MG tablet, Take 1 tablet by mouth 2 times daily, Disp: 180 tablet, Rfl: 3    metFORMIN (GLUCOPHAGE) 1000 MG tablet, TAKE 1 TABLET TWICE DAILY WITH MEALS, Disp: 180 tablet, Rfl: 1    magnesium oxide (MAG-OX) 400 MG tablet, Take 400 mg by mouth daily, Disp: , Rfl:     ferrous sulfate 325 (65 Fe) MG tablet, Take 325 mg by mouth 2 times daily, Disp: , Rfl:     aspirin 81 MG EC tablet, Take 1 tablet by mouth daily, Disp: 30 tablet, Rfl: 0    Cholecalciferol (VITAMIN D) 2000 UNITS CAPS capsule, Take  by mouth daily. , Disp: , Rfl:     Metolazone; Lipitor; and Simvastatin    Vitals:    06/19/19 0857   BP: (!) 115/57   Pulse: 66 Resp: 18   SpO2: 98%   Weight: 180 lb (81.6 kg)   Height: 5' 11\" (1.803 m)       Review of Systems   Constitutional: Negative for chills, fever and unexpected weight change. HENT: Negative for mouth sores, sore throat and voice change. Eyes: Negative for pain, discharge and itching. Respiratory: Positive for shortness of breath. Negative for choking, chest tightness and stridor. Cardiovascular: Negative for chest pain, palpitations and leg swelling. Gastrointestinal: Negative for abdominal pain, constipation and diarrhea. Endocrine: Negative for cold intolerance, heat intolerance and polydipsia. Genitourinary: Negative for dysuria, frequency and hematuria. Musculoskeletal: Negative for gait problem, joint swelling and neck stiffness. Neurological: Negative for dizziness, numbness and headaches. Psychiatric/Behavioral: Negative for agitation, confusion and hallucinations. Physical Exam   Constitutional: He appears well-developed and well-nourished. No distress. HENT:   Head: Normocephalic and atraumatic. Mouth/Throat: Oropharynx is clear and moist. No oropharyngeal exudate. Eyes: Pupils are equal, round, and reactive to light. EOM are normal.   Neck: Neck supple. No JVD present. Cardiovascular: Normal heart sounds. Exam reveals no gallop and no friction rub. No murmur heard. Pulmonary/Chest: Effort normal. He has no wheezes. He has no rales. Equal chest rise and expansion bilaterally   Abdominal: Soft. Bowel sounds are normal. He exhibits no distension. There is no tenderness. Musculoskeletal: Normal range of motion. He exhibits no edema. Lymphadenopathy:     He has no cervical adenopathy. Neurological: He is alert. No cranial nerve deficit. CN 2-12 grossly intact   Skin: Skin is warm and dry. No rash noted. He is not diaphoretic. ASSESSMENT:    1. Pleural effusion on left    2. Acute combined systolic and diastolic CHF, NYHA class 2 ([address]    3.  Simple chronic bronchitis ([facility] [address]    4. Excessive somnolence disorder      PLAN:    -Discussed findings, suspect a mix of mild COPD, large left effusion with compressive atelectasis, and pulmonary edema as etiology to his dyspnea  -Offered thoracentesis, he declined but stated he would consider and notify us. Instructed to contact us if he gets worse in the interim or go to ED  -Since he improved with steroids will escalate COPD regimen with a trial of Breo, discussed proper use and provided him a sample to trial   -Would benefit from additional workup including PFTs, sleep study testing, and a CT chest after thoracentesis. When I discussed this with him he declined due to cost barriers.   -Return to clinic if worsens or he decides on pursuing further workup.      Orders Placed This Encounter   Procedures   oSphie Solomon MD

## 2019-06-19 NOTE — PROGRESS NOTES
MA Communication: The following orders are received by verbal communication from Dr. Celso Alonzo. Orders include:  Pt wants to wait to decide if he wants to proceed with testing.        PRN follow up       1 sample Breo given to patient

## 2019-08-26 RX ORDER — LEVOTHYROXINE SODIUM 88 UG/1
TABLET ORAL
Qty: 90 TABLET | Refills: 1 | Status: SHIPPED | OUTPATIENT
Start: 2019-08-26 | End: 2020-02-27

## 2019-09-04 NOTE — PROGRESS NOTES
[address]   Cardiac Followup    Referring Provider:  Pj Calero MD     Chief Complaint   Patient presents with    3 Month Follow-Up    Coronary Artery Disease    Hyperlipidemia    Hypertension    Fatigue     good & bad days      Subjective: Mr Nasima Moreno being seen today for cardiology follow up of his CAD, HTN, HLD;  S/P 4V CABG January 2018; no complaints today    History of Present Illness:    Mr. Nasima Moreno is a 89ZN male hx of embolic R. CVA in 4/38, also hx of CAD s/p 4 stents prior (most recent October 2000), s/p 4V CABG 1/18 with DR Car Laughlin, HTN, DM, mild carotid artery disease, HLD, and hypothyroidism. Note hx of myalgias with statins but he is taking 20 mg pravachol with coenzyme Q10 and tolerating. Note ECHO in August 2009 showed normal EF=55% with mild diastolic noncompliance. Note nuclear stress test in February 2006. Admitted to [facility]. 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 Note event monitor 05/03-06/02/2016 with NSR, with occasional PVC's and overall normal study. Most recent BLE LUCY 6/23/17 Right LUCY 1.33 left LUCY 1.31. Carotid US 6/23/2017 less than 50% bilateral (no change from 4/16 study). Most recent exercise stress test 12/27/17 showed small area of ischemia lateral apex, EF 47% with hypokinesis. This test was ordered by Dr. Ashley Fay for CP/arm pain. Due to abnormal nuc stress he underwent most recent ECHO 1/18/18 showed EF55% mild MR, LVH (no change 4/16 study). Most recent LHC 1/18/18 LM 20% LAD 70% mid D1 90% prox Cx 90% mid OM2 side branch 90% ostium RCA 80% prox, 95% mid Collaterals LAD to RCA; multiple stents noted to be patent LVEF 50-55%. Subsequently underwent 4V CABG 1/31/18 by Dr. Car Laughlin at [facility] with pedicled LIMA to LAD, sequential SVG to Diag 1 then on to OM, separate single SVG to distal RCA. Note EKG 5/24/2019 NSR; nonspecific IVCD.  Most management of lipids, BP, diet, exercise,   6. Medications reviewed and refilled as warranted    Cost of prescription medications and patient compliance have been reviewed with patient. All questions answered. Thank you for allowing me to participate in the care of this individual.    This note was scribed in the presence of Binh Travis MD by Jennifer Rome RN    I, Dr. Tiffany Dominguez, personally performed the services described in this documentation, as scribed by the above signed scribe in my presence. It is both accurate and complete to my knowledge. I agree with the details independently gathered by the clinical support staff, while the remaining scribed note accurately describes my personal service to the patient. Denia Pugh.  Xavi Nixon M.D., [address] [facility]

## 2019-09-05 ENCOUNTER — OFFICE VISIT (OUTPATIENT)
Dept: CARDIOLOGY CLINIC | Age: 79
End: 2019-09-05
Payer: MEDICARE

## 2019-09-05 VITALS
SYSTOLIC BLOOD PRESSURE: 138 MMHG | HEIGHT: 70 IN | HEART RATE: 71 BPM | OXYGEN SATURATION: 98 % | DIASTOLIC BLOOD PRESSURE: 76 MMHG | BODY MASS INDEX: 25.05 KG/M2 | WEIGHT: 175 LBS

## 2019-09-05 DIAGNOSIS — I10 ESSENTIAL HYPERTENSION: ICD-10-CM

## 2019-09-05 DIAGNOSIS — I65.23 BILATERAL CAROTID ARTERY STENOSIS: ICD-10-CM

## 2019-09-05 DIAGNOSIS — R06.01 ORTHOPNEA: ICD-10-CM

## 2019-09-05 DIAGNOSIS — E78.2 MIXED HYPERLIPIDEMIA: ICD-10-CM

## 2019-09-05 DIAGNOSIS — I25.10 CORONARY ARTERY DISEASE INVOLVING NATIVE CORONARY ARTERY OF NATIVE HEART WITHOUT ANGINA PECTORIS: Primary | ICD-10-CM

## 2019-09-05 PROCEDURE — G8427 DOCREV CUR MEDS BY ELIG CLIN: HCPCS | Performed by: INTERNAL MEDICINE

## 2019-09-05 PROCEDURE — G8598 ASA/ANTIPLAT THER USED: HCPCS | Performed by: INTERNAL MEDICINE

## 2019-09-05 PROCEDURE — G8417 CALC BMI ABV UP PARAM F/U: HCPCS | Performed by: INTERNAL MEDICINE

## 2019-09-05 PROCEDURE — 1123F ACP DISCUSS/DSCN MKR DOCD: CPT | Performed by: INTERNAL MEDICINE

## 2019-09-05 PROCEDURE — 4040F PNEUMOC VAC/ADMIN/RCVD: CPT | Performed by: INTERNAL MEDICINE

## 2019-09-05 PROCEDURE — 1036F TOBACCO NON-USER: CPT | Performed by: INTERNAL MEDICINE

## 2019-09-05 PROCEDURE — 99214 OFFICE O/P EST MOD 30 MIN: CPT | Performed by: INTERNAL MEDICINE

## 2019-09-05 RX ORDER — LOSARTAN POTASSIUM 100 MG/1
100 TABLET ORAL DAILY
Qty: 90 TABLET | Refills: 3 | Status: SHIPPED | OUTPATIENT
Start: 2019-09-05 | End: 2020-10-14

## 2019-09-05 RX ORDER — PRAVASTATIN SODIUM 40 MG
40 TABLET ORAL DAILY
Qty: 90 TABLET | Refills: 3 | Status: SHIPPED | OUTPATIENT
Start: 2019-09-05 | End: 2020-06-18 | Stop reason: SDUPTHER

## 2019-09-05 RX ORDER — AMLODIPINE BESYLATE 5 MG/1
5 TABLET ORAL DAILY
Qty: 90 TABLET | Refills: 3 | Status: SHIPPED | OUTPATIENT
Start: 2019-09-05 | End: 2020-11-23

## 2019-09-05 NOTE — PATIENT INSTRUCTIONS
[facility]   Cardiac Followup    Referring Provider:  Car Escobar MD     Chief Complaint   Patient presents with    3 Month Follow-Up    Coronary Artery Disease    Hyperlipidemia    Hypertension    Fatigue     good & bad days      Subjective: Mr Aurelia Barrera being seen today for cardiology follow up of his CAD, HTN, HLD;  S/P 4V CABG January 2018; no complaints today    History of Present Illness:    Mr. Aurelia Barrera is a 36IL male hx of embolic R. CVA in 1/60, also hx of CAD s/p 4 stents prior (most recent October 2000), s/p 4V CABG 1/18 with DR Shawna Sharp, HTN, DM, mild carotid artery disease, HLD, and hypothyroidism. Note hx of myalgias with statins but he is taking 20 mg pravachol with coenzyme Q10 and tolerating. Note ECHO in August 2009 showed normal EF=55% with mild diastolic noncompliance. Note nuclear stress test in February 2006. Admitted to [facility]. 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 Note event monitor 05/03-06/02/2016 with NSR, with occasional PVC's and overall normal study. Most recent BLE LUCY 6/23/17 Right LUCY 1.33 left LUCY 1.31. Carotid US 6/23/2017 less than 50% bilateral (no change from 4/16 study). Most recent exercise stress test 12/27/17 showed small area of ischemia lateral apex, EF 47% with hypokinesis. This test was ordered by Dr. Cristela Croft for CP/arm pain. Due to abnormal nuc stress he underwent most recent ECHO 1/18/18 showed EF55% mild MR, LVH (no change 4/16 study). Most recent LHC 1/18/18 LM 20% LAD 70% mid D1 90% prox Cx 90% mid OM2 side branch 90% ostium RCA 80% prox, 95% mid Collaterals LAD to RCA; multiple stents noted to be patent LVEF 50-55%. Subsequently underwent 4V CABG 1/31/18 by Dr. Shawna Sharp at [facility] with pedicled LIMA to LAD, sequential SVG to Diag 1 then on to OM, separate single SVG to distal RCA. Note EKG 5/24/2019 NSR; nonspecific IVCD.  Most 11/06/2018    LDLCALC 81 01/30/2018    LDLCALC 121 (H) 06/20/2017     Lab Results   Component Value Date    LABVLDL 17 11/06/2018    LABVLDL 28 01/30/2018    LABVLDL 36 06/20/2017     Assessment:     1. CAD (coronary artery disease): He underwent most recent exercise stress test 12/27/17 showed small area of ischemia lateral apex, EF 47% with hypokinesis. Found multi-vessel CAD on [facility]. 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 Chest pain prior to surgery resolved. Note EKG 5/24/2019 NSR Most recent ECHO 6/13/19   EF 55-60%; type III DD; Mild thickening of the mitral valve. Mild MR, AR, AR; mild-to-moderate TR; large pleural effusion; moderate pulm HTN. There are no concerning symptoms for angina currently. 2. DM w/o complication Type II : managed per PCP. HA1C=6.5 on 11/6/18.   3. Carotid artery occlusion :   Carotid Doppler studies 04/13/2016 with no noted stenosis bilaterally (no major change from 8/14). Most recent  Carotid US 6/23/2017 showed moderate disease but less than 50% bilateral. I heard soft right bruit on exam today and will repeat study. 4. Cerebrovascular disease: Resolved. 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 Stable and doing better overall and will continue present medical regimen. He remains on baby aspirin. 5. Hypertension : Stable and will continue present medical regimen. 6. Hyperlipidemia:  Most recent labs from 11/6/18 see results above which I personally reviewed. Note LDL at goal 91. He tolerates pravachol 20mg qhs with coenzyme Q10. Plan:   1. Continue current medications  2. Try to elevate your legs while resting and wear compression stockings daily, avoid added salt and do not eat salty food, limit fluid to 2 liters per day  3. Will check carotid US and fasting lipids  4. Follow up in 9 months  5.  Risk factor modification was discussed including the importance and

## 2019-09-18 DIAGNOSIS — R60.0 EDEMA OF BOTH LOWER EXTREMITIES: ICD-10-CM

## 2019-09-19 ENCOUNTER — HOSPITAL ENCOUNTER (OUTPATIENT)
Dept: VASCULAR LAB | Age: 79
Discharge: HOME | End: 2019-09-19
Payer: MEDICARE

## 2019-09-19 DIAGNOSIS — I65.23 BILATERAL CAROTID ARTERY STENOSIS: ICD-10-CM

## 2019-09-19 PROCEDURE — 93880 EXTRACRANIAL BILAT STUDY: CPT

## 2019-09-19 RX ORDER — TORSEMIDE 20 MG/1
TABLET ORAL
Qty: 90 TABLET | Refills: 1 | Status: SHIPPED | OUTPATIENT
Start: 2019-09-19 | End: 2019-11-22 | Stop reason: SDUPTHER

## 2019-09-24 ENCOUNTER — HOSPITAL ENCOUNTER (OUTPATIENT)
Age: 79
Discharge: HOME | End: 2019-09-24
Payer: MEDICARE

## 2019-09-24 ENCOUNTER — HOSPITAL ENCOUNTER (OUTPATIENT)
Dept: GENERAL RADIOLOGY | Age: 79
Discharge: HOME | End: 2019-09-24
Payer: MEDICARE

## 2019-09-24 ENCOUNTER — OFFICE VISIT (OUTPATIENT)
Dept: FAMILY MEDICINE CLINIC | Age: 79
End: 2019-09-24
Payer: MEDICARE

## 2019-09-24 VITALS
BODY MASS INDEX: 25.4 KG/M2 | OXYGEN SATURATION: 94 % | SYSTOLIC BLOOD PRESSURE: 136 MMHG | TEMPERATURE: 97.9 F | DIASTOLIC BLOOD PRESSURE: 72 MMHG | WEIGHT: 177 LBS | HEART RATE: 78 BPM

## 2019-09-24 DIAGNOSIS — J01.90 ACUTE BACTERIAL SINUSITIS: Primary | ICD-10-CM

## 2019-09-24 DIAGNOSIS — R06.09 DYSPNEA ON EXERTION: ICD-10-CM

## 2019-09-24 DIAGNOSIS — J30.9 ALLERGIC SINUSITIS: ICD-10-CM

## 2019-09-24 DIAGNOSIS — B96.89 ACUTE BACTERIAL SINUSITIS: Primary | ICD-10-CM

## 2019-09-24 PROCEDURE — G8417 CALC BMI ABV UP PARAM F/U: HCPCS | Performed by: FAMILY MEDICINE

## 2019-09-24 PROCEDURE — 71046 X-RAY EXAM CHEST 2 VIEWS: CPT

## 2019-09-24 PROCEDURE — 1123F ACP DISCUSS/DSCN MKR DOCD: CPT | Performed by: FAMILY MEDICINE

## 2019-09-24 PROCEDURE — 1036F TOBACCO NON-USER: CPT | Performed by: FAMILY MEDICINE

## 2019-09-24 PROCEDURE — 99214 OFFICE O/P EST MOD 30 MIN: CPT | Performed by: FAMILY MEDICINE

## 2019-09-24 PROCEDURE — G8427 DOCREV CUR MEDS BY ELIG CLIN: HCPCS | Performed by: FAMILY MEDICINE

## 2019-09-24 PROCEDURE — G8598 ASA/ANTIPLAT THER USED: HCPCS | Performed by: FAMILY MEDICINE

## 2019-09-24 PROCEDURE — 4040F PNEUMOC VAC/ADMIN/RCVD: CPT | Performed by: FAMILY MEDICINE

## 2019-09-24 RX ORDER — AMOXICILLIN 500 MG/1
1000 CAPSULE ORAL 2 TIMES DAILY
Qty: 40 CAPSULE | Refills: 0 | Status: SHIPPED | OUTPATIENT
Start: 2019-09-24 | End: 2019-10-04

## 2019-09-24 RX ORDER — MONTELUKAST SODIUM 10 MG/1
TABLET ORAL
COMMUNITY
Start: 2019-09-17 | End: 2020-11-16 | Stop reason: ALTCHOICE

## 2019-09-24 RX ORDER — CETIRIZINE HYDROCHLORIDE 10 MG/1
10 TABLET ORAL NIGHTLY
COMMUNITY
End: 2020-05-12

## 2019-09-24 RX ORDER — FLUTICASONE PROPIONATE 50 MCG
2 SPRAY, SUSPENSION (ML) NASAL DAILY
COMMUNITY
End: 2021-06-08 | Stop reason: CLARIF

## 2019-09-24 NOTE — PROGRESS NOTES
Assessment and plan  1. Acute bacterial sinusitis  - amoxicillin (AMOXIL) 500 MG capsule; Take 2 capsules by mouth 2 times daily for 10 days  Dispense: 40 capsule; Refill: 0    2. Allergic sinusitis  Symptomatic. Continue Flonase and montelukast, change Claritin to Zyrtec nightly    3. Dyspnea on exertion  Etiology not clear. - XR CHEST STANDARD (2 VW); Future    RTC as scheduled in November pending response and results of above    Subjective  Patient presents with several months of persisting head congestion postnasal drainage cough producing white-gray phlegm. He admits he is had some dyspnea on exertion and chest congestion the last month. He has a history of diastolic CHF, but he and his wife report his edema has been better lately than it has in quite some time. No unexplained fever or chills, no chest pain. Objective  /72   Pulse 78   Temp 97.9 °F (36.6 °C) (Oral)   Wt 177 lb (80.3 kg)   SpO2 94%   BMI 25.40 kg/m²   Patient is alert, oriented, and in no acute distress. Psych:  mood and affect are normal               speech and thought processes are intact               appearance and behavior are unremarkable  Eyes:  Conjunctivae and lids are clear             Pupils are equal, round, and reactive, irises nondeformed  Ears:  External ears and nose unremarkable, canals are clear, TMs clear   Mouth:  Lips, gums, tongue, oral mucosa unremarkable  Throat: Palates unremarkable               Pharynx irritated with visible postnasal drainage  Nose: clear  Neck:  No masses, trachea midline, phonation normal, thyroid unremarkable              No significant cervical or supraclavicular adenopathy  Chest:  No deformity of thorax              Respirations easy and unlabored with equal breath sounds              Lungs clear  Heart: Regular rhythm with no murmur, rub or gallop. No JVD.  Pretibial pitting edema -trace bilaterally         Sherie Haas MD    The note was completed using Dragon voice

## 2019-10-04 ENCOUNTER — OFFICE VISIT (OUTPATIENT)
Dept: FAMILY MEDICINE CLINIC | Age: 79
End: 2019-10-04
Payer: MEDICARE

## 2019-10-04 VITALS
HEART RATE: 63 BPM | TEMPERATURE: 97.8 F | RESPIRATION RATE: 16 BRPM | OXYGEN SATURATION: 97 % | BODY MASS INDEX: 25.28 KG/M2 | WEIGHT: 176.6 LBS | SYSTOLIC BLOOD PRESSURE: 134 MMHG | HEIGHT: 70 IN | DIASTOLIC BLOOD PRESSURE: 81 MMHG

## 2019-10-04 DIAGNOSIS — B96.89 ACUTE BACTERIAL SINUSITIS: ICD-10-CM

## 2019-10-04 DIAGNOSIS — I50.32 CHRONIC DIASTOLIC CONGESTIVE HEART FAILURE (HCC): Primary | ICD-10-CM

## 2019-10-04 DIAGNOSIS — J01.90 ACUTE BACTERIAL SINUSITIS: ICD-10-CM

## 2019-10-04 LAB
ANION GAP SERPL CALCULATED.3IONS-SCNC: 17 MMOL/L (ref 3–16)
BUN BLDV-MCNC: 30 MG/DL (ref 7–20)
CALCIUM SERPL-MCNC: 10 MG/DL (ref 8.3–10.6)
CHLORIDE BLD-SCNC: 93 MMOL/L (ref 99–110)
CO2: 27 MMOL/L (ref 21–32)
CREAT SERPL-MCNC: 1.3 MG/DL (ref 0.8–1.3)
GFR AFRICAN AMERICAN: >60
GFR NON-AFRICAN AMERICAN: 53
GLUCOSE BLD-MCNC: 80 MG/DL (ref 70–99)
POTASSIUM SERPL-SCNC: 4.6 MMOL/L (ref 3.5–5.1)
SODIUM BLD-SCNC: 137 MMOL/L (ref 136–145)

## 2019-10-04 PROCEDURE — G8483 FLU IMM NO ADMIN DOC REA: HCPCS | Performed by: FAMILY MEDICINE

## 2019-10-04 PROCEDURE — 36415 COLL VENOUS BLD VENIPUNCTURE: CPT | Performed by: FAMILY MEDICINE

## 2019-10-04 PROCEDURE — 4040F PNEUMOC VAC/ADMIN/RCVD: CPT | Performed by: FAMILY MEDICINE

## 2019-10-04 PROCEDURE — G8427 DOCREV CUR MEDS BY ELIG CLIN: HCPCS | Performed by: FAMILY MEDICINE

## 2019-10-04 PROCEDURE — 1036F TOBACCO NON-USER: CPT | Performed by: FAMILY MEDICINE

## 2019-10-04 PROCEDURE — G8598 ASA/ANTIPLAT THER USED: HCPCS | Performed by: FAMILY MEDICINE

## 2019-10-04 PROCEDURE — 99213 OFFICE O/P EST LOW 20 MIN: CPT | Performed by: FAMILY MEDICINE

## 2019-10-04 PROCEDURE — G8417 CALC BMI ABV UP PARAM F/U: HCPCS | Performed by: FAMILY MEDICINE

## 2019-10-04 PROCEDURE — 1123F ACP DISCUSS/DSCN MKR DOCD: CPT | Performed by: FAMILY MEDICINE

## 2019-10-04 RX ORDER — AMOXICILLIN 500 MG/1
1000 CAPSULE ORAL 2 TIMES DAILY
Qty: 40 CAPSULE | Refills: 0 | Status: SHIPPED | OUTPATIENT
Start: 2019-10-04 | End: 2019-10-14

## 2019-11-06 PROBLEM — E87.1 HYPONATREMIA: Status: RESOLVED | Noted: 2019-05-09 | Resolved: 2019-11-06

## 2019-11-06 PROBLEM — E78.2 MIXED HYPERLIPIDEMIA: Status: RESOLVED | Noted: 2018-01-11 | Resolved: 2019-11-06

## 2019-11-08 ENCOUNTER — OFFICE VISIT (OUTPATIENT)
Dept: FAMILY MEDICINE CLINIC | Age: 79
End: 2019-11-08
Payer: MEDICARE

## 2019-11-08 VITALS
OXYGEN SATURATION: 95 % | HEART RATE: 71 BPM | BODY MASS INDEX: 25.68 KG/M2 | DIASTOLIC BLOOD PRESSURE: 78 MMHG | TEMPERATURE: 98.1 F | WEIGHT: 179 LBS | SYSTOLIC BLOOD PRESSURE: 128 MMHG

## 2019-11-08 DIAGNOSIS — I65.23 BILATERAL CAROTID ARTERY OCCLUSION: ICD-10-CM

## 2019-11-08 DIAGNOSIS — E06.3 HYPOTHYROIDISM, ACQUIRED, AUTOIMMUNE: ICD-10-CM

## 2019-11-08 DIAGNOSIS — J30.9 ALLERGIC SINUSITIS: ICD-10-CM

## 2019-11-08 DIAGNOSIS — I50.32 CHRONIC DIASTOLIC CONGESTIVE HEART FAILURE (HCC): ICD-10-CM

## 2019-11-08 DIAGNOSIS — I10 ESSENTIAL HYPERTENSION: ICD-10-CM

## 2019-11-08 DIAGNOSIS — I25.10 CORONARY ARTERY DISEASE INVOLVING NATIVE CORONARY ARTERY OF NATIVE HEART WITHOUT ANGINA PECTORIS: ICD-10-CM

## 2019-11-08 DIAGNOSIS — E11.9 TYPE 2 DIABETES MELLITUS WITHOUT COMPLICATION, WITHOUT LONG-TERM CURRENT USE OF INSULIN (HCC): Primary | ICD-10-CM

## 2019-11-08 DIAGNOSIS — E78.00 PURE HYPERCHOLESTEROLEMIA: ICD-10-CM

## 2019-11-08 LAB
A/G RATIO: 1.4 (ref 1.1–2.2)
ALBUMIN SERPL-MCNC: 4.5 G/DL (ref 3.4–5)
ALP BLD-CCNC: 151 U/L (ref 40–129)
ALT SERPL-CCNC: 16 U/L (ref 10–40)
ANION GAP SERPL CALCULATED.3IONS-SCNC: 16 MMOL/L (ref 3–16)
AST SERPL-CCNC: 21 U/L (ref 15–37)
BASOPHILS ABSOLUTE: 0 K/UL (ref 0–0.2)
BASOPHILS RELATIVE PERCENT: 0.6 %
BILIRUB SERPL-MCNC: 0.4 MG/DL (ref 0–1)
BUN BLDV-MCNC: 33 MG/DL (ref 7–20)
CALCIUM SERPL-MCNC: 9.7 MG/DL (ref 8.3–10.6)
CHLORIDE BLD-SCNC: 96 MMOL/L (ref 99–110)
CHOLESTEROL, TOTAL: 162 MG/DL (ref 0–199)
CO2: 26 MMOL/L (ref 21–32)
CREAT SERPL-MCNC: 1.2 MG/DL (ref 0.8–1.3)
EOSINOPHILS ABSOLUTE: 0.4 K/UL (ref 0–0.6)
EOSINOPHILS RELATIVE PERCENT: 4.9 %
GFR AFRICAN AMERICAN: >60
GFR NON-AFRICAN AMERICAN: 58
GLOBULIN: 3.2 G/DL
GLUCOSE BLD-MCNC: 92 MG/DL (ref 70–99)
HBA1C MFR BLD: 6.4 %
HCT VFR BLD CALC: 37.1 % (ref 40.5–52.5)
HDLC SERPL-MCNC: 46 MG/DL (ref 40–60)
HEMOGLOBIN: 12.3 G/DL (ref 13.5–17.5)
LDL CHOLESTEROL CALCULATED: 90 MG/DL
LYMPHOCYTES ABSOLUTE: 0.7 K/UL (ref 1–5.1)
LYMPHOCYTES RELATIVE PERCENT: 9.4 %
MCH RBC QN AUTO: 30.2 PG (ref 26–34)
MCHC RBC AUTO-ENTMCNC: 33.1 G/DL (ref 31–36)
MCV RBC AUTO: 91.2 FL (ref 80–100)
MONOCYTES ABSOLUTE: 0.6 K/UL (ref 0–1.3)
MONOCYTES RELATIVE PERCENT: 7.8 %
NEUTROPHILS ABSOLUTE: 6.1 K/UL (ref 1.7–7.7)
NEUTROPHILS RELATIVE PERCENT: 77.3 %
PDW BLD-RTO: 14.3 % (ref 12.4–15.4)
PLATELET # BLD: 294 K/UL (ref 135–450)
PMV BLD AUTO: 9.3 FL (ref 5–10.5)
POTASSIUM SERPL-SCNC: 5.1 MMOL/L (ref 3.5–5.1)
RBC # BLD: 4.07 M/UL (ref 4.2–5.9)
SODIUM BLD-SCNC: 138 MMOL/L (ref 136–145)
TOTAL PROTEIN: 7.7 G/DL (ref 6.4–8.2)
TRIGL SERPL-MCNC: 131 MG/DL (ref 0–150)
TSH REFLEX: 3.97 UIU/ML (ref 0.27–4.2)
VLDLC SERPL CALC-MCNC: 26 MG/DL
WBC # BLD: 7.9 K/UL (ref 4–11)

## 2019-11-08 PROCEDURE — 4040F PNEUMOC VAC/ADMIN/RCVD: CPT | Performed by: FAMILY MEDICINE

## 2019-11-08 PROCEDURE — G8483 FLU IMM NO ADMIN DOC REA: HCPCS | Performed by: FAMILY MEDICINE

## 2019-11-08 PROCEDURE — G8417 CALC BMI ABV UP PARAM F/U: HCPCS | Performed by: FAMILY MEDICINE

## 2019-11-08 PROCEDURE — G8598 ASA/ANTIPLAT THER USED: HCPCS | Performed by: FAMILY MEDICINE

## 2019-11-08 PROCEDURE — 1036F TOBACCO NON-USER: CPT | Performed by: FAMILY MEDICINE

## 2019-11-08 PROCEDURE — 83036 HEMOGLOBIN GLYCOSYLATED A1C: CPT | Performed by: FAMILY MEDICINE

## 2019-11-08 PROCEDURE — 36415 COLL VENOUS BLD VENIPUNCTURE: CPT | Performed by: FAMILY MEDICINE

## 2019-11-08 PROCEDURE — 99214 OFFICE O/P EST MOD 30 MIN: CPT | Performed by: FAMILY MEDICINE

## 2019-11-08 PROCEDURE — G8427 DOCREV CUR MEDS BY ELIG CLIN: HCPCS | Performed by: FAMILY MEDICINE

## 2019-11-08 PROCEDURE — 1123F ACP DISCUSS/DSCN MKR DOCD: CPT | Performed by: FAMILY MEDICINE

## 2019-11-12 LAB — METHYLMALONIC ACID: 0.37 UMOL/L (ref 0–0.4)

## 2019-11-22 DIAGNOSIS — R60.0 EDEMA OF BOTH LOWER EXTREMITIES: ICD-10-CM

## 2019-11-22 RX ORDER — TORSEMIDE 20 MG/1
20 TABLET ORAL DAILY
Qty: 90 TABLET | Refills: 1 | Status: SHIPPED | OUTPATIENT
Start: 2019-11-22 | End: 2020-05-12 | Stop reason: SDUPTHER

## 2020-02-25 ENCOUNTER — OFFICE VISIT (OUTPATIENT)
Dept: FAMILY MEDICINE CLINIC | Age: 80
End: 2020-02-25
Payer: MEDICARE

## 2020-02-25 VITALS
SYSTOLIC BLOOD PRESSURE: 121 MMHG | OXYGEN SATURATION: 96 % | DIASTOLIC BLOOD PRESSURE: 61 MMHG | HEART RATE: 71 BPM | TEMPERATURE: 97.8 F | WEIGHT: 176.2 LBS | BODY MASS INDEX: 25.28 KG/M2

## 2020-02-25 PROCEDURE — 4040F PNEUMOC VAC/ADMIN/RCVD: CPT | Performed by: NURSE PRACTITIONER

## 2020-02-25 PROCEDURE — 1123F ACP DISCUSS/DSCN MKR DOCD: CPT | Performed by: NURSE PRACTITIONER

## 2020-02-25 PROCEDURE — 1036F TOBACCO NON-USER: CPT | Performed by: NURSE PRACTITIONER

## 2020-02-25 PROCEDURE — G8427 DOCREV CUR MEDS BY ELIG CLIN: HCPCS | Performed by: NURSE PRACTITIONER

## 2020-02-25 PROCEDURE — G8417 CALC BMI ABV UP PARAM F/U: HCPCS | Performed by: NURSE PRACTITIONER

## 2020-02-25 PROCEDURE — G8483 FLU IMM NO ADMIN DOC REA: HCPCS | Performed by: NURSE PRACTITIONER

## 2020-02-25 PROCEDURE — 99213 OFFICE O/P EST LOW 20 MIN: CPT | Performed by: NURSE PRACTITIONER

## 2020-02-25 RX ORDER — DEXTROMETHORPHAN HYDROBROMIDE AND PROMETHAZINE HYDROCHLORIDE 15; 6.25 MG/5ML; MG/5ML
5 SYRUP ORAL NIGHTLY PRN
Qty: 50 ML | Refills: 0 | Status: SHIPPED | OUTPATIENT
Start: 2020-02-25 | End: 2020-02-27 | Stop reason: SDUPTHER

## 2020-02-25 RX ORDER — DOXYCYCLINE HYCLATE 100 MG
100 TABLET ORAL 2 TIMES DAILY
Qty: 20 TABLET | Refills: 0 | Status: SHIPPED | OUTPATIENT
Start: 2020-02-25 | End: 2020-03-03 | Stop reason: ALTCHOICE

## 2020-02-25 RX ORDER — PREDNISONE 20 MG/1
40 TABLET ORAL DAILY
Qty: 10 TABLET | Refills: 0 | Status: SHIPPED | OUTPATIENT
Start: 2020-02-25 | End: 2020-03-01

## 2020-02-25 ASSESSMENT — PATIENT HEALTH QUESTIONNAIRE - PHQ9
1. LITTLE INTEREST OR PLEASURE IN DOING THINGS: 0
SUM OF ALL RESPONSES TO PHQ QUESTIONS 1-9: 0
2. FEELING DOWN, DEPRESSED OR HOPELESS: 0
SUM OF ALL RESPONSES TO PHQ QUESTIONS 1-9: 0
SUM OF ALL RESPONSES TO PHQ9 QUESTIONS 1 & 2: 0

## 2020-02-25 ASSESSMENT — ENCOUNTER SYMPTOMS
SINUS PRESSURE: 1
SINUS PAIN: 1
EYES NEGATIVE: 1
CHOKING: 1
GASTROINTESTINAL NEGATIVE: 1

## 2020-02-25 NOTE — PATIENT INSTRUCTIONS
Sinusitis: Care Instructions  Your Care Instructions    Sinusitis is an infection of the lining of the sinus cavities in your head. Sinusitis often follows a cold. It causes pain and pressure in your head and face. In most cases, sinusitis gets better on its own in 1 to 2 weeks. But some mild symptoms may last for several weeks. Sometimes antibiotics are needed. Follow-up care is a key part of your treatment and safety. Be sure to make and go to all appointments, and call your doctor if you are having problems. It's also a good idea to know your test results and keep a list of the medicines you take. How can you care for yourself at home? · Take an over-the-counter pain medicine, such as acetaminophen (Tylenol), ibuprofen (Advil, Motrin), or naproxen (Aleve). Read and follow all instructions on the label. · If the doctor prescribed antibiotics, take them as directed. Do not stop taking them just because you feel better. You need to take the full course of antibiotics. · Be careful when taking over-the-counter cold or flu medicines and Tylenol at the same time. Many of these medicines have acetaminophen, which is Tylenol. Read the labels to make sure that you are not taking more than the recommended dose. Too much acetaminophen (Tylenol) can be harmful. · Breathe warm, moist air from a steamy shower, a hot bath, or a sink filled with hot water. Avoid cold, dry air. Using a humidifier in your home may help. Follow the directions for cleaning the machine. · Use saline (saltwater) nasal washes to help keep your nasal passages open and wash out mucus and bacteria. You can buy saline nose drops at a grocery store or drugstore. Or you can make your own at home by adding 1 teaspoon of salt and 1 teaspoon of baking soda to 2 cups of distilled water. If you make your own, fill a bulb syringe with the solution, insert the tip into your nostril, and squeeze gently. Marimar Deer your nose.   · Put a hot, wet towel or a warm gel in a nursing infant. Do not breast-feed while you are taking doxycycline. Doxycycline can cause permanent yellowing or graying of the teeth in children younger than 6years old. Children should use doxycycline only in cases of severe or life-threatening conditions such as anthrax or [facility] spotted fever. The benefit of treating a serious condition may outweigh any risks to the child's tooth development. How should I take doxycycline? Follow all directions on your prescription label and read all medication guides or instruction sheets. Use the medicine exactly as directed. Take doxycycline with a full glass of water. Drink plenty of liquids while you are taking doxycycline. Read and carefully follow any Instructions for Use provided with your medicine. Ask your doctor or pharmacist if you do not understand these instructions. Most brands of doxycyline may be taken with food or milk if the medicine upsets your stomach. Different brands of doxycycline may have different instructions about taking them with or without food. Take Oracea on an empty stomach, at least 1 hour before or 2 hours after a meal.  You may need to split a doxycycline tablet to get the correct dose. Follow your doctor's instructions. Swallow a delayed-release capsule or tablet whole. Do not crush, chew, break, or open it. Measure liquid medicine  with the dosing syringe provided, or with a special dose-measuring spoon or medicine cup. If you do not have a dose-measuring device, ask your pharmacist for one. If you take doxycycline to prevent malaria: Start taking the medicine 1 or 2 days before entering an area where malaria is common. Continue taking the medicine every day during your stay and for at least 4 weeks after you leave the area. Doxycycline is usually given by injection only if you are unable to take the medicine by mouth. A healthcare provider will give you this injection as an infusion into a vein.   Use this medicine for the full prescribed length of time, even if your symptoms quickly improve. Skipping doses can increase your risk of infection that is resistant to medication. Doxycycline will not treat a viral infection such as the flu or a common cold. Store at room temperature away from moisture, heat, and light. Throw away any unused medicine after the expiration date on the label has passed. Using  doxycycline can cause damage to your kidneys. What happens if I miss a dose? Take the medicine as soon as you can, but skip the missed dose if it is almost time for your next dose. Do not take two doses at one time. What happens if I overdose? Seek emergency medical attention or call the Poison Help line at 1-318.109.6102. What should I avoid while taking doxycycline? Do not take iron supplements, multivitamins, calcium supplements, antacids, or laxatives within 2 hours before or after taking doxycycline. Avoid taking any other antibiotics with doxycycline unless your doctor has told you to. Doxycycline could make you sunburn more easily. Avoid sunlight or tanning beds. Wear protective clothing and use sunscreen (SPF 30 or higher) when you are outdoors. Antibiotic medicines can cause diarrhea, which may be a sign of a new infection. If you have diarrhea that is watery or bloody, call your doctor. Do not use anti-diarrhea medicine unless your doctor tells you to. What are the possible side effects of doxycycline? Get emergency medical help if you have signs of an allergic reaction (hives, difficult breathing, swelling in your face or throat) or a severe skin reaction (fever, sore throat, burning in your eyes, skin pain, red or purple skin rash that spreads and causes blistering and peeling). Seek medical treatment if you have a serious drug reaction that can affect many parts of your body.  Symptoms may include: skin rash, fever, swollen glands, flu-like symptoms, muscle aches, severe weakness, unusual effort has been made to ensure that the information provided by [address] is accurate, up-to-date, and complete, but no guarantee is made to that effect. Drug information contained herein may be time sensitive. [facility] information has been compiled for use by healthcare practitioners and consumers in the [place] and therefore [facility] does not warrant that uses outside of the [place] are appropriate, unless specifically indicated otherwise. [facility]'s drug information does not endorse drugs, diagnose patients or recommend therapy. [facility] drug information is an informational resource designed to assist licensed healthcare practitioners in caring for their patients and/or to serve consumers viewing this service as a supplement to, and not a substitute for, the expertise, skill, knowledge and judgment of healthcare practitioners. The absence of a warning for a given drug or drug combination in no way should be construed to indicate that the drug or drug combination is safe, effective or appropriate for any given patient. [facility] does not assume any responsibility for any aspect of healthcare administered with the aid of information [facility] provides. The information contained herein is not intended to cover all possible uses, directions, precautions, warnings, drug interactions, allergic reactions, or adverse effects. If you have questions about the drugs you are taking, check with your doctor, nurse or pharmacist.  Copyright 8840-1329 [address]. Version: 21.02. Revision date: 5/22/2019. Care instructions adapted under license by [facility] ([facility]). If you have questions about a medical condition or this instruction, always ask your healthcare professional. [place] [address] any warranty or liability for your use of this information.        Patient Education        prednisone  Pronunciation:  PRED ni sone  Brand:  Nicole  What is the most important information I should know muscle weakness or limp feeling. Prednisone can affect growth in children. Tell your doctor if your child is not growing at a normal rate while using this medicine. Common side effects may include:  · weight gain (especially in your face or your upper back and torso);  · increased appetite;  · mood changes, trouble sleeping;  · changes in your menstrual periods;  · problems with memory or thought;  · muscle or joint pain;  · weakness;  · headache, dizziness, spinning sensation;  · nausea, bloating, loss of appetite;  · slow wound healing; or  · acne, increased sweating, thinning skin, bruising, pinpoint spots under your skin. This is not a complete list of side effects and others may occur. Call your doctor for medical advice about side effects. You may report side effects to FDA at 4-756-FDA-4163. What other drugs will affect prednisone? Sometimes it is not safe to use certain medications at the same time. Some drugs can affect your blood levels of other drugs you take, which may increase side effects or make the medications less effective. Tell your doctor about all your current medicines. Many drugs can affect prednisone, especially:  · bupropion;  · cyclosporine;  · digoxin;  · ketoconazole;  · an antibiotic;  · birth control pills or hormone replacement therapy;  · a diuretic or \"water pill\";  · insulin or oral diabetes medicine;  · a blood thinner --warfarin, Coumadin, Jantoven; or  · NSAIDs (nonsteroidal anti-inflammatory drugs) --aspirin, ibuprofen (Advil, Motrin), naproxen (Aleve), celecoxib, diclofenac, indomethacin, meloxicam, and others. This list is not complete and many other drugs may affect prednisone. This includes prescription and over-the-counter medicines, vitamins, and herbal products. Not all possible drug interactions are listed here. Where can I get more information? Your pharmacist can provide more information about prednisone.   Remember, keep this and all other medicines out of the reach of children, never share your medicines with others, and use this medication only for the indication prescribed. Every effort has been made to ensure that the information provided by [address] is accurate, up-to-date, and complete, but no guarantee is made to that effect. Drug information contained herein may be time sensitive. [facility] information has been compiled for use by healthcare practitioners and consumers in the [place] and therefore [facility] does not warrant that uses outside of the [place] are appropriate, unless specifically indicated otherwise. [facility]'s drug information does not endorse drugs, diagnose patients or recommend therapy. [facility]'s drug information is an informational resource designed to assist licensed healthcare practitioners in caring for their patients and/or to serve consumers viewing this service as a supplement to, and not a substitute for, the expertise, skill, knowledge and judgment of healthcare practitioners. The absence of a warning for a given drug or drug combination in no way should be construed to indicate that the drug or drug combination is safe, effective or appropriate for any given patient. [facility] does not assume any responsibility for any aspect of healthcare administered with the aid of information [facility] provides. The information contained herein is not intended to cover all possible uses, directions, precautions, warnings, drug interactions, allergic reactions, or adverse effects. If you have questions about the drugs you are taking, check with your doctor, nurse or pharmacist.  Copyright 1165-0818 [address]. Version: 10.01. Revision date: 3/28/2019. Care instructions adapted under license by [facility] ([facility]). If you have questions about a medical condition or this instruction, always ask your healthcare professional. [place] [address] any warranty or liability for your use of this information.

## 2020-02-25 NOTE — PROGRESS NOTES
Subjective:      Patient ID: Jenniffer Rachel is a 78 y.o. male. HPI    Chief Complaint   Patient presents with    Sinusitis    Congestion    Cough     Sinus Pain  Patient complains of congestion, headaches, nasal congestion, post nasal drip, sinus pressure and cough. Onset of symptoms was several weeks ago. Symptoms have been gradually worsening over the past 1 week. He is drinking plenty of fluids. Past history is significant for bronchitis and sinusitis. Patient is non-smoker. Review of Systems   Constitutional: Positive for appetite change. Negative for chills and fever. HENT: Positive for congestion, postnasal drip, sinus pressure and sinus pain. Negative for ear pain. Eyes: Negative. Respiratory: Positive for choking. Cardiovascular: Negative. Gastrointestinal: Negative. Endocrine: Negative. Genitourinary: Negative. Musculoskeletal: Negative. Neurological: Positive for dizziness and headaches. Psychiatric/Behavioral: Negative.         Patient Active Problem List   Diagnosis    Bilateral carotid artery occlusion    Essential hypertension    Hypothyroidism, acquired, autoimmune    Vitamin D deficiency disease    Type 2 diabetes mellitus without complication, without long-term current use of insulin ([facility] [address]    Coronary artery disease involving native coronary artery of native heart without angina pectoris    Pure hypercholesterolemia    History of CVA (cerebrovascular accident)    Chronic diastolic congestive heart failure (HCC)    Allergic sinusitis       Outpatient Medications Marked as Taking for the 2/25/20 encounter (Office Visit) with AICHA Larson CNP   Medication Sig Dispense Refill    metFORMIN (GLUCOPHAGE) 1000 MG tablet TAKE 1 TABLET TWICE DAILY WITH MEALS 180 tablet 1    torsemide (DEMADEX) 20 MG tablet Take 1 tablet by mouth daily 90 tablet 1    montelukast (SINGULAIR) 10 MG tablet       fluticasone (FLONASE) 50 MCG/ACT nasal spray 2 97.8 °F (36.6 °C)    TempSrc: Oral    SpO2: 96%    Weight: 176 lb 3.2 oz (79.9 kg)      Physical Exam  Vitals signs and nursing note reviewed. Constitutional:       General: He is not in acute distress. Appearance: Normal appearance. He is well-developed. He is not ill-appearing or toxic-appearing. HENT:      Head: Normocephalic and atraumatic. Right Ear: Tympanic membrane, ear canal and external ear normal.      Left Ear: Tympanic membrane, ear canal and external ear normal.      Nose:      Right Sinus: Maxillary sinus tenderness and frontal sinus tenderness present. Left Sinus: Maxillary sinus tenderness and frontal sinus tenderness present. Mouth/Throat:      Lips: Pink. Mouth: Mucous membranes are moist.      Pharynx: Posterior oropharyngeal erythema present. No oropharyngeal exudate. Eyes:      Conjunctiva/sclera: Conjunctivae normal.   Neck:      Musculoskeletal: Neck supple. Cardiovascular:      Rate and Rhythm: Normal rate and regular rhythm. Heart sounds: Normal heart sounds, S1 normal and S2 normal.   Pulmonary:      Effort: Pulmonary effort is normal. No accessory muscle usage or respiratory distress. Breath sounds: Wheezing (scattered expiratory wheezes) present. No decreased breath sounds, rhonchi or rales. Abdominal:      General: Bowel sounds are normal.      Palpations: Abdomen is soft. Lymphadenopathy:      Cervical: No cervical adenopathy. Skin:     General: Skin is warm and dry. Findings: No rash. Neurological:      Mental Status: He is alert and oriented to person, place, and time. Psychiatric:         Attention and Perception: Attention normal.         Mood and Affect: Mood normal.         Speech: Speech normal.         Behavior: Behavior is cooperative. Assessment/Plan:   1.  Acute bacterial sinusitis  Patient presents today with complaints of large amount of nasal congestion, headache, postnasal drip, sinus pain and pressure, cough and intermittent wheezing over the past several weeks with worsening of symptoms over the past 1 week. Patient reports history of chronic sinusitis and bronchitis. On exam noted frontal/maxillary sinus tenderness, posterior oropharyngeal erythema and scattered expiratory wheezes. Oxygen saturation is 96% on room air. Recommend treatment as below. Advised to drink plenty of fluids, take all doses of antibiotics and patient to follow-up if no better or worsening of symptoms. Patient/wife verbalized understanding and agreeable to plan. - doxycycline hyclate (VIBRA-TABS) 100 MG tablet; Take 1 tablet by mouth 2 times daily for 10 days  Dispense: 20 tablet; Refill: 0    2. Bronchitis  See note above. - predniSONE (DELTASONE) 20 MG tablet; Take 2 tablets by mouth daily for 5 days  Dispense: 10 tablet; Refill: 0  - promethazine-dextromethorphan (PROMETHAZINE-DM) 6.25-15 MG/5ML syrup;  Take 5 mLs by mouth nightly as needed for Cough  Dispense: 50 mL; Refill: 0

## 2020-02-27 RX ORDER — DEXTROMETHORPHAN HYDROBROMIDE AND PROMETHAZINE HYDROCHLORIDE 15; 6.25 MG/5ML; MG/5ML
5 SYRUP ORAL NIGHTLY PRN
Qty: 50 ML | Refills: 0 | Status: SHIPPED | OUTPATIENT
Start: 2020-02-27 | End: 2020-03-08

## 2020-02-27 RX ORDER — METOPROLOL TARTRATE 50 MG/1
TABLET, FILM COATED ORAL
Qty: 180 TABLET | Refills: 3 | Status: SHIPPED | OUTPATIENT
Start: 2020-02-27 | End: 2020-12-14 | Stop reason: SDUPTHER

## 2020-02-27 RX ORDER — LEVOTHYROXINE SODIUM 88 UG/1
TABLET ORAL
Qty: 90 TABLET | Refills: 1 | Status: SHIPPED | OUTPATIENT
Start: 2020-02-27 | End: 2020-07-16

## 2020-03-03 ENCOUNTER — TELEPHONE (OUTPATIENT)
Dept: FAMILY MEDICINE CLINIC | Age: 80
End: 2020-03-03

## 2020-03-03 RX ORDER — AZITHROMYCIN 250 MG/1
250 TABLET, FILM COATED ORAL SEE ADMIN INSTRUCTIONS
Qty: 6 TABLET | Refills: 0 | Status: SHIPPED | OUTPATIENT
Start: 2020-03-03 | End: 2020-03-08

## 2020-03-10 ENCOUNTER — TELEPHONE (OUTPATIENT)
Dept: FAMILY MEDICINE CLINIC | Age: 80
End: 2020-03-10

## 2020-03-11 ENCOUNTER — OFFICE VISIT (OUTPATIENT)
Dept: FAMILY MEDICINE CLINIC | Age: 80
End: 2020-03-11
Payer: MEDICARE

## 2020-03-11 VITALS
WEIGHT: 172 LBS | BODY MASS INDEX: 24.68 KG/M2 | OXYGEN SATURATION: 96 % | SYSTOLIC BLOOD PRESSURE: 134 MMHG | HEART RATE: 76 BPM | DIASTOLIC BLOOD PRESSURE: 77 MMHG | TEMPERATURE: 98.2 F

## 2020-03-11 PROCEDURE — G8420 CALC BMI NORM PARAMETERS: HCPCS | Performed by: FAMILY MEDICINE

## 2020-03-11 PROCEDURE — G8483 FLU IMM NO ADMIN DOC REA: HCPCS | Performed by: FAMILY MEDICINE

## 2020-03-11 PROCEDURE — 99213 OFFICE O/P EST LOW 20 MIN: CPT | Performed by: FAMILY MEDICINE

## 2020-03-11 PROCEDURE — 4040F PNEUMOC VAC/ADMIN/RCVD: CPT | Performed by: FAMILY MEDICINE

## 2020-03-11 PROCEDURE — 1036F TOBACCO NON-USER: CPT | Performed by: FAMILY MEDICINE

## 2020-03-11 PROCEDURE — G8427 DOCREV CUR MEDS BY ELIG CLIN: HCPCS | Performed by: FAMILY MEDICINE

## 2020-03-11 PROCEDURE — 1123F ACP DISCUSS/DSCN MKR DOCD: CPT | Performed by: FAMILY MEDICINE

## 2020-03-11 RX ORDER — CEFDINIR 300 MG/1
300 CAPSULE ORAL DAILY
Qty: 28 CAPSULE | Refills: 0 | Status: SHIPPED | OUTPATIENT
Start: 2020-03-11 | End: 2020-03-25

## 2020-03-11 NOTE — PATIENT INSTRUCTIONS
Please compare this printed medication list with your medications at home to be sure they are the same. If you have any medications that are different please contact us immediately at [phone]. Also review your allergies that we have listed, these may also include medications that you have not been able to tolerate, make sure everything listed is correct. If you have any allergies that are different please contact us immediately at [phone].

## 2020-03-12 ENCOUNTER — HOSPITAL ENCOUNTER (OUTPATIENT)
Age: 80
Discharge: HOME | End: 2020-03-12
Payer: MEDICARE

## 2020-03-12 ENCOUNTER — HOSPITAL ENCOUNTER (OUTPATIENT)
Dept: GENERAL RADIOLOGY | Age: 80
Discharge: HOME | End: 2020-03-12
Payer: MEDICARE

## 2020-03-12 PROCEDURE — 71046 X-RAY EXAM CHEST 2 VIEWS: CPT

## 2020-05-11 LAB
CATARACTS: POSITIVE
DIABETIC RETINOPATHY: NEGATIVE
GLAUCOMA: NEGATIVE
INTRAOCULAR PRESSURE EYE: NORMAL
VISUAL ACUITY DISTANCE LEFT EYE: NORMAL
VISUAL ACUITY DISTANCE RIGHT EYE: NORMAL

## 2020-05-12 ENCOUNTER — VIRTUAL VISIT (OUTPATIENT)
Dept: FAMILY MEDICINE CLINIC | Age: 80
End: 2020-05-12
Payer: MEDICARE

## 2020-05-12 PROCEDURE — 1123F ACP DISCUSS/DSCN MKR DOCD: CPT | Performed by: FAMILY MEDICINE

## 2020-05-12 PROCEDURE — 1036F TOBACCO NON-USER: CPT | Performed by: FAMILY MEDICINE

## 2020-05-12 PROCEDURE — 99214 OFFICE O/P EST MOD 30 MIN: CPT | Performed by: FAMILY MEDICINE

## 2020-05-12 PROCEDURE — G8427 DOCREV CUR MEDS BY ELIG CLIN: HCPCS | Performed by: FAMILY MEDICINE

## 2020-05-12 PROCEDURE — G8420 CALC BMI NORM PARAMETERS: HCPCS | Performed by: FAMILY MEDICINE

## 2020-05-12 PROCEDURE — 4040F PNEUMOC VAC/ADMIN/RCVD: CPT | Performed by: FAMILY MEDICINE

## 2020-05-12 RX ORDER — TORSEMIDE 20 MG/1
10-20 TABLET ORAL DAILY PRN
Qty: 90 TABLET | Refills: 3 | Status: SHIPPED | OUTPATIENT
Start: 2020-05-12 | End: 2020-06-18 | Stop reason: SDUPTHER

## 2020-05-12 NOTE — PROGRESS NOTES
2020    TELEHEALTH EVALUATION -- Audio/Visual (During RUBSX-89 public health emergency) using [employer]. me. ASSESSMENT and PLAN    1. Type 2 diabetes mellitus without complication, without long-term current use of insulin (HCC)   - status pending result of lab  - Hemoglobin A1C; Future    2. Essential hypertension  Stable  - Basic Metabolic Panel; Future    3. Chronic diastolic congestive heart failure ([address]  Compensated    4. Coronary artery disease involving native coronary artery of native heart without angina pectoris  Stable  - CBC Auto Differential; Future    5. Allergic sinusitis  Hold montelukast for 2 weeks to see if it is helping or not. Try dust mite covers on his pillow and mattress. Trial of buffered saline nasal irrigation. 6. Hypothyroidism, acquired, autoimmune   - status pending result of lab  - TSH with Reflex; Future      RTC in office in 6 months or sooner if needed. Continue all current medications as listed below. Lord Mike MD    History:    Shannon Alexis (:  1940) has requested an audio/video evaluation for the following concern(s): Follow-up of his chronic medical problems including diabetes, coronary disease, hypertension, CHF, hypothyroidism, and allergic sinusitis. Overall he feels he is getting along well. His blood sugar this morning was 111. His blood pressures have been stable. No chest pain increased shortness of breath or edema noted. His main issue today is his allergies. He is waking up every morning with nasal congestion despite adding montelukast.  He is not sure it is helping much. He did recently see his eye doctor. Prior to Visit Medications    Medication Sig Taking?  Authorizing Provider   torsemide (DEMADEX) 20 MG tablet Take 0.5-1 tablets by mouth daily as needed (sweling) Yes Lord Mike MD   metoprolol tartrate (LOPRESSOR) 50 MG tablet TAKE 1 TABLET TWICE DAILY Yes Imelda Baker MD   levothyroxine (SYNTHROID) 88 MCG tablet TAKE

## 2020-06-02 ENCOUNTER — NURSE ONLY (OUTPATIENT)
Dept: FAMILY MEDICINE CLINIC | Age: 80
End: 2020-06-02
Payer: MEDICARE

## 2020-06-02 LAB
ANION GAP SERPL CALCULATED.3IONS-SCNC: 13 MMOL/L (ref 3–16)
BASOPHILS ABSOLUTE: 0 K/UL (ref 0–0.2)
BASOPHILS RELATIVE PERCENT: 0.4 %
BUN BLDV-MCNC: 18 MG/DL (ref 7–20)
CALCIUM SERPL-MCNC: 8.8 MG/DL (ref 8.3–10.6)
CHLORIDE BLD-SCNC: 97 MMOL/L (ref 99–110)
CHOLESTEROL, TOTAL: 150 MG/DL (ref 0–199)
CO2: 25 MMOL/L (ref 21–32)
CREAT SERPL-MCNC: 1 MG/DL (ref 0.8–1.3)
EOSINOPHILS ABSOLUTE: 0.3 K/UL (ref 0–0.6)
EOSINOPHILS RELATIVE PERCENT: 4.7 %
GFR AFRICAN AMERICAN: >60
GFR NON-AFRICAN AMERICAN: >60
GLUCOSE BLD-MCNC: 98 MG/DL (ref 70–99)
HCT VFR BLD CALC: 34.7 % (ref 40.5–52.5)
HDLC SERPL-MCNC: 45 MG/DL (ref 40–60)
HEMOGLOBIN: 11.6 G/DL (ref 13.5–17.5)
LDL CHOLESTEROL CALCULATED: 88 MG/DL
LYMPHOCYTES ABSOLUTE: 0.6 K/UL (ref 1–5.1)
LYMPHOCYTES RELATIVE PERCENT: 10.2 %
MCH RBC QN AUTO: 30 PG (ref 26–34)
MCHC RBC AUTO-ENTMCNC: 33.5 G/DL (ref 31–36)
MCV RBC AUTO: 89.6 FL (ref 80–100)
MONOCYTES ABSOLUTE: 0.6 K/UL (ref 0–1.3)
MONOCYTES RELATIVE PERCENT: 9.1 %
NEUTROPHILS ABSOLUTE: 4.7 K/UL (ref 1.7–7.7)
NEUTROPHILS RELATIVE PERCENT: 75.6 %
PDW BLD-RTO: 14.7 % (ref 12.4–15.4)
PLATELET # BLD: 271 K/UL (ref 135–450)
PMV BLD AUTO: 9.2 FL (ref 5–10.5)
POTASSIUM SERPL-SCNC: 4.4 MMOL/L (ref 3.5–5.1)
RBC # BLD: 3.87 M/UL (ref 4.2–5.9)
SODIUM BLD-SCNC: 135 MMOL/L (ref 136–145)
TRIGL SERPL-MCNC: 87 MG/DL (ref 0–150)
TSH REFLEX: 3.78 UIU/ML (ref 0.27–4.2)
VLDLC SERPL CALC-MCNC: 17 MG/DL
WBC # BLD: 6.2 K/UL (ref 4–11)

## 2020-06-02 PROCEDURE — 36415 COLL VENOUS BLD VENIPUNCTURE: CPT | Performed by: FAMILY MEDICINE

## 2020-06-03 LAB
ESTIMATED AVERAGE GLUCOSE: 128.4 MG/DL
HBA1C MFR BLD: 6.1 %

## 2020-06-17 NOTE — PROGRESS NOTES
daily for 360 days. 1/18/12 1/12/13 Yes Morgan Parks MD   aspirin 81 MG EC tablet Take 81 mg by mouth daily. 2 daily     Yes Historical Provider, MD   Blood Glucose Monitoring Suppl (ONE TOUCH ULTRA) JUAN FRANCISCO by Does not apply route. 6/22/10  Yes Eda Weston MD   Multiple Vitamins-Minerals (MULTIVITAL PO) Take  by mouth daily. 5/24/10  Yes Historical Provider, MD   Omega-3 Fatty Acids (FISH OIL) 1200 MG CAPS Take  by mouth 2 times daily. 5/24/10  Yes Historical Provider, MD   Ascorbic Acid (VITAMIN C) 500 MG tablet Take 500 mg by mouth daily. Yes Historical Provider, MD   Cholecalciferol (VITAMIN D3) 1000 UNIT CAPS Take  by mouth daily. 5/24/10  Yes Historical Provider, MD   vitamin E 200 UNIT capsule Take 200 Units by mouth daily. Yes Historical Provider, MD        Allergies:  Metolazone; Lipitor; and Simvastatin     Review of Systems:   · Constitutional: there has been no unanticipated weight loss. There's been no change in energy level, sleep pattern, or activity level. · Eyes: No visual changes or diplopia. No scleral icterus. · ENT: No Headaches, hearing loss or vertigo. No mouth sores or sore throat. · Cardiovascular: Reviewed in HPI  · Respiratory: No cough or wheezing, no sputum production. No hematemesis. · Gastrointestinal: No abdominal pain, appetite loss, blood in stools. No change in bowel or bladder habits. · Genitourinary: No dysuria, trouble voiding, or hematuria. · Musculoskeletal:  No gait disturbance, weakness or joint complaints. · Integumentary: No rash or pruritis. · Neurological: No headache, diplopia, change in muscle strength, numbness or tingling. No change in gait, balance, coordination, mood, affect, memory, mentation, behavior. · Psychiatric: No anxiety, no depression. · Endocrine: No malaise, fatigue or temperature intolerance. No excessive thirst, fluid intake, or urination. No tremor.   · Hematologic/Lymphatic: No abnormal bruising or bleeding, blood clots or

## 2020-06-18 ENCOUNTER — OFFICE VISIT (OUTPATIENT)
Dept: CARDIOLOGY CLINIC | Age: 80
End: 2020-06-18
Payer: MEDICARE

## 2020-06-18 VITALS
BODY MASS INDEX: 24.91 KG/M2 | HEART RATE: 82 BPM | WEIGHT: 174 LBS | OXYGEN SATURATION: 97 % | TEMPERATURE: 98.5 F | SYSTOLIC BLOOD PRESSURE: 134 MMHG | HEIGHT: 70 IN | DIASTOLIC BLOOD PRESSURE: 68 MMHG

## 2020-06-18 PROCEDURE — 4040F PNEUMOC VAC/ADMIN/RCVD: CPT | Performed by: INTERNAL MEDICINE

## 2020-06-18 PROCEDURE — G8420 CALC BMI NORM PARAMETERS: HCPCS | Performed by: INTERNAL MEDICINE

## 2020-06-18 PROCEDURE — 1123F ACP DISCUSS/DSCN MKR DOCD: CPT | Performed by: INTERNAL MEDICINE

## 2020-06-18 PROCEDURE — G8427 DOCREV CUR MEDS BY ELIG CLIN: HCPCS | Performed by: INTERNAL MEDICINE

## 2020-06-18 PROCEDURE — 99214 OFFICE O/P EST MOD 30 MIN: CPT | Performed by: INTERNAL MEDICINE

## 2020-06-18 PROCEDURE — 1036F TOBACCO NON-USER: CPT | Performed by: INTERNAL MEDICINE

## 2020-06-18 RX ORDER — TORSEMIDE 20 MG/1
20 TABLET ORAL DAILY
Qty: 90 TABLET | Refills: 3 | Status: SHIPPED | OUTPATIENT
Start: 2020-06-18 | End: 2021-04-16 | Stop reason: SDUPTHER

## 2020-06-18 RX ORDER — PRAVASTATIN SODIUM 20 MG
20 TABLET ORAL DAILY
Qty: 90 TABLET | Refills: 3 | Status: SHIPPED | OUTPATIENT
Start: 2020-06-18 | End: 2020-12-14 | Stop reason: SDUPTHER

## 2020-07-16 RX ORDER — LEVOTHYROXINE SODIUM 88 UG/1
TABLET ORAL
Qty: 90 TABLET | Refills: 1 | Status: SHIPPED | OUTPATIENT
Start: 2020-07-16 | End: 2021-02-02 | Stop reason: SDUPTHER

## 2020-07-21 ENCOUNTER — TELEPHONE (OUTPATIENT)
Dept: FAMILY MEDICINE CLINIC | Age: 80
End: 2020-07-21

## 2020-07-21 NOTE — TELEPHONE ENCOUNTER
[facility] received a call from:    Name of Caller: Rebecca    Relationship to patient: n/a    Organization name: [address] [place] [address] contact number: [phone]    Reason for call: States that they faxed over a request for accu-check soft click lancet. They have not received anything back. She is re-faxing.

## 2020-08-13 ENCOUNTER — OFFICE VISIT (OUTPATIENT)
Dept: FAMILY MEDICINE CLINIC | Age: 80
End: 2020-08-13
Payer: MEDICARE

## 2020-08-13 VITALS
OXYGEN SATURATION: 97 % | WEIGHT: 169.25 LBS | DIASTOLIC BLOOD PRESSURE: 75 MMHG | HEART RATE: 61 BPM | SYSTOLIC BLOOD PRESSURE: 147 MMHG | TEMPERATURE: 98.1 F | BODY MASS INDEX: 24.28 KG/M2

## 2020-08-13 PROCEDURE — 99213 OFFICE O/P EST LOW 20 MIN: CPT | Performed by: NURSE PRACTITIONER

## 2020-08-13 ASSESSMENT — ENCOUNTER SYMPTOMS
NAUSEA: 0
DIARRHEA: 0
SHORTNESS OF BREATH: 0
VOMITING: 0
ABDOMINAL DISTENTION: 0
ABDOMINAL PAIN: 0
RHINORRHEA: 0
CHEST TIGHTNESS: 0
COUGH: 0
SORE THROAT: 0
WHEEZING: 0

## 2020-08-13 NOTE — PROGRESS NOTES
 Other Grandchild         Factor V deficiency     Social History     Socioeconomic History    Marital status:      Spouse name: Not on file    Number of children: Not on file    Years of education: Not on file    Highest education level: Not on file   Occupational History    Not on file   Social Needs    Financial resource strain: Not on file    Food insecurity     Worry: Not on file     Inability: Not on file    Transportation needs     Medical: Not on file     Non-medical: Not on file   Tobacco Use    Smoking status: Former Smoker     Packs/day: 2.00     Years: 20.00     Pack years: 40.00     Types: Cigarettes     Last attempt to quit: 1975     Years since quittin.6    Smokeless tobacco: Never Used   Substance and Sexual Activity    Alcohol use: No    Drug use: No    Sexual activity: Yes     Partners: Female   Lifestyle    Physical activity     Days per week: Not on file     Minutes per session: Not on file    Stress: Not on file   Relationships    Social connections     Talks on phone: Not on file     Gets together: Not on file     Attends Muslim service: Not on file     Active member of club or organization: Not on file     Attends meetings of clubs or organizations: Not on file     Relationship status: Not on file    Intimate partner violence     Fear of current or ex partner: Not on file     Emotionally abused: Not on file     Physically abused: Not on file     Forced sexual activity: Not on file   Other Topics Concern    Not on file   Social History Narrative    Not on file     Current Outpatient Medications   Medication Sig Dispense Refill    Omega-3 Fatty Acids (FISH OIL PO) Take by mouth      Multiple Vitamin (MULTI-VITAMIN DAILY PO) Take by mouth      levothyroxine (SYNTHROID) 88 MCG tablet TAKE 1 TABLET EVERY DAY 90 tablet 1    metFORMIN (GLUCOPHAGE) 1000 MG tablet TAKE 1 TABLET TWICE DAILY WITH MEALS 180 tablet 1    pravastatin (PRAVACHOL) 20 MG tablet Take 1 tablet by mouth daily 90 tablet 3    torsemide (DEMADEX) 20 MG tablet Take 1 tablet by mouth daily 90 tablet 3    metoprolol tartrate (LOPRESSOR) 50 MG tablet TAKE 1 TABLET TWICE DAILY 180 tablet 3    montelukast (SINGULAIR) 10 MG tablet       fluticasone (FLONASE) 50 MCG/ACT nasal spray 2 sprays by Each Nostril route daily      losartan (COZAAR) 100 MG tablet Take 1 tablet by mouth daily 90 tablet 3    amLODIPine (NORVASC) 5 MG tablet Take 1 tablet by mouth daily 90 tablet 3    Coenzyme Q10 (COQ10 PO) Take by mouth      Ascorbic Acid (VITAMIN C PO) Take by mouth 2 times daily      guaiFENesin 400 MG tablet Take 400 mg by mouth 4 times daily as needed for Cough      magnesium oxide (MAG-OX) 400 MG tablet Take 400 mg by mouth daily      ferrous sulfate 325 (65 Fe) MG tablet Take 325 mg by mouth 2 times daily      aspirin 81 MG EC tablet Take 1 tablet by mouth daily 30 tablet 0    Cholecalciferol (VITAMIN D) 2000 UNITS CAPS capsule Take  by mouth daily. No current facility-administered medications for this visit. Allergies   Allergen Reactions    Metolazone      Sodium 120 on 5/19, was on metolazone, no hypovolemic synptoms on presentation    Lipitor      Myalgias at 20 mg    Simvastatin      myalgias       REVIEW OF SYSTEMS  Review of Systems   Constitutional: Negative for activity change, appetite change, chills and fever. HENT: Negative for congestion, rhinorrhea and sore throat. Eyes: Negative for visual disturbance. Respiratory: Negative for cough, chest tightness, shortness of breath and wheezing. Cardiovascular: Negative for chest pain and leg swelling. Gastrointestinal: Negative for abdominal distention, abdominal pain, diarrhea, nausea and vomiting. Genitourinary: Negative for dysuria, frequency, hematuria and urgency. Musculoskeletal: Positive for arthralgias. Negative for gait problem and myalgias. Skin: Negative for rash.    Neurological: Negative for dizziness, weakness, light-headedness, numbness and headaches. Psychiatric/Behavioral: Positive for sleep disturbance. PHYSICAL EXAM  BP (!) 147/75   Pulse 61   Temp 98.1 °F (36.7 °C) (Infrared)   Wt 169 lb 4 oz (76.8 kg)   SpO2 97%   BMI 24.28 kg/m²   Physical Exam  Vitals signs reviewed. Constitutional:       General: He is not in acute distress. Appearance: Normal appearance. He is well-developed. He is not diaphoretic. HENT:      Head: Normocephalic and atraumatic. Right Ear: Tympanic membrane normal.      Left Ear: Tympanic membrane normal.      Nose: Nose normal.      Mouth/Throat:      Mouth: Mucous membranes are moist.      Pharynx: Oropharynx is clear. Eyes:      General:         Right eye: No discharge. Left eye: No discharge. Pupils: Pupils are equal, round, and reactive to light. Neck:      Musculoskeletal: Normal range of motion and neck supple. Vascular: No JVD. Cardiovascular:      Rate and Rhythm: Normal rate. Pulses: Normal pulses. Pulmonary:      Effort: Pulmonary effort is normal. No respiratory distress. Breath sounds: No stridor. No wheezing, rhonchi or rales. Chest:      Chest wall: No tenderness. Abdominal:      General: Bowel sounds are normal. There is no distension. Palpations: Abdomen is soft. Tenderness: There is no abdominal tenderness. There is no guarding. Musculoskeletal:         General: No swelling or deformity. Right shoulder: He exhibits tenderness and pain. He exhibits normal pulse and normal strength. Arms:    Skin:     General: Skin is warm and dry. Capillary Refill: Capillary refill takes less than 2 seconds. Coloration: Skin is not pale. Findings: No bruising, lesion or rash. Neurological:      General: No focal deficit present. Mental Status: He is alert and oriented to person, place, and time.    Psychiatric:         Mood and Affect: Mood normal.         Behavior: Behavior normal.          ASSESSMENT/PLAN:   1. Acute pain of right shoulder  Patient reports right shoulder pain worse with movement over the past 4 to 5 months. Patient reports that it is aching in his joints. Patient reports that it feels like arthritis. Patient denies any acute injury, trauma, or fall prior to onset of symptoms. Patient is right-hand dominant. No numbness or tingling in extremity. No other associated symptoms. Patient denies taking any medications for pain relief. Patient reports that at times when he rolls over at night pain does wake him up. Patient denies any previous work-up or evaluation. Upon exam pain was reproduced with palpation of shoulder as well as raising arm above head. No neurological deficits noted. I did recommend patient getting imaging to further assessed shoulder. Patient reports that he is not sure if he will or not. I did recommend heat/ice therapy as well as Tylenol arthritis for pain. Patient verbalized acknowledges. Continue to monitor and follow-up for any new or worsening symptoms. - XR SHOULDER RIGHT (MIN 2 VIEWS); Future         The note was completed using Dragon voice recognition transcription. Every effort was made to ensure accuracy; however, inadvertent  transcription errors may be present despite my best efforts to edit errors.     Hari Santoro, APRN - CNP

## 2020-08-14 ENCOUNTER — HOSPITAL ENCOUNTER (OUTPATIENT)
Age: 80
Discharge: HOME | End: 2020-08-14
Payer: MEDICARE

## 2020-08-14 ENCOUNTER — HOSPITAL ENCOUNTER (OUTPATIENT)
Dept: GENERAL RADIOLOGY | Age: 80
Discharge: HOME | End: 2020-08-14
Payer: MEDICARE

## 2020-08-14 PROCEDURE — 73030 X-RAY EXAM OF SHOULDER: CPT

## 2020-08-25 ENCOUNTER — HOSPITAL ENCOUNTER (OUTPATIENT)
Dept: NON INVASIVE DIAGNOSTICS | Age: 80
Discharge: HOME | End: 2020-08-25
Payer: MEDICARE

## 2020-08-25 ENCOUNTER — TELEPHONE (OUTPATIENT)
Dept: PULMONOLOGY | Age: 80
End: 2020-08-25

## 2020-08-25 ENCOUNTER — HOSPITAL ENCOUNTER (OUTPATIENT)
Dept: NUCLEAR MEDICINE | Age: 80
Discharge: HOME | End: 2020-08-25
Payer: MEDICARE

## 2020-08-25 LAB
LV EF: 53 %
LV EF: 65 %
LVEF MODALITY: NORMAL
LVEF MODALITY: NORMAL

## 2020-08-25 PROCEDURE — A9502 TC99M TETROFOSMIN: HCPCS | Performed by: INTERNAL MEDICINE

## 2020-08-25 PROCEDURE — 93017 CV STRESS TEST TRACING ONLY: CPT

## 2020-08-25 PROCEDURE — 6360000002 HC RX W HCPCS: Performed by: INTERNAL MEDICINE

## 2020-08-25 PROCEDURE — 3430000000 HC RX DIAGNOSTIC RADIOPHARMACEUTICAL: Performed by: INTERNAL MEDICINE

## 2020-08-25 PROCEDURE — 93306 TTE W/DOPPLER COMPLETE: CPT

## 2020-08-25 PROCEDURE — 78452 HT MUSCLE IMAGE SPECT MULT: CPT

## 2020-08-25 RX ADMIN — TETROFOSMIN 10.3 MILLICURIE: 1.38 INJECTION, POWDER, LYOPHILIZED, FOR SOLUTION INTRAVENOUS at 08:48

## 2020-08-25 RX ADMIN — REGADENOSON 0.4 MG: 0.08 INJECTION, SOLUTION INTRAVENOUS at 09:55

## 2020-08-25 RX ADMIN — TETROFOSMIN 33.4 MILLICURIE: 1.38 INJECTION, POWDER, LYOPHILIZED, FOR SOLUTION INTRAVENOUS at 09:55

## 2020-08-25 NOTE — TELEPHONE ENCOUNTER
Order Pended. Patient scheduled 8/26/20 with same day cxr. Patient verbalized understanding. Can you schedule him this week with PA LAT CXR and then see me after? Put on reason for consult left effusion, call Dr. Emperatriz Ryan after consult. can be in office. Lalit Capellan

## 2020-08-27 ENCOUNTER — TELEPHONE (OUTPATIENT)
Dept: PULMONOLOGY | Age: 80
End: 2020-08-27

## 2020-08-27 ENCOUNTER — HOSPITAL ENCOUNTER (OUTPATIENT)
Dept: GENERAL RADIOLOGY | Age: 80
Discharge: HOME | End: 2020-08-27
Payer: MEDICARE

## 2020-08-27 ENCOUNTER — OFFICE VISIT (OUTPATIENT)
Dept: PULMONOLOGY | Age: 80
End: 2020-08-27
Payer: MEDICARE

## 2020-08-27 ENCOUNTER — HOSPITAL ENCOUNTER (OUTPATIENT)
Age: 80
Discharge: HOME | End: 2020-08-27
Payer: MEDICARE

## 2020-08-27 VITALS
HEIGHT: 70 IN | TEMPERATURE: 98 F | OXYGEN SATURATION: 98 % | DIASTOLIC BLOOD PRESSURE: 69 MMHG | HEART RATE: 60 BPM | BODY MASS INDEX: 24.62 KG/M2 | SYSTOLIC BLOOD PRESSURE: 135 MMHG | WEIGHT: 172 LBS

## 2020-08-27 PROCEDURE — 1123F ACP DISCUSS/DSCN MKR DOCD: CPT | Performed by: INTERNAL MEDICINE

## 2020-08-27 PROCEDURE — 71046 X-RAY EXAM CHEST 2 VIEWS: CPT

## 2020-08-27 PROCEDURE — G8428 CUR MEDS NOT DOCUMENT: HCPCS | Performed by: INTERNAL MEDICINE

## 2020-08-27 PROCEDURE — G8420 CALC BMI NORM PARAMETERS: HCPCS | Performed by: INTERNAL MEDICINE

## 2020-08-27 PROCEDURE — 99214 OFFICE O/P EST MOD 30 MIN: CPT | Performed by: INTERNAL MEDICINE

## 2020-08-27 PROCEDURE — 4040F PNEUMOC VAC/ADMIN/RCVD: CPT | Performed by: INTERNAL MEDICINE

## 2020-08-27 PROCEDURE — 1036F TOBACCO NON-USER: CPT | Performed by: INTERNAL MEDICINE

## 2020-08-27 RX ORDER — KETOCONAZOLE 20 MG/G
CREAM TOPICAL
COMMUNITY
Start: 2020-08-04 | End: 2020-12-01 | Stop reason: ALTCHOICE

## 2020-08-27 ASSESSMENT — ENCOUNTER SYMPTOMS
EYE DISCHARGE: 0
CHEST TIGHTNESS: 0
SORE THROAT: 0
EYE ITCHING: 0
STRIDOR: 0
CHOKING: 0
VOICE CHANGE: 0
CONSTIPATION: 0
EYE PAIN: 0
DIARRHEA: 0
ABDOMINAL PAIN: 0
SHORTNESS OF BREATH: 1

## 2020-08-27 NOTE — PROGRESS NOTES
Pulmonary Outpatient Note   Zannie Brunner, MD       2020    Chief Complaint:  New Patient (R/BDr. Jessenia Blunt left plural effusion ) and Results (chest x-ray just done)     HPI:   [de-identified]y.o. year old male here for further eval of a left effusion. CXR personally reviewed, bilateral effusions greater on the left  Had recent echo which showed a large left effusion, diastolic dysfunction. Describes shortness of breath with moderate activity on a daily basis for many months. No significant orthopnea or positional nature to this   Has a cough and post nasal drip with sinus congestion. Was tried on inhalers in the past without any relief of symptoms.    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    Vitamin D deficiency disease 12/15       Past Surgical History:   Procedure Laterality Date    CORONARY ANGIOPLASTY WITH STENT PLACEMENT  2000    RX Tristar 2.5 x 13 mCX  RX Tristar 2.5 x 13 pCX    CORONARY ANGIOPLASTY WITH STENT PLACEMENT  10/19/2000    Tetra 2.75 x 18 CX  Tetra 3.0 x 13 CX    OTHER SURGICAL HISTORY  12    phaco emulsification cataract right eye    TOOTH EXTRACTION         Social History     Tobacco Use    Smoking status: Former Smoker     Packs/day: 2.00     Years: 20.00     Pack years: 40.00     Types: Cigarettes     Start date: 1956     Last attempt to quit: 1975     Years since quittin.6    Smokeless tobacco: Never Used   Substance Use Topics    Alcohol use: No          Family History   Problem Relation Age of Onset    Emphysema Mother     Stroke Father     Other Daughter Factor V deficiency    Other Grandchild         Factor V deficiency         Current Outpatient Medications:     ketoconazole (NIZORAL) 2 % cream, , Disp: , Rfl:     Omega-3 Fatty Acids (FISH OIL PO), Take by mouth, Disp: , Rfl:     Multiple Vitamin (MULTI-VITAMIN DAILY PO), Take by mouth, Disp: , Rfl:     levothyroxine (SYNTHROID) 88 MCG tablet, TAKE 1 TABLET EVERY DAY, Disp: 90 tablet, Rfl: 1    metFORMIN (GLUCOPHAGE) 1000 MG tablet, TAKE 1 TABLET TWICE DAILY WITH MEALS, Disp: 180 tablet, Rfl: 1    pravastatin (PRAVACHOL) 20 MG tablet, Take 1 tablet by mouth daily, Disp: 90 tablet, Rfl: 3    torsemide (DEMADEX) 20 MG tablet, Take 1 tablet by mouth daily, Disp: 90 tablet, Rfl: 3    metoprolol tartrate (LOPRESSOR) 50 MG tablet, TAKE 1 TABLET TWICE DAILY, Disp: 180 tablet, Rfl: 3    montelukast (SINGULAIR) 10 MG tablet, , Disp: , Rfl:     fluticasone (FLONASE) 50 MCG/ACT nasal spray, 2 sprays by Each Nostril route daily, Disp: , Rfl:     losartan (COZAAR) 100 MG tablet, Take 1 tablet by mouth daily, Disp: 90 tablet, Rfl: 3    amLODIPine (NORVASC) 5 MG tablet, Take 1 tablet by mouth daily, Disp: 90 tablet, Rfl: 3    Coenzyme Q10 (COQ10 PO), Take by mouth, Disp: , Rfl:     Ascorbic Acid (VITAMIN C PO), Take by mouth 2 times daily, Disp: , Rfl:     guaiFENesin 400 MG tablet, Take 400 mg by mouth 4 times daily as needed for Cough, Disp: , Rfl:     magnesium oxide (MAG-OX) 400 MG tablet, Take 400 mg by mouth daily, Disp: , Rfl:     ferrous sulfate 325 (65 Fe) MG tablet, Take 325 mg by mouth 2 times daily, Disp: , Rfl:     aspirin 81 MG EC tablet, Take 1 tablet by mouth daily, Disp: 30 tablet, Rfl: 0    Cholecalciferol (VITAMIN D) 2000 UNITS CAPS capsule, Take  by mouth daily. , Disp: , Rfl:     Metolazone; Lipitor; and Simvastatin    Vitals:    08/27/20 1033   BP: 135/69   Pulse: 60   Temp: 98 °F (36.7 °C)   TempSrc: Oral   SpO2: 98%   Weight: 172 lb (78 kg)   Height: 5' 10\" (1.778 m)       Review of Systems   Constitutional: Negative for chills, fever and unexpected weight change. HENT: Negative for mouth sores, sore throat and voice change. Eyes: Negative for pain, discharge and itching. Respiratory: Positive for shortness of breath. Negative for choking, chest tightness and stridor. Cardiovascular: Negative for chest pain, palpitations and leg swelling. Gastrointestinal: Negative for abdominal pain, constipation and diarrhea. Endocrine: Negative for cold intolerance, heat intolerance and polydipsia. Genitourinary: Negative for dysuria, frequency and hematuria. Musculoskeletal: Negative for gait problem, joint swelling and neck stiffness. Neurological: Negative for dizziness, numbness and headaches. Psychiatric/Behavioral: Negative for agitation, confusion and hallucinations. Physical Exam  Constitutional:       General: He is not in acute distress. Appearance: He is well-developed. He is not diaphoretic. HENT:      Head: Normocephalic and atraumatic. Mouth/Throat:      Pharynx: No oropharyngeal exudate. Eyes:      Pupils: Pupils are equal, round, and reactive to light. Neck:      Musculoskeletal: Neck supple. Vascular: No JVD. Cardiovascular:      Heart sounds: Normal heart sounds. No murmur. No friction rub. No gallop. Pulmonary:      Effort: Pulmonary effort is normal.      Breath sounds: No wheezing or rales. Abdominal:      General: Bowel sounds are normal. There is no distension. Palpations: Abdomen is soft. Tenderness: There is no abdominal tenderness. Musculoskeletal: Normal range of motion. Lymphadenopathy:      Cervical: No cervical adenopathy. Skin:     General: Skin is warm and dry. Findings: No rash. Neurological:      Mental Status: He is alert. Cranial Nerves: No cranial nerve deficit. Comments: CN 2-12 grossly intact         ASSESSMENT:    1.  Pleural effusion on left      PLAN:    -Reviewed image results and options for pleural effusion.  He agreed to proceed with further investigation  -Set up with a thoracentesis and post procedure CT chest to evaluate lung parenchyma  -Follow up after to discuss results and assess his symptoms, if he does not feel better after thoracentesis can pursue additional workup of COPD and sinus condition    Orders Placed This Encounter   Procedures    Culture, Body Fluid    US THORACENTESIS    CT CHEST WO CONTRAST    CYTOLOGY, NON-GYN    LACTATE DEHYDROGENASE, BODY FLUID    PROTEIN, BODY FLUID       Leonel Jade MD

## 2020-08-27 NOTE — PATIENT INSTRUCTIONS
Remember to bring all pulmonary medications to your next appointment with the office. Please keep all of your future appointments scheduled by [address], [facility] office. Out of respect for other patients and providers, you may be asked to reschedule your appointment if you arrive later than your scheduled appointment time. Appointments cancelled less than 24hrs in advance will be considered a no show. Patients with three missed appointments within 1 year or four missed appointments within 2 years can be dismissed from the practice. You may receive a survey regarding the care you received during your visit. Your input is valuable to us. We encourage you to complete and return your survey. We hope you will choose us in the future for your healthcare needs. COVID test [phone] 7 days prior to Thoracentesis. Call and make appt. For test push opt. 4 and leave name and number and they will call you back.

## 2020-08-27 NOTE — TELEPHONE ENCOUNTER
Within this Telehealth Consent, the terms you and yours refer to the person using the Telehealth Service (Service), or in the case of a use of the Service by or on behalf of a minor, you and yours refer to and include (i) the parent or legal guardian who provides consent to the use of the Service by such minor or uses the Service on behalf of such minor, and (ii) the minor for whom consent is being provided or on whose behalf the Service is being utilized. When using Service, you will be consulting with your health care providers via the use of Telehealth.   Telehealth involves the delivery of healthcare services using electronic communications, information technology or other means between a healthcare provider and a patient who are not in the same physical location. Telehealth may be used for diagnosis, treatment, follow-up and/or patient education, and may include, but is not limited to, one or more of the following:    Electronic transmission of medical records, photo images, personal health information or other data between a patient and a healthcare provider    Interactions between a patient and healthcare provider via audio, video and/or data communications    Use of output data from medical devices, sound and video files    Anticipated Benefits   The use of Telehealth by your Provider(s) through the Service may have the following possible benefits:    Making it easier and more efficient for you to access medical care and treatment for the conditions treated by such Provider(s) utilizing the Service    Allowing you to obtain medical care and treatment by Provider(s) at times that are convenient for you    Enabling you to interact with Provider(s) without the necessity of an in-office appointment     Possible Risks   While the use of Telehealth can provide potential benefits for you, there are also potential risks associated with the use of Telehealth.  These risks include, but may not be limited to the following:    Your Provider(s) may not able to provide medical treatment for your particular condition and you may be required to seek alternative healthcare or emergency care services.  The electronic systems or other security protocols or safeguards used in the Service could fail, causing a breach of privacy of your medical or other information.  Given regulatory requirements in certain jurisdictions, your Provider(s) diagnosis and/or treatment options, especially pertaining to certain prescriptions, may be limited. Acceptance   1. You understand that Services will be provided via Telehealth. This process involves the use of HIPAA compliant and secure, real-time audio-visual interfacing with a qualified and appropriately trained provider located at [facility]. 2. You understand that, under no circumstances, will this session be recorded. 3. You understand that the Provider(s) at [facility] and other clinical participants will be party to the information obtained during the Telehealth session in accordance with best medical practices. 4. You understand that the information obtained during the Telehealth session will be used to help determine the most appropriate treatment options. 5. You understand that You have the right to revoke this consent at any point in time. 6. You understand that Telehealth is voluntary, and that continued treatment is not dependent upon consent. 7. You understand that, in the event of non-consent to Telehealth services and/or technical difficulties, you will obtain services as typically provided in the absence of Telehealth technology. 8. You understand that this consent will be kept in Your medical record. 9. No potential benefits from the use of Telehealth or specific results can be guaranteed. Your condition may not be cured or improved and, in some cases, may get worse.    10. There are limitations in the provision of medical care and treatment via Telehealth and the Service and you may not be able to receive diagnosis and/or treatment through the Service for every condition for which you seek diagnosis and/or treatment. 11. There are potential risks to the use of Telehealth, including but not limited to the risks described in this Telehealth Consent. 12. Your Provider(s) have discussed the use of Telehealth and the Service with you, including the benefits and risks of such and you have provided oral consent to your Provider(s) for the use of Telehealth and the Service. 15. You understand that it is your duty to provide your Provider(s) truthful, accurate and complete information, including all relevant information regarding care that you may have received or may be receiving from other healthcare providers outside of the Service. 14. You understand that each of your Provider(s) may determine in his or sole discretion that your condition is not suitable for diagnosis and/or treatment using the Service, and that you may need to seek medical care and treatment a specialist or other healthcare provider, outside of the Service. 15. You understand that you are fully responsible for payment for all services provided by Provider(s) or through use of the Service and that you may not be able to use third-party insurance. 16. You represent that (a) you have read this Telehealth Consent carefully, (b) you understand the risks and benefits of the Service and the use of Telehealth in the medical care and treatment provided to you by Provider(s) using the Service, and (c) you have the legal capacity and authority to provide this consent for yourself and/or the minor for which you are consenting under applicable federal and state laws, including laws relating to the age of [de-identified] and/or parental/guardian consent.    17. You give your informed consent to the use of Telehealth by Provider(s) using the Service under the terms described in the Terms of Service and this Telehealth Consent. The patient was read the following statement and has consented to the visit as of 8/27/20. The patient has been scheduled for their first telehealth visit on 9/29 with Reza Conroy.

## 2020-08-27 NOTE — PROGRESS NOTES
MA Communication: The following orders are received by verbal communication from Dr. Adolfo Hauser. Orders include: Throcentesis by Rad.         CT chest/ lab work       FU VV

## 2020-09-02 ENCOUNTER — OFFICE VISIT (OUTPATIENT)
Dept: PRIMARY CARE CLINIC | Age: 80
End: 2020-09-02
Payer: MEDICARE

## 2020-09-02 PROCEDURE — G8420 CALC BMI NORM PARAMETERS: HCPCS | Performed by: NURSE PRACTITIONER

## 2020-09-02 PROCEDURE — G8428 CUR MEDS NOT DOCUMENT: HCPCS | Performed by: NURSE PRACTITIONER

## 2020-09-02 PROCEDURE — 99211 OFF/OP EST MAY X REQ PHY/QHP: CPT | Performed by: NURSE PRACTITIONER

## 2020-09-02 NOTE — PROGRESS NOTES
Christiano Bustamante received a viral test for COVID-19. They were educated on isolation and quarantine as appropriate. For any symptoms, they were directed to seek care from their PCP, given contact information to establish with a doctor, directed to an urgent care or the emergency room.

## 2020-09-02 NOTE — PATIENT INSTRUCTIONS

## 2020-09-08 ENCOUNTER — HOSPITAL ENCOUNTER (OUTPATIENT)
Dept: GENERAL RADIOLOGY | Age: 80
Discharge: HOME | End: 2020-09-08
Payer: MEDICARE

## 2020-09-08 ENCOUNTER — HOSPITAL ENCOUNTER (OUTPATIENT)
Dept: ULTRASOUND IMAGING | Age: 80
Discharge: HOME | End: 2020-09-08
Payer: MEDICARE

## 2020-09-08 ENCOUNTER — HOSPITAL ENCOUNTER (OUTPATIENT)
Age: 80
Discharge: HOME | End: 2020-09-08
Payer: MEDICARE

## 2020-09-08 LAB
FLUID TYPE: NORMAL
INR BLD: 1.21 (ref 0.86–1.14)
LD, FLUID: 65 U/L
PROTEIN FLUID: 2.1 G/DL
PROTHROMBIN TIME: 14.1 SEC (ref 10–13.2)

## 2020-09-08 PROCEDURE — 84157 ASSAY OF PROTEIN OTHER: CPT

## 2020-09-08 PROCEDURE — 87070 CULTURE OTHR SPECIMN AEROBIC: CPT

## 2020-09-08 PROCEDURE — 36415 COLL VENOUS BLD VENIPUNCTURE: CPT

## 2020-09-08 PROCEDURE — 83615 LACTATE (LD) (LDH) ENZYME: CPT

## 2020-09-08 PROCEDURE — 88305 TISSUE EXAM BY PATHOLOGIST: CPT

## 2020-09-08 PROCEDURE — 88112 CYTOPATH CELL ENHANCE TECH: CPT

## 2020-09-08 PROCEDURE — 85610 PROTHROMBIN TIME: CPT

## 2020-09-08 PROCEDURE — 71045 X-RAY EXAM CHEST 1 VIEW: CPT

## 2020-09-08 PROCEDURE — 87205 SMEAR GRAM STAIN: CPT

## 2020-09-08 PROCEDURE — 32555 ASPIRATE PLEURA W/ IMAGING: CPT

## 2020-09-08 NOTE — PROGRESS NOTES
RADIOLOGY:  Patient status post ultrasound guided [<left>] sided thoracentesis. Patient tolerated the procedure well. Full report to follow.

## 2020-09-11 LAB
BODY FLUID CULTURE, STERILE: NORMAL
GRAM STAIN RESULT: NORMAL

## 2020-09-17 ENCOUNTER — HOSPITAL ENCOUNTER (OUTPATIENT)
Dept: CT IMAGING | Age: 80
Discharge: HOME | End: 2020-09-17
Payer: MEDICARE

## 2020-09-17 PROCEDURE — 71250 CT THORAX DX C-: CPT

## 2020-09-29 ENCOUNTER — TELEPHONE (OUTPATIENT)
Dept: PULMONOLOGY | Age: 80
End: 2020-09-29

## 2020-09-29 ENCOUNTER — OFFICE VISIT (OUTPATIENT)
Dept: PULMONOLOGY | Age: 80
End: 2020-09-29
Payer: MEDICARE

## 2020-09-29 VITALS
DIASTOLIC BLOOD PRESSURE: 71 MMHG | SYSTOLIC BLOOD PRESSURE: 132 MMHG | TEMPERATURE: 97.8 F | HEART RATE: 62 BPM | WEIGHT: 174 LBS | HEIGHT: 71 IN | RESPIRATION RATE: 16 BRPM | BODY MASS INDEX: 24.36 KG/M2 | OXYGEN SATURATION: 98 %

## 2020-09-29 PROCEDURE — G8427 DOCREV CUR MEDS BY ELIG CLIN: HCPCS | Performed by: INTERNAL MEDICINE

## 2020-09-29 PROCEDURE — 4040F PNEUMOC VAC/ADMIN/RCVD: CPT | Performed by: INTERNAL MEDICINE

## 2020-09-29 PROCEDURE — 99213 OFFICE O/P EST LOW 20 MIN: CPT | Performed by: INTERNAL MEDICINE

## 2020-09-29 PROCEDURE — G8420 CALC BMI NORM PARAMETERS: HCPCS | Performed by: INTERNAL MEDICINE

## 2020-09-29 PROCEDURE — 1036F TOBACCO NON-USER: CPT | Performed by: INTERNAL MEDICINE

## 2020-09-29 PROCEDURE — 1123F ACP DISCUSS/DSCN MKR DOCD: CPT | Performed by: INTERNAL MEDICINE

## 2020-09-29 ASSESSMENT — ENCOUNTER SYMPTOMS
EYE ITCHING: 0
EYE PAIN: 0
CONSTIPATION: 0
DIARRHEA: 0
SORE THROAT: 0
CHEST TIGHTNESS: 0
CHOKING: 0
VOICE CHANGE: 0
STRIDOR: 0
ABDOMINAL PAIN: 0
EYE DISCHARGE: 0

## 2020-09-29 NOTE — PROGRESS NOTES
(SYNTHROID) 88 MCG tablet, TAKE 1 TABLET EVERY DAY, Disp: 90 tablet, Rfl: 1    metFORMIN (GLUCOPHAGE) 1000 MG tablet, TAKE 1 TABLET TWICE DAILY WITH MEALS, Disp: 180 tablet, Rfl: 1    pravastatin (PRAVACHOL) 20 MG tablet, Take 1 tablet by mouth daily, Disp: 90 tablet, Rfl: 3    torsemide (DEMADEX) 20 MG tablet, Take 1 tablet by mouth daily, Disp: 90 tablet, Rfl: 3    metoprolol tartrate (LOPRESSOR) 50 MG tablet, TAKE 1 TABLET TWICE DAILY, Disp: 180 tablet, Rfl: 3    montelukast (SINGULAIR) 10 MG tablet, , Disp: , Rfl:     fluticasone (FLONASE) 50 MCG/ACT nasal spray, 2 sprays by Each Nostril route daily, Disp: , Rfl:     losartan (COZAAR) 100 MG tablet, Take 1 tablet by mouth daily, Disp: 90 tablet, Rfl: 3    amLODIPine (NORVASC) 5 MG tablet, Take 1 tablet by mouth daily, Disp: 90 tablet, Rfl: 3    Coenzyme Q10 (COQ10 PO), Take by mouth, Disp: , Rfl:     Ascorbic Acid (VITAMIN C PO), Take by mouth 2 times daily, Disp: , Rfl:     guaiFENesin 400 MG tablet, Take 400 mg by mouth 4 times daily as needed for Cough, Disp: , Rfl:     magnesium oxide (MAG-OX) 400 MG tablet, Take 400 mg by mouth daily, Disp: , Rfl:     ferrous sulfate 325 (65 Fe) MG tablet, Take 325 mg by mouth 2 times daily, Disp: , Rfl:     aspirin 81 MG EC tablet, Take 1 tablet by mouth daily, Disp: 30 tablet, Rfl: 0    Cholecalciferol (VITAMIN D) 2000 UNITS CAPS capsule, Take  by mouth daily. , Disp: , Rfl:     Metolazone; Lipitor; and Simvastatin    Vitals:    09/29/20 1044 09/29/20 1045   BP: (!) 146/72 132/71   Site: Left Upper Arm Left Upper Arm   Position: Sitting Sitting   Cuff Size: Medium Adult Medium Adult   Pulse: 62    Resp: 16    Temp: 97.8 °F (36.6 °C)    TempSrc: Oral    SpO2: 98%    Weight: 174 lb (78.9 kg)    Height: 5' 11\" (1.803 m)        Review of Systems   Constitutional: Negative for chills, fever and unexpected weight change. HENT: Negative for mouth sores, sore throat and voice change.     Eyes: Negative for pain, discharge and itching. Respiratory: Negative for choking, chest tightness and stridor. Cardiovascular: Negative for chest pain, palpitations and leg swelling. Gastrointestinal: Negative for abdominal pain, constipation and diarrhea. Endocrine: Negative for cold intolerance, heat intolerance and polydipsia. Genitourinary: Negative for dysuria, frequency and hematuria. Musculoskeletal: Negative for gait problem, joint swelling and neck stiffness. Neurological: Negative for dizziness, numbness and headaches. Psychiatric/Behavioral: Negative for agitation, confusion and hallucinations. Physical Exam  Constitutional:       General: He is not in acute distress. Appearance: He is well-developed. He is not diaphoretic. HENT:      Head: Normocephalic and atraumatic. Mouth/Throat:      Pharynx: No oropharyngeal exudate. Eyes:      Pupils: Pupils are equal, round, and reactive to light. Neck:      Musculoskeletal: Neck supple. Vascular: No JVD. Cardiovascular:      Heart sounds: Normal heart sounds. No murmur. No friction rub. No gallop. Pulmonary:      Effort: Pulmonary effort is normal.      Breath sounds: No wheezing or rales. Abdominal:      General: Bowel sounds are normal. There is no distension. Palpations: Abdomen is soft. Tenderness: There is no abdominal tenderness. Musculoskeletal: Normal range of motion. Lymphadenopathy:      Cervical: No cervical adenopathy. Skin:     General: Skin is warm and dry. Findings: No rash. Neurological:      Mental Status: He is alert. Cranial Nerves: No cranial nerve deficit. Comments: CN 2-12 grossly intact         ASSESSMENT:    1.  Pleural effusion on left      PLAN:    -Follow up CXR in 2 months  -If symptoms worsen before then will move up chest imaging  -Cardiac management per Dr. Tamayo Even This Encounter   Procedures    XR CHEST STANDARD (2 VW)       Linda Arvizu MD    --Tru Schneider MD on 9/29/2020 at 11:49 AM    An electronic signature was used to authenticate this note.

## 2020-09-29 NOTE — PATIENT INSTRUCTIONS
Remember to bring all pulmonary medications to your next appointment with the office. Please keep all of your future appointments scheduled by [address], [facility] office. Out of respect for other patients and providers, you may be asked to reschedule your appointment if you arrive later than your scheduled appointment time. Appointments cancelled less than 24hrs in advance will be considered a no show. Patients with three missed appointments within 1 year or four missed appointments within 2 years can be dismissed from the practice. You may receive a survey regarding the care you received during your visit. Your input is valuable to us. We encourage you to complete and return your survey. We hope you will choose us in the future for your healthcare needs.

## 2020-09-29 NOTE — TELEPHONE ENCOUNTER
Within this Telehealth Consent, the terms you and yours refer to the person using the Telehealth Service (Service), or in the case of a use of the Service by or on behalf of a minor, you and yours refer to and include (i) the parent or legal guardian who provides consent to the use of the Service by such minor or uses the Service on behalf of such minor, and (ii) the minor for whom consent is being provided or on whose behalf the Service is being utilized. When using Service, you will be consulting with your health care providers via the use of Telehealth.   Telehealth involves the delivery of healthcare services using electronic communications, information technology or other means between a healthcare provider and a patient who are not in the same physical location. Telehealth may be used for diagnosis, treatment, follow-up and/or patient education, and may include, but is not limited to, one or more of the following:    Electronic transmission of medical records, photo images, personal health information or other data between a patient and a healthcare provider    Interactions between a patient and healthcare provider via audio, video and/or data communications    Use of output data from medical devices, sound and video files    Anticipated Benefits   The use of Telehealth by your Provider(s) through the Service may have the following possible benefits:    Making it easier and more efficient for you to access medical care and treatment for the conditions treated by such Provider(s) utilizing the Service    Allowing you to obtain medical care and treatment by Provider(s) at times that are convenient for you    Enabling you to interact with Provider(s) without the necessity of an in-office appointment     Possible Risks   While the use of Telehealth can provide potential benefits for you, there are also potential risks associated with the use of Telehealth.  These risks include, but may not be the provision of medical care and treatment via Telehealth and the Service and you may not be able to receive diagnosis and/or treatment through the Service for every condition for which you seek diagnosis and/or treatment. 11. There are potential risks to the use of Telehealth, including but not limited to the risks described in this Telehealth Consent. 12. Your Provider(s) have discussed the use of Telehealth and the Service with you, including the benefits and risks of such and you have provided oral consent to your Provider(s) for the use of Telehealth and the Service. 15. You understand that it is your duty to provide your Provider(s) truthful, accurate and complete information, including all relevant information regarding care that you may have received or may be receiving from other healthcare providers outside of the Service. 14. You understand that each of your Provider(s) may determine in his or sole discretion that your condition is not suitable for diagnosis and/or treatment using the Service, and that you may need to seek medical care and treatment a specialist or other healthcare provider, outside of the Service. 15. You understand that you are fully responsible for payment for all services provided by Provider(s) or through use of the Service and that you may not be able to use third-party insurance. 16. You represent that (a) you have read this Telehealth Consent carefully, (b) you understand the risks and benefits of the Service and the use of Telehealth in the medical care and treatment provided to you by Provider(s) using the Service, and (c) you have the legal capacity and authority to provide this consent for yourself and/or the minor for which you are consenting under applicable federal and state laws, including laws relating to the age of [de-identified] and/or parental/guardian consent.    17. You give your informed consent to the use of Telehealth by Provider(s) using the Service under the terms described in the Terms of Service and this Telehealth Consent. The patient was read the following statement and has consented to the visit as of 20. The patient has been scheduled for their first telehealth visit on 20 with Dr. Alva Pantoja.

## 2020-09-29 NOTE — PROGRESS NOTES
MA Communication: The following orders are received by verbal communication from Dr. Zacarias Vizcarra.     Orders include:  2 mo fu w/CXR scheduled 12/2/20

## 2020-10-14 RX ORDER — LOSARTAN POTASSIUM 100 MG/1
TABLET ORAL
Qty: 90 TABLET | Refills: 3 | Status: SHIPPED | OUTPATIENT
Start: 2020-10-14 | End: 2021-02-05 | Stop reason: SDUPTHER

## 2020-10-14 NOTE — TELEPHONE ENCOUNTER
LOV 6/18/20    Plan:   1. Meds reviewed. Refills as warranted. 2. I recommend ECHO to evaluate LV function and size, wall motion, and valves for any structural abnormalities. 3. I will order a lexiscan nuclear stress test to assess myocardial perfusion and LV function.     4. Follow up with me in 6 months

## 2020-11-16 ENCOUNTER — OFFICE VISIT (OUTPATIENT)
Dept: FAMILY MEDICINE CLINIC | Age: 80
End: 2020-11-16
Payer: MEDICARE

## 2020-11-16 VITALS
WEIGHT: 178.13 LBS | TEMPERATURE: 98.1 F | HEART RATE: 72 BPM | OXYGEN SATURATION: 97 % | BODY MASS INDEX: 24.84 KG/M2 | DIASTOLIC BLOOD PRESSURE: 74 MMHG | SYSTOLIC BLOOD PRESSURE: 152 MMHG

## 2020-11-16 LAB — HBA1C MFR BLD: 6.2 %

## 2020-11-16 PROCEDURE — 99214 OFFICE O/P EST MOD 30 MIN: CPT | Performed by: NURSE PRACTITIONER

## 2020-11-16 PROCEDURE — 83036 HEMOGLOBIN GLYCOSYLATED A1C: CPT | Performed by: NURSE PRACTITIONER

## 2020-11-16 ASSESSMENT — ENCOUNTER SYMPTOMS
VOMITING: 0
SHORTNESS OF BREATH: 1
WHEEZING: 0
CHEST TIGHTNESS: 0
DIARRHEA: 0
ABDOMINAL PAIN: 0
SORE THROAT: 0
NAUSEA: 0
COUGH: 1
RHINORRHEA: 0

## 2020-11-16 NOTE — PROGRESS NOTES
CHIEF COMPLAINT  Chief Complaint   Patient presents with    Check-Up    Shortness of Breath    Congestion     Sinus drainage        HPI   Umair Elliott is a [de-identified] y.o. male who presents to the office checkup. Patient denies any new unusual fatigue unexplained weight loss. No chest pain or tightness. Patient reports chronic shortness of breath worse upon exertion. Patient reports productive cough with clear to white sputum. Patient sees pulmonology on a regular basis. Patient reports that he does check his blood sugar 1 time a day. Patient reports that blood sugar ranges between 100-110. Patient reports he has been taking guaifenesin 2-3 times a week at nighttime to help with his congestion. Patient weighs himself daily. Patient reports that he did gain 2-1/2 pounds over the past day. Patient reports that he did decrease his water pill to 10 mg over the past week. Patient reports that he did that himself with no recommendations per his cardiologist or pulmonologist.  Patient reports that pulmonologist would like to draw the fluid off his lung again however he reports \"it was too painful I do not feel like I want to do that again. \"  Patient denies any abdominal pain or discomfort. No nausea, vomiting, or diarrhea. No changes in bowel or bladder habits. No other complaints, modifying factors or associated symptoms. Nursing notes reviewed.    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    Vitamin D deficiency disease 12/15     Past Surgical History:   Procedure Laterality Date    CORONARY ANGIOPLASTY WITH STENT PLACEMENT  08/14/2000    RX Tristar 2.5 x 13 mCX  RX Tristar 2.5 x 13 pCX    CORONARY ANGIOPLASTY WITH STENT PLACEMENT  10/19/2000    Tetra 2.75 x 18 CX  Tetra 3.0 x 13 CX    OTHER SURGICAL HISTORY  12    phaco emulsification cataract right eye    TOOTH EXTRACTION       Family History   Problem Relation Age of Onset    Emphysema Mother     Stroke Father     Other Daughter         Factor V deficiency    Other Grandchild         Factor V deficiency     Social History     Socioeconomic History    Marital status:      Spouse name: Not on file    Number of children: Not on file    Years of education: Not on file    Highest education level: Not on file   Occupational History    Not on file   Social Needs    Financial resource strain: Not on file    Food insecurity     Worry: Not on file     Inability: Not on file    Transportation needs     Medical: Not on file     Non-medical: Not on file   Tobacco Use    Smoking status: Former Smoker     Packs/day: 2.00     Years: 20.00     Pack years: 40.00     Types: Cigarettes     Start date: 1956     Last attempt to quit: 1975     Years since quittin.9    Smokeless tobacco: Never Used   Substance and Sexual Activity    Alcohol use: No    Drug use: No    Sexual activity: Yes     Partners: Female   Lifestyle    Physical activity     Days per week: Not on file     Minutes per session: Not on file    Stress: Not on file   Relationships    Social connections     Talks on phone: Not on file     Gets together: Not on file     Attends Oriental orthodox service: Not on file     Active member of club or organization: Not on file     Attends meetings of clubs or organizations: Not on file     Relationship status: Not on file    Intimate partner violence     Fear of current or ex partner: Not on file     Emotionally abused: Not on file     Physically abused: Not on file     Forced sexual activity: Not on file   Other Topics Concern    Not on file   Social History Narrative    Not on file     Current Outpatient Medications   Medication Sig Dispense Refill    losartan (COZAAR) 100 MG tablet TAKE 1 TABLET EVERY DAY 90 tablet 3    ketoconazole (NIZORAL) 2 % cream       Omega-3 Fatty Acids (FISH OIL PO) Take by mouth      Multiple Vitamin (MULTI-VITAMIN DAILY PO) Take by mouth      levothyroxine (SYNTHROID) 88 MCG tablet TAKE 1 TABLET EVERY DAY 90 tablet 1    metFORMIN (GLUCOPHAGE) 1000 MG tablet TAKE 1 TABLET TWICE DAILY WITH MEALS 180 tablet 1    pravastatin (PRAVACHOL) 20 MG tablet Take 1 tablet by mouth daily 90 tablet 3    torsemide (DEMADEX) 20 MG tablet Take 1 tablet by mouth daily (Patient taking differently: Take 10 mg by mouth daily ) 90 tablet 3    metoprolol tartrate (LOPRESSOR) 50 MG tablet TAKE 1 TABLET TWICE DAILY 180 tablet 3    fluticasone (FLONASE) 50 MCG/ACT nasal spray 2 sprays by Each Nostril route daily      amLODIPine (NORVASC) 5 MG tablet Take 1 tablet by mouth daily 90 tablet 3    Coenzyme Q10 (COQ10 PO) Take by mouth      Ascorbic Acid (VITAMIN C PO) Take by mouth 2 times daily      guaiFENesin 400 MG tablet Take 400 mg by mouth 4 times daily as needed for Cough      magnesium oxide (MAG-OX) 400 MG tablet Take 400 mg by mouth daily      ferrous sulfate 325 (65 Fe) MG tablet Take 325 mg by mouth 2 times daily      aspirin 81 MG EC tablet Take 1 tablet by mouth daily 30 tablet 0    Cholecalciferol (VITAMIN D) 2000 UNITS CAPS capsule Take  by mouth daily. No current facility-administered medications for this visit. Allergies   Allergen Reactions    Metolazone      Sodium 120 on 5/19, was on metolazone, no hypovolemic synptoms on presentation    Lipitor      Myalgias at 20 mg    Simvastatin      myalgias       REVIEW OF SYSTEMS  Review of Systems   Constitutional: Negative for activity change, appetite change, chills and fever. HENT: Positive for congestion. Negative for rhinorrhea and sore throat. Eyes: Negative for visual disturbance.    Respiratory: Positive for cough and shortness of breath. Negative for chest tightness and wheezing. Cardiovascular: Negative for chest pain and leg swelling. Gastrointestinal: Negative for abdominal pain, diarrhea, nausea and vomiting. Genitourinary: Negative for dysuria, frequency, hematuria and urgency. Musculoskeletal: Negative for gait problem and myalgias. Skin: Negative for rash. Neurological: Negative for dizziness, weakness, light-headedness, numbness and headaches. Psychiatric/Behavioral: Negative for self-injury and sleep disturbance. The patient is not nervous/anxious. PHYSICAL EXAM  BP (!) 152/74   Pulse 72   Temp 98.1 °F (36.7 °C) (Oral)   Wt 178 lb 2 oz (80.8 kg)   SpO2 97%   BMI 24.84 kg/m²   Physical Exam  Vitals signs reviewed. Constitutional:       General: He is not in acute distress. Appearance: Normal appearance. He is well-developed. He is not diaphoretic. HENT:      Head: Normocephalic and atraumatic. Right Ear: Tympanic membrane normal.      Left Ear: Tympanic membrane normal.      Nose: Nose normal.      Mouth/Throat:      Mouth: Mucous membranes are moist.      Pharynx: Oropharynx is clear. No oropharyngeal exudate or posterior oropharyngeal erythema. Eyes:      General:         Right eye: No discharge. Left eye: No discharge. Pupils: Pupils are equal, round, and reactive to light. Neck:      Musculoskeletal: Normal range of motion and neck supple. Vascular: No JVD. Cardiovascular:      Rate and Rhythm: Normal rate. Pulses: Normal pulses. Pulmonary:      Effort: Pulmonary effort is normal. No respiratory distress. Breath sounds: No stridor. No wheezing, rhonchi or rales. Chest:      Chest wall: No tenderness. Abdominal:      General: Bowel sounds are normal. There is no distension. Palpations: Abdomen is soft. Tenderness: There is no abdominal tenderness. There is no guarding.    Musculoskeletal: Normal range of symptoms. 4. Pure hypercholesterolemia  Stable. Managed by cardiology. Patient compliant with omega-3 fish oil and pravastatin 20 mg daily. No adverse side effects or missed doses. Patient encouraged to monitor dietary intake limiting saturated fats, increase fiber. Follow-up with cardiologist as recommended. Follow-up with myself in 6 months, sooner for new or worsening symptoms. 5. Chronic diastolic congestive heart failure (HCC)  Stable. Managed by cardiologist.  Patient is prescribed Demadex 20 mg daily however he reports over the past week he has cut his medication in half and only taking 10 mg daily. Patient reports that he did weigh himself today and had gained 2-1/2 pounds over the past 24 hours. Patient denies any swelling to his extremities or abdomen. Patient reports that he does have shortness of breath worse upon exertion but is normal.  Patient reports chronic productive cough as well. Patient also sees pulmonology for pleural effusion. Patient reports that they want to remove the fluid however previous procedure was too painful and he does not want to go through that again. I did recommend patient taking medications as prescribed and if symptoms do not improve then he will need to be reevaluated. Patient verbalized and acknowledges with plan of care at this time. 6. Coronary artery disease involving native coronary artery of native heart without angina pectoris  Stable. Managed by cardiologist.  Patient compliant with medication regimen. No recent changes. Echocardiogram performed August 2020. Continue current management per cardiologist follow-up for any new or worsening symptoms. Influenza vaccination offered however patient declined. The note was completed using Dragon voice recognition transcription. Every effort was made to ensure accuracy; however, inadvertent  transcription errors may be present despite my best efforts to edit errors.     Odilia Rondon, AICHA - CNP

## 2020-11-16 NOTE — PATIENT INSTRUCTIONS
Please read the healthy family handout that you were given and share it with your family. Please compare this printed medication list with your medications at home to be sure they are the same. If you have any medications that are different please contact us immediately at [phone]. Also review your allergies that we have listed, these may also include medications that you have not been able to tolerate, make sure everything listed is correct. If you have any allergies that are different please contact us immediately at [phone]. Patient Education        Learning About Coronary Artery Disease (CAD)  What is coronary artery disease? Coronary artery disease (CAD) occurs when plaque builds up in the arteries that bring oxygen-rich blood to your heart. Plaque is a fatty substance made of cholesterol, calcium, and other substances in the blood. This process is called hardening of the arteries, or atherosclerosis. What happens when you have coronary artery disease? · Plaque may narrow the coronary arteries. Narrowed arteries cause poor blood flow. This can lead to angina symptoms such as chest pain or discomfort. If blood flow is completely blocked, you could have a heart attack. · You can slow CAD and reduce the risk of future problems by making changes in your lifestyle. These include quitting smoking and eating heart-healthy foods. · Treatments for CAD, along with changes in your lifestyle, can help you live a longer and healthier life. How can you prevent coronary artery disease? · Do not smoke. It may be the best thing you can do to prevent heart disease. If you need help quitting, talk to your doctor about stop-smoking programs and medicines. These can increase your chances of quitting for good. · Be active. Get at least 30 minutes of exercise on most days of the week. Walking is a good choice.  You also may want to do other activities, such as running, swimming, cycling, or playing tennis or team sports. · Eat heart-healthy foods. Eat more fruits and vegetables and less foods that contain saturated and trans fats. Limit alcohol, sodium, and sweets. · Stay at a healthy weight. Lose weight if you need to. · Manage other health problems such as diabetes, high blood pressure, and high cholesterol. How is coronary artery disease treated? · Your doctor will suggest that you make lifestyle changes. For example, your doctor may ask you to eat healthy foods, quit smoking, lose extra weight, and be more active. · You will have to take medicines. · Your doctor may suggest a procedure to open narrowed or blocked arteries. This is called angioplasty. Or your doctor may suggest using healthy blood vessels to create detours around narrowed or blocked arteries. This is called bypass surgery. Follow-up care is a key part of your treatment and safety. Be sure to make and go to all appointments, and call your doctor if you are having problems. It's also a good idea to know your test results and keep a list of the medicines you take. Where can you learn more? Go to https://When You WishpeEbix.SiOx. org and sign in to your [employer] account. Enter [phone] in the [facility] box to learn more about \"Learning About Coronary Artery Disease (CAD). \"     If you do not have an account, please click on the \"Sign Up Now\" link. Current as of: December 16, 2019               Content Version: 12.6  © [id number] [employer]orporated. Care instructions adapted under license by [facility] ([facility]). If you have questions about a medical condition or this instruction, always ask your healthcare professional. [place] [address] any warranty or liability for your use of this information. Patient Education        Learning About Meal Planning for Diabetes  Why plan your meals? Meal planning can be a key part of managing diabetes.  Planning meals and snacks with the right balance of carbohydrate, Carbohydrate raises blood sugar higher and more quickly than any other nutrient. It is found in desserts, breads and cereals, and fruit. It's also found in starchy vegetables such as potatoes and corn, grains such as rice and pasta, and milk and yogurt. Spreading carbohydrate throughout the day helps keep your blood sugar levels within your target range. Your daily amount depends on several things, including your weight, how active you are, which diabetes medicines you take, and what your goals are for your blood sugar levels. A registered dietitian or diabetes educator can help you plan how much carbohydrate to include in each meal and snack. A guideline for your daily amount of carbohydrate is:  · 45 to 60 grams at each meal. That's about the same as 3 to 4 carbohydrate servings. · 15 to 20 grams at each snack. That's about the same as 1 carbohydrate serving. The Nutrition Facts label on packaged foods tells you how much carbohydrate is in a serving of the food. First, look at the serving size on the food label. Is that the amount you eat in a serving? All of the nutrition information on a food label is based on that serving size. So if you eat more or less than that, you'll need to adjust the other numbers. Total carbohydrate is the next thing you need to look for on the label. If you count carbohydrate servings, one serving of carbohydrate is 15 grams. For foods that don't come with labels, such as fresh fruits and vegetables, you'll need a guide that lists carbohydrate in these foods. Ask your doctor, dietitian, or diabetes educator about books or other nutrition guides you can use. If you take insulin, you need to know how many grams of carbohydrate are in a meal. This lets you know how much rapid-acting insulin to take before you eat. If you use an insulin pump, you get a constant rate of insulin during the day.  So the pump must be programmed at meals to give you extra insulin to cover the rise in blood sugar after meals. When you know how much carbohydrate you will eat, you can take the right amount of insulin. Or, if you always use the same amount of insulin, you need to make sure that you eat the same amount of carbohydrate at meals. If you need more help to understand carbohydrate counting and food labels, ask your doctor, dietitian, or diabetes educator. How do you get started with meal planning? Here are some tips to get started:  · Plan your meals a week at a time. Don't forget to include snacks too. · Use cookbooks or online recipes to plan several main meals. Plan some quick meals for busy nights. You also can double some recipes that freeze well. Then you can save half for other busy nights when you don't have time to cook. · Make sure you have the ingredients you need for your recipes. If you're running low on basic items, put these items on your shopping list too. · List foods that you use to make breakfasts, lunches, and snacks. List plenty of fruits and vegetables. · Post this list on the refrigerator. Add to it as you think of more things you need. · Take the list to the store to do your weekly shopping. Follow-up care is a key part of your treatment and safety. Be sure to make and go to all appointments, and call your doctor if you are having problems. It's also a good idea to know your test results and keep a list of the medicines you take. Where can you learn more? Go to https://chesmer.Kriyari. org and sign in to your [employer] account. Enter A195 in the [facility] box to learn more about \"Learning About Meal Planning for Diabetes. \"     If you do not have an account, please click on the \"Sign Up Now\" link. Current as of: December 20, 2019               Content Version: 12.6  © 7567-6874 [employer], Incorporated. Care instructions adapted under license by [facility] ([facility]).  If you have questions about a medical condition or this instruction, always ask your healthcare professional. [address] any warranty or liability for your use of this information. Patient Education        DASH Diet: Care Instructions  Your Care Instructions     The DASH diet is an eating plan that can help lower your blood pressure. DASH stands for Dietary Approaches to Stop Hypertension. Hypertension is high blood pressure. The DASH diet focuses on eating foods that are high in calcium, potassium, and magnesium. These nutrients can lower blood pressure. The foods that are highest in these nutrients are fruits, vegetables, low-fat dairy products, nuts, seeds, and legumes. But taking calcium, potassium, and magnesium supplements instead of eating foods that are high in those nutrients does not have the same effect. The DASH diet also includes whole grains, fish, and poultry. The DASH diet is one of several lifestyle changes your doctor may recommend to lower your high blood pressure. Your doctor may also want you to decrease the amount of sodium in your diet. Lowering sodium while following the DASH diet can lower blood pressure even further than just the DASH diet alone. Follow-up care is a key part of your treatment and safety. Be sure to make and go to all appointments, and call your doctor if you are having problems. It's also a good idea to know your test results and keep a list of the medicines you take. How can you care for yourself at home? Following the DASH diet  · Eat 4 to 5 servings of fruit each day. A serving is 1 medium-sized piece of fruit, ½ cup chopped or canned fruit, 1/4 cup dried fruit, or 4 ounces (½ cup) of fruit juice. Choose fruit more often than fruit juice. · Eat 4 to 5 servings of vegetables each day. A serving is 1 cup of lettuce or raw leafy vegetables, ½ cup of chopped or cooked vegetables, or 4 ounces (½ cup) of vegetable juice. Choose vegetables more often than vegetable juice.   · Get 2 to 3 servings of low-fat and fat-free dairy each day. A serving is 8 ounces of milk, 1 cup of yogurt, or 1 ½ ounces of cheese. · Eat 6 to 8 servings of grains each day. A serving is 1 slice of bread, 1 ounce of dry cereal, or ½ cup of cooked rice, pasta, or cooked cereal. Try to choose whole-grain products as much as possible. · Limit lean meat, poultry, and fish to 2 servings each day. A serving is 3 ounces, about the size of a deck of cards. · Eat 4 to 5 servings of nuts, seeds, and legumes (cooked dried beans, lentils, and split peas) each week. A serving is 1/3 cup of nuts, 2 tablespoons of seeds, or ½ cup of cooked beans or peas. · Limit fats and oils to 2 to 3 servings each day. A serving is 1 teaspoon of vegetable oil or 2 tablespoons of salad dressing. · Limit sweets and added sugars to 5 servings or less a week. A serving is 1 tablespoon jelly or jam, ½ cup sorbet, or 1 cup of lemonade. · Eat less than 2,300 milligrams (mg) of sodium a day. If you limit your sodium to 1,500 mg a day, you can lower your blood pressure even more. Tips for success  · Start small. Do not try to make dramatic changes to your diet all at once. You might feel that you are missing out on your favorite foods and then be more likely to not follow the plan. Make small changes, and stick with them. Once those changes become habit, add a few more changes. · Try some of the following:  ? Make it a goal to eat a fruit or vegetable at every meal and at snacks. This will make it easy to get the recommended amount of fruits and vegetables each day. ? Try yogurt topped with fruit and nuts for a snack or healthy dessert. ? Add lettuce, tomato, cucumber, and onion to sandwiches. ? Combine a ready-made pizza crust with low-fat mozzarella cheese and lots of vegetable toppings. Try using tomatoes, squash, spinach, broccoli, carrots, cauliflower, and onions. ? Have a variety of cut-up vegetables with a low-fat dip as an appetizer instead of chips and dip. ?  Sprinkle sunflower seeds or chopped almonds over salads. Or try adding chopped walnuts or almonds to cooked vegetables. ? Try some vegetarian meals using beans and peas. Add garbanzo or kidney beans to salads. Make burritos and tacos with mashed piper beans or black beans. Where can you learn more? Go to https://chpepiceweb.Transit App. org and sign in to your [employer] account. Enter L548 in the [facility] box to learn more about \"DASH Diet: Care Instructions. \"     If you do not have an account, please click on the \"Sign Up Now\" link. Current as of: December 16, 2019               Content Version: 12.6  © 9401-6723 [employer]. Care instructions adapted under license by [facility] ([facility]). If you have questions about a medical condition or this instruction, always ask your healthcare professional. [address] any warranty or liability for your use of this information. Patient Education        Fluid Restriction: Care Instructions  Your Care Instructions     A buildup of fluid in the body can cause low sodium levels in the blood. It may also cause symptoms such as swelling and pain. Your doctor may suggest that you limit liquids, including foods that contain a lot of liquid. Limiting liquids is called fluid restriction. Keeping track of the amount of fluids you take in may help you feel better. Your doctor will tell you how much fluid you can have in a day. Follow-up care is a key part of your treatment and safety. Be sure to make and go to all appointments, and call your doctor if you are having problems. It's also a good idea to know your test results and keep a list of the medicines you take. How can you care for yourself at home? · Find a way of tracking the fluids you take in that works for you. Here are two methods you can try:  ? Write down how much you drink throughout the day. ? Keep a container filled with the amount of liquid allowed for the day.  As you drink body has an amazing ability to make up for heart failure. It may do such a good job that you don't know you have a disease. But at some point, your heart and body will no longer be able to keep up. Then fluid starts to build up in your lungs and other parts of your body. This fluid buildup is called congestion. It's why some doctors call the disease congestive heart failure. What can you expect when you have heart failure? Heart failure is a lifelong (chronic) disease. Treatment may be able to slow the disease and help you feel better. But heart failure tends to get worse over time. Despite this, there are many steps you can take to feel better and stay healthy longer. Early on, your symptoms may not be too bad. As heart failure gets worse, symptoms typically get worse, and you may need to limit your activities. Heart failure can also get worse suddenly. If this happens, you need emergency care. Then, after treatment, your symptoms may go back to being stable (which means they stay the same) for a long time. Heart failure can lead to other health problems, such as heart rhythm problems. Over time, your treatment options may change, especially as your symptoms get worse. You may want to think about what kind of care you want at the end of your life. What are the symptoms? Symptoms of heart failure start to happen when your heart can't pump enough blood to the rest of your body. In the early stages of heart failure, you may:  · Feel tired easily. · Be short of breath when you exert yourself. · Feel like your heart is pounding or racing (palpitations). · Feel weak or dizzy. As heart failure gets worse, fluid starts to build up in your lungs and other parts of your body. This may cause you to:  · Feel short of breath even at rest.  · Have swelling (edema), especially in your legs, ankles, and feet. · Gain weight. This may happen over just a day or two, or more slowly.   · Cough or wheeze, especially when you lie down. · Feel bloated or sick to your stomach. How is heart failure treated? Heart failure is treated with medicines and steps you take to make lifestyle changes and check your symptoms. Treatment can slow the disease and help you feel better and live longer. · You'll probably take several medicines. · You'll take steps to care for yourself at home. [place] watch for changes in your symptoms. You may need to make lifestyle changes, such as limiting sodium, getting regular exercise, not smoking, and eating healthy foods. · You might attend cardiac rehabilitation (rehab) to get education and support that help you make lifestyle changes and stay as healthy as possible. · You may get a heart device. A pacemaker helps your heart pump blood. An ICD can stop abnormal heart rhythms. · As heart failure gets worse, palliative care can help improve your quality of life. You can do advance care planning to decide what kind of care you want at the end of your life. How can you care for yourself? There are many steps you can take to feel better and live longer. These steps are an important part of treatment. They can help you stay active and enjoy life. Take your medicine the right way. Avoid medicines that can make your symptoms worse. Check your weight and symptoms every day. Know what to do if your symptoms get worse. Limit sodium. This helps keep fluid from building up. It may help you feel better. Be active. Exercise regularly, but don't exercise too hard. Be heart-healthy. Eat healthy foods, stay at a healthy weight, limit alcohol, and don't smoke. Stay as healthy as possible. Manage other health problems such as diabetes and high blood pressure. Avoid colds and flu, get help for depression and anxiety, and manage stress. If you think you may have a problem with alcohol or drug use, talk to your doctor. Follow-up care is a key part of your treatment and safety.  Be sure to make and go to all appointments, and call your doctor if you are having problems. It's also a good idea to know your test results and keep a list of the medicines you take. Where can you learn more? Go to https://chesmer.Photofy. org and sign in to your [employer] account. Enter S614 in the [facility] box to learn more about \"Learning About Heart Failure. \"     If you do not have an account, please click on the \"Sign Up Now\" link. Current as of: December 16, 2019               Content Version: 12.6  © 2227-3662 [employer]. Care instructions adapted under license by [facility] ([facility]). If you have questions about a medical condition or this instruction, always ask your healthcare professional. [address] any warranty or liability for your use of this information. Patient Education        High Blood Pressure: Care Instructions  Overview     It's normal for blood pressure to go up and down throughout the day. But if it stays up, you have high blood pressure. Another name for high blood pressure is hypertension. Despite what a lot of people think, high blood pressure usually doesn't cause headaches or make you feel dizzy or lightheaded. It usually has no symptoms. But it does increase your risk of stroke, heart attack, and other problems. You and your doctor will talk about your risks of these problems based on your blood pressure. Your doctor will give you a goal for your blood pressure. Your goal will be based on your health and your age. Lifestyle changes, such as eating healthy and being active, are always important to help lower blood pressure. You might also take medicine to reach your blood pressure goal.  Follow-up care is a key part of your treatment and safety. Be sure to make and go to all appointments, and call your doctor if you are having problems. It's also a good idea to know your test results and keep a list of the medicines you take.   How can you care for yourself at home? Medical treatment  · If you stop taking your medicine, your blood pressure will go back up. You may take one or more types of medicine to lower your blood pressure. Be safe with medicines. Take your medicine exactly as prescribed. Call your doctor if you think you are having a problem with your medicine. · Talk to your doctor before you start taking aspirin every day. Aspirin can help certain people lower their risk of a heart attack or stroke. But taking aspirin isn't right for everyone, because it can cause serious bleeding. · See your doctor regularly. You may need to see the doctor more often at first or until your blood pressure comes down. · If you are taking blood pressure medicine, talk to your doctor before you take decongestants or anti-inflammatory medicine, such as ibuprofen. Some of these medicines can raise blood pressure. · Learn how to check your blood pressure at home. Lifestyle changes  · Stay at a healthy weight. This is especially important if you put on weight around the waist. Losing even 10 pounds can help you lower your blood pressure. · If your doctor recommends it, get more exercise. Walking is a good choice. Bit by bit, increase the amount you walk every day. Try for at least 30 minutes on most days of the week. You also may want to swim, bike, or do other activities. · Avoid or limit alcohol. Talk to your doctor about whether you can drink any alcohol. · Try to limit how much sodium you eat to less than 2,300 milligrams (mg) a day. Your doctor may ask you to try to eat less than 1,500 mg a day. · Eat plenty of fruits (such as bananas and oranges), vegetables, legumes, whole grains, and low-fat dairy products. · Lower the amount of saturated fat in your diet. Saturated fat is found in animal products such as milk, cheese, and meat. Limiting these foods may help you lose weight and also lower your risk for heart disease. · Do not smoke.  Smoking your blood pressure medicine. Where can you learn more? Go to https://chpepiceweb.healthSampa. org and sign in to your [employer] account. Enter E416 in the [employer] box to learn more about \"High Blood Pressure: Care Instructions. \"     If you do not have an account, please click on the \"Sign Up Now\" link. Current as of: December 16, 2019               Content Version: 12.6  © 4628-9782 [employer]. Care instructions adapted under license by [facility] ([facility]). If you have questions about a medical condition or this instruction, always ask your healthcare professional. [address] any warranty or liability for your use of this information.

## 2020-11-23 RX ORDER — AMLODIPINE BESYLATE 5 MG/1
TABLET ORAL
Qty: 90 TABLET | Refills: 3 | Status: SHIPPED | OUTPATIENT
Start: 2020-11-23 | End: 2021-03-26 | Stop reason: SDUPTHER

## 2020-11-24 ENCOUNTER — HOSPITAL ENCOUNTER (OUTPATIENT)
Dept: GENERAL RADIOLOGY | Age: 80
Discharge: HOME | End: 2020-11-24
Payer: MEDICARE

## 2020-11-24 ENCOUNTER — HOSPITAL ENCOUNTER (OUTPATIENT)
Age: 80
Discharge: HOME | End: 2020-11-24
Payer: MEDICARE

## 2020-11-24 PROCEDURE — 71046 X-RAY EXAM CHEST 2 VIEWS: CPT

## 2020-12-01 ENCOUNTER — VIRTUAL VISIT (OUTPATIENT)
Dept: PULMONOLOGY | Age: 80
End: 2020-12-01
Payer: MEDICARE

## 2020-12-01 PROCEDURE — 99442 PR PHYS/QHP TELEPHONE EVALUATION 11-20 MIN: CPT | Performed by: INTERNAL MEDICINE

## 2020-12-01 NOTE — PROGRESS NOTES
permission. His was referred by HARLEEN Gu. The patient presents with left pleural effusion, underwent thoracentesis that showed a transudate. The procedure itself was painful. He was seen by Dr. Scott Flaherty in follow-up by virtual visit on 9/29/2020. The patient underwent a chest x-ray and CT that showed a small effusion, possible rounded atelectasis. He was told to follow-up with chest x-ray in 2 months. The patient worked as a , was exposed to asbestos on an automatic rule out in the machine. He had a 20-pack-year history of smoking, has not smoked since 1975. He also has diabetes mellitus, CKD, vitamin D deficiency, previous CVA. The patient says he has reduced shortness of breath, has minimal cough, has mild postnasal drip which he thinks is the cause of his cough. He has been followed by Dr. Maria Elena Albrecht for atrial fibrillation, his diuretic was decreased and he has not had edema for several months. The rest of his ROS was negative. There is no other change in his medical or surgical history since his last visit. His medications and allergies were reviewed. CXR 11/24/20  Bilateral pleural effusions and bibasilar airspace disease, atelectasis    versus pneumonia.  Findings on the right are slightly improved compared to    prior while findings on the left are increased.         Increased interstitial markings within both lungs.  Findings may be related    to mild edema superimposed on chronic lung disease.         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    Vitamin D deficiency disease 12/15       Past Surgical History:   Procedure Laterality Date    CORONARY ANGIOPLASTY WITH STENT PLACEMENT  2000    RX Tristar 2.5 x 13 mCX  RX Tristar 2.5 x 13 pCX    CORONARY ANGIOPLASTY WITH STENT PLACEMENT  10/19/2000    Tetra 2.75 x 18 CX  Tetra 3.0 x 13 CX    OTHER SURGICAL HISTORY  12    phaco emulsification cataract right eye    TOOTH EXTRACTION         Social History     Tobacco Use    Smoking status: Former Smoker     Packs/day: 2.00     Years: 20.00     Pack years: 40.00     Types: Cigarettes     Start date: 1956     Last attempt to quit: 1975     Years since quittin.9    Smokeless tobacco: Never Used   Substance Use Topics    Alcohol use: No       Family History   Problem Relation Age of Onset    Emphysema Mother     Stroke Father     Other Daughter         Factor V deficiency    Other Grandchild         Factor V deficiency         Current Outpatient Medications:     amLODIPine (NORVASC) 5 MG tablet, TAKE 1 TABLET EVERY DAY, Disp: 90 tablet, Rfl: 3    losartan (COZAAR) 100 MG tablet, TAKE 1 TABLET EVERY DAY, Disp: 90 tablet, Rfl: 3    Omega-3 Fatty Acids (FISH OIL PO), Take by mouth, Disp: , Rfl:     Multiple Vitamin (MULTI-VITAMIN DAILY PO), Take by mouth, Disp: , Rfl:     levothyroxine (SYNTHROID) 88 MCG tablet, TAKE 1 TABLET EVERY DAY, Disp: 90 tablet, Rfl: 1    metFORMIN (GLUCOPHAGE) 1000 MG tablet, TAKE 1 TABLET TWICE DAILY WITH MEALS, Disp: 180 tablet, Rfl: 1    pravastatin (PRAVACHOL) 20 MG tablet, Take 1 tablet by mouth daily, Disp: 90 tablet, Rfl: 3    torsemide (DEMADEX) 20 MG tablet, Take 1 tablet by mouth daily (Patient taking differently: Take 10 mg by mouth daily ), Disp: 90 tablet, Rfl: 3    metoprolol tartrate (LOPRESSOR) 50 MG tablet, TAKE 1 TABLET TWICE DAILY, Disp: 180 tablet, Rfl: 3    fluticasone (FLONASE) 50 MCG/ACT nasal spray, 2 sprays by Each Nostril route daily, Disp: , Rfl:     Coenzyme Q10 (COQ10 PO), Take by mouth, Disp: , Rfl:     Ascorbic Acid (VITAMIN C PO), Take by mouth 2 times daily, Disp: , Rfl:   guaiFENesin 400 MG tablet, Take 400 mg by mouth nightly , Disp: , Rfl:     magnesium oxide (MAG-OX) 400 MG tablet, Take 400 mg by mouth daily, Disp: , Rfl:     ferrous sulfate 325 (65 Fe) MG tablet, Take 325 mg by mouth daily (with breakfast) , Disp: , Rfl:     aspirin 81 MG EC tablet, Take 1 tablet by mouth daily, Disp: 30 tablet, Rfl: 0    Cholecalciferol (VITAMIN D) 2000 UNITS CAPS capsule, Take  by mouth daily. , Disp: , Rfl:     Metolazone; Lipitor; and Simvastatin    There were no vitals filed for this visit. Review of Systems   HENT: Positive for postnasal drip. Respiratory: Positive for cough and shortness of breath. All other systems reviewed and are negative. Physical Exam  Physical exam was not performed as this was a virtual telephonic visit. The patient appeared to be awake, alert, oriented. He was able to speak in full sentences, had no cough. He denied leg edema.

## 2020-12-05 ASSESSMENT — ENCOUNTER SYMPTOMS
SHORTNESS OF BREATH: 1
COUGH: 1

## 2020-12-11 NOTE — PROGRESS NOTES
[address]   Cardiac Followup    Referring Provider:  AICHA Ruiz - CNP     Chief Complaint   Patient presents with    6 Month Follow-Up     Follow up for CAD    Other     Shortness of breath, gets weak when short of breath      Subjective: Mr Jayda Ayers being seen today for cardiology follow up of his CAD, HTN, HLD;  S/P 4V CABG January 2018; c/o continued SOB and IBARRA    History of Present Illness:    Mr. Jayda Ayers is a [de-identified] y.o. male hx of embolic R. CVA in 5/61, CAD s/p 4 stents prior (most recent 10/00), s/p 4V CABG 1/18 with Dr. Clarisa Marin, HTN, DM, pleural effusion s/p left thoracentesis 9/20, mild carotid artery disease, HLD, and hypothyroidism. Note hx of myalgias with statins but tolerates 20 mg pravachol with coenzyme Q10. Note ECHO in August 2009 showed normal EF=55% with mild diastolic noncompliance. Admitted to [facility]. 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 Note event monitor 05/03-06/02/2016 with NSR, with occasional PVC's and overall normal study. Most recent BLE LUCY 6/23/17 Right LUCY 1.33 left LUCY 1.31. Most recent LHC 1/18/18 LM 20% LAD 70% mid D1 90% prox Cx 90% mid OM2 side branch 90% ostium RCA 80% prox, 95% mid Collaterals LAD to RCA; multiple stents noted to be patent LVEF 50-55%. Subsequently underwent 4V CABG 1/31/18 by Dr. Clarisa Marin at [facility] with pedicled LIMA to LAD, sequential SVG to Diag 1 then on to OM, separate single SVG to distal RCA. Most recent Carotid doppler 9/19/19 moderate plaque <50% bilateral carotid arteries (no change from 6/2107 study)   Last OV he c/o new SOB with exertion. Most recent ECHO 8/25/20 EF 50-55% (EF=55-60% 6/19; EF=55% 1/18); Milld HK/basal inferior and inferoseptal walls. Grade III DDwith elevated LV pressure. Left atrium is mildly dilated. Mild MAC; Mild MR, AR, TR. Large left pleural effusion.  Most recent [place] stress 8/25/20 There is no evidence of  myocardial ischemia or scar (12/17 GXT nuc + ischemia lateral apex). Referred to pulmonary and had Left thoracentesis 9/8/20 0.5-L transudate fluid removed. He had follow up CXR 11/24/20 Bilateral pleural effusions and bibasilar airspace disease, atelectasis versus pneumonia. Today he reports ongoing SOB at both rest and exertion. His SOB is worse with exertion. He had VV with Dr. Hung Del Real early December and will have repeat CXR 6 months. Conservative mgt and felt fluid from CHF. Denies chest pain, edema, dizziness, palpitations and syncope. He does have occasional pain in right chest shoulder. 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 Surgical History:   has a past surgical history that includes Tooth Extraction; Coronary angioplasty with stent (08/14/2000); other surgical history (1-23-12); and Coronary angioplasty with stent (10/19/2000). Social History:   reports that he quit smoking about 45 years ago. His smoking use included cigarettes. He started smoking about 64 years ago. He has a 40.00 pack-year smoking history. He has never used smokeless tobacco. He reports that he does not drink alcohol or use drugs. Family History:  family history includes Emphysema in his mother; Other in his daughter and grandchild; Stroke in his father. Home Medications:  Prior to Admission medications    Medication Sig Start Date End Date Taking? Authorizing Provider   pravastatin (PRAVACHOL) 20 MG tablet Take 20 mg by mouth daily. Yes Historical Provider, MD   Coenzyme Q10 (CO Q 10 PO) Take 1 capsule by mouth daily.      Yes Historical Provider, MD   NITROSTAT 0.4 MG SL tablet PLACE 1 TABLET UNDER THE TONGUE EVERY 5 MINUTES ASNEEDED 7/12/12  Yes [address], MD   metoprolol (TOPROL-XL) 50 MG XL tablet TAKE 1 TABLET BY MOUTH ONCE DAILY 6/20/12  Yes Igor Garcia MD   levothyroxine (SYNTHROID) 75 MCG tablet TAKE 1 TABLET BY MOUTH ONCE DAILY 2/8/12  Yes Igor Garcia MD   losartan-hydrochlorothiazide (HYZAAR) 100-25 MG per tablet Take 1 tablet by mouth daily for 360 days. 1/18/12 1/12/13 Yes Gely Brown MD   aspirin 81 MG EC tablet Take 81 mg by mouth daily. 2 daily     Yes Historical Provider, MD   Blood Glucose Monitoring Suppl (ONE TOUCH ULTRA) JUAN FRANCISCO by Does not apply route. 6/22/10  Yes Igor Garcia MD   Multiple Vitamins-Minerals (MULTIVITAL PO) Take  by mouth daily. 5/24/10  Yes Historical Provider, MD   Omega-3 Fatty Acids (FISH OIL) 1200 MG CAPS Take  by mouth 2 times daily. 5/24/10  Yes Historical Provider, MD   Ascorbic Acid (VITAMIN C) 500 MG tablet Take 500 mg by mouth daily. Yes Historical Provider, MD   Cholecalciferol (VITAMIN D3) 1000 UNIT CAPS Take  by mouth daily. 5/24/10  Yes Historical Provider, MD   vitamin E 200 UNIT capsule Take 200 Units by mouth daily. Yes Historical Provider, MD        Allergies:  Metolazone, Lipitor, and Simvastatin     Review of Systems:   · Constitutional: there has been no unanticipated weight loss. There's been no change in energy level, sleep pattern, or activity level. · Eyes: No visual changes or diplopia. No scleral icterus. · ENT: No Headaches, hearing loss or vertigo. No mouth sores or sore throat. · Cardiovascular: Reviewed in HPI  · Respiratory: No cough or wheezing, no sputum production. No hematemesis. · Gastrointestinal: No abdominal pain, appetite loss, blood in stools. No change in bowel or bladder habits. · Genitourinary: No dysuria, trouble voiding, or hematuria. · Musculoskeletal:  No gait disturbance, weakness or joint complaints. · Integumentary: No rash or pruritis. · Neurological: No headache, diplopia, change in muscle strength, numbness or tingling.  No change in gait, balance, coordination, mood, affect, memory, mentation, behavior. · Psychiatric: No anxiety, no depression. · Endocrine: No malaise, fatigue or temperature intolerance. No excessive thirst, fluid intake, or urination. No tremor. · Hematologic/Lymphatic: No abnormal bruising or bleeding, blood clots or swollen lymph nodes. · Allergic/Immunologic: No nasal congestion or hives. Height: 5' 11\" (1.803 m), Weight: 172 lb 12.8 oz (78.4 kg), BP: 134/64    Wt Readings from Last 3 Encounters:   12/14/20 172 lb 12.8 oz (78.4 kg)   11/16/20 178 lb 2 oz (80.8 kg)   09/29/20 174 lb (78.9 kg)     Temp Readings from Last 3 Encounters:   12/14/20 96 °F (35.6 °C)   11/16/20 98.1 °F (36.7 °C) (Oral)   09/29/20 97.8 °F (36.6 °C) (Oral)     BP Readings from Last 3 Encounters:   12/14/20 134/64   11/16/20 (!) 152/74   09/29/20 132/71     Pulse Readings from Last 3 Encounters:   12/14/20 76   11/16/20 72   09/29/20 62       Physical Examination:         Constitutional and General Appearance: NAD   Respiratory:  · Normal excursion and expansion without use of accessory muscles  · Resp Auscultation:  Diminished breath sounds bilateral bases otherwise Normal breath sounds without dullness  Cardiovascular:  · The apical impulses not displaced  · Heart tones are crisp and normal  · Cervical veins are not engorged  · The carotid upstroke is normal in amplitude and contour without delay  · Normal S1S2, No S3, no murmur  · Peripheral pulses are symmetrical and full  · There is no clubbing, cyanosis of the extremities.   ·   No edema  · Femoral Arteries: 2+ and equal  · Pedal Pulses: 2+ and equal   Abdomen:  · No masses or tenderness  · Liver/Spleen: No Abnormalities Noted  Neurological/Psychiatric:  · Alert and oriented in all spheres  · Moves all extremities well  · Exhibits normal gait balance and coordination  · No abnormalities of mood, affect, memory, mentation, or behavior are noted    No components found for: CHLPL  Lab Results   Component Value Date    TRIG 87 06/02/2020    TRIG 131 11/08/2019    TRIG 87 11/06/2018     Lab Results   Component Value Date    HDL 45 06/02/2020    HDL 46 11/08/2019    HDL 51 11/06/2018     Lab Results   Component Value Date    LDLCALC 88 06/02/2020    LDLCALC 90 11/08/2019    LDLCALC 91 11/06/2018     Lab Results   Component Value Date    LABVLDL 17 06/02/2020    LABVLDL 26 11/08/2019    LABVLDL 17 11/06/2018   TSH 6/2/20 3.78  Assessment:     1. CAD (coronary artery disease): Found multi-vessel CAD on [facility]. 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 Most recent ECHO 8/25/20 EF 50-55% (EF=55-60% 6/19; EF=55% 1/18); Milld HK/basal inferior and inferoseptal walls. Grade III DDwith elevated LV pressure. Left atrium is mildly dilated. Mild MAC; Mild MR, AR, TR. Large left pleural effusion. Most recent Lexiscan myoview stress 8/25/20 There is no evidence of  myocardial ischemia or scar. He continues to have SOB/IBARRA and required left thoracentesis 9/20 which was transudative. Despite recent cardiac testing without ischemia and EF within normal range I believe given symptoms and known cardiac disease I recommend left and right heart cath for definitive evaluation of coronary arteries and pulm artery/right heart pressures. Risks, benefits, expectations, and alternative treatments were discussed. Questions appropriately answered. Peter Miller agrees to proceed and verbalized understanding. 2. DM w/o complication Type II : managed per PCP. HA1C=6.5 on 11/6/18.   3. Carotid artery occlusion: Most recent Carotid doppler 9/19/19 moderate plaque <50% bilateral carotid arteries (no change from 6/2107 study)     4. Cerebrovascular disease: Resolved. 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 Stable and doing better overall and will continue present medical regimen. He remains on baby aspirin.      5. Hypertension : Stable and will continue present medical regimen. 6. Hyperlipidemia:  Most recent labs from 6/2/20 see results above which I personally reviewed. Note LDL at goal 88. He tolerates pravachol 20mg qhs with coenzyme Q10. Reports some mild cramping but brief in duration and tolerable. Plan:   1. Meds reviewed. Refills as warranted   2. Will schedule L and RHC. Someone from my office will call to schedule. Hold metformin 48 hours before procedure. Hold torsemide morning of procedure. 3. Limit fluid in take to less than 64oz daily. He does admit to drinking lots of water. 4. Follow up with me in 3 months     This note was scribed in the presence of Krish Chau MD by Pili Watkins RN    I, Dr. Blair Sparks, personally performed the services described in this documentation, as scribed by the above signed scribe in my presence. It is both accurate and complete to my knowledge. I agree with the details independently gathered by the clinical support staff, while the remaining scribed note accurately describes my personal service to the patient. Cost of prescription medications and patient compliance have been reviewed with patient. All questions answered. Thank you for allowing me to participate in the care of this individual.    Edda Schwartz.  Joy Maldonado M.D., [facility] [place]

## 2020-12-14 ENCOUNTER — OFFICE VISIT (OUTPATIENT)
Dept: CARDIOLOGY CLINIC | Age: 80
End: 2020-12-14
Payer: MEDICARE

## 2020-12-14 ENCOUNTER — TELEPHONE (OUTPATIENT)
Dept: CARDIOLOGY CLINIC | Age: 80
End: 2020-12-14

## 2020-12-14 VITALS
TEMPERATURE: 96 F | HEIGHT: 71 IN | OXYGEN SATURATION: 93 % | HEART RATE: 76 BPM | BODY MASS INDEX: 24.19 KG/M2 | WEIGHT: 172.8 LBS | SYSTOLIC BLOOD PRESSURE: 134 MMHG | DIASTOLIC BLOOD PRESSURE: 64 MMHG

## 2020-12-14 PROBLEM — Z87.891 HISTORY OF TOBACCO ABUSE: Status: ACTIVE | Noted: 2020-12-14

## 2020-12-14 PROCEDURE — G8427 DOCREV CUR MEDS BY ELIG CLIN: HCPCS | Performed by: INTERNAL MEDICINE

## 2020-12-14 PROCEDURE — 1036F TOBACCO NON-USER: CPT | Performed by: INTERNAL MEDICINE

## 2020-12-14 PROCEDURE — 1123F ACP DISCUSS/DSCN MKR DOCD: CPT | Performed by: INTERNAL MEDICINE

## 2020-12-14 PROCEDURE — 4040F PNEUMOC VAC/ADMIN/RCVD: CPT | Performed by: INTERNAL MEDICINE

## 2020-12-14 PROCEDURE — G8420 CALC BMI NORM PARAMETERS: HCPCS | Performed by: INTERNAL MEDICINE

## 2020-12-14 PROCEDURE — G8483 FLU IMM NO ADMIN DOC REA: HCPCS | Performed by: INTERNAL MEDICINE

## 2020-12-14 PROCEDURE — 99215 OFFICE O/P EST HI 40 MIN: CPT | Performed by: INTERNAL MEDICINE

## 2020-12-14 RX ORDER — PRAVASTATIN SODIUM 20 MG
20 TABLET ORAL DAILY
Qty: 90 TABLET | Refills: 3 | Status: ON HOLD | OUTPATIENT
Start: 2020-12-14 | End: 2020-12-29 | Stop reason: SDUPTHER

## 2020-12-14 RX ORDER — METOPROLOL TARTRATE 50 MG/1
TABLET, FILM COATED ORAL
Qty: 180 TABLET | Refills: 3 | Status: SHIPPED | OUTPATIENT
Start: 2020-12-14 | End: 2021-03-11 | Stop reason: SDUPTHER

## 2020-12-14 NOTE — TELEPHONE ENCOUNTER
Pt seen in office today by ADITHYA. Pt has ongoing SOB, chest discomfort. Can you please call pt and schedule L & RHC.

## 2020-12-17 NOTE — TELEPHONE ENCOUNTER
Spoke with patient. Patient is scheduled with Dr. Tana Earl for Right and Left Heart Cath on 12/29/20 at [address], arrival time of 6:30am to the Cath Lab. Please have patient arrive to the main entrance of [facility] and check in with the registration desk. Medications reviewed with SARITHA Gresham. Remind patient to be NPO after midnight (8 hours prior). Do not apply lotions/creams on skin the day of procedure. COVID testing 12/23. 1 [facility] ADITHYA Velasquez.

## 2020-12-23 ENCOUNTER — OFFICE VISIT (OUTPATIENT)
Dept: PRIMARY CARE CLINIC | Age: 80
End: 2020-12-23
Payer: MEDICARE

## 2020-12-23 LAB — SARS-COV-2: NOT DETECTED

## 2020-12-23 PROCEDURE — G8428 CUR MEDS NOT DOCUMENT: HCPCS | Performed by: NURSE PRACTITIONER

## 2020-12-23 PROCEDURE — G8420 CALC BMI NORM PARAMETERS: HCPCS | Performed by: NURSE PRACTITIONER

## 2020-12-23 PROCEDURE — 99211 OFF/OP EST MAY X REQ PHY/QHP: CPT | Performed by: NURSE PRACTITIONER

## 2020-12-23 NOTE — PROGRESS NOTES
Fanny Alisa received a viral test for COVID-19. They were educated on isolation and quarantine as appropriate. For any symptoms, they were directed to seek care from their PCP, given contact information to establish with a doctor, directed to an urgent care or the emergency room.

## 2020-12-29 ENCOUNTER — HOSPITAL ENCOUNTER (OUTPATIENT)
Dept: CARDIAC CATH/INVASIVE PROCEDURES | Age: 80
Discharge: HOME | End: 2020-12-29
Attending: INTERNAL MEDICINE | Admitting: INTERNAL MEDICINE
Payer: MEDICARE

## 2020-12-29 VITALS — BODY MASS INDEX: 23.8 KG/M2 | WEIGHT: 170 LBS | HEIGHT: 71 IN

## 2020-12-29 LAB
ALBUMIN SERPL-MCNC: 4.3 G/DL (ref 3.4–5)
ANION GAP SERPL CALCULATED.3IONS-SCNC: 9 MMOL/L (ref 3–16)
BUN BLDV-MCNC: 34 MG/DL (ref 7–20)
CALCIUM SERPL-MCNC: 9.8 MG/DL (ref 8.3–10.6)
CHLORIDE BLD-SCNC: 99 MMOL/L (ref 99–110)
CHOLESTEROL, TOTAL: 184 MG/DL (ref 0–199)
CO2: 31 MMOL/L (ref 21–32)
CREAT SERPL-MCNC: 1.5 MG/DL (ref 0.8–1.3)
EKG ATRIAL RATE: 69 BPM
EKG DIAGNOSIS: NORMAL
EKG P AXIS: 83 DEGREES
EKG P-R INTERVAL: 218 MS
EKG Q-T INTERVAL: 418 MS
EKG QRS DURATION: 126 MS
EKG QTC CALCULATION (BAZETT): 447 MS
EKG R AXIS: -19 DEGREES
EKG T AXIS: 60 DEGREES
EKG VENTRICULAR RATE: 69 BPM
GFR AFRICAN AMERICAN: 54
GFR NON-AFRICAN AMERICAN: 45
GLUCOSE BLD-MCNC: 140 MG/DL (ref 70–99)
HCT VFR BLD CALC: 37.7 % (ref 40.5–52.5)
HDLC SERPL-MCNC: 52 MG/DL (ref 40–60)
HEMOGLOBIN: 12.6 G/DL (ref 13.5–17.5)
INR BLD: 1.14 (ref 0.86–1.14)
LDL CHOLESTEROL CALCULATED: 114 MG/DL
MCH RBC QN AUTO: 29.6 PG (ref 26–34)
MCHC RBC AUTO-ENTMCNC: 33.3 G/DL (ref 31–36)
MCV RBC AUTO: 89 FL (ref 80–100)
PDW BLD-RTO: 14.4 % (ref 12.4–15.4)
PHOSPHORUS: 3.6 MG/DL (ref 2.5–4.9)
PLATELET # BLD: 325 K/UL (ref 135–450)
PMV BLD AUTO: 8.6 FL (ref 5–10.5)
POTASSIUM SERPL-SCNC: 4 MMOL/L (ref 3.5–5.1)
PROTHROMBIN TIME: 13.2 SEC (ref 10–13.2)
RBC # BLD: 4.24 M/UL (ref 4.2–5.9)
SODIUM BLD-SCNC: 139 MMOL/L (ref 136–145)
TRIGL SERPL-MCNC: 91 MG/DL (ref 0–150)
VLDLC SERPL CALC-MCNC: 18 MG/DL
WBC # BLD: 8.4 K/UL (ref 4–11)

## 2020-12-29 PROCEDURE — 93010 ELECTROCARDIOGRAM REPORT: CPT | Performed by: INTERNAL MEDICINE

## 2020-12-29 PROCEDURE — C1769 GUIDE WIRE: HCPCS

## 2020-12-29 PROCEDURE — 6360000002 HC RX W HCPCS

## 2020-12-29 PROCEDURE — 85027 COMPLETE CBC AUTOMATED: CPT

## 2020-12-29 PROCEDURE — 2500000003 HC RX 250 WO HCPCS

## 2020-12-29 PROCEDURE — 6360000004 HC RX CONTRAST MEDICATION

## 2020-12-29 PROCEDURE — 80061 LIPID PANEL: CPT

## 2020-12-29 PROCEDURE — 93005 ELECTROCARDIOGRAM TRACING: CPT | Performed by: INTERNAL MEDICINE

## 2020-12-29 PROCEDURE — 93461 R&L HRT ART/VENTRICLE ANGIO: CPT

## 2020-12-29 PROCEDURE — 93461 R&L HRT ART/VENTRICLE ANGIO: CPT | Performed by: INTERNAL MEDICINE

## 2020-12-29 PROCEDURE — 85610 PROTHROMBIN TIME: CPT

## 2020-12-29 PROCEDURE — 6370000000 HC RX 637 (ALT 250 FOR IP)

## 2020-12-29 PROCEDURE — 2709999900 HC NON-CHARGEABLE SUPPLY

## 2020-12-29 PROCEDURE — C1887 CATHETER, GUIDING: HCPCS

## 2020-12-29 PROCEDURE — 80069 RENAL FUNCTION PANEL: CPT

## 2020-12-29 PROCEDURE — 99152 MOD SED SAME PHYS/QHP 5/>YRS: CPT | Performed by: INTERNAL MEDICINE

## 2020-12-29 PROCEDURE — 99153 MOD SED SAME PHYS/QHP EA: CPT

## 2020-12-29 PROCEDURE — C1894 INTRO/SHEATH, NON-LASER: HCPCS

## 2020-12-29 PROCEDURE — 99152 MOD SED SAME PHYS/QHP 5/>YRS: CPT

## 2020-12-29 RX ORDER — SODIUM CHLORIDE 0.9 % (FLUSH) 0.9 %
10 SYRINGE (ML) INJECTION PRN
Status: CANCELLED | OUTPATIENT
Start: 2020-12-29

## 2020-12-29 RX ORDER — SODIUM CHLORIDE 9 MG/ML
INJECTION, SOLUTION INTRAVENOUS CONTINUOUS
Status: ACTIVE | OUTPATIENT
Start: 2020-12-29 | End: 2020-12-29

## 2020-12-29 RX ORDER — PRAVASTATIN SODIUM 40 MG
40 TABLET ORAL DAILY
Qty: 90 TABLET | Refills: 3 | Status: SHIPPED | OUTPATIENT
Start: 2020-12-29 | End: 2021-01-01 | Stop reason: SDUPTHER

## 2020-12-29 RX ORDER — SODIUM CHLORIDE 0.9 % (FLUSH) 0.9 %
10 SYRINGE (ML) INJECTION EVERY 12 HOURS SCHEDULED
Status: CANCELLED | OUTPATIENT
Start: 2020-12-29

## 2020-12-29 RX ORDER — MIDAZOLAM HYDROCHLORIDE 1 MG/ML
INJECTION INTRAMUSCULAR; INTRAVENOUS
Status: COMPLETED | OUTPATIENT
Start: 2020-12-29 | End: 2020-12-29

## 2020-12-29 RX ORDER — FENTANYL CITRATE 50 UG/ML
INJECTION, SOLUTION INTRAMUSCULAR; INTRAVENOUS
Status: COMPLETED | OUTPATIENT
Start: 2020-12-29 | End: 2020-12-29

## 2020-12-29 RX ADMIN — MIDAZOLAM HYDROCHLORIDE 1 MG: 1 INJECTION INTRAMUSCULAR; INTRAVENOUS at 08:25

## 2020-12-29 RX ADMIN — MIDAZOLAM HYDROCHLORIDE 1 MG: 1 INJECTION INTRAMUSCULAR; INTRAVENOUS at 08:56

## 2020-12-29 RX ADMIN — FENTANYL CITRATE 25 MCG: 50 INJECTION, SOLUTION INTRAMUSCULAR; INTRAVENOUS at 08:56

## 2020-12-29 RX ADMIN — FENTANYL CITRATE 25 MCG: 50 INJECTION, SOLUTION INTRAMUSCULAR; INTRAVENOUS at 08:24

## 2020-12-29 NOTE — PROCEDURES
CARDIAC CATHETERIZATION REPORT    Date of Procedure: 12/29/2020  : Micah Venegas DO  Primary Indication: Dyspnea on exertion, HFpEF, CAD with previous 4-vessel CABG    Procedures Performed:  1. Ultrasound-guided right femoral artery access  2. Ultrasound-guided right femoral vein access  3. Right heart catheterization  4. Coronary angiography  5. Left heart catheterization  6. Saphenous vein graft angiography  7. Left subclavian angiography  8. LIMA angiography  9. Right femoral angiography  10. Moderate conscious sedation    Procedural Details:  1. Access: Local anesthetic was given and access was obtained in the right femoral artery using a micropuncture technique and ultrasound guidance and a 6F sheath was placed without difficulty. Access was also obtained in the right femoral vein using a micropuncture technique and ultrasound guidance and a 5F sheath was placed without difficulty. 2. Diagnostic: A 5F Vevelyn Lash catheter was used to perform the right heart catheterization. 5F JR4 and 5F JL4 catheters were used to perform selective right and left coronary angiography, respectively. The 5F JR4 catheter was used to engage the SVG to RCA and SVG to diagonal with sequential to OM2. The 5F JR4 catheter was also used to perform left subclavian angiography. A 5F NEAL catheter was used to perform selective LIMA angiography. The 5F JR4 catheter was used to perform the left heart catheterization. No significant gradient was observed on pull-back of the catheter across the aortic valve. 3. Hemostasis: At the end of the procedure, both the right femoral arterial and right femoral venous sheaths were removed and manual pressure applied to maintain hemostasis. Findings:  1. Hemodynamics:  A.  Right heart catheterization                   1. RA: 7 mmHg                   2. RV: 38/8 mmHg                   3. PA: 35/18 (24) mmHg                   4. PCWP: 13 mmHg                   5. Saturations: RA 64%, PA 63%, AO 93%                   6. Genoveva CO: 5.27 L/min                   7. Genoveva CI: 2.68 L/min*m2                   8. Genoveva SVR: 1,032 dyne*sec/cm5                   9. Genoveva PVR: 76 dyne*sec/cm5     B. Opening arterial pressure: 127/55 (74) mmHg      C. LVEDP: 13 mmHg    2. Coronary anatomy:  A. Left main artery: The left main artery bifurcates into the left anterior descending artery and left circumflex artery. The left main artery has a mid-distal 20-30% stenosis. B. Left anterior descending artery: The LAD gives rise to one large diagonal artery and then is occluded in the mid-vessel. The diagonal artery has a 90% proximal stenosis. C. Left circumflex artery: Non-dominant vessel that gives rise to 2 obtuse marginal arteries. The LCx has a 95% proximal stenosis and 90% mid-vessel stenosis. The OM1 is a small vessel with a high origin and has an 80% proximal stenosis. The OM2 is a large vessel with a 100% proximal in-stent restenosis. D. Right coronary artery: Dominant vessel. The RCA has a 90% proximal stenosis and 100% mid-vessel stenosis. 3. Bypass graft anatomy:  A. Left subclavian artery: Patent with mild disease. B. LIMA to LAD: Patent. There is a 40-50% stenosis in the apical LAD. C. SVG to diagonal with sequential to OM2: Patent. Large size mismatch between vein graft and native diagonal artery. D. SVG to distal RCA: Patent. 4. Right femoral angiography  A. The right common femoral artery is patent and bifurcates into the right superficial femoral artery and right profunda femoris artery over the middle-third of the femoral head. The sheath enters the right common femoral artery directly above the bifurcation. Technical Factors:  Complications: None.   Estimated blood loss: Minimal.  Radiation: Air kerma 791 mGy and 9.6 minutes of fluoroscopy  Sedation: Moderate conscious sedation was administered by qaulified nursing personnel under continuous hemodynamic monitoring, starting at 8:20 AM and ending at 9:25 AM.  Medications: 2 mg IV Versed, 50 mcg IV Fentanyl  Contrast: 100 cc of Optiray    Impression:  1. Severe native 3-vessel coronary artery disease. 2. Patent LIMA to LAD. 3. Patent SVG to diagonal with sequential to OM2.  4. Patent SVG to distal RCA. 5. Normal left-sided cardiac filling pressures. 6. Mildly elevated right-sided cardiac filling pressures. 7. Normal pulmonary artery pressure. 8. Normal Genoveva cardiac output and index. Plan:  1. Post-procedure IVF hydration. 2. Continue aspirin 81 mg daily. 3. Increase pravastatin to 40 mg daily and continue to titrate as tolerated. 4. Hold torsemide and losartan for 3 days in setting of mild POLY and repeat renal panel in one week. 5. Continue metoprolol tartrate 50 mg BID and amlodipine 5 mg daily. 6. Follow-up with [facility] in 2 weeks.       Eugenia Yi, [address]

## 2020-12-29 NOTE — H&P
Brief Pre-Op Note/Sedation Assessment      Thomas Burrows  1940  Cath Pool Rm/NONE      [id number]  8:31 AM    Planned Procedure: Cardiac Catheterization Procedure    Post Procedure Plan: Return to same level of care    Consent: I have discussed with the patient and/or the patient representative the indication, alternatives, and the possible risks and/or complications of the planned procedure and the anesthesia methods. The patient and/or patient representative appear to understand and agree to proceed. Chief Complaint: Dyspnea on exertion, HFpEF, CAD with previous 4-vessel CABG      Indications for Cath Procedure: Worsening Angina and Stable Known CAD  Anginal Classification within 2 weeks:  CCS III - Symptoms with everyday living activities, i.e., moderate limitation  NYHA Heart Failure Class within 2 weeks: Class III - Symptoms of HF on less-than-ordinary exertion, Newly Diagnosed? No, Heart Failure Type: Diastolic  Is Cath Lab Visit Valve-related?: No  Surgical Risk: Intermediate  Functional Type: < 4 METS    Anti- Anginal Meds within 2 weeks:   Yes: Beta Blockers, Ca Channel Blockers, Aspirin and Statin (Any)    Stress or Imaging Studies Performed (within 6 months):  Stress Test with SPECT Result: Negative Risk/Extent of Ischemia:  Low   - However, given known CAD and worsening symptoms, concern for false negative study. 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 ([facility] [address] 04/2016    TIA in 1997  Routine health maintenance     refuses immunizations; declines PSA    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 Pre-Sedation:    Pre-Sedation Documentation and Exam:  I have personally completed a history, physical exam & review of systems for this patient (see notes). Prior History of Anesthesia Complications:   none    Modified Mallampati:  II (soft palate, uvula, fauces visible)    ASA Classification:  Class 3 - A patient with severe systemic disease that limits activity but is not incapacitating      Sonia Scale: Activity:  2 - Able to move 4 extremities voluntarily on command  Respiration:  2 - Able to breathe deeply and cough freely  Circulation:  2 - BP+/- 20mmHg of normal  Consciousness:  2 - Fully awake  Oxygen Saturation (color):  2 - Able to maintain oxygen saturation >92% on room air    Sedation/Anesthesia Plan:  Guard the patient's safety and welfare. Minimize physical discomfort and pain. Minimize negative psychological responses to treatment by providing sedation and analgesia and maximize the potential amnesia. Patient to meet pre-procedure discharge plan.     Medication Planned:  midazolam intravenously and fentanyl intravenously    Patient is an appropriate candidate for plan of sedation: yes      Electronically signed by Percy Head DO on 12/29/2020 at 8:31 AM

## 2021-01-01 ENCOUNTER — OFFICE VISIT (OUTPATIENT)
Dept: PULMONOLOGY | Age: 81
End: 2021-01-01
Payer: MEDICARE

## 2021-01-01 ENCOUNTER — HOSPITAL ENCOUNTER (OUTPATIENT)
Dept: PULMONOLOGY | Age: 81
Discharge: HOME | End: 2021-07-01
Payer: MEDICARE

## 2021-01-01 ENCOUNTER — TELEPHONE (OUTPATIENT)
Dept: ENT CLINIC | Age: 81
End: 2021-01-01

## 2021-01-01 ENCOUNTER — OFFICE VISIT (OUTPATIENT)
Dept: CARDIOLOGY CLINIC | Age: 81
End: 2021-01-01
Payer: MEDICARE

## 2021-01-01 ENCOUNTER — OFFICE VISIT (OUTPATIENT)
Dept: FAMILY MEDICINE CLINIC | Age: 81
End: 2021-01-01
Payer: MEDICARE

## 2021-01-01 ENCOUNTER — HOSPITAL ENCOUNTER (OUTPATIENT)
Dept: CT IMAGING | Age: 81
Discharge: HOME | End: 2021-07-01
Payer: MEDICARE

## 2021-01-01 ENCOUNTER — OFFICE VISIT (OUTPATIENT)
Dept: ENT CLINIC | Age: 81
End: 2021-01-01
Payer: MEDICARE

## 2021-01-01 VITALS
WEIGHT: 169.25 LBS | BODY MASS INDEX: 23.61 KG/M2 | TEMPERATURE: 98 F | DIASTOLIC BLOOD PRESSURE: 72 MMHG | SYSTOLIC BLOOD PRESSURE: 143 MMHG | OXYGEN SATURATION: 97 % | HEART RATE: 77 BPM

## 2021-01-01 VITALS
HEART RATE: 74 BPM | OXYGEN SATURATION: 96 % | WEIGHT: 170.12 LBS | HEIGHT: 71 IN | SYSTOLIC BLOOD PRESSURE: 132 MMHG | DIASTOLIC BLOOD PRESSURE: 58 MMHG | BODY MASS INDEX: 23.82 KG/M2 | TEMPERATURE: 97.3 F

## 2021-01-01 VITALS
HEIGHT: 71 IN | HEART RATE: 68 BPM | WEIGHT: 168.8 LBS | TEMPERATURE: 98.2 F | DIASTOLIC BLOOD PRESSURE: 66 MMHG | SYSTOLIC BLOOD PRESSURE: 120 MMHG | OXYGEN SATURATION: 96 % | BODY MASS INDEX: 23.63 KG/M2

## 2021-01-01 VITALS
DIASTOLIC BLOOD PRESSURE: 67 MMHG | TEMPERATURE: 98 F | SYSTOLIC BLOOD PRESSURE: 134 MMHG | WEIGHT: 168 LBS | HEIGHT: 71 IN | BODY MASS INDEX: 23.52 KG/M2

## 2021-01-01 VITALS
RESPIRATION RATE: 16 BRPM | BODY MASS INDEX: 24.14 KG/M2 | DIASTOLIC BLOOD PRESSURE: 71 MMHG | WEIGHT: 172.4 LBS | OXYGEN SATURATION: 97 % | HEIGHT: 71 IN | SYSTOLIC BLOOD PRESSURE: 129 MMHG | HEART RATE: 65 BPM | TEMPERATURE: 98.3 F

## 2021-01-01 DIAGNOSIS — J30.9 ALLERGIC SINUSITIS: ICD-10-CM

## 2021-01-01 DIAGNOSIS — J98.4 RESTRICTIVE LUNG DISEASE: ICD-10-CM

## 2021-01-01 DIAGNOSIS — E55.9 VITAMIN D DEFICIENCY DISEASE: ICD-10-CM

## 2021-01-01 DIAGNOSIS — I10 ESSENTIAL HYPERTENSION: ICD-10-CM

## 2021-01-01 DIAGNOSIS — R09.82 POST-NASAL DRAINAGE: ICD-10-CM

## 2021-01-01 DIAGNOSIS — I65.23 BILATERAL CAROTID ARTERY OCCLUSION: ICD-10-CM

## 2021-01-01 DIAGNOSIS — Z87.891 HISTORY OF TOBACCO ABUSE: ICD-10-CM

## 2021-01-01 DIAGNOSIS — J90 PLEURAL EFFUSION, BILATERAL: ICD-10-CM

## 2021-01-01 DIAGNOSIS — R06.01 ORTHOPNEA: ICD-10-CM

## 2021-01-01 DIAGNOSIS — I50.32 CHRONIC DIASTOLIC CONGESTIVE HEART FAILURE (HCC): ICD-10-CM

## 2021-01-01 DIAGNOSIS — J41.0 SIMPLE CHRONIC BRONCHITIS (HCC): ICD-10-CM

## 2021-01-01 DIAGNOSIS — E06.3 HYPOTHYROIDISM, ACQUIRED, AUTOIMMUNE: ICD-10-CM

## 2021-01-01 DIAGNOSIS — J32.9 CHRONIC SINUSITIS, UNSPECIFIED LOCATION: Primary | ICD-10-CM

## 2021-01-01 DIAGNOSIS — R91.8 PULMONARY INFILTRATES: ICD-10-CM

## 2021-01-01 DIAGNOSIS — R06.02 SOB (SHORTNESS OF BREATH): ICD-10-CM

## 2021-01-01 DIAGNOSIS — I25.10 CORONARY ARTERY DISEASE INVOLVING NATIVE CORONARY ARTERY OF NATIVE HEART WITHOUT ANGINA PECTORIS: ICD-10-CM

## 2021-01-01 DIAGNOSIS — I50.33 ACUTE ON CHRONIC HEART FAILURE WITH PRESERVED EJECTION FRACTION (HCC): ICD-10-CM

## 2021-01-01 DIAGNOSIS — E78.00 PURE HYPERCHOLESTEROLEMIA: ICD-10-CM

## 2021-01-01 DIAGNOSIS — I25.10 CORONARY ARTERY DISEASE INVOLVING NATIVE CORONARY ARTERY OF NATIVE HEART WITHOUT ANGINA PECTORIS: Primary | ICD-10-CM

## 2021-01-01 DIAGNOSIS — E11.9 TYPE 2 DIABETES MELLITUS WITHOUT COMPLICATION, WITHOUT LONG-TERM CURRENT USE OF INSULIN (HCC): Primary | ICD-10-CM

## 2021-01-01 DIAGNOSIS — R06.02 SHORTNESS OF BREATH: ICD-10-CM

## 2021-01-01 DIAGNOSIS — R59.0 MEDIASTINAL ADENOPATHY: ICD-10-CM

## 2021-01-01 DIAGNOSIS — R06.02 SHORTNESS OF BREATH: Primary | ICD-10-CM

## 2021-01-01 DIAGNOSIS — E78.00 PURE HYPERCHOLESTEROLEMIA: Primary | ICD-10-CM

## 2021-01-01 DIAGNOSIS — J34.2 DEVIATED NASAL SEPTUM: ICD-10-CM

## 2021-01-01 DIAGNOSIS — J90 BILATERAL PLEURAL EFFUSION: ICD-10-CM

## 2021-01-01 LAB
A/G RATIO: 1.1 (ref 1.1–2.2)
ALBUMIN SERPL-MCNC: 4.1 G/DL (ref 3.4–5)
ALP BLD-CCNC: 181 U/L (ref 40–129)
ALT SERPL-CCNC: 12 U/L (ref 10–40)
ANION GAP SERPL CALCULATED.3IONS-SCNC: 15 MMOL/L (ref 3–16)
AST SERPL-CCNC: 15 U/L (ref 15–37)
BILIRUB SERPL-MCNC: 0.4 MG/DL (ref 0–1)
BUN BLDV-MCNC: 27 MG/DL (ref 7–20)
CALCIUM SERPL-MCNC: 9.6 MG/DL (ref 8.3–10.6)
CHLORIDE BLD-SCNC: 91 MMOL/L (ref 99–110)
CO2: 24 MMOL/L (ref 21–32)
CREAT SERPL-MCNC: 1.2 MG/DL (ref 0.8–1.3)
ESTIMATED AVERAGE GLUCOSE: 131.2 MG/DL
GFR AFRICAN AMERICAN: >60
GFR NON-AFRICAN AMERICAN: 58
GLUCOSE BLD-MCNC: 100 MG/DL (ref 70–99)
GRAM STAIN RESULT: NORMAL
HBA1C MFR BLD: 6.2 %
POTASSIUM SERPL-SCNC: 4.6 MMOL/L (ref 3.5–5.1)
SODIUM BLD-SCNC: 130 MMOL/L (ref 136–145)
T4 FREE: 2 NG/DL (ref 0.9–1.8)
TOTAL PROTEIN: 7.9 G/DL (ref 6.4–8.2)
TSH REFLEX: 4.57 UIU/ML (ref 0.27–4.2)
VITAMIN D 25-HYDROXY: 53.7 NG/ML
WOUND/ABSCESS: NORMAL

## 2021-01-01 PROCEDURE — 1123F ACP DISCUSS/DSCN MKR DOCD: CPT | Performed by: OTOLARYNGOLOGY

## 2021-01-01 PROCEDURE — 99214 OFFICE O/P EST MOD 30 MIN: CPT | Performed by: INTERNAL MEDICINE

## 2021-01-01 PROCEDURE — 31231 NASAL ENDOSCOPY DX: CPT | Performed by: OTOLARYNGOLOGY

## 2021-01-01 PROCEDURE — 1123F ACP DISCUSS/DSCN MKR DOCD: CPT | Performed by: INTERNAL MEDICINE

## 2021-01-01 PROCEDURE — G8483 FLU IMM NO ADMIN DOC REA: HCPCS | Performed by: INTERNAL MEDICINE

## 2021-01-01 PROCEDURE — G8483 FLU IMM NO ADMIN DOC REA: HCPCS | Performed by: NURSE PRACTITIONER

## 2021-01-01 PROCEDURE — G8420 CALC BMI NORM PARAMETERS: HCPCS | Performed by: NURSE PRACTITIONER

## 2021-01-01 PROCEDURE — G8427 DOCREV CUR MEDS BY ELIG CLIN: HCPCS | Performed by: INTERNAL MEDICINE

## 2021-01-01 PROCEDURE — 1036F TOBACCO NON-USER: CPT | Performed by: INTERNAL MEDICINE

## 2021-01-01 PROCEDURE — 4040F PNEUMOC VAC/ADMIN/RCVD: CPT | Performed by: OTOLARYNGOLOGY

## 2021-01-01 PROCEDURE — G8427 DOCREV CUR MEDS BY ELIG CLIN: HCPCS | Performed by: NURSE PRACTITIONER

## 2021-01-01 PROCEDURE — 6370000000 HC RX 637 (ALT 250 FOR IP): Performed by: INTERNAL MEDICINE

## 2021-01-01 PROCEDURE — 94726 PLETHYSMOGRAPHY LUNG VOLUMES: CPT

## 2021-01-01 PROCEDURE — 4040F PNEUMOC VAC/ADMIN/RCVD: CPT | Performed by: INTERNAL MEDICINE

## 2021-01-01 PROCEDURE — 99214 OFFICE O/P EST MOD 30 MIN: CPT | Performed by: OTOLARYNGOLOGY

## 2021-01-01 PROCEDURE — 4040F PNEUMOC VAC/ADMIN/RCVD: CPT | Performed by: NURSE PRACTITIONER

## 2021-01-01 PROCEDURE — G8420 CALC BMI NORM PARAMETERS: HCPCS | Performed by: INTERNAL MEDICINE

## 2021-01-01 PROCEDURE — 94618 PULMONARY STRESS TESTING: CPT

## 2021-01-01 PROCEDURE — 36415 COLL VENOUS BLD VENIPUNCTURE: CPT | Performed by: NURSE PRACTITIONER

## 2021-01-01 PROCEDURE — 1036F TOBACCO NON-USER: CPT | Performed by: OTOLARYNGOLOGY

## 2021-01-01 PROCEDURE — 99214 OFFICE O/P EST MOD 30 MIN: CPT | Performed by: NURSE PRACTITIONER

## 2021-01-01 PROCEDURE — 94060 EVALUATION OF WHEEZING: CPT

## 2021-01-01 PROCEDURE — 1123F ACP DISCUSS/DSCN MKR DOCD: CPT | Performed by: NURSE PRACTITIONER

## 2021-01-01 PROCEDURE — G8483 FLU IMM NO ADMIN DOC REA: HCPCS | Performed by: OTOLARYNGOLOGY

## 2021-01-01 PROCEDURE — G8420 CALC BMI NORM PARAMETERS: HCPCS | Performed by: OTOLARYNGOLOGY

## 2021-01-01 PROCEDURE — 3023F SPIROM DOC REV: CPT | Performed by: NURSE PRACTITIONER

## 2021-01-01 PROCEDURE — G8427 DOCREV CUR MEDS BY ELIG CLIN: HCPCS | Performed by: OTOLARYNGOLOGY

## 2021-01-01 PROCEDURE — 1036F TOBACCO NON-USER: CPT | Performed by: NURSE PRACTITIONER

## 2021-01-01 PROCEDURE — 71250 CT THORAX DX C-: CPT

## 2021-01-01 PROCEDURE — 94729 DIFFUSING CAPACITY: CPT

## 2021-01-01 PROCEDURE — G8926 SPIRO NO PERF OR DOC: HCPCS | Performed by: NURSE PRACTITIONER

## 2021-01-01 RX ORDER — DOXYCYCLINE HYCLATE 100 MG
100 TABLET ORAL 2 TIMES DAILY
Qty: 20 TABLET | Refills: 0 | Status: SHIPPED | OUTPATIENT
Start: 2021-01-01 | End: 2021-01-01

## 2021-01-01 RX ORDER — PRAVASTATIN SODIUM 40 MG
TABLET ORAL
Qty: 90 TABLET | Refills: 3 | OUTPATIENT
Start: 2021-01-01

## 2021-01-01 RX ORDER — PRAVASTATIN SODIUM 40 MG
40 TABLET ORAL DAILY
Qty: 90 TABLET | Refills: 1 | Status: SHIPPED | OUTPATIENT
Start: 2021-01-01 | End: 2021-01-01 | Stop reason: SDUPTHER

## 2021-01-01 RX ORDER — ALBUTEROL SULFATE 90 UG/1
4 AEROSOL, METERED RESPIRATORY (INHALATION) ONCE
Status: COMPLETED | OUTPATIENT
Start: 2021-01-01 | End: 2021-01-01

## 2021-01-01 RX ORDER — BLOOD SUGAR DIAGNOSTIC
STRIP MISCELLANEOUS
Qty: 200 STRIP | Refills: 3 | Status: SHIPPED | OUTPATIENT
Start: 2021-01-01 | End: 2021-01-01 | Stop reason: SDUPTHER

## 2021-01-01 RX ORDER — BLOOD SUGAR DIAGNOSTIC
STRIP MISCELLANEOUS
Qty: 200 STRIP | Refills: 3 | Status: SHIPPED | OUTPATIENT
Start: 2021-01-01 | End: 2022-01-01

## 2021-01-01 RX ORDER — ALBUTEROL SULFATE 90 UG/1
2 AEROSOL, METERED RESPIRATORY (INHALATION) EVERY 6 HOURS PRN
Qty: 1 INHALER | Refills: 5 | Status: SHIPPED | OUTPATIENT
Start: 2021-01-01

## 2021-01-01 RX ORDER — AZELASTINE 1 MG/ML
1 SPRAY, METERED NASAL 2 TIMES DAILY
Qty: 3 EACH | Refills: 3 | Status: ON HOLD | OUTPATIENT
Start: 2021-01-01 | End: 2022-01-01

## 2021-01-01 RX ORDER — LEVOTHYROXINE SODIUM 88 UG/1
88 TABLET ORAL DAILY
Qty: 90 TABLET | Refills: 3 | Status: SHIPPED | OUTPATIENT
Start: 2021-01-01

## 2021-01-01 RX ORDER — TORSEMIDE 20 MG/1
20 TABLET ORAL DAILY
Qty: 90 TABLET | Refills: 3 | Status: ON HOLD | OUTPATIENT
Start: 2021-01-01 | End: 2022-01-01 | Stop reason: SDUPTHER

## 2021-01-01 RX ORDER — PRAVASTATIN SODIUM 40 MG
40 TABLET ORAL DAILY
Qty: 90 TABLET | Refills: 1 | Status: SHIPPED | OUTPATIENT
Start: 2021-01-01 | End: 2021-01-01

## 2021-01-01 RX ORDER — PRAVASTATIN SODIUM 40 MG
TABLET ORAL
Qty: 90 TABLET | Refills: 1 | Status: SHIPPED | OUTPATIENT
Start: 2021-01-01 | End: 2022-01-01

## 2021-01-01 RX ADMIN — Medication 4 PUFF: at 11:38

## 2021-01-01 ASSESSMENT — ENCOUNTER SYMPTOMS
SORE THROAT: 0
COUGH: 0
SHORTNESS OF BREATH: 1
TROUBLE SWALLOWING: 0
COUGH: 0
VOICE CHANGE: 0
EYE ITCHING: 0
ROS SKIN COMMENTS: RIGHT KNEE
CHEST TIGHTNESS: 0
COLOR CHANGE: 1
SHORTNESS OF BREATH: 0
WHEEZING: 0
FACIAL SWELLING: 0
VOMITING: 0
DIARRHEA: 0
SINUS PRESSURE: 0
RHINORRHEA: 0
SORE THROAT: 0
ABDOMINAL PAIN: 0
APNEA: 0
NAUSEA: 0

## 2021-01-04 ENCOUNTER — HOSPITAL ENCOUNTER (OUTPATIENT)
Age: 81
Discharge: HOME | End: 2021-01-04
Payer: MEDICARE

## 2021-01-04 DIAGNOSIS — N17.9 ACUTE KIDNEY INJURY (HCC): ICD-10-CM

## 2021-01-04 LAB
ALBUMIN SERPL-MCNC: 3.7 G/DL (ref 3.4–5)
ANION GAP SERPL CALCULATED.3IONS-SCNC: 10 MMOL/L (ref 3–16)
BUN BLDV-MCNC: 23 MG/DL (ref 7–20)
CALCIUM SERPL-MCNC: 9.6 MG/DL (ref 8.3–10.6)
CHLORIDE BLD-SCNC: 98 MMOL/L (ref 99–110)
CO2: 27 MMOL/L (ref 21–32)
CREAT SERPL-MCNC: 1.4 MG/DL (ref 0.8–1.3)
GFR AFRICAN AMERICAN: 59
GFR NON-AFRICAN AMERICAN: 49
GLUCOSE BLD-MCNC: 139 MG/DL (ref 70–99)
MAGNESIUM: 2 MG/DL (ref 1.8–2.4)
PHOSPHORUS: 3.5 MG/DL (ref 2.5–4.9)
POTASSIUM SERPL-SCNC: 4.2 MMOL/L (ref 3.5–5.1)
SODIUM BLD-SCNC: 135 MMOL/L (ref 136–145)

## 2021-01-04 PROCEDURE — 80069 RENAL FUNCTION PANEL: CPT

## 2021-01-04 PROCEDURE — 83735 ASSAY OF MAGNESIUM: CPT

## 2021-01-04 PROCEDURE — 36415 COLL VENOUS BLD VENIPUNCTURE: CPT

## 2021-01-12 NOTE — PROGRESS NOTES
[facility]   Cardiac Followup    Referring Provider:  AICHA Nielsen - KENRICK     Chief Complaint   Patient presents with    Follow-Up from Hospital     S/P Cath 12/30/20    Coronary Artery Disease    Other     Usual shortness of breath      Subjective: Mr Nikko Palomo being seen today for cardiology follow up of his CAD, HTN, HLD;  S/P 4V CABG January 2018; c/o IBARRA today    History of Present Illness:    Mr. Nikko Palomo is a [de-identified] y.o. male hx of embolic R. CVA in 4/77, CAD s/p 4 stents prior (most recent 10/00), s/p 4V CABG 1/18 with Dr. Lucila Morejon, HTN, DM, pleural effusion s/p left thoracentesis 9/20, mild carotid artery disease, HLD, and hypothyroidism. Note hx of myalgias with statins but tolerates pravachol with coenzyme Q10. Admitted to [facility]. on 04/11/2016 with acute ischemic CVA of the R hemisphere, confirmed on MRI of the brain with embolic pattern (6 small emboli). Note event monitor 05/03-06/02/2016 with NSR, with occasional PVC's and overall normal study. Most recent BLE LUCY 6/23/17 Right LUCY 1.33 left LUCY 1.31. Note LHC 1/18/18 LM 20% LAD 70% mid D1 90% prox Cx 90% mid OM2 side branch 90% ostium RCA 80% prox, 95% mid Collaterals LAD to RCA; multiple stents noted to be patent LVEF 50-55%. Underwent 4V CABG 1/31/18 by Dr. Lucila Morejon at [facility] with pedicled LIMA to LAD, sequential SVG to Diag 1 then on to OM, separate single SVG to distal RCA. Most recent Carotid doppler 9/19/19 moderate plaque <50% bilateral carotid arteries (no change from 6/2107 study)   Note he c/o new SOB with exertion. Most recent ECHO 8/25/20 EF 50-55% (EF=55-60% 6/19; EF=55% 1/18, EF=5% 8/09); Milld HK/basal inferior and inferoseptal walls. Grade III DDwith elevated LV pressure. Left atrium is mildly dilated. Mild MAC; Mild MR, AR, TR. Large left pleural effusion. Most recent Lexiscan myoview stress 8/25/20 There is no evidence of  myocardial ischemia or scar (12/17 GXT nuc + ischemia lateral apex).  Referred to pulmonary and had Left thoracentesis 9/8/20 0.5-L transudate fluid removed. He had follow up CXR 11/24/20 Bilateral pleural effusions and bibasilar airspace disease, atelectasis versus pneumonia. At [address] 12/14/20 c/o ongoing SOB at both rest and exertion, worse with exertion. He had VV with Dr. Jackquline Dakins early December and will have repeat CXR 6 months. Conservative mgt and felt fluid from CHF. Due to symptoms I ordered most recent [facility] 12/29/20 which revealed native 3 vessel disease. Bypass grafts patent (LIMA-LAD, [id number]-toce-UR6, SVG-RCA). Normal left side filling pressures (LVEDP 13); Mildly elevated right sided filling pressures. Normal pulmonary artery pressure. Most recent EKG 12/19/20 SR; 1st degree AV block; incomplete RBBB. Today he reports still having SOB with exertion unchanged. This is his main complaint and he denies complaints of chest pain, SOB, palpitations, dizziness, edema, or orthopnea/PND. No complications noted after cath procedure. 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 Surgical History:   has a past surgical history that includes Tooth Extraction; Coronary angioplasty with stent (08/14/2000); other surgical history (1-23-12); and Coronary angioplasty with stent (10/19/2000). Social History:   reports that he quit smoking about 46 years ago. His smoking use included cigarettes. He started smoking about 64 years ago. He has a 40.00 pack-year smoking history. He has never used smokeless tobacco. He reports that he does not drink alcohol or use drugs. Family History:  family history includes Emphysema in his mother; Other in his daughter and grandchild; Stroke in his father. Home Medications:  Prior to Admission medications    Medication Sig Start Date End Date Taking? Authorizing Provider   pravastatin (PRAVACHOL) 20 MG tablet Take 20 mg by mouth daily. Yes Historical Provider, MD   Coenzyme Q10 (CO Q 10 PO) Take 1 capsule by mouth daily. Yes Historical Provider, MD   NITROSTAT 0.4 MG SL tablet PLACE 1 TABLET UNDER THE TONGUE EVERY 5 MINUTES ASNEEDED 7/12/12  Yes Ady Slater MD   metoprolol (TOPROL-XL) 50 MG XL tablet TAKE 1 TABLET BY MOUTH ONCE DAILY 6/20/12  Yes Ady Slater MD   levothyroxine (SYNTHROID) 75 MCG tablet TAKE 1 TABLET BY MOUTH ONCE DAILY 2/8/12  Yes Ady Slater MD   losartan-hydrochlorothiazide (HYZAAR) 100-25 MG per tablet Take 1 tablet by mouth daily for 360 days. 1/18/12 1/12/13 Yes Tania Amador MD   aspirin 81 MG EC tablet Take 81 mg by mouth daily. 2 daily     Yes Historical Provider, MD   Blood Glucose Monitoring Suppl (ONE TOUCH ULTRA) JUAN FRANCISCO by Does not apply route. 6/22/10  Yes Ady Slater MD   Multiple Vitamins-Minerals (MULTIVITAL PO) Take  by mouth daily. 5/24/10  Yes Historical Provider, MD   Omega-3 Fatty Acids (FISH OIL) 1200 MG CAPS Take  by mouth 2 times daily. 5/24/10  Yes Historical Provider, MD   Ascorbic Acid (VITAMIN C) 500 MG tablet Take 500 mg by mouth daily. Yes Historical Provider, MD   Cholecalciferol (VITAMIN D3) 1000 UNIT CAPS Take  by mouth daily. 5/24/10  Yes Historical Provider, MD   vitamin E 200 UNIT capsule Take 200 Units by mouth daily. Yes Historical Provider, MD        Allergies:  Metolazone, Lipitor, and Simvastatin     Review of Systems:   · Constitutional: there has been no unanticipated weight loss. There's been no change in energy level, sleep pattern, or activity level. · Eyes: No visual changes or diplopia. No scleral icterus. · ENT: No Headaches, hearing loss or vertigo. No mouth sores or sore throat. · Cardiovascular: Reviewed in HPI  · Respiratory: No cough or wheezing, no sputum production. No hematemesis. · Gastrointestinal: No abdominal pain, appetite loss, blood in stools.  No change in bowel or bladder habits. · Genitourinary: No dysuria, trouble voiding, or hematuria. · Musculoskeletal:  No gait disturbance, weakness or joint complaints. · Integumentary: No rash or pruritis. · Neurological: No headache, diplopia, change in muscle strength, numbness or tingling. No change in gait, balance, coordination, mood, affect, memory, mentation, behavior. · Psychiatric: No anxiety, no depression. · Endocrine: No malaise, fatigue or temperature intolerance. No excessive thirst, fluid intake, or urination. No tremor. · Hematologic/Lymphatic: No abnormal bruising or bleeding, blood clots or swollen lymph nodes. · Allergic/Immunologic: No nasal congestion or hives. Height: 5' 11\" (1.803 m), Weight: 172 lb (78 kg), BP: 132/70    Wt Readings from Last 3 Encounters:   01/15/21 172 lb (78 kg)   12/29/20 170 lb (77.1 kg)   12/14/20 172 lb 12.8 oz (78.4 kg)     Temp Readings from Last 3 Encounters:   01/15/21 96.6 °F (35.9 °C)   12/14/20 96 °F (35.6 °C)   11/16/20 98.1 °F (36.7 °C) (Oral)     BP Readings from Last 3 Encounters:   01/15/21 132/70   12/14/20 134/64   11/16/20 (!) 152/74     Pulse Readings from Last 3 Encounters:   01/15/21 81   12/14/20 76   11/16/20 72       Physical Examination:         Constitutional and General Appearance: NAD   Respiratory:  · Normal excursion and expansion without use of accessory muscles  · Resp Auscultation:  Diminished breath sounds bilateral bases otherwise Normal breath sounds without dullness  Cardiovascular:  · The apical impulses not displaced  · Heart tones are crisp and normal  · Cervical veins are not engorged  · The carotid upstroke is normal in amplitude and contour without delay  · Normal S1S2, No S3, no murmur  · Peripheral pulses are symmetrical and full  · There is no clubbing, cyanosis of the extremities.   ·   No edema  · Femoral Arteries: 2+ and equal  · Pedal Pulses: 2+ and equal   Abdomen:  · No masses or tenderness  · Liver/Spleen: No Abnormalities Noted  Neurological/Psychiatric:  · Alert and oriented in all spheres  · Moves all extremities well  · Exhibits normal gait balance and coordination  · No abnormalities of mood, affect, memory, mentation, or behavior are noted    No components found for: CHLPL  Lab Results   Component Value Date    TRIG 91 12/29/2020    TRIG 87 06/02/2020    TRIG 131 11/08/2019     Lab Results   Component Value Date    HDL 52 12/29/2020    HDL 45 06/02/2020    HDL 46 11/08/2019     Lab Results   Component Value Date    LDLCALC 114 (H) 12/29/2020    LDLCALC 88 06/02/2020    LDLCALC 90 11/08/2019     Lab Results   Component Value Date    LABVLDL 18 12/29/2020    LABVLDL 17 06/02/2020    LABVLDL 26 11/08/2019   TSH 6/2/20 3.78  Assessment:     1. CAD (coronary artery disease): S/P 4V CABG 1/31/18. Most recent ECHO 8/25/20 EF 50-55% (EF=55-60% 6/19; EF=55% 1/18); Milld HK/basal inferior and inferoseptal walls. Grade III DDwith elevated LV pressure. Most recent Lexiscan myoview stress 8/25/20 There is no evidence of  myocardial ischemia or scar. Most recent LHC/RHC 12/29/20 which revealed native 3 vessel disease. Bypass grafts patent (LIMA-LAD, IUI-jshu-IG5, SVG-RCA). Normal left side filling pressures (LVEDP 13); Mildly elevated right sided filling pressures. Normal pulmonary artery pressure. No definite cardiac etiology to explain IBARRA symptoms. 2. DM w/o complication Type II : managed per PCP. HA1C=6.5 on 11/6/18.   3. Carotid artery occlusion: Most recent Carotid doppler 9/19/19 moderate plaque <50% bilateral carotid arteries (no change from 6/2107 study)     4. Cerebrovascular disease: Resolved. 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 Stable and doing better overall and will continue present medical regimen. He remains on baby aspirin. 5. Hypertension : Stable and will continue present medical regimen.       6. Hyperlipidemia:  Most recent labs from 12/29/20  see results above which I personally reviewed. Note VJM=053 and not at goal. Will increase pravachol to 80mg qhs. Plan:   1. Meds reviewed. Refills as warranted. 2. I recommend calling Dr. Josue Santiago office. Let him know you had L&RHC  findings do not suggest SOB is cardiac related. See if he's able to see you in office sooner than June 2021. Otherwise follow up with him as scheduled 6/1/21  3. Increase Pravastatin to 80mg daily. Will check fasting lipids and LFT's in 2 months   4. Follow up with me in 6 months     This note was scribed in the presence of Dickson Randle MD by Woodfin Halsted, RN    I, Dr. Tommie Hills, personally performed the services described in this documentation, as scribed by the above signed scribe in my presence. It is both accurate and complete to my knowledge. I agree with the details independently gathered by the clinical support staff, while the remaining scribed note accurately describes my personal service to the patient. Cost of prescription medications and patient compliance have been reviewed with patient. All questions answered. Thank you for allowing me to participate in the care of this individual.    Nathen Dietz.  Mahesh Francisco M.D., [address] [facility]

## 2021-01-15 ENCOUNTER — OFFICE VISIT (OUTPATIENT)
Dept: CARDIOLOGY CLINIC | Age: 81
End: 2021-01-15
Payer: MEDICARE

## 2021-01-15 VITALS
WEIGHT: 172 LBS | BODY MASS INDEX: 24.08 KG/M2 | TEMPERATURE: 96.6 F | SYSTOLIC BLOOD PRESSURE: 132 MMHG | OXYGEN SATURATION: 94 % | DIASTOLIC BLOOD PRESSURE: 70 MMHG | HEIGHT: 71 IN | HEART RATE: 81 BPM

## 2021-01-15 DIAGNOSIS — E78.00 PURE HYPERCHOLESTEROLEMIA: ICD-10-CM

## 2021-01-15 DIAGNOSIS — I10 ESSENTIAL HYPERTENSION: ICD-10-CM

## 2021-01-15 DIAGNOSIS — Z87.891 HISTORY OF TOBACCO ABUSE: ICD-10-CM

## 2021-01-15 DIAGNOSIS — I25.10 CORONARY ARTERY DISEASE INVOLVING NATIVE CORONARY ARTERY OF NATIVE HEART WITHOUT ANGINA PECTORIS: Primary | ICD-10-CM

## 2021-01-15 PROCEDURE — 99214 OFFICE O/P EST MOD 30 MIN: CPT | Performed by: INTERNAL MEDICINE

## 2021-01-15 PROCEDURE — G8483 FLU IMM NO ADMIN DOC REA: HCPCS | Performed by: INTERNAL MEDICINE

## 2021-01-15 PROCEDURE — 1123F ACP DISCUSS/DSCN MKR DOCD: CPT | Performed by: INTERNAL MEDICINE

## 2021-01-15 PROCEDURE — 1036F TOBACCO NON-USER: CPT | Performed by: INTERNAL MEDICINE

## 2021-01-15 PROCEDURE — G8427 DOCREV CUR MEDS BY ELIG CLIN: HCPCS | Performed by: INTERNAL MEDICINE

## 2021-01-15 PROCEDURE — G8420 CALC BMI NORM PARAMETERS: HCPCS | Performed by: INTERNAL MEDICINE

## 2021-01-15 PROCEDURE — 4040F PNEUMOC VAC/ADMIN/RCVD: CPT | Performed by: INTERNAL MEDICINE

## 2021-01-15 RX ORDER — PRAVASTATIN SODIUM 80 MG/1
80 TABLET ORAL DAILY
Qty: 90 TABLET | Refills: 3 | Status: CANCELLED | OUTPATIENT
Start: 2021-01-15

## 2021-01-15 NOTE — PATIENT INSTRUCTIONS
Plan:   1. Meds reviewed. Refills as warranted. 2. I recommend calling Dr. Arline Butcher office. Let him know you had L&RHC  findings do not suggest SOB is cardiac related. See if he's able to see you in office sooner than June 2021. Otherwise follow up with him as scheduled 6/1/21  3. Increase Pravastatin to 80mg daily. Will check fasting lipids and LFT's in 2 months   4.  Follow up with me in 6 months

## 2021-02-02 RX ORDER — LEVOTHYROXINE SODIUM 88 UG/1
88 TABLET ORAL DAILY
Qty: 90 TABLET | Refills: 1 | Status: SHIPPED | OUTPATIENT
Start: 2021-02-02 | End: 2021-01-01

## 2021-02-03 ENCOUNTER — OFFICE VISIT (OUTPATIENT)
Dept: ENT CLINIC | Age: 81
End: 2021-02-03
Payer: MEDICARE

## 2021-02-03 VITALS
BODY MASS INDEX: 24.92 KG/M2 | HEIGHT: 71 IN | TEMPERATURE: 97 F | HEART RATE: 70 BPM | DIASTOLIC BLOOD PRESSURE: 67 MMHG | WEIGHT: 178 LBS | SYSTOLIC BLOOD PRESSURE: 129 MMHG

## 2021-02-03 DIAGNOSIS — H60.8X3 CHRONIC ECZEMATOUS OTITIS EXTERNA OF BOTH EARS: ICD-10-CM

## 2021-02-03 DIAGNOSIS — J34.2 DEVIATED NASAL SEPTUM: ICD-10-CM

## 2021-02-03 DIAGNOSIS — M95.0 NASAL DEFORMITY, ACQUIRED: ICD-10-CM

## 2021-02-03 DIAGNOSIS — R09.82 POST-NASAL DRAINAGE: Primary | ICD-10-CM

## 2021-02-03 PROCEDURE — 99203 OFFICE O/P NEW LOW 30 MIN: CPT | Performed by: OTOLARYNGOLOGY

## 2021-02-03 PROCEDURE — 4040F PNEUMOC VAC/ADMIN/RCVD: CPT | Performed by: OTOLARYNGOLOGY

## 2021-02-03 PROCEDURE — 1036F TOBACCO NON-USER: CPT | Performed by: OTOLARYNGOLOGY

## 2021-02-03 PROCEDURE — G8420 CALC BMI NORM PARAMETERS: HCPCS | Performed by: OTOLARYNGOLOGY

## 2021-02-03 PROCEDURE — G8483 FLU IMM NO ADMIN DOC REA: HCPCS | Performed by: OTOLARYNGOLOGY

## 2021-02-03 PROCEDURE — G8427 DOCREV CUR MEDS BY ELIG CLIN: HCPCS | Performed by: OTOLARYNGOLOGY

## 2021-02-03 PROCEDURE — 1123F ACP DISCUSS/DSCN MKR DOCD: CPT | Performed by: OTOLARYNGOLOGY

## 2021-02-03 ASSESSMENT — ENCOUNTER SYMPTOMS
SHORTNESS OF BREATH: 0
TROUBLE SWALLOWING: 0
SORE THROAT: 0
APNEA: 0
FACIAL SWELLING: 0
EYE ITCHING: 0
VOICE CHANGE: 0
COUGH: 0
SINUS PRESSURE: 0

## 2021-02-03 NOTE — PATIENT INSTRUCTIONS
Flonase 2 sprays each side once a day  Nasal saline spray 4-6 times a day    Olive oil or sweet oil drop 1 per week each side for itching  If this doesn't work use hydrocortisone on Q tip daily for 1 week then as needed

## 2021-02-03 NOTE — PROGRESS NOTES
[facility], [address], [address], [facility] [place] Magali Spain  P: [phone]       Patient     Magalis Hoffmann  1940    ChiefComplaint     Chief Complaint   Patient presents with    Sinus Problem     Patient complains of post nasal drainage. He states it's thick and hard to cough up       History of Present Illness     Tammy Almazan is an 27-year-old male here today for evaluation of postnasal drainage that has been present for 50 years. Admits mild postnasal drainage throughout the day but symptoms mainly occur at night when attempting to sleep. He feels mucus pulls in the back of his throat and requires a significant force to expel it. He uses nasal saline spray before night which he thinks helped but does not significantly improve symptoms. He tried Flonase briefly for 1 to 2 days without improvement. Has significant nasal deformity with left-sided obstruction since the age of 15 after breaking his nose. Denies facial pain, pressure. No discolored nasal secretions. Sense of smell normal.  Also notes bilateral itching of the ears.     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 [address] 04/2016    TIA in 1997    Routine health maintenance     refuses immunizations; declines PSA    Vasovagal reaction     to needles    Vitamin D deficiency disease 12/15       Past Surgical History     Past Surgical History:   Procedure Laterality Date    CORONARY ANGIOPLASTY WITH STENT PLACEMENT  08/14/2000    RX Tristar 2.5 x 13 mCX  RX Tristar 2.5 x 13 pCX    CORONARY ANGIOPLASTY WITH STENT PLACEMENT  10/19/2000    Tetra 2.75 x 18 CX  Tetra 3.0 x 13 CX    OTHER SURGICAL HISTORY  1-23-12    phaco emulsification cataract right eye    TOOTH EXTRACTION         Family History     Family History   Problem Relation Age of Onset    Emphysema Mother     Stroke Father     Other Daughter         Factor V deficiency    Other Grandchild         Factor V deficiency       Social History     Social History     Tobacco Use    Smoking status: Former Smoker     Packs/day: 2.00     Years: 20.00     Pack years: 40.00     Types: Cigarettes     Start date: 1956     Quit date: 1975     Years since quittin.1    Smokeless tobacco: Never Used   Substance Use Topics    Alcohol use: No    Drug use: No        Allergies     Allergies   Allergen Reactions    Metolazone      Sodium 120 on , was on metolazone, no hypovolemic synptoms on presentation    Lipitor      Myalgias at 20 mg    Simvastatin      myalgias       Medications     Current Outpatient Medications   Medication Sig Dispense Refill    levothyroxine (SYNTHROID) 88 MCG tablet Take 1 tablet by mouth Daily 90 tablet 1    pravastatin (PRAVACHOL) 40 MG tablet Take 1 tablet by mouth daily 90 tablet 3    metoprolol tartrate (LOPRESSOR) 50 MG tablet TAKE 1 TABLET TWICE DAILY 180 tablet 3    metFORMIN (GLUCOPHAGE) 1000 MG tablet TAKE 1 TABLET TWICE DAILY WITH MEALS 180 tablet 1    amLODIPine (NORVASC) 5 MG tablet TAKE 1 TABLET EVERY DAY 90 tablet 3    losartan (COZAAR) 100 MG tablet TAKE 1 TABLET EVERY DAY 90 tablet 3    Omega-3 Fatty Acids (FISH OIL PO) Take by mouth      Multiple Vitamin (MULTI-VITAMIN DAILY PO) Take by mouth      torsemide (DEMADEX) 20 MG tablet Take 1 tablet by mouth daily (Patient taking differently: Take 10 mg by mouth daily ) 90 tablet 3    Coenzyme Q10 (COQ10 PO) Take by mouth      Ascorbic Acid (VITAMIN C PO) Take by mouth 2 times daily      guaiFENesin 400 MG tablet Take 400 mg by mouth nightly       magnesium oxide (MAG-OX) 400 MG tablet Take 400 mg by mouth daily      ferrous sulfate 325 (65 Fe) MG tablet Take 325 mg by mouth daily (with breakfast)       aspirin 81 MG EC tablet Take 1 tablet by mouth daily 30 tablet 0    Cholecalciferol (VITAMIN D) 2000 UNITS CAPS capsule Take  by mouth daily.  fluticasone (FLONASE) 50 MCG/ACT nasal spray 2 sprays by Each Nostril route daily       No current facility-administered medications for this visit. Review of Systems     Review of Systems   Constitutional: Negative for appetite change, chills, fatigue, fever and unexpected weight change. HENT: Positive for postnasal drip. Negative for congestion, ear discharge, ear pain, facial swelling, hearing loss, nosebleeds, sinus pressure, sneezing, sore throat, tinnitus, trouble swallowing and voice change. Eyes: Negative for itching. Respiratory: Negative for apnea, cough and shortness of breath. Gastrointestinal:        Negative for dysphasia   Endocrine: Negative for cold intolerance and heat intolerance. Musculoskeletal: Negative for myalgias and neck pain. Skin: Negative for rash. Allergic/Immunologic: Negative for environmental allergies. Neurological: Negative for dizziness and headaches. Psychiatric/Behavioral: Negative for confusion, decreased concentration and sleep disturbance. PhysicalExam     Vitals:    02/03/21 1000   BP: 129/67   Site: Right Upper Arm   Position: Sitting   Pulse: 70   Temp: 97 °F (36.1 °C)   Weight: 178 lb (80.7 kg)   Height: 5' 11\" (1.803 m)       Physical Exam  Constitutional:       General: He is not in acute distress. Appearance: He is well-developed. HENT:      Head: Normocephalic and atraumatic. Right Ear: Tympanic membrane, ear canal and external ear normal. No drainage. No middle ear effusion. Tympanic membrane is not bulging. Tympanic membrane has normal mobility. Left Ear: Tympanic membrane, ear canal and external ear normal. No drainage. No middle ear effusion. Tympanic membrane is not bulging. Tympanic membrane has normal mobility.       Ears:      Comments: Eczematous changes to EACs bilaterally     Nose: Nasal deformity (Deviation of nasal dorsum from left to right) and septal deviation (Severe to left) present. No mucosal edema or rhinorrhea. Mouth/Throat:      Lips: Pink. Mouth: Mucous membranes are moist.      Tongue: No lesions. Palate: No mass. Pharynx: Uvula midline. Comments: Clear mucus in posterior oropharynx  Eyes:      Pupils: Pupils are equal, round, and reactive to light. Neck:      Musculoskeletal: Full passive range of motion without pain. Thyroid: No thyroid mass or thyromegaly. Trachea: Trachea and phonation normal.   Cardiovascular:      Pulses: Normal pulses. Pulmonary:      Effort: Pulmonary effort is normal. No accessory muscle usage or respiratory distress. Breath sounds: No stridor. Lymphadenopathy:      Head:      Right side of head: No submental or submandibular adenopathy. Left side of head: No submental or submandibular adenopathy. Cervical: No cervical adenopathy. Right cervical: No superficial, deep or posterior cervical adenopathy. Left cervical: No superficial, deep or posterior cervical adenopathy. Skin:     General: Skin is warm and dry. Neurological:      Mental Status: He is alert and oriented to person, place, and time. Cranial Nerves: No cranial nerve deficit. Coordination: Coordination normal.      Gait: Gait normal.   Psychiatric:         Thought Content: Thought content normal.             Assessment and Plan     1. Post-nasal drainage  -Recommend increasing use of nasal saline spray 4-6 times throughout the day  -Restart Flonase 2 sprays each side once a day    2. Deviated nasal septum  -Chronic    3. Nasal deformity, acquired  -Chronic    4.  Chronic eczematous otitis externa of both ears  -Recommend use of olive oil or sweet oil 1 drop once a week in each ear to provide moisture if this is not improved symptoms of itching can try OTC hydrocortisone ointment    Return in 1 month if symptoms persist    Veto Plants DO Osorio  2/3/21      Portions of this note were dictated using Dragon.  There may be linguistic errors secondary to the use of this program.

## 2021-02-05 DIAGNOSIS — R06.01 ORTHOPNEA: ICD-10-CM

## 2021-02-05 NOTE — TELEPHONE ENCOUNTER
Last ov 1.15.21---next ov 6.24.21    Assessment:      1. CAD (coronary artery disease): S/P 4V CABG 1/31/18. Most recent ECHO 8/25/20 EF 50-55% (EF=55-60% 6/19; EF=55% 1/18); Milld HK/basal inferior and inferoseptal walls. Grade III DDwith elevated LV pressure. Most recent Lexiscan myoview stress 8/25/20 There is no evidence of  myocardial ischemia or scar. Most recent LHC/RHC 12/29/20 which revealed native 3 vessel disease. Bypass grafts patent (LIMA-LAD, [id number], SVG-RCA). Normal left side filling pressures (LVEDP 13); Mildly elevated right sided filling pressures. Normal pulmonary artery pressure. No definite cardiac etiology to explain IBARRA symptoms.       2. DM w/o complication Type II : managed per PCP. HA1C=6.5 on 11/6/18.   3. Carotid artery occlusion: Most recent Carotid doppler 9/19/19 moderate plaque <50% bilateral carotid arteries (no change from 6/2107 study)      4. Cerebrovascular disease: Resolved. 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 Stable and doing better overall and will continue present medical regimen. He remains on baby aspirin.      5. Hypertension : Stable and will continue present medical regimen.       6. Hyperlipidemia:  Most recent labs from 12/29/20  see results above which I personally reviewed. Note TEC=126 and not at goal. Will increase pravachol to 80mg qhs.       Plan:   1. Meds reviewed. Refills as warranted. 2. I recommend calling Dr. Morton Sine office. Let him know you had L&RHC  findings do not suggest SOB is cardiac related. See if he's able to see you in office sooner than June 2021. Otherwise follow up with him as scheduled 6/1/21  3. Increase Pravastatin to 80mg daily. Will check fasting lipids and LFT's in 2 months   4.  Follow up with me in 6 months

## 2021-02-08 RX ORDER — LANCETS
EACH MISCELLANEOUS
Qty: 200 EACH | Refills: 3 | Status: SHIPPED | OUTPATIENT
Start: 2021-02-08 | End: 2022-01-01

## 2021-02-08 RX ORDER — LOSARTAN POTASSIUM 100 MG/1
TABLET ORAL
Qty: 90 TABLET | Refills: 5 | Status: SHIPPED | OUTPATIENT
Start: 2021-02-08 | End: 2022-01-01

## 2021-03-11 ENCOUNTER — TELEPHONE (OUTPATIENT)
Dept: PULMONOLOGY | Age: 81
End: 2021-03-11

## 2021-03-11 RX ORDER — METOPROLOL TARTRATE 50 MG/1
TABLET, FILM COATED ORAL
Qty: 180 TABLET | Refills: 5 | Status: SHIPPED | OUTPATIENT
Start: 2021-03-11 | End: 2022-01-01

## 2021-03-11 NOTE — TELEPHONE ENCOUNTER
Within this Telehealth Consent, the terms you and yours refer to the person using the Telehealth Service (Service), or in the case of a use of the Service by or on behalf of a minor, you and yours refer to and include (i) the parent or legal guardian who provides consent to the use of the Service by such minor or uses the Service on behalf of such minor, and (ii) the minor for whom consent is being provided or on whose behalf the Service is being utilized. When using Service, you will be consulting with your health care providers via the use of Telehealth.   Telehealth involves the delivery of healthcare services using electronic communications, information technology or other means between a healthcare provider and a patient who are not in the same physical location. Telehealth may be used for diagnosis, treatment, follow-up and/or patient education, and may include, but is not limited to, one or more of the following:    Electronic transmission of medical records, photo images, personal health information or other data between a patient and a healthcare provider    Interactions between a patient and healthcare provider via audio, video and/or data communications    Use of output data from medical devices, sound and video files    Anticipated Benefits   The use of Telehealth by your Provider(s) through the Service may have the following possible benefits:    Making it easier and more efficient for you to access medical care and treatment for the conditions treated by such Provider(s) utilizing the Service    Allowing you to obtain medical care and treatment by Provider(s) at times that are convenient for you    Enabling you to interact with Provider(s) without the necessity of an in-office appointment     Possible Risks   While the use of Telehealth can provide potential benefits for you, there are also potential risks associated with the use of Telehealth.  These risks include, but may not be limited to the following:    Your Provider(s) may not able to provide medical treatment for your particular condition and you may be required to seek alternative healthcare or emergency care services.  The electronic systems or other security protocols or safeguards used in the Service could fail, causing a breach of privacy of your medical or other information.  Given regulatory requirements in certain jurisdictions, your Provider(s) diagnosis and/or treatment options, especially pertaining to certain prescriptions, may be limited. Acceptance   1. You understand that Services will be provided via Telehealth. This process involves the use of HIPAA compliant and secure, real-time audio-visual interfacing with a qualified and appropriately trained provider located at [facility]. 2. You understand that, under no circumstances, will this session be recorded. 3. You understand that the Provider(s) at [facility] and other clinical participants will be party to the information obtained during the Telehealth session in accordance with best medical practices. 4. You understand that the information obtained during the Telehealth session will be used to help determine the most appropriate treatment options. 5. You understand that You have the right to revoke this consent at any point in time. 6. You understand that Telehealth is voluntary, and that continued treatment is not dependent upon consent. 7. You understand that, in the event of non-consent to Telehealth services and/or technical difficulties, you will obtain services as typically provided in the absence of Telehealth technology. 8. You understand that this consent will be kept in Your medical record. 9. No potential benefits from the use of Telehealth or specific results can be guaranteed. Your condition may not be cured or improved and, in some cases, may get worse.    10. There are limitations in the provision of medical care and treatment via Telehealth and the Service and you may not be able to receive diagnosis and/or treatment through the Service for every condition for which you seek diagnosis and/or treatment. 11. There are potential risks to the use of Telehealth, including but not limited to the risks described in this Telehealth Consent. 12. Your Provider(s) have discussed the use of Telehealth and the Service with you, including the benefits and risks of such and you have provided oral consent to your Provider(s) for the use of Telehealth and the Service. 15. You understand that it is your duty to provide your Provider(s) truthful, accurate and complete information, including all relevant information regarding care that you may have received or may be receiving from other healthcare providers outside of the Service. 14. You understand that each of your Provider(s) may determine in his or sole discretion that your condition is not suitable for diagnosis and/or treatment using the Service, and that you may need to seek medical care and treatment a specialist or other healthcare provider, outside of the Service. 15. You understand that you are fully responsible for payment for all services provided by Provider(s) or through use of the Service and that you may not be able to use third-party insurance. 16. You represent that (a) you have read this Telehealth Consent carefully, (b) you understand the risks and benefits of the Service and the use of Telehealth in the medical care and treatment provided to you by Provider(s) using the Service, and (c) you have the legal capacity and authority to provide this consent for yourself and/or the minor for which you are consenting under applicable federal and state laws, including laws relating to the age of [de-identified] and/or parental/guardian consent.    17. You give your informed consent to the use of Telehealth by Provider(s) using the Service under the terms described in the Terms of Service and this Telehealth Consent. The patient was read the following statement and has consented to the visit as of 3/11/21. The patient has been scheduled for their first telehealth visit on 03/15/2021 with Dr. Maisha Mclean.

## 2021-03-15 ENCOUNTER — TELEPHONE (OUTPATIENT)
Dept: PULMONOLOGY | Age: 81
End: 2021-03-15

## 2021-03-15 NOTE — TELEPHONE ENCOUNTER
Patient same day cancelled appt (no show) for Cardiology fu  with Dr. Maisha Mclean on 3/15/21. Reason:  Pt did not like the idea that he had a copay for a virtual visit. I did verify with Luis Fernando Diaz (office mgr) that pt did indeed have a copay. He decided to cancel appt. This is patient's first no show. Patient was a no show on: NA.      Patient did reschedule. Reschedule date:  6/1/21.

## 2021-03-16 ENCOUNTER — OFFICE VISIT (OUTPATIENT)
Dept: ENT CLINIC | Age: 81
End: 2021-03-16
Payer: MEDICARE

## 2021-03-16 VITALS
TEMPERATURE: 97.7 F | WEIGHT: 175.6 LBS | HEIGHT: 71 IN | HEART RATE: 61 BPM | DIASTOLIC BLOOD PRESSURE: 67 MMHG | BODY MASS INDEX: 24.58 KG/M2 | SYSTOLIC BLOOD PRESSURE: 134 MMHG

## 2021-03-16 DIAGNOSIS — H60.8X3 CHRONIC ECZEMATOUS OTITIS EXTERNA OF BOTH EARS: ICD-10-CM

## 2021-03-16 DIAGNOSIS — R09.81 NASAL CONGESTION: ICD-10-CM

## 2021-03-16 DIAGNOSIS — R09.82 POST-NASAL DRAINAGE: Primary | ICD-10-CM

## 2021-03-16 PROCEDURE — 4040F PNEUMOC VAC/ADMIN/RCVD: CPT | Performed by: OTOLARYNGOLOGY

## 2021-03-16 PROCEDURE — G8483 FLU IMM NO ADMIN DOC REA: HCPCS | Performed by: OTOLARYNGOLOGY

## 2021-03-16 PROCEDURE — 99213 OFFICE O/P EST LOW 20 MIN: CPT | Performed by: OTOLARYNGOLOGY

## 2021-03-16 PROCEDURE — G8427 DOCREV CUR MEDS BY ELIG CLIN: HCPCS | Performed by: OTOLARYNGOLOGY

## 2021-03-16 PROCEDURE — G8420 CALC BMI NORM PARAMETERS: HCPCS | Performed by: OTOLARYNGOLOGY

## 2021-03-16 PROCEDURE — 1036F TOBACCO NON-USER: CPT | Performed by: OTOLARYNGOLOGY

## 2021-03-16 PROCEDURE — 1123F ACP DISCUSS/DSCN MKR DOCD: CPT | Performed by: OTOLARYNGOLOGY

## 2021-03-16 RX ORDER — AZELASTINE 1 MG/ML
1 SPRAY, METERED NASAL 2 TIMES DAILY
Qty: 3 BOTTLE | Refills: 0 | Status: SHIPPED | OUTPATIENT
Start: 2021-03-16 | End: 2021-01-01 | Stop reason: SDUPTHER

## 2021-03-16 ASSESSMENT — ENCOUNTER SYMPTOMS
TROUBLE SWALLOWING: 0
COUGH: 0
FACIAL SWELLING: 0
SINUS PRESSURE: 0
APNEA: 0
EYE ITCHING: 0
VOICE CHANGE: 0
SORE THROAT: 0
SHORTNESS OF BREATH: 0

## 2021-03-16 NOTE — PROGRESS NOTES
Kaylin Montgomery 89, [address], [facility]  P: [phone]       Patient     Sen Raygoza  1940    ChiefComplaint     Chief Complaint   Patient presents with    Follow-up     Pt states he is here for a one month follow up from post nasal drip; Pt states he is still having thick phelgm in his throat and has not improved other than being able to sleep through the night. History of Present Illness     Deepak Lozano is an 59-year-old male here today for follow-up regarding nasal congestion, post nasal Drainage and itchy ears. He reports significant improvement in bilateral nasal congestion since use of Flonase for the last 2 weeks. Ears are also no longer itchy after following previous recommendations. He does continue to have thick postnasal drainage/secretions which she finds bothersome.     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    Vitamin D deficiency disease 12/15       Past Surgical History     Past Surgical History:   Procedure Laterality Date    CORONARY ANGIOPLASTY WITH STENT PLACEMENT  08/14/2000    RX Tristar 2.5 x 13 mCX  RX Tristar 2.5 x 13 pCX    CORONARY ANGIOPLASTY WITH STENT PLACEMENT  10/19/2000    Tetra 2.75 x 18 CX  Tetra 3.0 x 13 CX    OTHER SURGICAL HISTORY  1-23-12    phaco emulsification cataract right eye    TONSILLECTOMY      TOOTH EXTRACTION         Family History     Family History   Problem Relation Age of Onset    Emphysema Mother     Stroke Father     Other Daughter         Factor V deficiency    Other Grandchild         Factor V deficiency       Social History     Social History     Tobacco Use    Smoking status: Former Smoker     Packs/day: 2.00     Years: 20.00     Pack years: 40.00     Types: Cigarettes     Start date: 1956     Quit date: 1975     Years since quittin.2    Smokeless tobacco: Never Used   Substance Use Topics    Alcohol use: No    Drug use: No        Allergies     Allergies   Allergen Reactions    Metolazone      Sodium 120 on , was on metolazone, no hypovolemic synptoms on presentation    Lipitor      Myalgias at 20 mg    Simvastatin      myalgias       Medications     Current Outpatient Medications   Medication Sig Dispense Refill    azelastine (ASTELIN) 0.1 % nasal spray 1 spray by Nasal route 2 times daily Use in each nostril as directed 3 Bottle 0    metoprolol tartrate (LOPRESSOR) 50 MG tablet TAKE 1 TABLET TWICE DAILY 180 tablet 5    losartan (COZAAR) 100 MG tablet TAKE 1 TABLET EVERY DAY 90 tablet 5    Accu-Chek Softclix Lancets MISC Tests two times daily Dx: E11.9 200 each 3    levothyroxine (SYNTHROID) 88 MCG tablet Take 1 tablet by mouth Daily 90 tablet 1    pravastatin (PRAVACHOL) 40 MG tablet Take 1 tablet by mouth daily 90 tablet 3    metFORMIN (GLUCOPHAGE) 1000 MG tablet TAKE 1 TABLET TWICE DAILY WITH MEALS 180 tablet 1    amLODIPine (NORVASC) 5 MG tablet TAKE 1 TABLET EVERY DAY 90 tablet 3    Omega-3 Fatty Acids (FISH OIL PO) Take by mouth      Multiple Vitamin (MULTI-VITAMIN DAILY PO) Take by mouth      torsemide (DEMADEX) 20 MG tablet Take 1 tablet by mouth daily (Patient taking differently: Take 10 mg by mouth daily ) 90 tablet 3    fluticasone (FLONASE) 50 MCG/ACT nasal spray 2 sprays by Each Nostril route daily      Coenzyme Q10 (COQ10 PO) Take by mouth      Ascorbic Acid (VITAMIN C PO) Take by mouth 2 times daily      guaiFENesin 400 MG tablet Take 400 mg by mouth nightly       magnesium oxide (MAG-OX) 400 MG tablet Take 400 mg by mouth daily      ferrous sulfate 325 (65 Fe) MG tablet Take 325 mg by mouth daily (with breakfast)       aspirin 81 MG EC tablet Take 1 tablet by mouth daily 30 tablet 0    Cholecalciferol (VITAMIN D) 2000 UNITS CAPS capsule Take  by mouth daily. No current facility-administered medications for this visit. Review of Systems     Review of Systems   Constitutional: Negative for appetite change, chills, fatigue, fever and unexpected weight change. HENT: Positive for postnasal drip. Negative for congestion, ear discharge, ear pain, facial swelling, hearing loss, nosebleeds, sinus pressure, sneezing, sore throat, tinnitus, trouble swallowing and voice change. Eyes: Negative for itching. Respiratory: Negative for apnea, cough and shortness of breath. Gastrointestinal:        Negative for dysphasia   Endocrine: Negative for cold intolerance and heat intolerance. Musculoskeletal: Negative for myalgias and neck pain. Skin: Negative for rash. Allergic/Immunologic: Negative for environmental allergies. Neurological: Negative for dizziness and headaches. Psychiatric/Behavioral: Negative for confusion, decreased concentration and sleep disturbance. PhysicalExam     Vitals:    03/16/21 1028   BP: 134/67   Site: Right Upper Arm   Position: Sitting   Cuff Size: Large Adult   Pulse: 61   Temp: 97.7 °F (36.5 °C)   TempSrc: Infrared   Weight: 175 lb 9.6 oz (79.7 kg)   Height: 5' 11\" (1.803 m)       Physical Exam  Constitutional:       General: He is not in acute distress. Appearance: He is well-developed. HENT:      Head: Normocephalic and atraumatic. Right Ear: Tympanic membrane, ear canal and external ear normal. No drainage. No middle ear effusion. Tympanic membrane is not bulging. Tympanic membrane has normal mobility. Left Ear: Tympanic membrane, ear canal and external ear normal. No drainage. No middle ear effusion. Tympanic membrane is not bulging. Tympanic membrane has normal mobility. Nose: Septal deviation (left) present. No mucosal edema or rhinorrhea. Mouth/Throat:      Lips: Pink. Mouth: Mucous membranes are moist.      Tongue: No lesions. Palate: No mass. Pharynx: Uvula midline. Comments: Clear drainage on posterior pharyngeal wall  Eyes:      Pupils: Pupils are equal, round, and reactive to light. Neck:      Musculoskeletal: Full passive range of motion without pain. Thyroid: No thyroid mass or thyromegaly. Trachea: Trachea and phonation normal.   Cardiovascular:      Pulses: Normal pulses. Pulmonary:      Effort: Pulmonary effort is normal. No accessory muscle usage or respiratory distress. Breath sounds: No stridor. Lymphadenopathy:      Head:      Right side of head: No submental or submandibular adenopathy. Left side of head: No submental or submandibular adenopathy. Cervical: No cervical adenopathy. Right cervical: No superficial, deep or posterior cervical adenopathy. Left cervical: No superficial, deep or posterior cervical adenopathy. Skin:     General: Skin is warm and dry. Neurological:      Mental Status: He is alert and oriented to person, place, and time. Cranial Nerves: No cranial nerve deficit. Coordination: Coordination normal.      Gait: Gait normal.   Psychiatric:         Thought Content: Thought content normal.         Assessment and Plan     1. Post-nasal drainage  -Persistent despite Flonase and previous use of Flonase  - azelastine (ASTELIN) 0.1 % nasal spray; 1 spray by Nasal route 2 times daily Use in each nostril as directed  Dispense: 3 Bottle; Refill: 0    2. 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 Nasal congestion  -Significant provement with use of Flonase      Will call in 2 weeks to report on improvement with Astelin. Symptoms persist can try Bianca. Clemente Better, DO  3/16/21      Portions of this note were dictated using Dragon.  There may be linguistic errors secondary to the use of this program.

## 2021-03-26 RX ORDER — AMLODIPINE BESYLATE 5 MG/1
TABLET ORAL
Qty: 90 TABLET | Refills: 5 | Status: SHIPPED | OUTPATIENT
Start: 2021-03-26 | End: 2022-01-01

## 2021-03-29 ENCOUNTER — TELEPHONE (OUTPATIENT)
Dept: ENT CLINIC | Age: 81
End: 2021-03-29

## 2021-03-29 NOTE — TELEPHONE ENCOUNTER
Patient was told to call Dr. Phil Lund and let her know how he was feeling. Sinus and nasal pathways are clear  Back of throat still mucus but feels lower then it was at the time of visit.     Call back phone number: [phone]

## 2021-03-30 ENCOUNTER — TELEPHONE (OUTPATIENT)
Dept: ENT CLINIC | Age: 81
End: 2021-03-30

## 2021-03-30 NOTE — TELEPHONE ENCOUNTER
Spoke with  Gabiclaude Luna regarding recent trial medication. He reports improvement in nasal congestion and postnasal drip. Still has some small degree of mucus in the back of his throat but overall feels better. He wishes to continue on current medical regimen and he will call back should symptoms progress or change.

## 2021-04-16 RX ORDER — TORSEMIDE 20 MG/1
20 TABLET ORAL DAILY
Qty: 90 TABLET | Refills: 1 | Status: SHIPPED | OUTPATIENT
Start: 2021-04-16 | End: 2021-01-01

## 2021-05-17 ENCOUNTER — OFFICE VISIT (OUTPATIENT)
Dept: FAMILY MEDICINE CLINIC | Age: 81
End: 2021-05-17
Payer: MEDICARE

## 2021-05-17 VITALS
DIASTOLIC BLOOD PRESSURE: 68 MMHG | WEIGHT: 175 LBS | BODY MASS INDEX: 24.41 KG/M2 | SYSTOLIC BLOOD PRESSURE: 148 MMHG | OXYGEN SATURATION: 94 % | HEART RATE: 79 BPM | TEMPERATURE: 98.1 F

## 2021-05-17 DIAGNOSIS — E55.9 VITAMIN D DEFICIENCY DISEASE: ICD-10-CM

## 2021-05-17 DIAGNOSIS — E78.00 PURE HYPERCHOLESTEROLEMIA: ICD-10-CM

## 2021-05-17 DIAGNOSIS — E11.9 TYPE 2 DIABETES MELLITUS WITHOUT COMPLICATION, WITHOUT LONG-TERM CURRENT USE OF INSULIN (HCC): ICD-10-CM

## 2021-05-17 DIAGNOSIS — I50.32 CHRONIC DIASTOLIC CONGESTIVE HEART FAILURE (HCC): ICD-10-CM

## 2021-05-17 DIAGNOSIS — I10 ESSENTIAL HYPERTENSION: ICD-10-CM

## 2021-05-17 DIAGNOSIS — E06.3 HYPOTHYROIDISM, ACQUIRED, AUTOIMMUNE: Primary | ICD-10-CM

## 2021-05-17 DIAGNOSIS — I25.10 CORONARY ARTERY DISEASE INVOLVING NATIVE CORONARY ARTERY OF NATIVE HEART WITHOUT ANGINA PECTORIS: ICD-10-CM

## 2021-05-17 LAB
A/G RATIO: 1.1 (ref 1.1–2.2)
ALBUMIN SERPL-MCNC: 4.2 G/DL (ref 3.4–5)
ALBUMIN SERPL-MCNC: 4.3 G/DL (ref 3.4–5)
ALP BLD-CCNC: 167 U/L (ref 40–129)
ALP BLD-CCNC: 182 U/L (ref 40–129)
ALT SERPL-CCNC: 16 U/L (ref 10–40)
ALT SERPL-CCNC: 16 U/L (ref 10–40)
ANION GAP SERPL CALCULATED.3IONS-SCNC: 18 MMOL/L (ref 3–16)
AST SERPL-CCNC: 17 U/L (ref 15–37)
AST SERPL-CCNC: 17 U/L (ref 15–37)
BILIRUB SERPL-MCNC: 0.5 MG/DL (ref 0–1)
BILIRUB SERPL-MCNC: 0.5 MG/DL (ref 0–1)
BILIRUBIN DIRECT: <0.2 MG/DL (ref 0–0.3)
BILIRUBIN, INDIRECT: ABNORMAL MG/DL (ref 0–1)
BUN BLDV-MCNC: 27 MG/DL (ref 7–20)
CALCIUM SERPL-MCNC: 9.7 MG/DL (ref 8.3–10.6)
CHLORIDE BLD-SCNC: 99 MMOL/L (ref 99–110)
CHOLESTEROL, FASTING: 161 MG/DL (ref 0–199)
CO2: 25 MMOL/L (ref 21–32)
CREAT SERPL-MCNC: 1.4 MG/DL (ref 0.8–1.3)
GFR AFRICAN AMERICAN: 59
GFR NON-AFRICAN AMERICAN: 49
GLOBULIN: 3.8 G/DL
GLUCOSE BLD-MCNC: 103 MG/DL (ref 70–99)
HDLC SERPL-MCNC: 49 MG/DL (ref 40–60)
LDL CHOLESTEROL CALCULATED: 92 MG/DL
POTASSIUM SERPL-SCNC: 4.4 MMOL/L (ref 3.5–5.1)
SODIUM BLD-SCNC: 142 MMOL/L (ref 136–145)
TOTAL PROTEIN: 8.1 G/DL (ref 6.4–8.2)
TOTAL PROTEIN: 8.2 G/DL (ref 6.4–8.2)
TRIGLYCERIDE, FASTING: 99 MG/DL (ref 0–150)
TSH REFLEX: 3.7 UIU/ML (ref 0.27–4.2)
VLDLC SERPL CALC-MCNC: 20 MG/DL

## 2021-05-17 PROCEDURE — G8427 DOCREV CUR MEDS BY ELIG CLIN: HCPCS | Performed by: NURSE PRACTITIONER

## 2021-05-17 PROCEDURE — 4040F PNEUMOC VAC/ADMIN/RCVD: CPT | Performed by: NURSE PRACTITIONER

## 2021-05-17 PROCEDURE — 99214 OFFICE O/P EST MOD 30 MIN: CPT | Performed by: NURSE PRACTITIONER

## 2021-05-17 PROCEDURE — 36415 COLL VENOUS BLD VENIPUNCTURE: CPT | Performed by: NURSE PRACTITIONER

## 2021-05-17 PROCEDURE — 1123F ACP DISCUSS/DSCN MKR DOCD: CPT | Performed by: NURSE PRACTITIONER

## 2021-05-17 PROCEDURE — G8420 CALC BMI NORM PARAMETERS: HCPCS | Performed by: NURSE PRACTITIONER

## 2021-05-17 PROCEDURE — 1036F TOBACCO NON-USER: CPT | Performed by: NURSE PRACTITIONER

## 2021-05-17 ASSESSMENT — ENCOUNTER SYMPTOMS
RHINORRHEA: 0
DIARRHEA: 0
VOMITING: 0
CHEST TIGHTNESS: 0
ABDOMINAL PAIN: 0
WHEEZING: 0
COUGH: 1
SORE THROAT: 0
SHORTNESS OF BREATH: 1
NAUSEA: 0

## 2021-05-17 ASSESSMENT — PATIENT HEALTH QUESTIONNAIRE - PHQ9
1. LITTLE INTEREST OR PLEASURE IN DOING THINGS: 0
SUM OF ALL RESPONSES TO PHQ QUESTIONS 1-9: 0
2. FEELING DOWN, DEPRESSED OR HOPELESS: 0

## 2021-05-17 NOTE — PROGRESS NOTES
CHIEF COMPLAINT  Chief Complaint   Patient presents with    Check-Up     HTN, DM2        HPI   Kika Kennedy is a [de-identified] y.o. male who presents to the office for check up. Patient reports doing well other than shortness of breath upon exertion. Patient reports has been gone on for a while now. Patient has an appointment with pulmonologist in a few weeks. Patient reports chronic postnasal drainage. Patient denies any episodes of dizziness, lightheadedness, chest pain or chest tightness. No abdominal pain or discomfort. No nausea, vomiting, or diarrhea. No dark or tarry stools. Patient reports take medications as prescribed with no missed doses. Patient checks blood sugar every morning, this morning 109. No other complaints, modifying factors or associated symptoms. Nursing notes reviewed.    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 ([address] 04/2016    TIA in 1997    Routine health maintenance     refuses immunizations; declines PSA    Tinnitus     Vasovagal reaction     to needles    Vitamin D deficiency disease 12/15     Past Surgical History:   Procedure Laterality Date    CORONARY ANGIOPLASTY WITH STENT PLACEMENT  08/14/2000    RX Tristar 2.5 x 13 mCX  RX Tristar 2.5 x 13 pCX    CORONARY ANGIOPLASTY WITH STENT PLACEMENT  10/19/2000    Tetra 2.75 x 18 CX  Tetra 3.0 x 13 CX    OTHER SURGICAL HISTORY  1-23-12    phaco emulsification cataract right eye    TONSILLECTOMY      TOOTH EXTRACTION       Family History   Problem Relation Age of Onset    Emphysema Mother     Stroke Father     Other Daughter         Factor V deficiency    Other Grandchild         Factor V deficiency     Social History     Socioeconomic History    Marital status:      Spouse name: Not on file  Number of children: Not on file    Years of education: Not on file    Highest education level: Not on file   Occupational History    Not on file   Tobacco Use    Smoking status: Former Smoker     Packs/day: 2.00     Years: 20.00     Pack years: 40.00     Types: Cigarettes     Start date: 1956     Quit date: 1975     Years since quittin.4    Smokeless tobacco: Never Used   Vaping Use    Vaping Use: Never used   Substance and Sexual Activity    Alcohol use: No    Drug use: No    Sexual activity: Yes     Partners: Female   Other Topics Concern    Not on file   Social History Narrative    Not on file     Social Determinants of Health     Financial Resource Strain:     Difficulty of Paying Living Expenses:    Food Insecurity:     Worried About Running Out of Food in the Last Year:     [address] in the Last Year:    Transportation Needs:     Lack of Transportation (Medical):      Lack of Transportation (Non-Medical):    Physical Activity:     Days of Exercise per Week:     Minutes of Exercise per Session:    Stress:     Feeling of Stress :    Social Connections:     Frequency of Communication with Friends and Family:     Frequency of Social Gatherings with Friends and Family:     Attends Jain Services:     Active Member of Clubs or Organizations:     Attends Club or Organization Meetings:     Marital Status:    Intimate Partner Violence:     Fear of Current or Ex-Partner:     Emotionally Abused:     Physically Abused:     Sexually Abused:      Current Outpatient Medications   Medication Sig Dispense Refill    torsemide (DEMADEX) 20 MG tablet Take 1 tablet by mouth daily 90 tablet 1    metFORMIN (GLUCOPHAGE) 1000 MG tablet TAKE 1 TABLET TWICE DAILY WITH MEALS 180 tablet 1    amLODIPine (NORVASC) 5 MG tablet TAKE 1 TABLET EVERY DAY 90 tablet 5    azelastine (ASTELIN) 0.1 % nasal spray 1 spray by Nasal route 2 times daily Use in each nostril as directed 3 Bottle 0  metoprolol tartrate (LOPRESSOR) 50 MG tablet TAKE 1 TABLET TWICE DAILY 180 tablet 5    losartan (COZAAR) 100 MG tablet TAKE 1 TABLET EVERY DAY 90 tablet 5    Accu-Chek Softclix Lancets MISC Tests two times daily Dx: E11.9 200 each 3    levothyroxine (SYNTHROID) 88 MCG tablet Take 1 tablet by mouth Daily 90 tablet 1    pravastatin (PRAVACHOL) 40 MG tablet Take 1 tablet by mouth daily 90 tablet 3    Omega-3 Fatty Acids (FISH OIL PO) Take by mouth      Multiple Vitamin (MULTI-VITAMIN DAILY PO) Take by mouth      Coenzyme Q10 (COQ10 PO) Take by mouth      Ascorbic Acid (VITAMIN C PO) Take by mouth 2 times daily      guaiFENesin 400 MG tablet Take 400 mg by mouth nightly       magnesium oxide (MAG-OX) 400 MG tablet Take 400 mg by mouth daily      ferrous sulfate 325 (65 Fe) MG tablet Take 325 mg by mouth daily (with breakfast)       aspirin 81 MG EC tablet Take 1 tablet by mouth daily 30 tablet 0    Cholecalciferol (VITAMIN D) 2000 UNITS CAPS capsule Take  by mouth daily.  fluticasone (FLONASE) 50 MCG/ACT nasal spray 2 sprays by Each Nostril route daily (Patient not taking: Reported on 5/17/2021)       No current facility-administered medications for this visit. Allergies   Allergen Reactions    Metolazone      Sodium 120 on 5/19, was on metolazone, no hypovolemic synptoms on presentation    Lipitor      Myalgias at 20 mg    Simvastatin      myalgias       REVIEW OF SYSTEMS  Review of Systems   Constitutional: Negative for activity change, appetite change, chills and fever. HENT: Positive for postnasal drip. Negative for congestion, rhinorrhea and sore throat. Eyes: Negative for visual disturbance. Respiratory: Positive for cough and shortness of breath. Negative for chest tightness and wheezing. Cardiovascular: Negative for chest pain and leg swelling. Gastrointestinal: Negative for abdominal pain, diarrhea, nausea and vomiting.    Genitourinary: Negative for dysuria, frequency, hematuria and urgency. Musculoskeletal: Negative for gait problem and myalgias. Skin: Negative for rash. Neurological: Negative for dizziness, weakness, light-headedness, numbness and headaches. Psychiatric/Behavioral: Negative for self-injury and sleep disturbance. The patient is not nervous/anxious. PHYSICAL EXAM  BP (!) 148/68   Pulse 79   Temp 98.1 °F (36.7 °C) (Oral)   Wt 175 lb (79.4 kg)   SpO2 94%   BMI 24.41 kg/m²   Physical Exam  Vitals reviewed. Constitutional:       General: He is not in acute distress. Appearance: Normal appearance. He is well-developed. He is not diaphoretic. HENT:      Head: Normocephalic and atraumatic. Right Ear: Tympanic membrane normal.      Left Ear: Tympanic membrane normal.      Nose: Nose normal.      Mouth/Throat:      Mouth: Mucous membranes are moist.      Pharynx: Oropharynx is clear. No oropharyngeal exudate or posterior oropharyngeal erythema. Eyes:      General:         Right eye: No discharge. Left eye: No discharge. Pupils: Pupils are equal, round, and reactive to light. Neck:      Vascular: No JVD. Cardiovascular:      Rate and Rhythm: Normal rate. Pulses: Normal pulses. Pulmonary:      Effort: Pulmonary effort is normal. No respiratory distress. Breath sounds: No stridor. No wheezing, rhonchi or rales. Chest:      Chest wall: No tenderness. Abdominal:      General: Bowel sounds are normal. There is no distension. Palpations: Abdomen is soft. Tenderness: There is no abdominal tenderness. There is no guarding. Musculoskeletal:         General: No swelling or deformity. Normal range of motion. Cervical back: Normal range of motion and neck supple. Right lower leg: No edema. Left lower leg: No edema. Feet:      Right foot:      Protective Sensation: 6 sites tested. 5 sites sensed. Left foot:      Protective Sensation: 6 sites tested. 5 sites sensed.    Skin:     General: Skin is warm and dry. Capillary Refill: Capillary refill takes less than 2 seconds. Coloration: Skin is not pale. Findings: No bruising, lesion or rash. Neurological:      General: No focal deficit present. Mental Status: He is alert and oriented to person, place, and time. Psychiatric:         Mood and Affect: Mood normal.         Behavior: Behavior normal.          ASSESSMENT/PLAN:   1. Hypothyroidism, acquired, autoimmune  Stable. Patient compliant with levothyroxine 88 mcg daily. No adverse side effects or missed doses. Previous labs stable. Patient asymptomatic currently. Continue current treatment management follow-up in 6 months, sooner for new or worsening symptoms.  - TSH with Reflex    2. Type 2 diabetes mellitus without complication, without long-term current use of insulin (HCC)  Stable. Previous A1c 6.2. Patient reports checking blood sugar every morning. Patient reports this morning it was 109. Patient compliant with medications. Patient does see podiatry but reports that they only clipped his toenails. Patient currently on Metformin 1000 mg twice a day. Patient reports having appointment with ophthalmology next month for diabetic eye exam, Dr. Tati Schneider. Continue with current treatment and management follow-up in 6 months, sooner for new or worsening symptoms.  - HEMOGLOBIN A1C  - COMPREHENSIVE METABOLIC PANEL  -  DIABETES FOOT EXAM    3. Essential hypertension  Stable. Managed by cardiology. Patient compliant with losartan 100 mg daily, metoprolol 50 mg twice a day, amlodipine 5 mg daily patient denies any episodes of chest pain, tightness, palpitations. Patient blood pressure slightly elevated today however he reports that he did not take his medications this morning prior to appointment. I did recommend patient taking medications once he gets home and continue monitoring blood pressure. Patient reports chronic shortness of breath worse upon exertion.   Continue with current treatment and management per cardiologist follow-up in 6 months, sooner for new or worsening symptoms.  - HEPATIC FUNCTION PANEL    4. Coronary artery disease involving native coronary artery of native heart without angina pectoris  Stable. Managed by cardiology. No recent changes in medication or management. Patient follows up with cardiology next month. Labs ordered today and pending follow-up in 6 months, sooner for new or worsening symptoms. 5. Chronic diastolic congestive heart failure (HCC)  Stable. Managed by cardiology. Patient reports taking Demadex daily. Patient denies any worsening shortness of breath, swelling, or weight gain. Patient encouraged to monitor weight continue with current medications following up with cardiologist in 6 months, sooner for new or worsening symptoms. 6. Pure hypercholesterolemia  Stable. Managed by cardiology. Patient compliant with omega-3 fish oil and pravastatin 20 mg daily. No adverse side effects or missed doses. Patient encouraged to monitor dietary intake limiting saturated fats, increase fiber. Follow-up with cardiologist as recommended. Follow-up with myself in 6 months, sooner for new or worsening symptoms.  - Lipid, Fasting    7. Vitamin D deficiency disease  Stable. Patient compliant with vitamin D supplement. Continue current treatment management follow-up in 1 year, sooner for new or worsening symptoms. The note was completed using Dragon voice recognition transcription. Every effort was made to ensure accuracy; however, inadvertent  transcription errors may be present despite my best efforts to edit errors.     AICHA Jauregui - CNP

## 2021-05-17 NOTE — PATIENT INSTRUCTIONS
Please read the healthy family handout that you were given and share it with your family. Please compare this printed medication list with your medications at home to be sure they are the same. If you have any medications that are different please contact us immediately at [phone]. Also review your allergies that we have listed, these may also include medications that you have not been able to tolerate, make sure everything listed is correct. If you have any allergies that are different please contact us immediately at [phone].

## 2021-05-18 ENCOUNTER — TELEPHONE (OUTPATIENT)
Dept: FAMILY MEDICINE CLINIC | Age: 81
End: 2021-05-18

## 2021-05-18 LAB
ESTIMATED AVERAGE GLUCOSE: 142.7 MG/DL
HBA1C MFR BLD: 6.6 %

## 2021-05-18 NOTE — TELEPHONE ENCOUNTER
----- Message from Mckayla Taylor MD sent at 5/18/2021 10:35 AM EDT -----    Lipids are good and LFT's OK as well. CPM  Dr. Higgins Heading      ----- Message -----  From: Chloe Toth MA  Sent: 5/18/2021   9:14 AM EDT  To: Mckayla Taylor MD    [facility]  [facility]

## 2021-06-08 ENCOUNTER — OFFICE VISIT (OUTPATIENT)
Dept: PULMONOLOGY | Age: 81
End: 2021-06-08
Payer: MEDICARE

## 2021-06-08 VITALS
TEMPERATURE: 97.8 F | HEIGHT: 71 IN | WEIGHT: 175 LBS | SYSTOLIC BLOOD PRESSURE: 126 MMHG | HEART RATE: 60 BPM | RESPIRATION RATE: 16 BRPM | OXYGEN SATURATION: 97 % | DIASTOLIC BLOOD PRESSURE: 67 MMHG | BODY MASS INDEX: 24.5 KG/M2

## 2021-06-08 DIAGNOSIS — J90 BILATERAL PLEURAL EFFUSION: Primary | ICD-10-CM

## 2021-06-08 DIAGNOSIS — R06.02 SHORTNESS OF BREATH: ICD-10-CM

## 2021-06-08 PROCEDURE — 1036F TOBACCO NON-USER: CPT | Performed by: INTERNAL MEDICINE

## 2021-06-08 PROCEDURE — G8420 CALC BMI NORM PARAMETERS: HCPCS | Performed by: INTERNAL MEDICINE

## 2021-06-08 PROCEDURE — 99214 OFFICE O/P EST MOD 30 MIN: CPT | Performed by: INTERNAL MEDICINE

## 2021-06-08 PROCEDURE — 1123F ACP DISCUSS/DSCN MKR DOCD: CPT | Performed by: INTERNAL MEDICINE

## 2021-06-08 PROCEDURE — 4040F PNEUMOC VAC/ADMIN/RCVD: CPT | Performed by: INTERNAL MEDICINE

## 2021-06-08 PROCEDURE — G8427 DOCREV CUR MEDS BY ELIG CLIN: HCPCS | Performed by: INTERNAL MEDICINE

## 2021-06-08 NOTE — PROGRESS NOTES
[facility]   Cardiac Followup    Referring Provider:  AICHA Craig - CNP     Chief Complaint   Patient presents with    6 Month Follow-Up    Coronary Artery Disease    Other     more than usual shortness of breath      Subjective: Mr Karen Reyes being seen today for cardiology follow up of his CAD, HTN, HLD;  S/P 4V CABG January 2018; no new complaints today remains SOB on exertion    History of Present Illness:    Mr. Karen Reyes is a [de-identified] y.o. male with PMH of embolic R. CVA in 9/55, CAD s/p 4 stents prior (most recent 10/00), s/p 4V CABG 1/18 with Dr. Imer Al, HTN, DM, pleural effusion s/p left thoracentesis 9/20, mild carotid artery disease, HLD, and hypothyroidism. Note hx of myalgias with statins but tolerates pravachol with coenzyme Q10. Admitted to [facility]. on 04/11/2016 with acute ischemic CVA of the R hemisphere, confirmed on MRI of the brain with embolic pattern. Note event monitor 05/03-06/02/2016 with NSR, with occasional PVC's and overall normal study. Most recent BLE LUYC 6/23/17 Right LUCY 1.33 left LUCY 1.31. Note LHC 1/18/18 MV CAD with prior stents patent. Underwent 4V CABG 1/31/18 by Dr. Imer Al at [facility] with pedicled LIMA to LAD, sequential SVG to Diag 1 then on to OM, separate single SVG to distal RCA. Most recent Carotid doppler 9/19/19 moderate plaque <50% bilateral carotid arteries (no change from 6/2107 study)   Most recent ECHO 8/25/20 EF 50-55% (EF=55-60% 6/19; EF=55% 1/18); Milld HK/basal inferior and inferoseptal walls. Grade III DDwith elevated LV pressure. LAE; Mild MAC; Mild MR, AR, TR. Large left pleural effusion. Most recent Lexiscan myoview stress 8/25/20 There is no evidence of  myocardial ischemia or scar (12/17 GXT nuc + ischemia lateral apex). Referred to pulmonary and had Left thoracentesis 9/8/20 0.5-L transudate fluid removed. He had follow up CXR 11/24/20 Bilateral pleural effusions and bibasilar ASD. At [facility] 12/14/20 c/o ongoing IBARRA.  He had VV with  Erin Cortés early 12/20 and will have repeat CXR 6 months. Conservative mgt and felt fluid from CHF. Due to symptoms I ordered most recent [facility] 12/29/20 which revealed native 3 vessel disease. Bypass grafts patent (LIMA-LAD, [id number], SVG-RCA). Normal left side filling pressures (LVEDP 13); Mildly elevated right sided filling pressures. Normal pulmonary artery pressure. Most recent EKG 12/19/20 SR; 1st degree AV block; incomplete RBBB. Today he reports he is feeling good overall except for SOB with activities unchanged from prior. He reports Dr Jonnie Helm has left [facility] practice and he will need to change pulmonologists. Denies chest pain, edema, dizziness, palpitations and syncope. His wife is present at exam. 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 Surgical History:   has a past surgical history that includes Tooth Extraction; Coronary angioplasty with stent (08/14/2000); other surgical history (1-23-12); Coronary angioplasty with stent (10/19/2000); and Tonsillectomy. Social History:   reports that he quit smoking about 46 years ago. His smoking use included cigarettes. He started smoking about 65 years ago. He has a 40.00 pack-year smoking history. He has never used smokeless tobacco. He reports that he does not drink alcohol and does not use drugs. Family History:  family history includes Emphysema in his mother; Other in his daughter and grandchild; Stroke in his father. Home Medications:  Prior to Admission medications    Medication Sig Start Date End Date Taking? Authorizing Provider   pravastatin (PRAVACHOL) 20 MG tablet Take 20 mg by mouth daily.      Yes Historical Provider, MD   Coenzyme Q10 (CO Q 10 PO) Take 1 capsule by mouth daily. Yes Historical Provider, MD   NITROSTAT 0.4 MG SL tablet PLACE 1 TABLET UNDER THE TONGUE EVERY 5 MINUTES ASNEEDED 7/12/12  Yes Rafael Perez MD   metoprolol (TOPROL-XL) 50 MG XL tablet TAKE 1 TABLET BY MOUTH ONCE DAILY 6/20/12  Yes Rafael Perez MD   levothyroxine (SYNTHROID) 75 MCG tablet TAKE 1 TABLET BY MOUTH ONCE DAILY 2/8/12  Yes Rafael Perez MD   losartan-hydrochlorothiazide (HYZAAR) 100-25 MG per tablet Take 1 tablet by mouth daily for 360 days. 1/18/12 1/12/13 Yes Sheila Blair MD   aspirin 81 MG EC tablet Take 81 mg by mouth daily. 2 daily     Yes Historical Provider, MD   Blood Glucose Monitoring Suppl (ONE TOUCH ULTRA) JUAN FRANCISCO by Does not apply route. 6/22/10  Yes Rafael Perez MD   Multiple Vitamins-Minerals (MULTIVITAL PO) Take  by mouth daily. 5/24/10  Yes Historical Provider, MD   Omega-3 Fatty Acids (FISH OIL) 1200 MG CAPS Take  by mouth 2 times daily. 5/24/10  Yes Historical Provider, MD   Ascorbic Acid (VITAMIN C) 500 MG tablet Take 500 mg by mouth daily. Yes Historical Provider, MD   Cholecalciferol (VITAMIN D3) 1000 UNIT CAPS Take  by mouth daily. 5/24/10  Yes Historical Provider, MD   vitamin E 200 UNIT capsule Take 200 Units by mouth daily. Yes Historical Provider, MD        Allergies:  Metolazone, Lipitor, and Simvastatin     Review of Systems:   · Constitutional: there has been no unanticipated weight loss. There's been no change in energy level, sleep pattern, or activity level. · Eyes: No visual changes or diplopia. No scleral icterus. · ENT: No Headaches, hearing loss or vertigo. No mouth sores or sore throat. · Cardiovascular: Reviewed in HPI  · Respiratory: No cough or wheezing, no sputum production. No hematemesis. · Gastrointestinal: No abdominal pain, appetite loss, blood in stools. No change in bowel or bladder habits. · Genitourinary: No dysuria, trouble voiding, or hematuria.   · Musculoskeletal:  No gait disturbance, weakness or joint complaints. · Integumentary: No rash or pruritis. · Neurological: No headache, diplopia, change in muscle strength, numbness or tingling. No change in gait, balance, coordination, mood, affect, memory, mentation, behavior. · Psychiatric: No anxiety, no depression. · Endocrine: No malaise, fatigue or temperature intolerance. No excessive thirst, fluid intake, or urination. No tremor. · Hematologic/Lymphatic: No abnormal bruising or bleeding, blood clots or swollen lymph nodes. · Allergic/Immunologic: No nasal congestion or hives. Height: 5' 11\" (1.803 m), Weight: 170 lb 1.9 oz (77.2 kg), BP: (!) 132/58    Wt Readings from Last 3 Encounters:   06/24/21 170 lb 1.9 oz (77.2 kg)   06/08/21 175 lb (79.4 kg)   05/17/21 175 lb (79.4 kg)     Temp Readings from Last 3 Encounters:   06/24/21 97.3 °F (36.3 °C)   06/08/21 97.8 °F (36.6 °C) (Oral)   05/17/21 98.1 °F (36.7 °C) (Oral)     BP Readings from Last 3 Encounters:   06/24/21 (!) 132/58   06/08/21 126/67   05/17/21 (!) 148/68     Pulse Readings from Last 3 Encounters:   06/24/21 74   06/08/21 60   05/17/21 79       Physical Examination:         Constitutional and General Appearance: NAD   Respiratory:  · Normal excursion and expansion without use of accessory muscles  · Resp Auscultation: Normal breath sounds without dullness  Cardiovascular:  · The apical impulses not displaced  · Heart tones are crisp and normal  · Cervical veins are not engorged  · The carotid upstroke is normal in amplitude and contour without delay  · Normal S1S2, No S3, no murmur  · Peripheral pulses are symmetrical and full  · There is no clubbing, cyanosis of the extremities.   ·   No edema  · Femoral Arteries: 2+ and equal  · Pedal Pulses: 2+ and equal   Abdomen:  · No masses or tenderness  · Liver/Spleen: No Abnormalities Noted  Neurological/Psychiatric:  · Alert and oriented in all spheres  · Moves all extremities well  · Exhibits normal gait balance and coordination  · No abnormalities of mood, affect, memory, mentation, or behavior are noted    No components found for: CHLPL  Lab Results   Component Value Date    TRIG 91 12/29/2020    TRIG 87 06/02/2020    TRIG 131 11/08/2019     Lab Results   Component Value Date    HDL 49 05/17/2021    HDL 52 12/29/2020    HDL 45 06/02/2020     Lab Results   Component Value Date    LDLCALC 92 05/17/2021    LDLCALC 114 (H) 12/29/2020    LDLCALC 88 06/02/2020     Lab Results   Component Value Date    LABVLDL 20 05/17/2021    LABVLDL 18 12/29/2020    LABVLDL 17 06/02/2020     TSH 6/2/20 3.78    Assessment:     1. CAD (coronary artery disease): S/P 4V CABG 1/31/18. Most recent ECHO 8/25/20 EF 50-55% (EF=55-60% 6/19; EF=55% 1/18); Most recent Lexiscan myoview stress 8/25/20 There is no evidence of  myocardial ischemia or scar. Most recent LHC/RHC 12/29/20 which revealed native 3 vessel disease. Bypass grafts patent (LIMA-LAD, [id number], SVG-RCA). Normal left side filling pressures (LVEDP 13); Mildly elevated right sided filling pressures. Normal pulmonary artery pressure. No definite cardiac etiology to explain IBARRA symptoms. There are no concerning symptoms for angina currently. 2. DM w/o complication Type II : managed per PCP. HA1C=6.5 on 11/6/18.   3. Carotid artery occlusion: Most recent Carotid doppler 9/19/19 moderate plaque <50% bilateral carotid arteries (no change from 6/2107 study)     4. Cerebrovascular disease: Resolved. On 4/11/2016 he had acute ischemic CVA of the R hemisphere, confirmed on MRI of the brain with embolic pattern. Stable and doing better overall and will continue present medical regimen. He remains on baby aspirin. 5. Hypertension : Stable and will continue present medical regimen. 6. Hyperlipidemia:  Most recent labs from 05/17/21  see results above which I personally reviewed. Note LDL=92 and at goal. Will increase pravachol to 80mg qhs. Plan:   1. Meds reviewed. No Refills warranted.     2. Will refer to

## 2021-06-08 NOTE — PROGRESS NOTES
MA Communication: The following orders are received by verbal communication from Dr. Cristina Seth.     Orders include:  CT and PFT/6MW scheduled 7/1/21       FU scheduled 7/1/21

## 2021-06-08 NOTE — PATIENT INSTRUCTIONS
WE ARE MOVING!!!!!!      [address] [place] [address] [address], [address], [address]      (Building behind hospital attached to the parking garage)    Remember to bring a list of pulmonary medications and any CPAP or BiPAP machines to your next appointment with the office. Please keep all of your future appointments scheduled by [address], [facility] office. Out of respect for other patients and providers, you may be asked to reschedule your appointment if you arrive later than your scheduled appointment time. Appointments cancelled less than 24hrs in advance will be considered a no show. Patients with three missed appointments within 1 year or four missed appointments within 2 years can be dismissed from the practice. Please be aware that our physicians are required to work in the Intensive Care Unit at [facility].  Your appointment may need to be rescheduled if they are designated to work during your appointment time. You may receive a survey regarding the care you received during your visit. Your input is valuable to us. We encourage you to complete and return your survey. We hope you will choose us in the future for your healthcare needs. Pt instructed of all future appointment dates & times, including radiology, labs, procedures & referrals. If procedures were scheduled preparation instructions provided. Instructions on future appointments with [facility] were given.

## 2021-06-08 NOTE — PROGRESS NOTES
Pulmonary Outpatient Note   Michael Klein MD       2021    Chief Complaint:  Shortness of Breath (6 mo fu)     HPI:   [de-identified]y.o. year old male here for follow up of pleural effusions. Since last seen he endorses persistent shortness of breath with mild to moderate exertion on a daily basis, relieved with rest.   Had pleural effusions determined to be cardiogenic, he had a painful experience with his thoracentesis. He has been on aggressive diuresis/medical management per cardiology he is adherent to   Denies cough/congestion symptoms. No other respiratory complaints.        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    Vitamin D deficiency disease 12/15       Past Surgical History:   Procedure Laterality Date    CORONARY ANGIOPLASTY WITH STENT PLACEMENT  2000    RX Tristar 2.5 x 13 mCX  RX Tristar 2.5 x 13 pCX    CORONARY ANGIOPLASTY WITH STENT PLACEMENT  10/19/2000    Tetra 2.75 x 18 CX  Tetra 3.0 x 13 CX    OTHER SURGICAL HISTORY  12    phaco emulsification cataract right eye    TONSILLECTOMY      TOOTH EXTRACTION         Social History     Tobacco Use    Smoking status: Former Smoker     Packs/day: 2.00     Years: 20.00     Pack years: 40.00     Types: Cigarettes     Start date: 1956     Quit date: 1975     Years since quittin.4    Smokeless tobacco: Never Used   Substance Use Topics    Alcohol use: No          Family History   Problem Relation Age of Onset    Emphysema Mother     Stroke Father     Other Daughter         Factor V deficiency    Other Grandchild         Factor V deficiency         Current Outpatient Medications:     torsemide (DEMADEX) 20 MG tablet, Take 1 tablet by mouth daily, Disp: 90 tablet, Rfl: 1    metFORMIN (GLUCOPHAGE) 1000 MG tablet, TAKE 1 TABLET TWICE DAILY WITH MEALS, Disp: 180 tablet, Rfl: 1    amLODIPine (NORVASC) 5 MG tablet, TAKE 1 TABLET EVERY DAY, Disp: 90 tablet, Rfl: 5    azelastine (ASTELIN) 0.1 % nasal spray, 1 spray by Nasal route 2 times daily Use in each nostril as directed, Disp: 3 Bottle, Rfl: 0    metoprolol tartrate (LOPRESSOR) 50 MG tablet, TAKE 1 TABLET TWICE DAILY, Disp: 180 tablet, Rfl: 5    losartan (COZAAR) 100 MG tablet, TAKE 1 TABLET EVERY DAY, Disp: 90 tablet, Rfl: 5    Accu-Chek Softclix Lancets MISC, Tests two times daily Dx: E11.9, Disp: 200 each, Rfl: 3    levothyroxine (SYNTHROID) 88 MCG tablet, Take 1 tablet by mouth Daily, Disp: 90 tablet, Rfl: 1    pravastatin (PRAVACHOL) 40 MG tablet, Take 1 tablet by mouth daily, Disp: 90 tablet, Rfl: 3    Omega-3 Fatty Acids (FISH OIL PO), Take by mouth, Disp: , Rfl:     Multiple Vitamin (MULTI-VITAMIN DAILY PO), Take by mouth, Disp: , Rfl:     Coenzyme Q10 (COQ10 PO), Take by mouth, Disp: , Rfl:     Ascorbic Acid (VITAMIN C PO), Take by mouth 2 times daily, Disp: , Rfl:     guaiFENesin 400 MG tablet, Take 400 mg by mouth nightly , Disp: , Rfl:     magnesium oxide (MAG-OX) 400 MG tablet, Take 400 mg by mouth daily, Disp: , Rfl:     ferrous sulfate 325 (65 Fe) MG tablet, Take 325 mg by mouth daily (with breakfast) , Disp: , Rfl:     aspirin 81 MG EC tablet, Take 1 tablet by mouth daily, Disp: 30 tablet, Rfl: 0    Cholecalciferol (VITAMIN D) 2000 UNITS CAPS capsule, Take  by mouth daily. , Disp: , Rfl:     Metolazone, Lipitor, and Simvastatin    Vitals:    06/08/21 1054   BP: 126/67   Site: Left Upper Arm   Position: Sitting   Cuff Size: Medium Adult   Pulse: 60   Resp: 16   Temp: 97.8 °F (36.6 °C)   TempSrc: Oral   SpO2: 97%   Weight: 175 lb (79.4 kg)   Height: 5' 11\" (1.803 m)       Review of Systems Constitutional: Negative for chills, fever and unexpected weight change. HENT: Negative for mouth sores, sore throat and voice change. Eyes: Negative for pain, discharge and itching. Respiratory: Positive for shortness of breath and stridor. Negative for choking and chest tightness. Cardiovascular: Negative for chest pain, palpitations and leg swelling. Gastrointestinal: Negative for abdominal pain, constipation and diarrhea. Endocrine: Negative for cold intolerance, heat intolerance and polydipsia. Genitourinary: Negative for dysuria, frequency and hematuria. Musculoskeletal: Negative for gait problem, joint swelling and neck stiffness. Neurological: Negative for dizziness, numbness and headaches. Psychiatric/Behavioral: Negative for agitation, confusion and hallucinations. Physical Exam  Constitutional:       General: He is not in acute distress. Appearance: He is well-developed. He is not diaphoretic. HENT:      Head: Normocephalic and atraumatic. Mouth/Throat:      Pharynx: No oropharyngeal exudate. Eyes:      Pupils: Pupils are equal, round, and reactive to light. Neck:      Vascular: No JVD. Cardiovascular:      Heart sounds: Normal heart sounds. No murmur heard. No friction rub. No gallop. Pulmonary:      Effort: Pulmonary effort is normal.      Breath sounds: No wheezing or rales. Abdominal:      General: Bowel sounds are normal. There is no distension. Palpations: Abdomen is soft. Tenderness: There is no abdominal tenderness. Musculoskeletal:         General: Normal range of motion. Cervical back: Neck supple. Lymphadenopathy:      Cervical: No cervical adenopathy. Skin:     General: Skin is warm and dry. Findings: No rash. Neurological:      Mental Status: He is alert. Cranial Nerves: No cranial nerve deficit. Comments: CN 2-12 grossly intact         ASSESSMENT:    1. Bilateral pleural effusion    2.  Shortness of breath      PLAN:    -Unclear etiology to his persistent dyspnea complaints  -Check CT chest to evaluate effusions, may need repeat thoracentesis  -Check PFTs and walk testing  -Return after testing to determine next steps in workup     Orders Placed This Encounter   Procedures    CT CHEST WO CONTRAST    Full PFT Study With Bronchodilator    6 Minute Walk Test       Claudetta Motley MD

## 2021-06-09 ASSESSMENT — ENCOUNTER SYMPTOMS
EYE ITCHING: 0
CONSTIPATION: 0
EYE DISCHARGE: 0
EYE PAIN: 0
CHOKING: 0
SHORTNESS OF BREATH: 1
ABDOMINAL PAIN: 0
VOICE CHANGE: 0
SORE THROAT: 0
CHEST TIGHTNESS: 0
STRIDOR: 1
DIARRHEA: 0

## 2021-07-01 NOTE — PROCEDURES
[facility] Pulmonary Function Lab - Six Minute Walk      Test Performed on:   Room Air_X___   Oxygen at _____ lpm via N/C- continuous Oxygen at _____ lpm via N/C- OCD  Assist Device Used During Test:  None__X__ Cane________ Walker_________    Modified Javier's Scale  0 Nothing at all 5 Strong    0.5 Extremely Weak 6 Stronger (Hard)    1 Very weak 7 Very Strong   2 Weak (light) 8 Very Very Strong   3 Moderate 9 Extremely Strong   4 Somewhat Strong 10 Maximum All out      Time SpO2 Heart Rate Respiratory Rate Dyspnea-  Modified Javiers Scale Fatigue- Modified Javiers Scale Other Symptoms   Baseline                     98% 62 18 0 0      1 minute                     95% 76 32 2 0    2 minutes                     94% 84 34 4 1      3 minutes                     93% 91 34 4 1    4 minutes                     94% 89 34 4 2    5 minutes                     94% 89 34 4 2    6 minutes                     94% 88 34 4 3    Recovery x 1 minute                     95% 71 26 3 1    Recovery x 2 Minute                     97% 69 24 2 1     Number of Laps____10___ X 100 feet + _________ additional feet = Total Distance __1000__feet   Stopped or paused before 6 minutes? No_______ Yes __X____  Total expected 6 MW distance is ___1694__feet. Patient achieved __59__% of expected distance. Pre Blood Pressure:124/63  Post Blood Pressure:144/74  Other symptoms at the end of exercise: Severe lower back pain-chronic.

## 2021-07-07 NOTE — PROCEDURES
[facility]                               PULMONARY FUNCTION    PATIENT NAME: Paulina Norman                   :        1940  MED REC NO:   [id number]                          ROOM:  ACCOUNT NO:   [de-identified]                           ADMIT DATE: 2021  PROVIDER:     Kwesi Colmenares MD    DATE OF PROCEDURE:  2021    PFT INTERPRETATION    The patient is an 80-year-old male who underwent a PFT for shortness of  breath. Spirometry shows FVC to be 40%, FEV1 to be 40%, FEV1 to FVC  ratio was 100%, NKE57-47% was 35%. The patient had significant  postbronchodilator improvement on the study. The patient's lung volume  shows the total capacity was significantly reduced. The patient also  has some air trapping. Along with that, the patient's diffusion  capacity when adjusted for volume was normal.  The patient's flow-volume  loop was suggestive of restrictive pattern. The patient also underwent a 6-minute walk test which shows baseline  oxygen saturation of 98% at room air at rest with a heart rate of 62,  respiratory rate of 18, dyspnea-modified Javier scale of 0,  fatigue-modified Javier scale of 0. The patient walked 1000 feet. The  patient did not have any exertional hypoxemia on the study. Total  distance walked was 1000 feet. Total expected 6-minute walk distance  was 1694 feet. The patient achieved 59% of the expected distance. The  patient had severe back pain after the walking. The patient also had a  PFT done in 2018, at that time, the patient had FVC of 88%, FEV1 of 95%  and FEV1 to FVC ratio was 106% at that time. The patient had SNO75-32%  was 120% at that time. The patient's total lung capacity was 67% at  that time and diffusion capacity when adjusted for volume was normal at  that time.   On the basis of this PFT, the patient has severe restrictive  lung disease along with reactive airway disease. The patient also has  air trapping suggestive of some tendency towards emphysema as compared  to 2018, the patient's FVC, FEV1, OCL22-84% has significantly reduced. Also the patient has reduction in total lung capacity significantly as  compared to 2018. The patient does not have any exertional hypoxemia on  suboptimal exercise. Please correlate clinically.         Nabeel Gerardo MD    D: 07/06/2021 18:04:13       T: 07/06/2021 18:09:13     [id number]  Job#: [id number]     Doc#: [id number]

## 2021-07-13 PROBLEM — R59.0 MEDIASTINAL ADENOPATHY: Status: ACTIVE | Noted: 2021-01-01

## 2021-07-13 PROBLEM — R91.8 PULMONARY INFILTRATES: Status: ACTIVE | Noted: 2021-01-01

## 2021-07-13 PROBLEM — J90 PLEURAL EFFUSION, BILATERAL: Status: ACTIVE | Noted: 2021-01-01

## 2021-07-13 PROBLEM — J98.4 RESTRICTIVE LUNG DISEASE: Status: ACTIVE | Noted: 2021-01-01

## 2021-07-13 NOTE — PROGRESS NOTES
MA Communication:   The following orders are received by verbal communication from Mariya Atkins MD    Orders include:  Pulm Rehab order faxed       3 mo fu scheduled 10/19/21

## 2021-07-13 NOTE — PROGRESS NOTES
Cardiovascular: Normal rate, regular rhythm, normal heart sounds. No right ventricular heave. No lower extremity edema. Pulmonary/Chest: No wheezes. Minimal scattered  rales. Chest wall is not dull to percussion. No accessory muscle usage or stridor. Decreased bSI   Abdominal: Soft. Bowel sounds present. No distension or hernia. No tenderness. Musculoskeletal: No cyanosis. No clubbing. No obvious joint deformity. Lymphadenopathy: No cervical or supraclavicularadenopathy. Skin: Skin is warm and dry. No rash or nodules on the exposed extremities. Psychiatric: Normal mood and affect. Behavior is normal.  No anxiety. Neurologic : Alert, awake and oriented. PERRL. Speechfluent          Data:     Imaging:  I have reviewed radiology images personally. No orders to display     CT CHEST WO CONTRAST    Result Date: 7/1/2021  EXAMINATION: CT OF THE CHEST WITHOUT CONTRAST 7/1/2021 11:05 am TECHNIQUE: CT of the chest was performed without the administration of intravenous contrast. Multiplanar reformatted images are provided for review. Dose modulation, iterative reconstruction, and/or weight based adjustment of the mA/kV was utilized to reduce the radiation dose to as low as reasonably achievable. COMPARISON: September 2020 HISTORY: ORDERING SYSTEM PROVIDED HISTORY: Bilateral pleural effusion TECHNOLOGIST PROVIDED HISTORY: Reason for Exam: sob; bilateral pleural effusion; Acuity: Acute Type of Exam: Initial Relevant Medical/Surgical History: 4 way bypass 2018; former smoker; FINDINGS: Mediastinum: Thyroid gland appears normal.  Coronary artery calcification is seen. No pericardial effusion is seen. Aortic valve calcification is seen. Small hiatal hernia seen. There is nonspecific thickening at the GE junction. .  Small mildly enlarged mediastinal and hilar nodes are noted. Index precarinal node seen previously measures 2.4 cm by 1.4 cm, unchanged from prior.  Lungs/pleura: Small pleural effusion is seen on the left. Curvilinear nodular opacity in the lingula is unchanged. Curvilinear and nodular opacities posteriorly in the left lower lobe are unchanged. On the right, small right-sided pleural effusion is seen. Curvilinear opacity is seen in the right middle lobe. Curvilinear opacity in the right lower lobe is unchanged. There is chronic thickening of the pleura bilaterally. Non dependent portions of pleural fluid are seen on the right, compatible with loculation. Scattered superimpose reticular opacities are seen throughout the lungs, similar to prior Upper Abdomen: Adrenal glands appear unchanged. Atherosclerotic change seen in abdominal aorta. Gallstones are seen. Mild stool load is seen in the colon. Soft Tissues/Bones: Spurring is seen in the spine. Spurring is seen in the shoulder joints. Stable chest CT. No change in chronic pleural effusions, right greater than left, with chronic consolidative changes in both the right and left lung. Small mildly enlarged mediastinal nodes are noted,, similar compared to prior Scattered superimpose reticular opacities are seen throughout the lungs similar to prior, either due to scarring or mild fibrosis     PFT INTERPRETATION     The patient is an 80-year-old male who underwent a PFT for shortness of  breath. Spirometry shows FVC to be 40%, FEV1 to be 40%, FEV1 to FVC  ratio was 100%, YDJ78-44% was 35%. The patient had significant  postbronchodilator improvement on the study. The patient's lung volume  shows the total capacity was significantly reduced. The patient also  has some air trapping.   Along with that, the patient's diffusion  capacity when adjusted for volume was normal.  The patient's flow-volume  loop was suggestive of restrictive pattern.     The patient also underwent a 6-minute walk test which shows baseline  oxygen saturation of 98% at room air at rest with a heart rate of 62,  respiratory rate of 18, dyspnea-modified Javier scale of 0,  fatigue-modified Javier scale of 0. The patient walked 1000 feet. The  patient did not have any exertional hypoxemia on the study. Total  distance walked was 1000 feet. Total expected 6-minute walk distance  was 1694 feet. The patient achieved 59% of the expected distance. The  patient had severe back pain after the walking. The patient also had a  PFT done in 2018, at that time, the patient had FVC of 88%, FEV1 of 95%  and FEV1 to FVC ratio was 106% at that time. The patient had TPI42-96%  was 120% at that time. The patient's total lung capacity was 67% at  that time and diffusion capacity when adjusted for volume was normal at  that time. On the basis of this PFT, the patient has severe restrictive  lung disease along with reactive airway disease. The patient also has  air trapping suggestive of some tendency towards emphysema as compared  to 2018, the patient's FVC, FEV1, NHW68-83% has significantly reduced. Also the patient has reduction in total lung capacity significantly as  compared to 2018. The patient does not have any exertional hypoxemia on  suboptimal exercise. Please correlate clinically. TSH 3.70          Assessment:    1. Shortness of breath    - albuterol sulfate HFA (PROAIR HFA) 108 (90 Base) MCG/ACT inhaler; Inhale 2 puffs into the lungs every 6 hours as needed for Wheezing  Dispense: 1 Inhaler; Refill: [address] [place] [address] [facility]    2. Allergic sinusitis      3. Restrictive lung disease    - albuterol sulfate HFA (PROAIR HFA) 108 (90 Base) MCG/ACT inhaler; Inhale 2 puffs into the lungs every 6 hours as needed for Wheezing  Dispense: 1 Inhaler; Refill: [address] [place] [address] Pulmonary [address] [place] [address]    4. Chronic diastolic congestive heart failure ([address]      5. Pleural effusion, bilateral      6. Pulmonary infiltrates      7.  Mediastinal adenopathy          Plan:   · Patient's review of system were discussed  · Patient and family were told about the auscultatory findings and its implications  · Patient and family were shown the CT scan of the chest along with interpretation/differential diagnosis/implications/options  · Patient continues to have bilateral pleural effusion with some loculation especially on the left side along with that patient has some pulmonary infiltrates which may be part of the same process which is causing her to have shortness of breath  · Patient's pleural fluid analysis shows no infections or malignancies and appears to be transudative in nature  · Patient to continue with Demadex as given by cardiology  · Patient needs to weigh himself on a daily basis and if he gains more than 2 pounds in 24 hours time or more than 4 pounds in a week then patient may require extra diuresis after discussion with cardiology/internal  medicine  · Patient Synthroid as per PCP as per TSH to continue  · Salt and fluid restrictions discussed  · Patient's PFT results were interpreted along with interpretation/implications and options  · Patient's lymphadenopathy may be reactive in nature and does not need any biopsies at this time  · Patient has leg cramps which can be secondary to hypokalemia and hypomagnesia and patient was told about to try certain foods which may supplement that  · Patient can take some magnesium supplement over-the-counter  · Patient to take some saline nasal spray twice a day in addition to Astelin which may help  · Some sugar-free lozenges may also help  · On the basis of patient's clinical status, patient was told to try some Trelegy Ellipta which was given as a sample once a day and patient was told how to take it properly and patient was told to rinse mouth with water after use otherwise he can have hoarseness of voice oral thrush  · Patient to take albuterol inhaler 2 puffs every 6 and whenever necessary basis  · Patient may benefit from a pulmonary rehab and after discussion patient was agreeable and patient was referred to [facility] rehab for the same as it is closer to his house  · Patient to follow-up in 3 months time or earlier if required  · A patient's long-acting inhaler is helping him -patient was told to call back for refill  · Further management depending on patient's clinical status after follow-up on above recommendations

## 2021-07-13 NOTE — PATIENT INSTRUCTIONS
Remember to bring a list of pulmonary medications and any CPAP or BiPAP machines to your next appointment with the office. Please keep all of your future appointments scheduled by [facility] Pulmonary office. Out of respect for other patients and providers, you may be asked to reschedule your appointment if you arrive later than your scheduled appointment time. Appointments cancelled less than 24hrs in advance will be considered a no show. Patients with three missed appointments within 1 year or four missed appointments within 2 years can be dismissed from the practice. Please be aware that our physicians are required to work in the Intensive Care Unit at [facility].  Your appointment may need to be rescheduled if they are designated to work during your appointment time. You may receive a survey regarding the care you received during your visit. Your input is valuable to us. We encourage you to complete and return your survey. We hope you will choose us in the future for your healthcare needs. Pt instructed of all future appointment dates & times, including radiology, labs, procedures & referrals. If procedures were scheduled preparation instructions provided. Instructions on future appointments with [facility] Pulmonary were given.

## 2021-10-26 NOTE — TELEPHONE ENCOUNTER
Future appt scheduled 11/18/21  Last appt 5/17/21  Refilled medication per verbal order from provider.

## 2021-11-09 NOTE — PROGRESS NOTES
Social History     Tobacco Use    Smoking status: Former Smoker     Packs/day: 2.00     Years: 20.00     Pack years: 40.00     Types: Cigarettes     Start date: 1956     Quit date: 1975     Years since quittin.8    Smokeless tobacco: Never Used   Vaping Use    Vaping Use: Never used   Substance Use Topics    Alcohol use: No    Drug use: No        Allergies     Allergies   Allergen Reactions    Metolazone      Sodium 120 on , was on metolazone, no hypovolemic synptoms on presentation    Lipitor      Myalgias at 20 mg    Simvastatin      myalgias       Medications     Current Outpatient Medications   Medication Sig Dispense Refill    azelastine (ASTELIN) 0.1 % nasal spray 1 spray by Nasal route 2 times daily Use in each nostril as directed 3 each 3    metFORMIN (GLUCOPHAGE) 1000 MG tablet TAKE 1 TABLET BY MOUTH  TWICE DAILY WITH MEALS 180 tablet 1    torsemide (DEMADEX) 20 MG tablet TAKE 1 TABLET BY MOUTH  DAILY 90 tablet 3    fluticasone-umeclidin-vilant (TRELEGY ELLIPTA) 100-62.5-25 MCG/INH AEPB Inhale 1 puff into the lungs daily 2 each 0    albuterol sulfate HFA (PROAIR HFA) 108 (90 Base) MCG/ACT inhaler Inhale 2 puffs into the lungs every 6 hours as needed for Wheezing 1 Inhaler 5    blood glucose test strips (ACCU-CHEK DIONTE PLUS) strip TEST TWO TIMES DAILY dx:E11.9 200 strip 3    pravastatin (PRAVACHOL) 40 MG tablet Take 1 tablet by mouth daily 90 tablet 1    levothyroxine (SYNTHROID) 88 MCG tablet TAKE 1 TABLET BY MOUTH  DAILY 90 tablet 3    amLODIPine (NORVASC) 5 MG tablet TAKE 1 TABLET EVERY DAY 90 tablet 5    metoprolol tartrate (LOPRESSOR) 50 MG tablet TAKE 1 TABLET TWICE DAILY 180 tablet 5    losartan (COZAAR) 100 MG tablet TAKE 1 TABLET EVERY DAY 90 tablet 5    Accu-Chek Softclix Lancets MISC Tests two times daily Dx: E11.9 200 each 3    Omega-3 Fatty Acids (FISH OIL PO) Take by mouth      Multiple Vitamin (MULTI-VITAMIN DAILY PO) Take by mouth      Coenzyme Q10 (COQ10 PO) Take by mouth      Ascorbic Acid (VITAMIN C PO) Take by mouth 2 times daily      guaiFENesin 400 MG tablet Take 400 mg by mouth nightly       magnesium oxide (MAG-OX) 400 MG tablet Take 400 mg by mouth daily      ferrous sulfate 325 (65 Fe) MG tablet Take 325 mg by mouth daily (with breakfast)       aspirin 81 MG EC tablet Take 1 tablet by mouth daily 30 tablet 0    Cholecalciferol (VITAMIN D) 2000 UNITS CAPS capsule Take  by mouth daily. No current facility-administered medications for this visit. Review of Systems     Review of Systems   Constitutional: Negative for appetite change, chills, fatigue, fever and unexpected weight change. HENT: Positive for postnasal drip. Negative for congestion, ear discharge, ear pain, facial swelling, hearing loss, nosebleeds, sinus pressure, sneezing, sore throat, tinnitus, trouble swallowing and voice change. Eyes: Negative for itching. Respiratory: Negative for apnea, cough and shortness of breath. Endocrine: Negative for cold intolerance and heat intolerance. Musculoskeletal: Negative for myalgias and neck pain. Skin: Negative for rash. Allergic/Immunologic: Negative for environmental allergies. Neurological: Negative for dizziness and headaches. Psychiatric/Behavioral: Negative for confusion, decreased concentration and sleep disturbance. PhysicalExam     Vitals:    11/09/21 1003   BP: 134/67   Site: Right Upper Arm   Position: Sitting   Temp: 98 °F (36.7 °C)   Weight: 168 lb (76.2 kg)   Height: 5' 11\" (1.803 m)       Physical Exam  Constitutional:       General: He is not in acute distress. Appearance: He is well-developed. HENT:      Head: Normocephalic and atraumatic. Right Ear: Tympanic membrane, ear canal and external ear normal. No drainage. No middle ear effusion. Tympanic membrane is not bulging. Tympanic membrane has normal mobility.       Left Ear: Tympanic membrane, ear canal and external ear sphenoethmoid recess was examined bilaterally. Cultures were obtained from the right nasal cavity. There were no complications. Pertinent positives included: Unable to assess left nasal cavity due to severe septal deviation there was edema and purulence at the right middle meatus. Polyps were not identified. Masses were not identified. Tolerated well without complication. Assessment and Plan     1. Post-nasal drainage  - azelastine (ASTELIN) 0.1 % nasal spray; 1 spray by Nasal route 2 times daily Use in each nostril as directed  Dispense: 3 each; Refill: 3    2. Chronic sinusitis, unspecified location  - Culture, Aerobic and Anaerobic    3. Deviated nasal septum      Start rinsing daily with saline rinse  Start Flonase 1 spray each side twice a day  Start Astelin 1 spray each side twice a day  Drink 64oz of water a day  Take mucinex as needed  I will call you with results of culture- we will do antibiotics then        Portillo Blaze, DO  11/9/21      Portions of this note were dictated using Dragon.  There may be linguistic errors secondary to the use of this program.

## 2021-11-09 NOTE — PATIENT INSTRUCTIONS
Start rinsing daily with saline rinse  Start Flonase 1 spray each side twice a day  Start Astelin 1 spray each side twice a day  Drink 64oz of water a day  Take mucinex as needed  I will call you with results of culture- we will do antibiotics then

## 2021-11-12 NOTE — TELEPHONE ENCOUNTER
Chief Complaint:  Chief Complaint   Patient presents with   • Consultation   • Hypertension         Vitals:  Visit Vitals  BP (!) 152/78 (BP Location: RUE - Right upper extremity)   Pulse 68   Resp 16   Ht 5' 3\" (1.6 m)   Wt 100.7 kg   SpO2 98%   BMI 39.33 kg/m²         HISTORY OF PRESENT ILLNESS     HPI:  Yolis is a 87 year old female patient who presents to clinic for a consult at the request of Dr. Styles for evaluation of hypertension. She has a history of hyperlipidemia, type 2 diabetes mellitus, and MARY ANNE. She is accompanied by her daughter today. She reports her blood pressure readings vary, sometimes normal and sometimes elevated. She reports shortness of breath with exertion as well as BLE edema. She reports chest pain which occurs at night when she lays down. She states the pain is sometimes relieved if she takes GasX. She reports in the mornings when she wakes up she experiences hot flashes, \"shakiness\" and myalgias. She will go sit in her bathroom for about 15 minutes and her symptoms will resolve. She reports she sleeps soundly throughout the night.       Other significant problems:  Patient Active Problem List   Diagnosis   • WILLAM (generalized anxiety disorder)   • Mild episode of recurrent major depressive disorder (CMS/HCC)   • Essential hypertension   • Dyslipidemia   • Irritable bowel syndrome   • Osteoarthritis of knee   • Adult BMI 37.0-37.9 kg/sq m   • Adnexal mass   • Type 2 diabetes mellitus with diabetic polyneuropathy, without long-term current use of insulin (CMS/HCC)   • Obstructive sleep apnea on CPAP   • Urge incontinence of urine   • Diabetic polyneuropathy associated with type 2 diabetes mellitus (CMS/HCC)   • Age-related osteoporosis without current pathological fracture   • GERD (gastroesophageal reflux disease)   • Bilateral hearing loss   • Anxiety   • Type 2 diabetes mellitus with microalbuminuria, without long-term current use of insulin (CMS/HCC)           PAST MEDICAL, FAMILY AND  Spoke with the patient.  He is aware that the prescription has been sent it SOCIAL HISTORY     Medications:  Current Outpatient Medications   Medication Sig Dispense Refill   • losartan (COZAAR) 25 MG tablet TAKE 1 TABLET BY MOUTH  DAILY 90 tablet 0   • furosemide (LASIX) 20 MG tablet TAKE 1 TABLET BY MOUTH  DAILY 90 tablet 1   • amLODIPine (NORVASC) 10 MG tablet TAKE 1 TABLET BY MOUTH  DAILY 90 tablet 1   • simvastatin (ZOCOR) 20 MG tablet TAKE 1 TABLET BY MOUTH AT  NIGHT 90 tablet 3   • nystatin (MYCOSTATIN) 281952 UNIT/ML suspension Swish and swallow 5 mLs 4 times daily 200 mL 0   • DULoxetine (CYMBALTA) 60 MG capsule Take 1 capsule by mouth daily. 90 capsule 3   • metFORMIN (GLUCOPHAGE) 500 MG tablet Take 2 tablets by mouth daily (with breakfast). 180 tablet 3   • busPIRone (BUSPAR) 15 MG tablet Take 1 tablet by mouth 2 times daily. 180 tablet 1   • famotidine (PEPCID) 40 MG tablet Take 1 tablet by mouth daily. 90 tablet 1   • potassium CHLORIDE (KLOR-CON M) 20 MEQ gareth ER tablet TAKE 1 TABLET BY MOUTH TWO  TIMES DAILY 180 tablet 3   • glipiZIDE (GLUCOTROL) 2.5 MG 24 hr tablet TAKE 1 TABLET BY MOUTH  DAILY 90 tablet 3   • metoPROLOL succinate (TOPROL-XL) 50 MG 24 hr tablet TAKE 1 TABLET BY MOUTH  DAILY 90 tablet 3   • hydrALAZINE (APRESOLINE) 50 MG tablet TAKE 1 TABLET BY MOUTH TWO  TIMES DAILY 180 tablet 3   • fenofibrate (TRICOR) 54 MG tablet TAKE 1 TABLET BY MOUTH  DAILY 90 tablet 3   • meloxicam (MOBIC) 15 MG tablet TAKE 1 TABLET BY MOUTH  DAILY 90 tablet 3   • estradiol 0.2 mg/g (0.02 %) cream (in natural base) Insert 1 gram into the vaginal opening and apply a small amount around the vaginal opening twice a week at night before bed. 30 g 6   • NYSTOP (NYSTOP) 821134 UNIT/GM powder Apply vaginal area daily prn itching 15 g 0   • FIBER SELECT GUMMIES PO Take by mouth 2 times daily.     • aspirin 81 MG tablet Take 81 mg by mouth daily.     • Tetrahydrozoline HCl (EYE DROPS OP) TACOS     • acetaminophen 650 MG TABS Take 650 mg by mouth every 4 hours as needed for Pain.     • gabapentin  (NEURONTIN) 100 MG capsule Take 1 capsule by mouth nightly. 90 capsule 0   • clindamycin (CLEOCIN) 300 MG capsule TAKE 2 CAPSULES BY MOUTH ONE HOUR BEFORE DENTAL APPOINTMENT 2 capsule 0     No current facility-administered medications for this visit.         Allergies:  ALLERGIES:   Allergen Reactions   • Lyrica Other (See Comments)     Palpitations     • Wellbutrin [Bupropion Hcl] HIVES and CARDIAC DISTURBANCES     Wellbutrin XL:  Rapid heart beat       Past Medical History/Surgeries:  Past Medical History:   Diagnosis Date   • Anxiety    • Arthritis    • Cerebral infarction (CMS/HCC)     ? possible mini stroke    • Depression    • Diabetes mellitus (CMS/HCC)    • Esophageal reflux    • Essential (primary) hypertension    • Fracture     wrist 40 years ago,   Right Hip 2013   • High cholesterol    • Hypertension    • Osteoporosis, unspecified 11/18/2008   • Other and unspecified hyperlipidemia    • Ovarian cyst    • Personal history of traumatic fracture    • Post-nasal drip    • Sleep apnea    • Sleep apnea    • Urinary incontinence    • Vulvar lesion     hemangioma       Past Surgical History:   Procedure Laterality Date   • Appendectomy     • Cholecystectomy      open val   • Colonoscopy diagnostic  07/31/2009   • Dexa bone density axial skeleton  04/01/2008   • Eye surgery  2011    LEFT EYE   • Fracture surgery      r hip   • Hysterectomy      at age 37, related to menorrhagia   • Joint replacement     • Knee surgery     • Tonsillectomy and adenoidectomy         Family History:  Family History   Problem Relation Age of Onset   • Hypertension Mother    • Cancer Mother 50        colon in her 50s,  70s vaginal CA    • COPD Father    • Heart disease Father    • Diabetes Father    • Stroke Father    • High cholesterol Daughter    • Cancer Maternal Aunt         ? vaginal    • Cancer Maternal Grandmother         colon    • Cancer Maternal Grandfather         colon       Social History:  Social History      Socioeconomic History   • Marital status:      Spouse name: Not on file   • Number of children: 6   • Years of education: Not on file   • Highest education level: Not on file   Occupational History   • Occupation: retired   Tobacco Use   • Smoking status: Never Smoker   • Smokeless tobacco: Never Used   Substance and Sexual Activity   • Alcohol use: Not Currently     Alcohol/week: 1.0 standard drinks     Types: 1 Glasses of wine per week   • Drug use: No   • Sexual activity: Not Currently   Other Topics Concern   • Not on file   Social History Narrative    Lives with daughter for several months, does not exercise.     Social Determinants of Health     Financial Resource Strain:    • Social Determinants: Financial Resource Strain:    Food Insecurity:    • Social Determinants: Food Insecurity:    Transportation Needs:    • Lack of Transportation (Medical):    • Lack of Transportation (Non-Medical):    Physical Activity:    • Days of Exercise per Week:    • Minutes of Exercise per Session:    Stress:    • Social Determinants: Stress:    Social Connections:    • Social Determinants: Social Connections:    Intimate Partner Violence:    • Social Determinants: Intimate Partner Violence Past Fear:    • Social Determinants: Intimate Partner Violence Current Fear:          REVIEW OF SYSTEMS     REVIEW OF SYSTEMS:  All other systems reviewed, negative except as noted.  CONSTITUTIONAL:  Denies fever, chills, or declining functional capacity.  RESPIRATORY:  Denies cough, wheezing, or sputum production.  CARDIOVASCULAR:  As noted in HPI.   GASTROINTESTINAL:  Denies abdominal pain, nausea, vomiting, bloody stools, constipation, or diarrhea.   GENITOURINARY:  Denies dysuria or flank pain.   MUSCULOSKELETAL:  Denies back pain.   EXTREMITIES:  + bilateral lower extremity edema.  INTEGUMENT:  Denies rash or excessive bruising.   NEUROLOGICAL:  Denies lightheadedness, dizziness, or near syncopal events.      PHYSICAL EXAM      VITAL SIGNS:    Visit Vitals  BP (!) 152/78 (BP Location: RUE - Right upper extremity)   Pulse 68   Resp 16   Ht 5' 3\" (1.6 m)   Wt 100.7 kg   SpO2 98%   BMI 39.33 kg/m²     GENERAL:  Yolis Muro is a 87 year old female who is well developed and well nourished.  She is in no acute distress, nontoxic appearance.   HEENT:  Atraumatic, external ears without scar or lesion, nose and oropharynx moist, Eyes:  PERRL (Pupils equal, round, reactive to light), conjunctivae normal.  Left carotid:  no bruit, Right carotid: no bruit.  RESPIRATORY:  No respiratory distress, normal breath sounds, no rales, no wheezing.   CARDIOVASCULAR:  Normal rate, normal rhythm, no murmur noted, no gallops, no rubs.   GASTROINTESTINAL:  Normal bowel sounds, nondistended, soft, nontender, no mass, no rebound, no guarding.   GENITOURINARY:  No costovertebral angle tenderness.   MUSCULOSKELETAL:  Back - No tenderness.  SKIN:  Well hydrated, warm, no rash.   EXTREMITIES:  No clubbing or cyanosis. Nonpitting edema noted.  Radial pulses are adequate.  NEUROLOGICAL:  Alert and oriented x3, CN (cranial nerves) 2-12 normal, normal motor function, normal sensory function, no focal deficits noted.   PSYCHIATRIC:  Speech and behavior appropriate.    Pertinent laboratory tests:  Lab Results   Component Value Date    SODIUM 141 06/11/2021    POTASSIUM 4.3 06/11/2021    CO2 24 06/11/2021    BUN 22 (H) 06/11/2021    CREATININE 0.69 06/11/2021    GFRNA 80 11/14/2019       Lab Results   Component Value Date    AST 19 06/11/2021    GPT 25 06/11/2021    ALKPT 68 06/11/2021    BILIRUBIN 0.5 06/11/2021       Lab Results   Component Value Date    GLUCOSE 172 (H) 06/11/2021    HGBA1C 6.2 (H) 06/11/2021       Lab Results   Component Value Date    WBC 4.9 06/11/2021    HCT 42.6 06/11/2021    HGB 14.0 06/11/2021     06/11/2021    INR 1.0 05/21/2014       Lab Results   Component Value Date    TSH 1.179 06/11/2021       Lab Results   Component Value Date     CHOLESTEROL 162 06/11/2021    HDL 55 06/11/2021    CALCLDL 74 06/11/2021    TRIGLYCERIDE 165 (H) 06/11/2021       Lab Results   Component Value Date    BUN 22 (H) 06/11/2021    CREATININE 0.69 06/11/2021    GFRNA 80 11/14/2019       Diagnostic tests reviewed:    7/1/2021 12-Lead EKG:  Reviewed     5/21/2014 Echocardiogram:  Reviewed        ASSESSMENT/PLAN         Recommendations:    #1 Hypertension: Blood pressure today is 152/78. She monitors her blood pressure at home and reports morning readings in the 130's and 140's systolic, and afternoon readings in the 150's. She reports she only takes hydralazine if her blood pressure is greater than 160 mmHg systolic. She reports she is currently taking Lasix 40 mg daily. Will update medication list. Nonpitting edema noted on exam. Will reduce amlodipine to 5 mg daily. Will increase Losartan to 25 mg twice daily. If her blood pressure remains well controlled, would consider stopping amlodipine and reducing Lasix.     #2 Hyperlipidemia: LDL 74 last checked 6/11/2021. She reports experiencing hot flashes, shakiness, and myalgias in the mornings when she wakes up. Will stop simvastatin and fenofibrate.     Follow up: as needed    On 7/1/2021, Tariq SUNSHINE scribed the services personally performed by Eleazar Hook DO    Greater than 50 minutes was spent today in face-to-face consultation and coordination of care.    Eleazar Hook DO, FACC, FASE, FASNC    The documentation recorded by the scribe accurately and completely reflects the service(s) I personally performed and the decisions made by me.

## 2021-11-12 NOTE — TELEPHONE ENCOUNTER
----- Message from Dagoberto Murphy DO sent at 11/12/2021 12:59 PM EST -----  Please let patient know I sent in an antibiotic to his [address]

## 2021-11-18 PROBLEM — J90 PLEURAL EFFUSION, BILATERAL: Status: RESOLVED | Noted: 2021-01-01 | Resolved: 2021-01-01

## 2021-11-18 PROBLEM — J41.0 SIMPLE CHRONIC BRONCHITIS (HCC): Status: ACTIVE | Noted: 2021-01-01

## 2021-11-18 NOTE — PROGRESS NOTES
CHIEF COMPLAINT  Chief Complaint   Patient presents with    Check-Up     HTN        HPI   Marsha Caban is a 80 y.o. male who presents to the office for check up. Patient denies any change in medications or recent illness. Patient denies any dizziness or change in loc. No chest pain or chest tightness. No abdominal pain/discomfort. Reports chronic shortness of breath worse upon exertion. No nausea, vomiting, diarrhea. No dark or tarry stools. Patient reports eating and drinking appropriately. Patient reports muscle aches at night. She reports following up with pulmonologist, ENT and cardiologist on a regular basis. No other complaints, modifying factors or associated symptoms. Nursing notes reviewed.    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 ([facility] [address] 04/2016    TIA in 1997    Pleural effusion, bilateral 7/13/2021    Routine health maintenance     refuses immunizations; declines PSA    Tinnitus     Vasovagal reaction     to needles    Vitamin D deficiency disease 12/15     Past Surgical History:   Procedure Laterality Date    CORONARY ANGIOPLASTY WITH STENT PLACEMENT  08/14/2000    RX Tristar 2.5 x 13 mCX  RX Tristar 2.5 x 13 pCX    CORONARY ANGIOPLASTY WITH STENT PLACEMENT  10/19/2000    Tetra 2.75 x 18 CX  Tetra 3.0 x 13 CX    OTHER SURGICAL HISTORY  1-23-12    phaco emulsification cataract right eye    TONSILLECTOMY      TOOTH EXTRACTION       Family History   Problem Relation Age of Onset    Emphysema Mother     Stroke Father     Other Daughter         Factor V deficiency    Other Grandchild         Factor V deficiency     Social History     Socioeconomic History    Marital status:      Spouse name: Not on file    Number of children: Not on file    Years of education: Not on file    Highest education level: Not on file   Occupational History    Not on file   Tobacco Use    Smoking status: Former Smoker     Packs/day: 2.00     Years: 20.00     Pack years: 40.00     Types: Cigarettes     Start date: 1956     Quit date: 1975     Years since quittin.9    Smokeless tobacco: Never Used   Vaping Use    Vaping Use: Never used   Substance and Sexual Activity    Alcohol use: No    Drug use: No    Sexual activity: Yes     Partners: Female   Other Topics Concern    Not on file   Social History Narrative    Not on file     Social Determinants of Health     Financial Resource Strain:     Difficulty of Paying Living Expenses: Not on file   Food Insecurity:     Worried About Running Out of Food in the Last Year: Not on file    [place] of Food in the Last Year: Not on file   Transportation Needs:     Lack of Transportation (Medical): Not on file    Lack of Transportation (Non-Medical):  Not on file   Physical Activity:     Days of Exercise per Week: Not on file    Minutes of Exercise per Session: Not on file   Stress:     Feeling of Stress : Not on file   Social Connections:     Frequency of Communication with Friends and Family: Not on file    Frequency of Social Gatherings with Friends and Family: Not on file    Attends [employer] Services: Not on file    Active Member of [employer] or Organizations: Not on file    Attends Club or Organization Meetings: Not on file    Marital Status: Not on file   Intimate Partner Violence:     Fear of Current or Ex-Partner: Not on file    Emotionally Abused: Not on file    Physically Abused: Not on file    Sexually Abused: Not on file   Housing Stability:     Unable to Pay for Housing in the Last Year: Not on file    Number of [place] in the Last Year: Not on file    Unstable Housing in the Last Year: Not on file     Current Outpatient Medications   Medication Sig Dispense Refill    doxycycline hyclate (VIBRA-TABS) 100 MG tablet Take 1 tablet by mouth 2 times daily for 10 days 20 tablet 0    pravastatin (PRAVACHOL) 40 MG tablet TAKE 1 TABLET BY MOUTH  DAILY AT NIGHT 90 tablet 1    azelastine (ASTELIN) 0.1 % nasal spray 1 spray by Nasal route 2 times daily Use in each nostril as directed 3 each 3    torsemide (DEMADEX) 20 MG tablet TAKE 1 TABLET BY MOUTH  DAILY 90 tablet 3    metFORMIN (GLUCOPHAGE) 1000 MG tablet TAKE 1 TABLET BY MOUTH  TWICE DAILY WITH MEALS 180 tablet 1    fluticasone-umeclidin-vilant (TRELEGY ELLIPTA) 100-62.5-25 MCG/INH AEPB Inhale 1 puff into the lungs daily 2 each 0    albuterol sulfate HFA (PROAIR HFA) 108 (90 Base) MCG/ACT inhaler Inhale 2 puffs into the lungs every 6 hours as needed for Wheezing 1 Inhaler 5    blood glucose test strips (ACCU-CHEK DIONTE PLUS) strip TEST TWO TIMES DAILY dx:E11.9 200 strip 3    levothyroxine (SYNTHROID) 88 MCG tablet TAKE 1 TABLET BY MOUTH  DAILY 90 tablet 3    amLODIPine (NORVASC) 5 MG tablet TAKE 1 TABLET EVERY DAY 90 tablet 5    metoprolol tartrate (LOPRESSOR) 50 MG tablet TAKE 1 TABLET TWICE DAILY 180 tablet 5    losartan (COZAAR) 100 MG tablet TAKE 1 TABLET EVERY DAY 90 tablet 5    Accu-Chek Softclix Lancets MISC Tests two times daily Dx: E11.9 200 each 3    Omega-3 Fatty Acids (FISH OIL PO) Take by mouth      Multiple Vitamin (MULTI-VITAMIN DAILY PO) Take by mouth      Coenzyme Q10 (COQ10 PO) Take by mouth      Ascorbic Acid (VITAMIN C PO) Take by mouth 2 times daily      guaiFENesin 400 MG tablet Take 400 mg by mouth nightly       magnesium oxide (MAG-OX) 400 MG tablet Take 400 mg by mouth daily      ferrous sulfate 325 (65 Fe) MG tablet Take 325 mg by mouth daily (with breakfast)       aspirin 81 MG EC tablet Take 1 tablet by mouth daily 30 tablet 0    Cholecalciferol (VITAMIN D) 2000 UNITS CAPS capsule Take  by mouth daily. No current facility-administered medications for this visit.      Allergies   Allergen Reactions    Metolazone      Sodium 120 on 5/19, was on metolazone, no hypovolemic synptoms on presentation    Lipitor      Myalgias at 20 mg    Simvastatin      myalgias       REVIEW OF SYSTEMS  Review of Systems   Constitutional: Negative for activity change, appetite change, chills and fever. HENT: Negative for congestion, rhinorrhea and sore throat. Eyes: Negative for visual disturbance. Respiratory: Positive for shortness of breath. Negative for cough, chest tightness and wheezing. Cardiovascular: Negative for chest pain and leg swelling. Gastrointestinal: Negative for abdominal pain, diarrhea, nausea and vomiting. Genitourinary: Negative for dysuria, frequency, hematuria and urgency. Musculoskeletal: Negative for gait problem and myalgias. Skin: Positive for color change. Negative for rash. Right knee   Neurological: Negative for dizziness, weakness, light-headedness, numbness and headaches. Psychiatric/Behavioral: Negative for self-injury and sleep disturbance. The patient is not nervous/anxious. PHYSICAL EXAM  BP (!) 143/72   Pulse 77   Temp 98 °F (36.7 °C) (Oral)   Wt 169 lb 4 oz (76.8 kg)   SpO2 97%   BMI 23.61 kg/m²   Physical Exam  Vitals reviewed. Constitutional:       General: He is not in acute distress. Appearance: Normal appearance. He is well-developed. He is not diaphoretic. HENT:      Head: Normocephalic and atraumatic. Right Ear: Tympanic membrane normal.      Left Ear: Tympanic membrane normal.      Nose: Nose normal.      Mouth/Throat:      Mouth: Mucous membranes are moist.      Pharynx: Oropharynx is clear. No oropharyngeal exudate or posterior oropharyngeal erythema. Eyes:      General:         Right eye: No discharge. Left eye: No discharge. Pupils: Pupils are equal, round, and reactive to light. Neck:      Vascular: No JVD. Cardiovascular:      Rate and Rhythm: Normal rate. Pulses: Normal pulses.    Pulmonary:      Effort: missed doses.  Previous labs stable.  Patient asymptomatic currently.  Continue current treatment management follow-up in 6 months, sooner for new or worsening symptoms.  - TSH with Reflex    4. Chronic diastolic congestive heart failure (HCC)  Stable. Managed by cardiology. Managed by cardiology. Patient reports taking Demadex daily. Reports monitoring fluid intake to 64 ounces daily. Patient denies any worsening shortness of breath, swelling, or weight gain. Patient encouraged to monitor weight continue with current medications following up with cardiologist in 6 months, sooner for new or worsening symptoms. 5. Coronary artery disease involving native coronary artery of native heart without angina pectoris  Stable. Managed by cardiology. No recent changes in medication or management. Patient follows up with cardiology next month. Labs ordered today and pending follow-up in 6 months, sooner for new or worsening symptoms. 6. Essential hypertension  Stable. Patient reports blood pressure at home 130/71. Patient reports taking medications as prescribed. Patient compliant with losartan 100 mg daily, metoprolol 50 mg twice a day, and amlodipine 5 mg daily. Continue with current treatment management follow-up in 6 months, sooner for new or worsening symptoms. 7. Pure hypercholesterolemia  Stable. Patient compliant with pravastatin 40 mg nightly. Patient reports muscle aching at night and cardiologist tried to adjust his cholesterol medication to see if that would help but he did not see any difference. Continue with current treatment management follow-up in 6 months, sooner for new or worsening symptoms. 8. Vitamin D deficiency disease  Stable. Labs ordered and pending. Patient compliant with vitamin D supplement. Continue with current treatment management follow-up in 6 to 12 months, sooner for new or worsening symptoms.  - VITAMIN D 25 HYDROXY; Future    9. Bilateral carotid artery occlusion  Stable. Previous imaging 2019 with no significant changes from previous imaging. Patient reports that he does see cardiologist next month therefore I did recommend him discussing this with cardiologist to see if venous duplex to be repeated. Patient verbalized acknowledges with plan of care at this time. The note was completed using Dragon voice recognition transcription. Every effort was made to ensure accuracy; however, inadvertent  transcription errors may be present despite my best efforts to edit errors.     Jessie Hull, APRN - CNP

## 2021-11-18 NOTE — PATIENT INSTRUCTIONS
Please read the healthy family handout that you were given and share it with your family. Please compare this printed medication list with your medications at home to be sure they are the same. If you have any medications that are different please contact us immediately at [phone]. Also review your allergies that we have listed, these may also include medications that you have not been able to tolerate, make sure everything listed is correct. If you have any allergies that are different please contact us immediately at [phone].

## 2021-12-03 NOTE — PATIENT INSTRUCTIONS
Plan:  1. Stop pravastatin for 1 week and then go back on half dose (20 mg daily) to see if that would make a difference. May try crestor if this does not help with cramping. 2. Check lipids in 2-3 months  Recommend Pfizer or [address] [place] [address] booster  3. Plan to follow up in 6 months  Call my office in 1 month to let me know how you are doing.

## 2021-12-03 NOTE — PROGRESS NOTES
[facility]   Cardiac Followup    Referring Provider:  AICHA Haque CNP     Chief Complaint   Patient presents with    6 Month Follow-Up    Coronary Artery Disease      Subjective: Mr Meryle Diamond being seen today for cardiology follow up of his CAD, HTN, HLD;  S/P 4V CABG January 2018; c/o cramping in legs today    History of Present Illness:    Mr. Meryle Diamond is a 80 y.o. male with PMH embolic R. CVA in 0/45, CAD s/p 4 stents prior (most recent 10/00), s/p 4V CABG 1/18 with Dr. Mandeep Nunn, HTN, DM, restrictive lung disease (follows Dr. Mindy Grullon), pleural effusion s/p left thoracentesis 9/20, mild carotid artery disease, HLD, and hypothyroidism. Note hx of myalgias with statins but tolerates pravachol with coenzyme Q10. Admitted to [facility] 4/11/2016 with acute ischemic CVA of the R hemisphere, confirmed on MRI of the brain with embolic pattern. Most recent BLE LUCY 6/23/17 normal > 1 bilaterally. Note LHC 1/18/18 MV CAD with prior stents patent. Underwent 4V CABG 1/31/18 by Dr. Mandeep Nunn at [facility] with pedicled LIMA to LAD, sequential SVG to Diag 1 then on to OM, separate single SVG to distal RCA. Most recent Carotid doppler 9/19/19 moderate plaque <50% bilateral carotid arteries (no change from 6/17 study)   Most recent ECHO 8/25/20 EF 50-55% (EF=55-60% 6/19; EF=55% 1/18); Milld HK/basal inferior and inferoseptal walls. Grade III DDwith elevated LV pressure. LAE; Mild MAC; Mild MR, AR, TR. Large left pleural effusion. Most recent Lexiscan myoview stress 8/25/20 NO ischemia or scar (12/17 GXT nuc + ischemia lateral apex). Referred to pulmonary and had Left thoracentesis 9/8/20 0.5-L transudate fluid removed. Due to SOB underwent most recent [facility] 12/29/20 which revealed native 3V CAD. Bypass grafts patent (LIMA-LAD, [id number], SVG-RCA). Normal left side filling pressures (LVEDP 13); Mildly elevated right sided filling pressures. Normal PAP. Most recent EKG 12/29/20 SR; 1st degree AV block; incomplete RBBB. Today, he reports that ENT Dr. Laura Scruggs put him on antibiotic and Astelin nasal spray for congestion. He presents with a cane and notes that he has muscle cramping and believes pravachol is the issues. Patient with no complaints of chest pain, SOB, palpitations, dizziness, edema, or orthopnea/PND. He is compliant with medications. 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 Surgical History:   has a past surgical history that includes Tooth Extraction; Coronary angioplasty with stent (08/14/2000); other surgical history (1-23-12); Coronary angioplasty with stent (10/19/2000); and Tonsillectomy. Social History:   reports that he quit smoking about mid-1970's. His smoking use included cigarettes. He started smoking about 65 years ago. He has a 40.00 pack-year smoking history. He has never used smokeless tobacco. He reports that he does not drink alcohol and does not use drugs. Family History:  family history includes Emphysema in his mother; Other in his daughter and grandchild; Stroke in his father. Home Medications:  Prior to Admission medications    Medication Sig Start Date End Date Taking? Authorizing Provider   pravastatin (PRAVACHOL) 20 MG tablet Take 20 mg by mouth daily. Yes Historical Provider, MD   Coenzyme Q10 (CO Q 10 PO) Take 1 capsule by mouth daily.      Yes Historical Provider, MD   NITROSTAT 0.4 MG SL tablet PLACE 1 TABLET UNDER THE TONGUE EVERY 5 MINUTES ASNEEDED 7/12/12  Yes Tien Angel MD   metoprolol (TOPROL-XL) 50 MG XL tablet TAKE 1 TABLET BY MOUTH ONCE DAILY 6/20/12  Yes Tien Angel MD   levothyroxine (SYNTHROID) 75 MCG tablet TAKE 1 TABLET BY MOUTH ONCE DAILY 2/8/12  Yes Tien Angel MD losartan-hydrochlorothiazide (HYZAAR) 100-25 MG per tablet Take 1 tablet by mouth daily for 360 days. 1/18/12 1/12/13 Yes Radha Jovel MD   aspirin 81 MG EC tablet Take 81 mg by mouth daily. 2 daily     Yes Historical Provider, MD   Blood Glucose Monitoring Suppl (ONE TOUCH ULTRA) JUAN FRANCISCO by Does not apply route. 6/22/10  Yes Shy Lyle MD   Multiple Vitamins-Minerals (MULTIVITAL PO) Take  by mouth daily. 5/24/10  Yes Historical Provider, MD   Omega-3 Fatty Acids (FISH OIL) 1200 MG CAPS Take  by mouth 2 times daily. 5/24/10  Yes Historical Provider, MD   Ascorbic Acid (VITAMIN C) 500 MG tablet Take 500 mg by mouth daily. Yes Historical Provider, MD   Cholecalciferol (VITAMIN D3) 1000 UNIT CAPS Take  by mouth daily. 5/24/10  Yes Historical Provider, MD   vitamin E 200 UNIT capsule Take 200 Units by mouth daily. Yes Historical Provider, MD        Allergies:  Metolazone, Lipitor, and Simvastatin     Review of Systems:   · Constitutional: there has been no unanticipated weight loss. There's been no change in energy level, sleep pattern, or activity level. · Eyes: No visual changes or diplopia. No scleral icterus. · ENT: No Headaches, hearing loss or vertigo. No mouth sores or sore throat. · Cardiovascular: Reviewed in HPI  · Respiratory: No cough or wheezing, no sputum production. No hematemesis. · Gastrointestinal: No abdominal pain, appetite loss, blood in stools. No change in bowel or bladder habits. · Genitourinary: No dysuria, trouble voiding, or hematuria. · Musculoskeletal:  No gait disturbance, weakness or joint complaints. · Integumentary: No rash or pruritis. · Neurological: No headache, diplopia, change in muscle strength, numbness or tingling. No change in gait, balance, coordination, mood, affect, memory, mentation, behavior. · Psychiatric: No anxiety, no depression. · Endocrine: No malaise, fatigue or temperature intolerance. No excessive thirst, fluid intake, or urination.  No tremor. · Hematologic/Lymphatic: No abnormal bruising or bleeding, blood clots or swollen lymph nodes. · Allergic/Immunologic: No nasal congestion or hives. Height: 5' 11\" (1.803 m), Weight: 168 lb 12.8 oz (76.6 kg), BP: 120/66    Wt Readings from Last 3 Encounters:   12/03/21 168 lb 12.8 oz (76.6 kg)   11/18/21 169 lb 4 oz (76.8 kg)   11/09/21 168 lb (76.2 kg)     Temp Readings from Last 3 Encounters:   12/03/21 98.2 °F (36.8 °C)   11/18/21 98 °F (36.7 °C) (Oral)   11/09/21 98 °F (36.7 °C)     BP Readings from Last 3 Encounters:   12/03/21 120/66   11/18/21 (!) 143/72   11/09/21 134/67     Pulse Readings from Last 3 Encounters:   12/03/21 68   11/18/21 77   07/13/21 65       Physical Examination:         Constitutional and General Appearance: NAD   Respiratory:  · Normal excursion and expansion without use of accessory muscles  · Resp Auscultation: clear soft breath sounds without dullness  Cardiovascular:  · The apical impulses not displaced  · Heart tones are crisp and normal  · Cervical veins are not engorged  · The carotid upstroke is normal in amplitude and contour without delay, short       soft bruit on left side. · Normal S1S2, No S3, no murmur  · Peripheral pulses are symmetrical and full  · There is no clubbing, cyanosis of the extremities.   · No edema  · Femoral Arteries: 2+ and equal  · Pedal Pulses: 2+ and equal   Abdomen:  · No masses or tenderness  · Liver/Spleen: No Abnormalities Noted  Neurological/Psychiatric:  · Alert and oriented in all spheres  · Moves all extremities well  · Exhibits normal gait balance and coordination  · No abnormalities of mood, affect, memory, mentation, or behavior are noted    Lab Results   Component Value Date     (L) 11/18/2021    K 4.6 11/18/2021    CL 91 (L) 11/18/2021    CO2 24 11/18/2021    BUN 27 (H) 11/18/2021    CREATININE 1.2 11/18/2021    GLUCOSE 100 (H) 11/18/2021    CALCIUM 9.6 11/18/2021    PROT 7.9 11/18/2021    LABALBU 4.1 11/18/2021 BILITOT 0.4 11/18/2021    ALKPHOS 181 (H) 11/18/2021    AST 15 11/18/2021    ALT 12 11/18/2021    LABGLOM 58 (A) 11/18/2021    GFRAA >60 11/18/2021    AGRATIO 1.1 11/18/2021    GLOB 3.8 05/17/2021       Lab Results   Component Value Date    WBC 8.4 12/29/2020    HGB 12.6 (L) 12/29/2020    HCT 37.7 (L) 12/29/2020    MCV 89.0 12/29/2020     12/29/2020     No components found for: CHLPL  Lab Results   Component Value Date    TRIG 91 12/29/2020    TRIG 87 06/02/2020    TRIG 131 11/08/2019     Lab Results   Component Value Date    HDL 49 05/17/2021    HDL 52 12/29/2020    HDL 45 06/02/2020     Lab Results   Component Value Date    LDLCALC 92 05/17/2021    LDLCALC 114 (H) 12/29/2020    LDLCALC 88 06/02/2020     Lab Results   Component Value Date    LABVLDL 20 05/17/2021    LABVLDL 18 12/29/2020    LABVLDL 17 06/02/2020     TSH 11/18/2021 4/57  A1C 11/18/2021 6.2%    Assessment:     1. CAD (coronary artery disease): S/P 4V CABG 1/31/18. Most recent LHC/RHC 12/29/20 which revealed native 3 vessel disease. Bypass grafts patent (LIMA-LAD, [id number], SVG-RCA). Normal left side filling pressures (LVEDP 13); Mildly elevated right sided filling pressures. Normal pulmonary artery pressure. No definite cardiac etiology to explain IBARRA symptoms. There are no concerning symptoms for angina currently. 2. DM w/o complication Type II : managed per PCP. HA1C=6.5 on 11/6/18.   3. Carotid artery occlusion: Most recent Carotid doppler 9/19/19 moderate plaque <50% bilateral carotid arteries (no change from 6/2107 study)     4. Cerebrovascular disease: Resolved. On 4/11/2016 he had acute ischemic CVA of the R hemisphere, confirmed on MRI of the brain with embolic pattern. Stable and doing better overall and will continue present medical regimen. He remains on baby aspirin. 5. Hypertension : Stable and will continue present medical regimen.       6. Hyperlipidemia:  Most recent labs from 05/17/21  see results above which I personally reviewed. Note LDL=92 and at goal. He c/o myalgias and I will decrease pravachol from 40mg to 20mg daily after holding statin x 1 week. Plan:  1. Stop pravastatin for 1 week and then go back on half dose (20 mg daily) to see if that would make a difference. May try crestor if this does not help with cramping. Call my office in 1 month to let me know how you are doing. 2. Check lipids in 2-3 months  Recommend Pfizer or [address] [place] [address] booster  3. Plan to follow up in 6 months    This note was scribed in the presence of Shahriar Mcclellan MD by Sera Blancas RN. I, Dr. Barton Peabody, personally performed the services described in this documentation, as scribed by the above signed scribe in my presence. It is both accurate and complete to my knowledge. I agree with the details independently gathered by the clinical support staff, while the remaining scribed note accurately describes my personal service to the patient. Cost of prescription medications and patient compliance have been reviewed with patient. All questions answered. Thank you for allowing me to participate in the care of this individual.    Kristen Rubio.  Deya Ya M.D., [facility] [place]

## 2022-01-01 ENCOUNTER — TELEPHONE (OUTPATIENT)
Dept: CARDIOLOGY CLINIC | Age: 82
End: 2022-01-01

## 2022-01-01 ENCOUNTER — CARE COORDINATION (OUTPATIENT)
Dept: CARE COORDINATION | Age: 82
End: 2022-01-01

## 2022-01-01 ENCOUNTER — CARE COORDINATION (OUTPATIENT)
Dept: CASE MANAGEMENT | Age: 82
End: 2022-01-01

## 2022-01-01 ENCOUNTER — HOSPITAL ENCOUNTER (OUTPATIENT)
Age: 82
Discharge: HOME | End: 2022-05-31
Payer: MEDICARE

## 2022-01-01 ENCOUNTER — HOSPITAL ENCOUNTER (INPATIENT)
Age: 82
LOS: 2 days | Discharge: HOME | DRG: 291 | End: 2022-05-13
Attending: INTERNAL MEDICINE | Admitting: INTERNAL MEDICINE
Payer: MEDICARE

## 2022-01-01 ENCOUNTER — HOSPITAL ENCOUNTER (OUTPATIENT)
Dept: CARDIAC REHAB | Age: 82
Setting detail: THERAPIES SERIES
Discharge: HOME | End: 2022-04-27

## 2022-01-01 ENCOUNTER — APPOINTMENT (OUTPATIENT)
Dept: GENERAL RADIOLOGY | Age: 82
DRG: 291 | End: 2022-01-01
Attending: INTERNAL MEDICINE
Payer: MEDICARE

## 2022-01-01 ENCOUNTER — OFFICE VISIT (OUTPATIENT)
Dept: FAMILY MEDICINE CLINIC | Age: 82
End: 2022-01-01
Payer: MEDICARE

## 2022-01-01 ENCOUNTER — HOSPITAL ENCOUNTER (OUTPATIENT)
Dept: CARDIAC REHAB | Age: 82
Setting detail: THERAPIES SERIES
Discharge: HOME | End: 2022-04-18

## 2022-01-01 ENCOUNTER — HOSPITAL ENCOUNTER (OUTPATIENT)
Age: 82
Discharge: HOME | End: 2022-02-11
Payer: MEDICARE

## 2022-01-01 ENCOUNTER — APPOINTMENT (OUTPATIENT)
Dept: CT IMAGING | Age: 82
DRG: 291 | End: 2022-01-01
Payer: MEDICARE

## 2022-01-01 ENCOUNTER — TELEPHONE (OUTPATIENT)
Dept: PULMONOLOGY | Age: 82
End: 2022-01-01

## 2022-01-01 ENCOUNTER — ANESTHESIA EVENT (OUTPATIENT)
Dept: ENDOSCOPY | Age: 82
End: 2022-01-01
Payer: MEDICARE

## 2022-01-01 ENCOUNTER — HOSPITAL ENCOUNTER (OUTPATIENT)
Dept: NURSING | Age: 82
Setting detail: INFUSION SERIES
Discharge: HOME | End: 2022-05-19
Payer: MEDICARE

## 2022-01-01 ENCOUNTER — OFFICE VISIT (OUTPATIENT)
Dept: CARDIOLOGY CLINIC | Age: 82
End: 2022-01-01
Payer: MEDICARE

## 2022-01-01 ENCOUNTER — HOSPITAL ENCOUNTER (OUTPATIENT)
Dept: CARDIAC REHAB | Age: 82
Setting detail: THERAPIES SERIES
Discharge: HOME | End: 2022-04-11

## 2022-01-01 ENCOUNTER — OFFICE VISIT (OUTPATIENT)
Dept: PULMONOLOGY | Age: 82
End: 2022-01-01
Payer: MEDICARE

## 2022-01-01 ENCOUNTER — HOSPITAL ENCOUNTER (OUTPATIENT)
Dept: CARDIAC REHAB | Age: 82
Setting detail: THERAPIES SERIES
Discharge: HOME | End: 2022-04-08

## 2022-01-01 ENCOUNTER — HOSPITAL ENCOUNTER (OUTPATIENT)
Dept: CARDIAC REHAB | Age: 82
Setting detail: THERAPIES SERIES
Discharge: HOME | End: 2022-04-13

## 2022-01-01 ENCOUNTER — HOSPITAL ENCOUNTER (OUTPATIENT)
Age: 82
Discharge: HOME | End: 2022-05-16
Payer: MEDICARE

## 2022-01-01 ENCOUNTER — APPOINTMENT (OUTPATIENT)
Dept: GENERAL RADIOLOGY | Age: 82
DRG: 309 | End: 2022-01-01
Payer: MEDICARE

## 2022-01-01 ENCOUNTER — HOSPITAL ENCOUNTER (OUTPATIENT)
Age: 82
Setting detail: SPECIMEN
Discharge: HOME | End: 2022-02-22
Payer: MEDICARE

## 2022-01-01 ENCOUNTER — HOSPITAL ENCOUNTER (OUTPATIENT)
Dept: CARDIAC REHAB | Age: 82
Setting detail: THERAPIES SERIES
Discharge: HOME | End: 2022-04-15

## 2022-01-01 ENCOUNTER — HOSPITAL ENCOUNTER (OUTPATIENT)
Dept: GENERAL RADIOLOGY | Age: 82
Discharge: HOME | End: 2022-02-11
Payer: MEDICARE

## 2022-01-01 ENCOUNTER — TELEPHONE (OUTPATIENT)
Dept: FAMILY MEDICINE CLINIC | Age: 82
End: 2022-01-01

## 2022-01-01 ENCOUNTER — APPOINTMENT (OUTPATIENT)
Dept: GENERAL RADIOLOGY | Age: 82
DRG: 291 | End: 2022-01-01
Payer: MEDICARE

## 2022-01-01 ENCOUNTER — APPOINTMENT (OUTPATIENT)
Dept: CT IMAGING | Age: 82
DRG: 309 | End: 2022-01-01
Payer: MEDICARE

## 2022-01-01 ENCOUNTER — OFFICE VISIT (OUTPATIENT)
Dept: ENT CLINIC | Age: 82
End: 2022-01-01
Payer: MEDICARE

## 2022-01-01 ENCOUNTER — HOSPITAL ENCOUNTER (OUTPATIENT)
Dept: CARDIAC REHAB | Age: 82
Setting detail: THERAPIES SERIES
Discharge: HOME | End: 2022-04-25

## 2022-01-01 ENCOUNTER — HOSPITAL ENCOUNTER (OUTPATIENT)
Age: 82
Discharge: HOME | End: 2022-03-08
Payer: MEDICARE

## 2022-01-01 ENCOUNTER — HOSPITAL ENCOUNTER (INPATIENT)
Age: 82
LOS: 2 days | Discharge: HOME HEALTH | DRG: 291 | End: 2022-02-17
Attending: STUDENT IN AN ORGANIZED HEALTH CARE EDUCATION/TRAINING PROGRAM | Admitting: INTERNAL MEDICINE
Payer: MEDICARE

## 2022-01-01 ENCOUNTER — HOSPITAL ENCOUNTER (OUTPATIENT)
Dept: CARDIAC REHAB | Age: 82
Setting detail: THERAPIES SERIES
Discharge: HOME | End: 2022-03-07

## 2022-01-01 ENCOUNTER — HOSPITAL ENCOUNTER (INPATIENT)
Age: 82
LOS: 1 days | Discharge: HOME | DRG: 309 | End: 2022-04-30
Attending: STUDENT IN AN ORGANIZED HEALTH CARE EDUCATION/TRAINING PROGRAM | Admitting: INTERNAL MEDICINE
Payer: MEDICARE

## 2022-01-01 ENCOUNTER — HOSPITAL ENCOUNTER (OUTPATIENT)
Age: 82
Setting detail: OUTPATIENT SURGERY
Discharge: HOME | End: 2022-04-20
Attending: INTERNAL MEDICINE | Admitting: INTERNAL MEDICINE
Payer: MEDICARE

## 2022-01-01 ENCOUNTER — HOSPITAL ENCOUNTER (OUTPATIENT)
Dept: CARDIAC REHAB | Age: 82
Setting detail: THERAPIES SERIES
Discharge: HOME | End: 2022-04-29

## 2022-01-01 ENCOUNTER — HOSPITAL ENCOUNTER (OUTPATIENT)
Dept: NURSING | Age: 82
Setting detail: INFUSION SERIES
Discharge: HOME | End: 2022-05-27
Payer: MEDICARE

## 2022-01-01 ENCOUNTER — HOSPITAL ENCOUNTER (OUTPATIENT)
Dept: CARDIAC REHAB | Age: 82
Setting detail: THERAPIES SERIES
Discharge: HOME | End: 2022-05-06

## 2022-01-01 ENCOUNTER — ANESTHESIA (OUTPATIENT)
Dept: ENDOSCOPY | Age: 82
End: 2022-01-01
Payer: MEDICARE

## 2022-01-01 VITALS
HEART RATE: 67 BPM | HEIGHT: 70 IN | TEMPERATURE: 98.3 F | BODY MASS INDEX: 23.62 KG/M2 | DIASTOLIC BLOOD PRESSURE: 67 MMHG | WEIGHT: 165 LBS | RESPIRATION RATE: 16 BRPM | SYSTOLIC BLOOD PRESSURE: 136 MMHG

## 2022-01-01 VITALS
SYSTOLIC BLOOD PRESSURE: 129 MMHG | TEMPERATURE: 98.2 F | WEIGHT: 167.25 LBS | BODY MASS INDEX: 23.33 KG/M2 | DIASTOLIC BLOOD PRESSURE: 75 MMHG | OXYGEN SATURATION: 96 % | HEART RATE: 67 BPM

## 2022-01-01 VITALS
RESPIRATION RATE: 17 BRPM | DIASTOLIC BLOOD PRESSURE: 75 MMHG | SYSTOLIC BLOOD PRESSURE: 129 MMHG | HEART RATE: 59 BPM | HEIGHT: 71 IN | BODY MASS INDEX: 22.54 KG/M2 | WEIGHT: 161 LBS | TEMPERATURE: 97.7 F

## 2022-01-01 VITALS
BODY MASS INDEX: 23.77 KG/M2 | SYSTOLIC BLOOD PRESSURE: 139 MMHG | HEIGHT: 70 IN | TEMPERATURE: 98.3 F | RESPIRATION RATE: 16 BRPM | WEIGHT: 166 LBS | HEART RATE: 70 BPM | DIASTOLIC BLOOD PRESSURE: 65 MMHG

## 2022-01-01 VITALS
WEIGHT: 171.8 LBS | HEART RATE: 60 BPM | SYSTOLIC BLOOD PRESSURE: 110 MMHG | DIASTOLIC BLOOD PRESSURE: 60 MMHG | OXYGEN SATURATION: 95 % | BODY MASS INDEX: 24.05 KG/M2 | HEIGHT: 71 IN | TEMPERATURE: 97.9 F

## 2022-01-01 VITALS
OXYGEN SATURATION: 95 % | TEMPERATURE: 98.2 F | RESPIRATION RATE: 18 BRPM | BODY MASS INDEX: 22.54 KG/M2 | DIASTOLIC BLOOD PRESSURE: 71 MMHG | HEART RATE: 62 BPM | SYSTOLIC BLOOD PRESSURE: 124 MMHG | HEIGHT: 71 IN | WEIGHT: 161 LBS

## 2022-01-01 VITALS
TEMPERATURE: 98.7 F | HEIGHT: 71 IN | BODY MASS INDEX: 23.18 KG/M2 | WEIGHT: 165.57 LBS | RESPIRATION RATE: 16 BRPM | SYSTOLIC BLOOD PRESSURE: 125 MMHG | DIASTOLIC BLOOD PRESSURE: 57 MMHG | OXYGEN SATURATION: 95 % | HEART RATE: 81 BPM

## 2022-01-01 VITALS — TEMPERATURE: 97 F | HEIGHT: 71 IN | WEIGHT: 160 LBS | BODY MASS INDEX: 22.4 KG/M2

## 2022-01-01 VITALS
HEART RATE: 73 BPM | DIASTOLIC BLOOD PRESSURE: 78 MMHG | BODY MASS INDEX: 24.41 KG/M2 | WEIGHT: 175 LBS | SYSTOLIC BLOOD PRESSURE: 155 MMHG | OXYGEN SATURATION: 92 % | TEMPERATURE: 97.8 F

## 2022-01-01 VITALS
DIASTOLIC BLOOD PRESSURE: 81 MMHG | OXYGEN SATURATION: 97 % | SYSTOLIC BLOOD PRESSURE: 133 MMHG | TEMPERATURE: 97.7 F | HEART RATE: 101 BPM | BODY MASS INDEX: 24.11 KG/M2 | WEIGHT: 168 LBS

## 2022-01-01 VITALS
TEMPERATURE: 98.2 F | RESPIRATION RATE: 18 BRPM | DIASTOLIC BLOOD PRESSURE: 81 MMHG | BODY MASS INDEX: 23.62 KG/M2 | HEART RATE: 106 BPM | SYSTOLIC BLOOD PRESSURE: 132 MMHG | HEIGHT: 70 IN | WEIGHT: 165 LBS | OXYGEN SATURATION: 93 %

## 2022-01-01 VITALS
OXYGEN SATURATION: 96 % | DIASTOLIC BLOOD PRESSURE: 52 MMHG | HEIGHT: 70 IN | TEMPERATURE: 98.2 F | WEIGHT: 158 LBS | RESPIRATION RATE: 18 BRPM | BODY MASS INDEX: 22.62 KG/M2 | HEART RATE: 85 BPM | SYSTOLIC BLOOD PRESSURE: 108 MMHG

## 2022-01-01 VITALS
RESPIRATION RATE: 16 BRPM | TEMPERATURE: 96.5 F | SYSTOLIC BLOOD PRESSURE: 94 MMHG | OXYGEN SATURATION: 99 % | BODY MASS INDEX: 23.29 KG/M2 | DIASTOLIC BLOOD PRESSURE: 57 MMHG | HEIGHT: 70 IN | HEART RATE: 63 BPM | WEIGHT: 162.7 LBS

## 2022-01-01 VITALS
SYSTOLIC BLOOD PRESSURE: 111 MMHG | DIASTOLIC BLOOD PRESSURE: 73 MMHG | HEART RATE: 75 BPM | OXYGEN SATURATION: 94 % | WEIGHT: 165 LBS | BODY MASS INDEX: 23.68 KG/M2 | TEMPERATURE: 97.9 F

## 2022-01-01 VITALS
BODY MASS INDEX: 22.63 KG/M2 | WEIGHT: 158.06 LBS | DIASTOLIC BLOOD PRESSURE: 88 MMHG | HEART RATE: 117 BPM | TEMPERATURE: 98.1 F | RESPIRATION RATE: 16 BRPM | SYSTOLIC BLOOD PRESSURE: 137 MMHG | HEIGHT: 70 IN | OXYGEN SATURATION: 96 %

## 2022-01-01 DIAGNOSIS — I25.10 CORONARY ARTERY DISEASE INVOLVING NATIVE CORONARY ARTERY OF NATIVE HEART WITHOUT ANGINA PECTORIS: ICD-10-CM

## 2022-01-01 DIAGNOSIS — J98.4 RESTRICTIVE LUNG DISEASE: ICD-10-CM

## 2022-01-01 DIAGNOSIS — I50.21 ACUTE SYSTOLIC CONGESTIVE HEART FAILURE (HCC): Primary | ICD-10-CM

## 2022-01-01 DIAGNOSIS — R06.02 SHORT OF BREATH ON EXERTION: ICD-10-CM

## 2022-01-01 DIAGNOSIS — I50.32 CHRONIC DIASTOLIC CONGESTIVE HEART FAILURE (HCC): ICD-10-CM

## 2022-01-01 DIAGNOSIS — J41.0 SIMPLE CHRONIC BRONCHITIS (HCC): ICD-10-CM

## 2022-01-01 DIAGNOSIS — Z20.822 SUSPECTED COVID-19 VIRUS INFECTION: Primary | ICD-10-CM

## 2022-01-01 DIAGNOSIS — I50.33 ACUTE ON CHRONIC DIASTOLIC CHF (CONGESTIVE HEART FAILURE) (HCC): Primary | ICD-10-CM

## 2022-01-01 DIAGNOSIS — N17.9 AKI (ACUTE KIDNEY INJURY) (HCC): ICD-10-CM

## 2022-01-01 DIAGNOSIS — I50.9 ACUTE ON CHRONIC HEART FAILURE, UNSPECIFIED HEART FAILURE TYPE (HCC): Primary | ICD-10-CM

## 2022-01-01 DIAGNOSIS — E87.1 HYPONATREMIA: ICD-10-CM

## 2022-01-01 DIAGNOSIS — I48.91 NEW ONSET A-FIB (HCC): Primary | ICD-10-CM

## 2022-01-01 DIAGNOSIS — N18.4 STAGE 4 CHRONIC KIDNEY DISEASE (HCC): ICD-10-CM

## 2022-01-01 DIAGNOSIS — R06.02 SOB (SHORTNESS OF BREATH): Primary | ICD-10-CM

## 2022-01-01 DIAGNOSIS — I48.0 PAROXYSMAL ATRIAL FIBRILLATION (HCC): ICD-10-CM

## 2022-01-01 DIAGNOSIS — M79.10 MYALGIA: ICD-10-CM

## 2022-01-01 DIAGNOSIS — D50.9 IRON DEFICIENCY ANEMIA, UNSPECIFIED IRON DEFICIENCY ANEMIA TYPE: ICD-10-CM

## 2022-01-01 DIAGNOSIS — R09.82 POST-NASAL DRAINAGE: ICD-10-CM

## 2022-01-01 DIAGNOSIS — N18.9 CHRONIC KIDNEY DISEASE, UNSPECIFIED CKD STAGE: ICD-10-CM

## 2022-01-01 DIAGNOSIS — I51.89 GRADE III DIASTOLIC DYSFUNCTION: ICD-10-CM

## 2022-01-01 DIAGNOSIS — I50.33 ACUTE ON CHRONIC DIASTOLIC CHF (CONGESTIVE HEART FAILURE) (HCC): ICD-10-CM

## 2022-01-01 DIAGNOSIS — K21.9 GASTROESOPHAGEAL REFLUX DISEASE, UNSPECIFIED WHETHER ESOPHAGITIS PRESENT: Primary | ICD-10-CM

## 2022-01-01 DIAGNOSIS — E55.9 VITAMIN D DEFICIENCY DISEASE: ICD-10-CM

## 2022-01-01 DIAGNOSIS — I50.32 CHRONIC DIASTOLIC CONGESTIVE HEART FAILURE (HCC): Primary | ICD-10-CM

## 2022-01-01 DIAGNOSIS — J90 LOCULATED PLEURAL EFFUSION: ICD-10-CM

## 2022-01-01 DIAGNOSIS — N17.9 ACUTE KIDNEY INJURY SUPERIMPOSED ON CKD (HCC): Primary | ICD-10-CM

## 2022-01-01 DIAGNOSIS — R09.81 NASAL CONGESTION: ICD-10-CM

## 2022-01-01 DIAGNOSIS — K90.9 MALABSORPTION OF IRON: Primary | ICD-10-CM

## 2022-01-01 DIAGNOSIS — I10 ESSENTIAL HYPERTENSION: ICD-10-CM

## 2022-01-01 DIAGNOSIS — I50.9 ACUTE ON CHRONIC HEART FAILURE, UNSPECIFIED HEART FAILURE TYPE (HCC): ICD-10-CM

## 2022-01-01 DIAGNOSIS — I48.0 PAROXYSMAL ATRIAL FIBRILLATION (HCC): Primary | ICD-10-CM

## 2022-01-01 DIAGNOSIS — N17.9 ACUTE KIDNEY INJURY SUPERIMPOSED ON CKD (HCC): ICD-10-CM

## 2022-01-01 DIAGNOSIS — N18.9 ACUTE KIDNEY INJURY SUPERIMPOSED ON CKD (HCC): ICD-10-CM

## 2022-01-01 DIAGNOSIS — E11.9 TYPE 2 DIABETES MELLITUS WITHOUT COMPLICATION, WITHOUT LONG-TERM CURRENT USE OF INSULIN (HCC): ICD-10-CM

## 2022-01-01 DIAGNOSIS — R06.01 ORTHOPNEA: ICD-10-CM

## 2022-01-01 DIAGNOSIS — K90.9 MALABSORPTION OF IRON: ICD-10-CM

## 2022-01-01 DIAGNOSIS — Z23 NEED FOR PNEUMOCOCCAL VACCINE: ICD-10-CM

## 2022-01-01 DIAGNOSIS — N18.9 ACUTE KIDNEY INJURY SUPERIMPOSED ON CKD (HCC): Primary | ICD-10-CM

## 2022-01-01 DIAGNOSIS — E06.3 HYPOTHYROIDISM, ACQUIRED, AUTOIMMUNE: ICD-10-CM

## 2022-01-01 DIAGNOSIS — J30.9 ALLERGIC RHINITIS, UNSPECIFIED SEASONALITY, UNSPECIFIED TRIGGER: ICD-10-CM

## 2022-01-01 DIAGNOSIS — Z09 HOSPITAL DISCHARGE FOLLOW-UP: Primary | ICD-10-CM

## 2022-01-01 DIAGNOSIS — R59.0 MEDIASTINAL ADENOPATHY: ICD-10-CM

## 2022-01-01 DIAGNOSIS — R09.82 POST-NASAL DRAINAGE: Primary | ICD-10-CM

## 2022-01-01 DIAGNOSIS — R06.02 SHORTNESS OF BREATH: ICD-10-CM

## 2022-01-01 DIAGNOSIS — R05.9 COUGH: ICD-10-CM

## 2022-01-01 DIAGNOSIS — J41.0 SIMPLE CHRONIC BRONCHITIS (HCC): Primary | ICD-10-CM

## 2022-01-01 DIAGNOSIS — I27.20 PULMONARY HTN (HCC): ICD-10-CM

## 2022-01-01 DIAGNOSIS — K80.20 CALCULUS OF GALLBLADDER WITHOUT CHOLECYSTITIS WITHOUT OBSTRUCTION: ICD-10-CM

## 2022-01-01 DIAGNOSIS — R77.8 ELEVATED TROPONIN: ICD-10-CM

## 2022-01-01 DIAGNOSIS — I10 ESSENTIAL HYPERTENSION: Primary | ICD-10-CM

## 2022-01-01 DIAGNOSIS — Z20.822 SUSPECTED COVID-19 VIRUS INFECTION: ICD-10-CM

## 2022-01-01 LAB
A/G RATIO: 1 (ref 1.1–2.2)
A/G RATIO: 1.1 (ref 1.1–2.2)
A/G RATIO: 1.3 (ref 1.1–2.2)
A/G RATIO: 1.4 (ref 1.1–2.2)
ALBUMIN SERPL-MCNC: 3.4 G/DL (ref 3.4–5)
ALBUMIN SERPL-MCNC: 3.7 G/DL (ref 3.4–5)
ALBUMIN SERPL-MCNC: 3.8 G/DL (ref 3.4–5)
ALBUMIN SERPL-MCNC: 4 G/DL (ref 3.4–5)
ALBUMIN SERPL-MCNC: 4.1 G/DL (ref 3.4–5)
ALBUMIN SERPL-MCNC: 4.1 G/DL (ref 3.4–5)
ALBUMIN SERPL-MCNC: 4.4 G/DL (ref 3.4–5)
ALBUMIN SERPL-MCNC: 4.4 G/DL (ref 3.4–5)
ALP BLD-CCNC: 154 U/L (ref 40–129)
ALP BLD-CCNC: 164 U/L (ref 40–129)
ALP BLD-CCNC: 181 U/L (ref 40–129)
ALP BLD-CCNC: 202 U/L (ref 40–129)
ALT SERPL-CCNC: 19 U/L (ref 10–40)
ALT SERPL-CCNC: 21 U/L (ref 10–40)
ALT SERPL-CCNC: 41 U/L (ref 10–40)
ALT SERPL-CCNC: 45 U/L (ref 10–40)
ANION GAP SERPL CALCULATED.3IONS-SCNC: 10 MMOL/L (ref 3–16)
ANION GAP SERPL CALCULATED.3IONS-SCNC: 10 MMOL/L (ref 3–16)
ANION GAP SERPL CALCULATED.3IONS-SCNC: 11 MMOL/L (ref 3–16)
ANION GAP SERPL CALCULATED.3IONS-SCNC: 12 MMOL/L (ref 3–16)
ANION GAP SERPL CALCULATED.3IONS-SCNC: 14 MMOL/L (ref 3–16)
ANION GAP SERPL CALCULATED.3IONS-SCNC: 14 MMOL/L (ref 3–16)
ANION GAP SERPL CALCULATED.3IONS-SCNC: 16 MMOL/L (ref 3–16)
ANION GAP SERPL CALCULATED.3IONS-SCNC: 17 MMOL/L (ref 3–16)
ANTI-XA UNFRAC HEPARIN: 0.05 IU/ML (ref 0.3–0.7)
APTT: 33.3 SEC (ref 26.2–38.6)
AST SERPL-CCNC: 18 U/L (ref 15–37)
AST SERPL-CCNC: 19 U/L (ref 15–37)
AST SERPL-CCNC: 29 U/L (ref 15–37)
AST SERPL-CCNC: 36 U/L (ref 15–37)
BASE EXCESS VENOUS: -1.9 MMOL/L (ref -3–3)
BASOPHILS ABSOLUTE: 0 K/UL (ref 0–0.2)
BASOPHILS ABSOLUTE: 0.1 K/UL (ref 0–0.2)
BASOPHILS ABSOLUTE: 0.1 K/UL (ref 0–0.2)
BASOPHILS RELATIVE PERCENT: 0.2 %
BASOPHILS RELATIVE PERCENT: 0.3 %
BASOPHILS RELATIVE PERCENT: 0.4 %
BASOPHILS RELATIVE PERCENT: 0.5 %
BASOPHILS RELATIVE PERCENT: 0.7 %
BILIRUB SERPL-MCNC: 0.3 MG/DL (ref 0–1)
BILIRUB SERPL-MCNC: 0.4 MG/DL (ref 0–1)
BILIRUBIN URINE: NEGATIVE
BILIRUBIN URINE: NEGATIVE
BLOOD, URINE: NEGATIVE
BLOOD, URINE: NEGATIVE
BUN BLDV-MCNC: 29 MG/DL (ref 7–20)
BUN BLDV-MCNC: 31 MG/DL (ref 7–20)
BUN BLDV-MCNC: 33 MG/DL (ref 7–20)
BUN BLDV-MCNC: 34 MG/DL (ref 7–20)
BUN BLDV-MCNC: 36 MG/DL (ref 7–20)
BUN BLDV-MCNC: 38 MG/DL (ref 7–20)
BUN BLDV-MCNC: 39 MG/DL (ref 7–20)
BUN BLDV-MCNC: 39 MG/DL (ref 7–20)
BUN BLDV-MCNC: 57 MG/DL (ref 7–20)
BUN BLDV-MCNC: 59 MG/DL (ref 7–20)
BUN BLDV-MCNC: 62 MG/DL (ref 7–20)
CALCIUM SERPL-MCNC: 8.7 MG/DL (ref 8.3–10.6)
CALCIUM SERPL-MCNC: 9.1 MG/DL (ref 8.3–10.6)
CALCIUM SERPL-MCNC: 9.1 MG/DL (ref 8.3–10.6)
CALCIUM SERPL-MCNC: 9.2 MG/DL (ref 8.3–10.6)
CALCIUM SERPL-MCNC: 9.3 MG/DL (ref 8.3–10.6)
CALCIUM SERPL-MCNC: 9.4 MG/DL (ref 8.3–10.6)
CALCIUM SERPL-MCNC: 9.5 MG/DL (ref 8.3–10.6)
CALCIUM SERPL-MCNC: 9.6 MG/DL (ref 8.3–10.6)
CALCIUM SERPL-MCNC: 9.7 MG/DL (ref 8.3–10.6)
CARBOXYHEMOGLOBIN: 3.3 % (ref 0–1.5)
CHLORIDE BLD-SCNC: 85 MMOL/L (ref 99–110)
CHLORIDE BLD-SCNC: 88 MMOL/L (ref 99–110)
CHLORIDE BLD-SCNC: 88 MMOL/L (ref 99–110)
CHLORIDE BLD-SCNC: 89 MMOL/L (ref 99–110)
CHLORIDE BLD-SCNC: 89 MMOL/L (ref 99–110)
CHLORIDE BLD-SCNC: 90 MMOL/L (ref 99–110)
CHLORIDE BLD-SCNC: 90 MMOL/L (ref 99–110)
CHLORIDE BLD-SCNC: 91 MMOL/L (ref 99–110)
CHLORIDE BLD-SCNC: 92 MMOL/L (ref 99–110)
CHLORIDE BLD-SCNC: 93 MMOL/L (ref 99–110)
CHLORIDE BLD-SCNC: 95 MMOL/L (ref 99–110)
CHLORIDE BLD-SCNC: 95 MMOL/L (ref 99–110)
CHLORIDE BLD-SCNC: 98 MMOL/L (ref 99–110)
CHOLESTEROL, TOTAL: 186 MG/DL (ref 0–199)
CLARITY: CLEAR
CLARITY: CLEAR
CO2: 23 MMOL/L (ref 21–32)
CO2: 25 MMOL/L (ref 21–32)
CO2: 26 MMOL/L (ref 21–32)
CO2: 27 MMOL/L (ref 21–32)
CO2: 28 MMOL/L (ref 21–32)
CO2: 28 MMOL/L (ref 21–32)
CO2: 29 MMOL/L (ref 21–32)
CO2: 30 MMOL/L (ref 21–32)
CO2: 30 MMOL/L (ref 21–32)
CO2: 31 MMOL/L (ref 21–32)
CO2: 31 MMOL/L (ref 21–32)
COLOR: YELLOW
COLOR: YELLOW
CREAT SERPL-MCNC: 1.2 MG/DL (ref 0.8–1.3)
CREAT SERPL-MCNC: 1.4 MG/DL (ref 0.8–1.3)
CREAT SERPL-MCNC: 1.5 MG/DL (ref 0.8–1.3)
CREAT SERPL-MCNC: 1.5 MG/DL (ref 0.8–1.3)
CREAT SERPL-MCNC: 1.6 MG/DL (ref 0.8–1.3)
CREAT SERPL-MCNC: 1.6 MG/DL (ref 0.8–1.3)
CREAT SERPL-MCNC: 1.9 MG/DL (ref 0.8–1.3)
CREAT SERPL-MCNC: 2.4 MG/DL (ref 0.8–1.3)
CREAT SERPL-MCNC: 2.7 MG/DL (ref 0.8–1.3)
CREAT SERPL-MCNC: 2.7 MG/DL (ref 0.8–1.3)
CREAT SERPL-MCNC: 2.9 MG/DL (ref 0.8–1.3)
CREATININE URINE: 18.5 MG/DL (ref 39–259)
CREATININE URINE: 49.1 MG/DL (ref 39–259)
D DIMER: 2261 NG/ML DDU (ref 0–229)
EKG ATRIAL RATE: 104 BPM
EKG ATRIAL RATE: 258 BPM
EKG ATRIAL RATE: 277 BPM
EKG ATRIAL RATE: 416 BPM
EKG ATRIAL RATE: 69 BPM
EKG ATRIAL RATE: 77 BPM
EKG DIAGNOSIS: NORMAL
EKG P AXIS: 68 DEGREES
EKG P-R INTERVAL: 196 MS
EKG Q-T INTERVAL: 368 MS
EKG Q-T INTERVAL: 374 MS
EKG Q-T INTERVAL: 380 MS
EKG Q-T INTERVAL: 386 MS
EKG Q-T INTERVAL: 402 MS
EKG Q-T INTERVAL: 410 MS
EKG QRS DURATION: 110 MS
EKG QRS DURATION: 112 MS
EKG QRS DURATION: 114 MS
EKG QRS DURATION: 118 MS
EKG QRS DURATION: 122 MS
EKG QRS DURATION: 122 MS
EKG QTC CALCULATION (BAZETT): 417 MS
EKG QTC CALCULATION (BAZETT): 424 MS
EKG QTC CALCULATION (BAZETT): 424 MS
EKG QTC CALCULATION (BAZETT): 430 MS
EKG QTC CALCULATION (BAZETT): 433 MS
EKG QTC CALCULATION (BAZETT): 442 MS
EKG R AXIS: -19 DEGREES
EKG R AXIS: -26 DEGREES
EKG R AXIS: -26 DEGREES
EKG R AXIS: -37 DEGREES
EKG R AXIS: -9 DEGREES
EKG R AXIS: 113 DEGREES
EKG T AXIS: -41 DEGREES
EKG T AXIS: 122 DEGREES
EKG T AXIS: 140 DEGREES
EKG T AXIS: 177 DEGREES
EKG T AXIS: 80 DEGREES
EKG T AXIS: 91 DEGREES
EKG VENTRICULAR RATE: 67 BPM
EKG VENTRICULAR RATE: 69 BPM
EKG VENTRICULAR RATE: 75 BPM
EKG VENTRICULAR RATE: 75 BPM
EKG VENTRICULAR RATE: 79 BPM
EKG VENTRICULAR RATE: 80 BPM
EOSINOPHILS ABSOLUTE: 0 K/UL (ref 0–0.6)
EOSINOPHILS ABSOLUTE: 0 K/UL (ref 0–0.6)
EOSINOPHILS ABSOLUTE: 0.1 K/UL (ref 0–0.6)
EOSINOPHILS ABSOLUTE: 0.2 K/UL (ref 0–0.6)
EOSINOPHILS RELATIVE PERCENT: 0 %
EOSINOPHILS RELATIVE PERCENT: 0.3 %
EOSINOPHILS RELATIVE PERCENT: 1.3 %
EOSINOPHILS RELATIVE PERCENT: 1.5 %
EOSINOPHILS RELATIVE PERCENT: 2.4 %
ESTIMATED AVERAGE GLUCOSE: 128.4 MG/DL
ESTIMATED AVERAGE GLUCOSE: 139.9 MG/DL
ESTIMATED AVERAGE GLUCOSE: 145.6 MG/DL
FERRITIN: 122 NG/ML (ref 30–400)
GFR AFRICAN AMERICAN: 25
GFR AFRICAN AMERICAN: 28
GFR AFRICAN AMERICAN: 28
GFR AFRICAN AMERICAN: 32
GFR AFRICAN AMERICAN: 41
GFR AFRICAN AMERICAN: 50
GFR AFRICAN AMERICAN: 50
GFR AFRICAN AMERICAN: 54
GFR AFRICAN AMERICAN: 54
GFR AFRICAN AMERICAN: 59
GFR AFRICAN AMERICAN: >60
GFR NON-AFRICAN AMERICAN: 21
GFR NON-AFRICAN AMERICAN: 23
GFR NON-AFRICAN AMERICAN: 23
GFR NON-AFRICAN AMERICAN: 26
GFR NON-AFRICAN AMERICAN: 34
GFR NON-AFRICAN AMERICAN: 42
GFR NON-AFRICAN AMERICAN: 42
GFR NON-AFRICAN AMERICAN: 45
GFR NON-AFRICAN AMERICAN: 45
GFR NON-AFRICAN AMERICAN: 49
GFR NON-AFRICAN AMERICAN: 58
GLUCOSE BLD-MCNC: 106 MG/DL (ref 70–99)
GLUCOSE BLD-MCNC: 107 MG/DL (ref 70–99)
GLUCOSE BLD-MCNC: 108 MG/DL (ref 70–99)
GLUCOSE BLD-MCNC: 109 MG/DL (ref 70–99)
GLUCOSE BLD-MCNC: 110 MG/DL (ref 70–99)
GLUCOSE BLD-MCNC: 110 MG/DL (ref 70–99)
GLUCOSE BLD-MCNC: 112 MG/DL (ref 70–99)
GLUCOSE BLD-MCNC: 112 MG/DL (ref 70–99)
GLUCOSE BLD-MCNC: 113 MG/DL (ref 70–99)
GLUCOSE BLD-MCNC: 113 MG/DL (ref 70–99)
GLUCOSE BLD-MCNC: 115 MG/DL (ref 70–99)
GLUCOSE BLD-MCNC: 115 MG/DL (ref 70–99)
GLUCOSE BLD-MCNC: 116 MG/DL (ref 70–99)
GLUCOSE BLD-MCNC: 117 MG/DL (ref 70–99)
GLUCOSE BLD-MCNC: 120 MG/DL (ref 70–99)
GLUCOSE BLD-MCNC: 121 MG/DL (ref 70–99)
GLUCOSE BLD-MCNC: 123 MG/DL (ref 70–99)
GLUCOSE BLD-MCNC: 124 MG/DL (ref 70–99)
GLUCOSE BLD-MCNC: 124 MG/DL (ref 70–99)
GLUCOSE BLD-MCNC: 135 MG/DL (ref 70–99)
GLUCOSE BLD-MCNC: 137 MG/DL (ref 70–99)
GLUCOSE BLD-MCNC: 151 MG/DL (ref 70–99)
GLUCOSE BLD-MCNC: 157 MG/DL (ref 70–99)
GLUCOSE BLD-MCNC: 162 MG/DL (ref 70–99)
GLUCOSE BLD-MCNC: 164 MG/DL (ref 70–99)
GLUCOSE BLD-MCNC: 170 MG/DL (ref 70–99)
GLUCOSE BLD-MCNC: 198 MG/DL (ref 70–99)
GLUCOSE BLD-MCNC: 239 MG/DL (ref 70–99)
GLUCOSE BLD-MCNC: 90 MG/DL (ref 70–99)
GLUCOSE BLD-MCNC: 92 MG/DL (ref 70–99)
GLUCOSE BLD-MCNC: 99 MG/DL (ref 70–99)
GLUCOSE URINE: NEGATIVE MG/DL
GLUCOSE URINE: NEGATIVE MG/DL
HBA1C MFR BLD: 6.1 %
HBA1C MFR BLD: 6.5 %
HBA1C MFR BLD: 6.7 %
HCO3 VENOUS: 24.2 MMOL/L (ref 23–29)
HCT VFR BLD CALC: 29 % (ref 40.5–52.5)
HCT VFR BLD CALC: 29.2 % (ref 40.5–52.5)
HCT VFR BLD CALC: 29.4 % (ref 40.5–52.5)
HCT VFR BLD CALC: 29.7 % (ref 40.5–52.5)
HCT VFR BLD CALC: 30.1 % (ref 40.5–52.5)
HCT VFR BLD CALC: 30.4 % (ref 40.5–52.5)
HCT VFR BLD CALC: 31.6 % (ref 40.5–52.5)
HCT VFR BLD CALC: 34.6 % (ref 40.5–52.5)
HCT VFR BLD CALC: 35.7 % (ref 40.5–52.5)
HDLC SERPL-MCNC: 57 MG/DL (ref 40–60)
HEMOGLOBIN: 10 G/DL (ref 13.5–17.5)
HEMOGLOBIN: 10.1 G/DL (ref 13.5–17.5)
HEMOGLOBIN: 10.5 G/DL (ref 13.5–17.5)
HEMOGLOBIN: 11.5 G/DL (ref 13.5–17.5)
HEMOGLOBIN: 11.9 G/DL (ref 13.5–17.5)
HEMOGLOBIN: 9.6 G/DL (ref 13.5–17.5)
HEMOGLOBIN: 9.7 G/DL (ref 13.5–17.5)
HEMOGLOBIN: 9.8 G/DL (ref 13.5–17.5)
HEMOGLOBIN: 9.9 G/DL (ref 13.5–17.5)
INFLUENZA A: NOT DETECTED
INFLUENZA A: NOT DETECTED
INFLUENZA B: NOT DETECTED
INFLUENZA B: NOT DETECTED
INR BLD: 1.08 (ref 0.88–1.12)
INR BLD: 1.19 (ref 0.88–1.12)
IRON SATURATION: 14 % (ref 20–50)
IRON SATURATION: 15 % (ref 20–50)
IRON: 39 UG/DL (ref 59–158)
IRON: 39 UG/DL (ref 59–158)
KETONES, URINE: NEGATIVE MG/DL
KETONES, URINE: NEGATIVE MG/DL
LACTIC ACID: 1.1 MMOL/L (ref 0.4–2)
LACTIC ACID: 1.2 MMOL/L (ref 0.4–2)
LACTIC ACID: 3.1 MMOL/L (ref 0.4–2)
LDL CHOLESTEROL CALCULATED: 114 MG/DL
LEUKOCYTE ESTERASE, URINE: NEGATIVE
LEUKOCYTE ESTERASE, URINE: NEGATIVE
LV EF: 55 %
LVEF MODALITY: NORMAL
LYMPHOCYTES ABSOLUTE: 0.3 K/UL (ref 1–5.1)
LYMPHOCYTES ABSOLUTE: 0.4 K/UL (ref 1–5.1)
LYMPHOCYTES ABSOLUTE: 0.5 K/UL (ref 1–5.1)
LYMPHOCYTES ABSOLUTE: 0.5 K/UL (ref 1–5.1)
LYMPHOCYTES RELATIVE PERCENT: 2.5 %
LYMPHOCYTES RELATIVE PERCENT: 3.7 %
LYMPHOCYTES RELATIVE PERCENT: 4.1 %
LYMPHOCYTES RELATIVE PERCENT: 4.2 %
LYMPHOCYTES RELATIVE PERCENT: 4.3 %
LYMPHOCYTES RELATIVE PERCENT: 5.4 %
LYMPHOCYTES RELATIVE PERCENT: 5.7 %
MAGNESIUM: 2 MG/DL (ref 1.8–2.4)
MAGNESIUM: 2 MG/DL (ref 1.8–2.4)
MAGNESIUM: 2.2 MG/DL (ref 1.8–2.4)
MAGNESIUM: 2.8 MG/DL (ref 1.8–2.4)
MCH RBC QN AUTO: 28.1 PG (ref 26–34)
MCH RBC QN AUTO: 28.6 PG (ref 26–34)
MCH RBC QN AUTO: 28.6 PG (ref 26–34)
MCH RBC QN AUTO: 28.7 PG (ref 26–34)
MCH RBC QN AUTO: 28.8 PG (ref 26–34)
MCH RBC QN AUTO: 29 PG (ref 26–34)
MCH RBC QN AUTO: 29.4 PG (ref 26–34)
MCHC RBC AUTO-ENTMCNC: 32.7 G/DL (ref 31–36)
MCHC RBC AUTO-ENTMCNC: 33 G/DL (ref 31–36)
MCHC RBC AUTO-ENTMCNC: 33.1 G/DL (ref 31–36)
MCHC RBC AUTO-ENTMCNC: 33.2 G/DL (ref 31–36)
MCHC RBC AUTO-ENTMCNC: 33.2 G/DL (ref 31–36)
MCHC RBC AUTO-ENTMCNC: 33.3 G/DL (ref 31–36)
MCHC RBC AUTO-ENTMCNC: 33.3 G/DL (ref 31–36)
MCHC RBC AUTO-ENTMCNC: 33.4 G/DL (ref 31–36)
MCHC RBC AUTO-ENTMCNC: 33.9 G/DL (ref 31–36)
MCV RBC AUTO: 85.5 FL (ref 80–100)
MCV RBC AUTO: 86 FL (ref 80–100)
MCV RBC AUTO: 86.3 FL (ref 80–100)
MCV RBC AUTO: 86.4 FL (ref 80–100)
MCV RBC AUTO: 86.5 FL (ref 80–100)
MCV RBC AUTO: 86.5 FL (ref 80–100)
MCV RBC AUTO: 86.7 FL (ref 80–100)
MCV RBC AUTO: 87 FL (ref 80–100)
MCV RBC AUTO: 87.3 FL (ref 80–100)
METHEMOGLOBIN VENOUS: 0.3 %
MICROALBUMIN UR-MCNC: <1.2 MG/DL
MICROALBUMIN/CREAT UR-RTO: ABNORMAL MG/G (ref 0–30)
MICROSCOPIC EXAMINATION: NORMAL
MICROSCOPIC EXAMINATION: NORMAL
MONOCYTES ABSOLUTE: 0.4 K/UL (ref 0–1.3)
MONOCYTES ABSOLUTE: 0.5 K/UL (ref 0–1.3)
MONOCYTES ABSOLUTE: 0.6 K/UL (ref 0–1.3)
MONOCYTES ABSOLUTE: 0.6 K/UL (ref 0–1.3)
MONOCYTES ABSOLUTE: 0.8 K/UL (ref 0–1.3)
MONOCYTES RELATIVE PERCENT: 3.7 %
MONOCYTES RELATIVE PERCENT: 4.8 %
MONOCYTES RELATIVE PERCENT: 5.8 %
MONOCYTES RELATIVE PERCENT: 6.5 %
MONOCYTES RELATIVE PERCENT: 6.6 %
MONOCYTES RELATIVE PERCENT: 7 %
MONOCYTES RELATIVE PERCENT: 7.6 %
NEUTROPHILS ABSOLUTE: 11 K/UL (ref 1.7–7.7)
NEUTROPHILS ABSOLUTE: 6 K/UL (ref 1.7–7.7)
NEUTROPHILS ABSOLUTE: 6.2 K/UL (ref 1.7–7.7)
NEUTROPHILS ABSOLUTE: 6.6 K/UL (ref 1.7–7.7)
NEUTROPHILS ABSOLUTE: 7 K/UL (ref 1.7–7.7)
NEUTROPHILS ABSOLUTE: 7.8 K/UL (ref 1.7–7.7)
NEUTROPHILS ABSOLUTE: 9.7 K/UL (ref 1.7–7.7)
NEUTROPHILS RELATIVE PERCENT: 85.7 %
NEUTROPHILS RELATIVE PERCENT: 86.2 %
NEUTROPHILS RELATIVE PERCENT: 86.3 %
NEUTROPHILS RELATIVE PERCENT: 87.4 %
NEUTROPHILS RELATIVE PERCENT: 88.3 %
NEUTROPHILS RELATIVE PERCENT: 89.8 %
NEUTROPHILS RELATIVE PERCENT: 93.4 %
NITRITE, URINE: NEGATIVE
NITRITE, URINE: NEGATIVE
O2 CONTENT, VEN: 11 VOL %
O2 SAT, VEN: 74 %
O2 THERAPY: ABNORMAL
OCCULT BLOOD DIAGNOSTIC: ABNORMAL
OSMOLALITY URINE: 241 MOSM/KG (ref 390–1070)
OSMOLALITY: 287 MOSM/KG (ref 280–301)
PARATHYROID HORMONE INTACT: 62.2 PG/ML (ref 14–72)
PCO2, VEN: 47 MMHG (ref 40–50)
PDW BLD-RTO: 14.9 % (ref 12.4–15.4)
PDW BLD-RTO: 15.4 % (ref 12.4–15.4)
PDW BLD-RTO: 15.5 % (ref 12.4–15.4)
PDW BLD-RTO: 15.5 % (ref 12.4–15.4)
PDW BLD-RTO: 15.6 % (ref 12.4–15.4)
PDW BLD-RTO: 16.6 % (ref 12.4–15.4)
PDW BLD-RTO: 16.7 % (ref 12.4–15.4)
PDW BLD-RTO: 16.9 % (ref 12.4–15.4)
PDW BLD-RTO: 16.9 % (ref 12.4–15.4)
PERFORMED ON: ABNORMAL
PERFORMED ON: NORMAL
PH UA: 5.5 (ref 5–8)
PH UA: 6.5 (ref 5–8)
PH VENOUS: 7.33 (ref 7.35–7.45)
PHOSPHORUS: 3.1 MG/DL (ref 2.5–4.9)
PHOSPHORUS: 3.3 MG/DL (ref 2.5–4.9)
PHOSPHORUS: 3.4 MG/DL (ref 2.5–4.9)
PHOSPHORUS: 3.7 MG/DL (ref 2.5–4.9)
PLATELET # BLD: 228 K/UL (ref 135–450)
PLATELET # BLD: 235 K/UL (ref 135–450)
PLATELET # BLD: 238 K/UL (ref 135–450)
PLATELET # BLD: 301 K/UL (ref 135–450)
PLATELET # BLD: 303 K/UL (ref 135–450)
PLATELET # BLD: 311 K/UL (ref 135–450)
PLATELET # BLD: 321 K/UL (ref 135–450)
PLATELET # BLD: 326 K/UL (ref 135–450)
PLATELET # BLD: 368 K/UL (ref 135–450)
PMV BLD AUTO: 7.7 FL (ref 5–10.5)
PMV BLD AUTO: 7.9 FL (ref 5–10.5)
PMV BLD AUTO: 8 FL (ref 5–10.5)
PMV BLD AUTO: 8.2 FL (ref 5–10.5)
PMV BLD AUTO: 8.2 FL (ref 5–10.5)
PMV BLD AUTO: 8.4 FL (ref 5–10.5)
PMV BLD AUTO: 8.6 FL (ref 5–10.5)
PO2, VEN: 41.8 MMHG (ref 25–40)
POTASSIUM REFLEX MAGNESIUM: 4.6 MMOL/L (ref 3.5–5.1)
POTASSIUM REFLEX MAGNESIUM: 4.7 MMOL/L (ref 3.5–5.1)
POTASSIUM REFLEX MAGNESIUM: 5 MMOL/L (ref 3.5–5.1)
POTASSIUM SERPL-SCNC: 3.7 MMOL/L (ref 3.5–5.1)
POTASSIUM SERPL-SCNC: 4 MMOL/L (ref 3.5–5.1)
POTASSIUM SERPL-SCNC: 4.1 MMOL/L (ref 3.5–5.1)
POTASSIUM SERPL-SCNC: 4.1 MMOL/L (ref 3.5–5.1)
POTASSIUM SERPL-SCNC: 4.2 MMOL/L (ref 3.5–5.1)
POTASSIUM SERPL-SCNC: 4.3 MMOL/L (ref 3.5–5.1)
POTASSIUM SERPL-SCNC: 4.4 MMOL/L (ref 3.5–5.1)
POTASSIUM SERPL-SCNC: 4.6 MMOL/L (ref 3.5–5.1)
POTASSIUM SERPL-SCNC: 4.7 MMOL/L (ref 3.5–5.1)
POTASSIUM SERPL-SCNC: 4.8 MMOL/L (ref 3.5–5.1)
PRO-BNP: 4792 PG/ML (ref 0–449)
PRO-BNP: 5346 PG/ML (ref 0–449)
PRO-BNP: 6200 PG/ML (ref 0–449)
PRO-BNP: 6581 PG/ML (ref 0–449)
PRO-BNP: 9250 PG/ML (ref 0–449)
PROCALCITONIN: 0.06 NG/ML (ref 0–0.15)
PROTEIN PROTEIN: 5 MG/DL
PROTEIN UA: NEGATIVE MG/DL
PROTEIN UA: NEGATIVE MG/DL
PROTEIN/CREAT RATIO: 0.1 MG/DL
PROTHROMBIN TIME: 12.3 SEC (ref 9.9–12.7)
PROTHROMBIN TIME: 13.5 SEC (ref 9.9–12.7)
RBC # BLD: 3.36 M/UL (ref 4.2–5.9)
RBC # BLD: 3.36 M/UL (ref 4.2–5.9)
RBC # BLD: 3.43 M/UL (ref 4.2–5.9)
RBC # BLD: 3.46 M/UL (ref 4.2–5.9)
RBC # BLD: 3.49 M/UL (ref 4.2–5.9)
RBC # BLD: 3.51 M/UL (ref 4.2–5.9)
RBC # BLD: 3.64 M/UL (ref 4.2–5.9)
RBC # BLD: 3.96 M/UL (ref 4.2–5.9)
RBC # BLD: 4.13 M/UL (ref 4.2–5.9)
SARS-COV-2 RNA, RT PCR: NOT DETECTED
SARS-COV-2 RNA, RT PCR: NOT DETECTED
SARS-COV-2, NAAT: NOT DETECTED
SARS-COV-2: NEGATIVE
SODIUM BLD-SCNC: 124 MMOL/L (ref 136–145)
SODIUM BLD-SCNC: 125 MMOL/L (ref 136–145)
SODIUM BLD-SCNC: 127 MMOL/L (ref 136–145)
SODIUM BLD-SCNC: 128 MMOL/L (ref 136–145)
SODIUM BLD-SCNC: 129 MMOL/L (ref 136–145)
SODIUM BLD-SCNC: 130 MMOL/L (ref 136–145)
SODIUM BLD-SCNC: 132 MMOL/L (ref 136–145)
SODIUM BLD-SCNC: 133 MMOL/L (ref 136–145)
SODIUM BLD-SCNC: 133 MMOL/L (ref 136–145)
SODIUM BLD-SCNC: 135 MMOL/L (ref 136–145)
SODIUM BLD-SCNC: 136 MMOL/L (ref 136–145)
SODIUM BLD-SCNC: 136 MMOL/L (ref 136–145)
SODIUM BLD-SCNC: 139 MMOL/L (ref 136–145)
SPECIFIC GRAVITY UA: 1.01 (ref 1–1.03)
SPECIFIC GRAVITY UA: <=1.005 (ref 1–1.03)
T4 FREE: 1.6 NG/DL (ref 0.9–1.8)
T4 FREE: 1.8 NG/DL (ref 0.9–1.8)
TCO2 CALC VENOUS: 26 MMOL/L
TOTAL IRON BINDING CAPACITY: 253 UG/DL (ref 260–445)
TOTAL IRON BINDING CAPACITY: 279 UG/DL (ref 260–445)
TOTAL PROTEIN: 7.6 G/DL (ref 6.4–8.2)
TOTAL PROTEIN: 7.7 G/DL (ref 6.4–8.2)
TOTAL PROTEIN: 7.8 G/DL (ref 6.4–8.2)
TOTAL PROTEIN: 7.8 G/DL (ref 6.4–8.2)
TRIGL SERPL-MCNC: 76 MG/DL (ref 0–150)
TROPONIN: 0.01 NG/ML
TROPONIN: 0.01 NG/ML
TROPONIN: 0.02 NG/ML
TROPONIN: <0.01 NG/ML
TSH REFLEX: 2.16 UIU/ML (ref 0.27–4.2)
TSH REFLEX: 4.15 UIU/ML (ref 0.27–4.2)
TSH REFLEX: 4.37 UIU/ML (ref 0.27–4.2)
TSH REFLEX: 5.65 UIU/ML (ref 0.27–4.2)
URINE REFLEX TO CULTURE: NORMAL
URINE TYPE: NORMAL
URINE TYPE: NORMAL
UROBILINOGEN, URINE: 0.2 E.U./DL
UROBILINOGEN, URINE: 0.2 E.U./DL
VITAMIN D 25-HYDROXY: 54 NG/ML
VLDLC SERPL CALC-MCNC: 15 MG/DL
WBC # BLD: 11 K/UL (ref 4–11)
WBC # BLD: 11.8 K/UL (ref 4–11)
WBC # BLD: 6.9 K/UL (ref 4–11)
WBC # BLD: 7.2 K/UL (ref 4–11)
WBC # BLD: 7.6 K/UL (ref 4–11)
WBC # BLD: 7.7 K/UL (ref 4–11)
WBC # BLD: 7.8 K/UL (ref 4–11)
WBC # BLD: 8.6 K/UL (ref 4–11)
WBC # BLD: 9.1 K/UL (ref 4–11)

## 2022-01-01 PROCEDURE — 2580000003 HC RX 258: Performed by: PHYSICIAN ASSISTANT

## 2022-01-01 PROCEDURE — 99214 OFFICE O/P EST MOD 30 MIN: CPT | Performed by: NURSE PRACTITIONER

## 2022-01-01 PROCEDURE — 99233 SBSQ HOSP IP/OBS HIGH 50: CPT | Performed by: INTERNAL MEDICINE

## 2022-01-01 PROCEDURE — 83540 ASSAY OF IRON: CPT

## 2022-01-01 PROCEDURE — 36415 COLL VENOUS BLD VENIPUNCTURE: CPT

## 2022-01-01 PROCEDURE — 80069 RENAL FUNCTION PANEL: CPT

## 2022-01-01 PROCEDURE — 6360000002 HC RX W HCPCS: Performed by: PHYSICIAN ASSISTANT

## 2022-01-01 PROCEDURE — 99214 OFFICE O/P EST MOD 30 MIN: CPT | Performed by: OTOLARYNGOLOGY

## 2022-01-01 PROCEDURE — G0239 OTH RESP PROC, GROUP: HCPCS

## 2022-01-01 PROCEDURE — 99222 1ST HOSP IP/OBS MODERATE 55: CPT | Performed by: INTERNAL MEDICINE

## 2022-01-01 PROCEDURE — G8427 DOCREV CUR MEDS BY ELIG CLIN: HCPCS | Performed by: NURSE PRACTITIONER

## 2022-01-01 PROCEDURE — 3700000001 HC ADD 15 MINUTES (ANESTHESIA): Performed by: INTERNAL MEDICINE

## 2022-01-01 PROCEDURE — 94640 AIRWAY INHALATION TREATMENT: CPT

## 2022-01-01 PROCEDURE — 93010 ELECTROCARDIOGRAM REPORT: CPT | Performed by: INTERNAL MEDICINE

## 2022-01-01 PROCEDURE — 85610 PROTHROMBIN TIME: CPT

## 2022-01-01 PROCEDURE — 99495 TRANSJ CARE MGMT MOD F2F 14D: CPT | Performed by: NURSE PRACTITIONER

## 2022-01-01 PROCEDURE — 82728 ASSAY OF FERRITIN: CPT

## 2022-01-01 PROCEDURE — 6370000000 HC RX 637 (ALT 250 FOR IP): Performed by: INTERNAL MEDICINE

## 2022-01-01 PROCEDURE — 3700000000 HC ANESTHESIA ATTENDED CARE: Performed by: INTERNAL MEDICINE

## 2022-01-01 PROCEDURE — 85379 FIBRIN DEGRADATION QUANT: CPT

## 2022-01-01 PROCEDURE — 85025 COMPLETE CBC W/AUTO DIFF WBC: CPT

## 2022-01-01 PROCEDURE — 6360000002 HC RX W HCPCS: Performed by: NURSE ANESTHETIST, CERTIFIED REGISTERED

## 2022-01-01 PROCEDURE — 93228 REMOTE 30 DAY ECG REV/REPORT: CPT | Performed by: INTERNAL MEDICINE

## 2022-01-01 PROCEDURE — 84484 ASSAY OF TROPONIN QUANT: CPT

## 2022-01-01 PROCEDURE — 93005 ELECTROCARDIOGRAM TRACING: CPT | Performed by: NURSE PRACTITIONER

## 2022-01-01 PROCEDURE — 80053 COMPREHEN METABOLIC PANEL: CPT

## 2022-01-01 PROCEDURE — 83036 HEMOGLOBIN GLYCOSYLATED A1C: CPT

## 2022-01-01 PROCEDURE — 99222 1ST HOSP IP/OBS MODERATE 55: CPT | Performed by: NURSE PRACTITIONER

## 2022-01-01 PROCEDURE — 6370000000 HC RX 637 (ALT 250 FOR IP): Performed by: NURSE PRACTITIONER

## 2022-01-01 PROCEDURE — 96374 THER/PROPH/DIAG INJ IV PUSH: CPT

## 2022-01-01 PROCEDURE — 81003 URINALYSIS AUTO W/O SCOPE: CPT

## 2022-01-01 PROCEDURE — 83970 ASSAY OF PARATHORMONE: CPT

## 2022-01-01 PROCEDURE — 99214 OFFICE O/P EST MOD 30 MIN: CPT | Performed by: INTERNAL MEDICINE

## 2022-01-01 PROCEDURE — 2580000003 HC RX 258: Performed by: INTERNAL MEDICINE

## 2022-01-01 PROCEDURE — 94761 N-INVAS EAR/PLS OXIMETRY MLT: CPT

## 2022-01-01 PROCEDURE — G8427 DOCREV CUR MEDS BY ELIG CLIN: HCPCS | Performed by: INTERNAL MEDICINE

## 2022-01-01 PROCEDURE — 96365 THER/PROPH/DIAG IV INF INIT: CPT

## 2022-01-01 PROCEDURE — 93005 ELECTROCARDIOGRAM TRACING: CPT | Performed by: PHYSICIAN ASSISTANT

## 2022-01-01 PROCEDURE — 1111F DSCHRG MED/CURRENT MED MERGE: CPT | Performed by: NURSE PRACTITIONER

## 2022-01-01 PROCEDURE — 2580000003 HC RX 258: Performed by: NURSE PRACTITIONER

## 2022-01-01 PROCEDURE — 6360000002 HC RX W HCPCS: Performed by: NURSE PRACTITIONER

## 2022-01-01 PROCEDURE — 93005 ELECTROCARDIOGRAM TRACING: CPT | Performed by: STUDENT IN AN ORGANIZED HEALTH CARE EDUCATION/TRAINING PROGRAM

## 2022-01-01 PROCEDURE — G8483 FLU IMM NO ADMIN DOC REA: HCPCS | Performed by: INTERNAL MEDICINE

## 2022-01-01 PROCEDURE — 3609012400 HC EGD TRANSORAL BIOPSY SINGLE/MULTIPLE: Performed by: INTERNAL MEDICINE

## 2022-01-01 PROCEDURE — 83550 IRON BINDING TEST: CPT

## 2022-01-01 PROCEDURE — 83880 ASSAY OF NATRIURETIC PEPTIDE: CPT

## 2022-01-01 PROCEDURE — 85027 COMPLETE CBC AUTOMATED: CPT

## 2022-01-01 PROCEDURE — 82306 VITAMIN D 25 HYDROXY: CPT

## 2022-01-01 PROCEDURE — 84439 ASSAY OF FREE THYROXINE: CPT

## 2022-01-01 PROCEDURE — G0328 FECAL BLOOD SCRN IMMUNOASSAY: HCPCS

## 2022-01-01 PROCEDURE — 2060000000 HC ICU INTERMEDIATE R&B

## 2022-01-01 PROCEDURE — 71046 X-RAY EXAM CHEST 2 VIEWS: CPT

## 2022-01-01 PROCEDURE — 7100000011 HC PHASE II RECOVERY - ADDTL 15 MIN: Performed by: INTERNAL MEDICINE

## 2022-01-01 PROCEDURE — 1123F ACP DISCUSS/DSCN MKR DOCD: CPT | Performed by: OTOLARYNGOLOGY

## 2022-01-01 PROCEDURE — 97162 PT EVAL MOD COMPLEX 30 MIN: CPT

## 2022-01-01 PROCEDURE — 99239 HOSP IP/OBS DSCHRG MGMT >30: CPT | Performed by: INTERNAL MEDICINE

## 2022-01-01 PROCEDURE — 87636 SARSCOV2 & INF A&B AMP PRB: CPT

## 2022-01-01 PROCEDURE — G0378 HOSPITAL OBSERVATION PER HR: HCPCS

## 2022-01-01 PROCEDURE — G8420 CALC BMI NORM PARAMETERS: HCPCS | Performed by: NURSE PRACTITIONER

## 2022-01-01 PROCEDURE — 4040F PNEUMOC VAC/ADMIN/RCVD: CPT | Performed by: INTERNAL MEDICINE

## 2022-01-01 PROCEDURE — 71260 CT THORAX DX C+: CPT

## 2022-01-01 PROCEDURE — 80048 BASIC METABOLIC PNL TOTAL CA: CPT

## 2022-01-01 PROCEDURE — 4040F PNEUMOC VAC/ADMIN/RCVD: CPT | Performed by: NURSE PRACTITIONER

## 2022-01-01 PROCEDURE — 1123F ACP DISCUSS/DSCN MKR DOCD: CPT | Performed by: NURSE PRACTITIONER

## 2022-01-01 PROCEDURE — G8483 FLU IMM NO ADMIN DOC REA: HCPCS | Performed by: OTOLARYNGOLOGY

## 2022-01-01 PROCEDURE — 96366 THER/PROPH/DIAG IV INF ADDON: CPT

## 2022-01-01 PROCEDURE — 2709999900 HC NON-CHARGEABLE SUPPLY: Performed by: INTERNAL MEDICINE

## 2022-01-01 PROCEDURE — 6360000004 HC RX CONTRAST MEDICATION: Performed by: PHYSICIAN ASSISTANT

## 2022-01-01 PROCEDURE — 93005 ELECTROCARDIOGRAM TRACING: CPT | Performed by: INTERNAL MEDICINE

## 2022-01-01 PROCEDURE — 6360000002 HC RX W HCPCS: Performed by: INTERNAL MEDICINE

## 2022-01-01 PROCEDURE — 36415 COLL VENOUS BLD VENIPUNCTURE: CPT | Performed by: NURSE PRACTITIONER

## 2022-01-01 PROCEDURE — 99233 SBSQ HOSP IP/OBS HIGH 50: CPT | Performed by: NURSE PRACTITIONER

## 2022-01-01 PROCEDURE — 1036F TOBACCO NON-USER: CPT | Performed by: OTOLARYNGOLOGY

## 2022-01-01 PROCEDURE — 1200000000 HC SEMI PRIVATE

## 2022-01-01 PROCEDURE — 84443 ASSAY THYROID STIM HORMONE: CPT

## 2022-01-01 PROCEDURE — 83605 ASSAY OF LACTIC ACID: CPT

## 2022-01-01 PROCEDURE — 74177 CT ABD & PELVIS W/CONTRAST: CPT

## 2022-01-01 PROCEDURE — 85730 THROMBOPLASTIN TIME PARTIAL: CPT

## 2022-01-01 PROCEDURE — 88305 TISSUE EXAM BY PATHOLOGIST: CPT

## 2022-01-01 PROCEDURE — 4040F PNEUMOC VAC/ADMIN/RCVD: CPT | Performed by: OTOLARYNGOLOGY

## 2022-01-01 PROCEDURE — 87635 SARS-COV-2 COVID-19 AMP PRB: CPT

## 2022-01-01 PROCEDURE — G8427 DOCREV CUR MEDS BY ELIG CLIN: HCPCS | Performed by: OTOLARYNGOLOGY

## 2022-01-01 PROCEDURE — 82043 UR ALBUMIN QUANTITATIVE: CPT

## 2022-01-01 PROCEDURE — 99232 SBSQ HOSP IP/OBS MODERATE 35: CPT | Performed by: INTERNAL MEDICINE

## 2022-01-01 PROCEDURE — 82803 BLOOD GASES ANY COMBINATION: CPT

## 2022-01-01 PROCEDURE — 3023F SPIROM DOC REV: CPT | Performed by: INTERNAL MEDICINE

## 2022-01-01 PROCEDURE — 80061 LIPID PANEL: CPT

## 2022-01-01 PROCEDURE — 1111F DSCHRG MED/CURRENT MED MERGE: CPT | Performed by: OTOLARYNGOLOGY

## 2022-01-01 PROCEDURE — 96376 TX/PRO/DX INJ SAME DRUG ADON: CPT

## 2022-01-01 PROCEDURE — 3288F FALL RISK ASSESSMENT DOCD: CPT | Performed by: NURSE PRACTITIONER

## 2022-01-01 PROCEDURE — 84145 PROCALCITONIN (PCT): CPT

## 2022-01-01 PROCEDURE — 1123F ACP DISCUSS/DSCN MKR DOCD: CPT | Performed by: INTERNAL MEDICINE

## 2022-01-01 PROCEDURE — 1036F TOBACCO NON-USER: CPT | Performed by: NURSE PRACTITIONER

## 2022-01-01 PROCEDURE — 83735 ASSAY OF MAGNESIUM: CPT

## 2022-01-01 PROCEDURE — 82570 ASSAY OF URINE CREATININE: CPT

## 2022-01-01 PROCEDURE — 99223 1ST HOSP IP/OBS HIGH 75: CPT | Performed by: INTERNAL MEDICINE

## 2022-01-01 PROCEDURE — 2500000003 HC RX 250 WO HCPCS: Performed by: ANESTHESIOLOGY

## 2022-01-01 PROCEDURE — 93306 TTE W/DOPPLER COMPLETE: CPT

## 2022-01-01 PROCEDURE — 2580000003 HC RX 258: Performed by: NURSE ANESTHETIST, CERTIFIED REGISTERED

## 2022-01-01 PROCEDURE — 71045 X-RAY EXAM CHEST 1 VIEW: CPT

## 2022-01-01 PROCEDURE — 97166 OT EVAL MOD COMPLEX 45 MIN: CPT

## 2022-01-01 PROCEDURE — G8420 CALC BMI NORM PARAMETERS: HCPCS | Performed by: OTOLARYNGOLOGY

## 2022-01-01 PROCEDURE — 84156 ASSAY OF PROTEIN URINE: CPT

## 2022-01-01 PROCEDURE — 6360000004 HC RX CONTRAST MEDICATION: Performed by: INTERNAL MEDICINE

## 2022-01-01 PROCEDURE — 94669 MECHANICAL CHEST WALL OSCILL: CPT

## 2022-01-01 PROCEDURE — 2580000003 HC RX 258: Performed by: ANESTHESIOLOGY

## 2022-01-01 PROCEDURE — 7100000010 HC PHASE II RECOVERY - FIRST 15 MIN: Performed by: INTERNAL MEDICINE

## 2022-01-01 PROCEDURE — 3609009900 HC COLONOSCOPY W/CONTROL BLEEDING ANY METHOD: Performed by: INTERNAL MEDICINE

## 2022-01-01 PROCEDURE — 3609010600 HC COLONOSCOPY POLYPECTOMY SNARE/COLD BIOPSY: Performed by: INTERNAL MEDICINE

## 2022-01-01 PROCEDURE — 1036F TOBACCO NON-USER: CPT | Performed by: INTERNAL MEDICINE

## 2022-01-01 PROCEDURE — 83935 ASSAY OF URINE OSMOLALITY: CPT

## 2022-01-01 PROCEDURE — 97535 SELF CARE MNGMENT TRAINING: CPT

## 2022-01-01 PROCEDURE — 6360000004 HC RX CONTRAST MEDICATION: Performed by: STUDENT IN AN ORGANIZED HEALTH CARE EDUCATION/TRAINING PROGRAM

## 2022-01-01 PROCEDURE — 97116 GAIT TRAINING THERAPY: CPT

## 2022-01-01 PROCEDURE — 99213 OFFICE O/P EST LOW 20 MIN: CPT | Performed by: NURSE PRACTITIONER

## 2022-01-01 PROCEDURE — 99285 EMERGENCY DEPT VISIT HI MDM: CPT

## 2022-01-01 PROCEDURE — 3023F SPIROM DOC REV: CPT | Performed by: NURSE PRACTITIONER

## 2022-01-01 PROCEDURE — 3044F HG A1C LEVEL LT 7.0%: CPT | Performed by: NURSE PRACTITIONER

## 2022-01-01 PROCEDURE — 99203 OFFICE O/P NEW LOW 30 MIN: CPT

## 2022-01-01 PROCEDURE — 97110 THERAPEUTIC EXERCISES: CPT

## 2022-01-01 PROCEDURE — 90677 PCV20 VACCINE IM: CPT | Performed by: NURSE PRACTITIONER

## 2022-01-01 PROCEDURE — 83930 ASSAY OF BLOOD OSMOLALITY: CPT

## 2022-01-01 PROCEDURE — 85520 HEPARIN ASSAY: CPT

## 2022-01-01 PROCEDURE — G8483 FLU IMM NO ADMIN DOC REA: HCPCS | Performed by: NURSE PRACTITIONER

## 2022-01-01 PROCEDURE — G8420 CALC BMI NORM PARAMETERS: HCPCS | Performed by: INTERNAL MEDICINE

## 2022-01-01 PROCEDURE — 99211 OFF/OP EST MAY X REQ PHY/QHP: CPT

## 2022-01-01 RX ORDER — SODIUM CHLORIDE, SODIUM LACTATE, POTASSIUM CHLORIDE, CALCIUM CHLORIDE 600; 310; 30; 20 MG/100ML; MG/100ML; MG/100ML; MG/100ML
INJECTION, SOLUTION INTRAVENOUS CONTINUOUS
Status: DISCONTINUED | OUTPATIENT
Start: 2022-01-01 | End: 2022-01-01 | Stop reason: HOSPADM

## 2022-01-01 RX ORDER — PANTOPRAZOLE SODIUM 40 MG/1
40 TABLET, DELAYED RELEASE ORAL
Status: DISCONTINUED | OUTPATIENT
Start: 2022-01-01 | End: 2022-01-01 | Stop reason: HOSPADM

## 2022-01-01 RX ORDER — INSULIN LISPRO 100 [IU]/ML
0-3 INJECTION, SOLUTION INTRAVENOUS; SUBCUTANEOUS NIGHTLY
Status: DISCONTINUED | OUTPATIENT
Start: 2022-01-01 | End: 2022-01-01 | Stop reason: HOSPADM

## 2022-01-01 RX ORDER — HEPARIN SODIUM 1000 [USP'U]/ML
4000 INJECTION, SOLUTION INTRAVENOUS; SUBCUTANEOUS ONCE
Status: COMPLETED | OUTPATIENT
Start: 2022-01-01 | End: 2022-01-01

## 2022-01-01 RX ORDER — LANOLIN ALCOHOL/MO/W.PET/CERES
400 CREAM (GRAM) TOPICAL DAILY
Status: DISCONTINUED | OUTPATIENT
Start: 2022-01-01 | End: 2022-01-01 | Stop reason: HOSPADM

## 2022-01-01 RX ORDER — M-VIT,TX,IRON,MINS/CALC/FOLIC 27MG-0.4MG
1 TABLET ORAL DAILY
Status: DISCONTINUED | OUTPATIENT
Start: 2022-01-01 | End: 2022-01-01 | Stop reason: HOSPADM

## 2022-01-01 RX ORDER — METOPROLOL TARTRATE 50 MG/1
25 TABLET, FILM COATED ORAL 2 TIMES DAILY
Qty: 180 TABLET | Refills: 3 | Status: SHIPPED
Start: 2022-01-01

## 2022-01-01 RX ORDER — MEPERIDINE HYDROCHLORIDE 25 MG/ML
12.5 INJECTION INTRAMUSCULAR; INTRAVENOUS; SUBCUTANEOUS EVERY 5 MIN PRN
Status: DISCONTINUED | OUTPATIENT
Start: 2022-01-01 | End: 2022-01-01 | Stop reason: HOSPADM

## 2022-01-01 RX ORDER — PANTOPRAZOLE SODIUM 40 MG/1
TABLET, DELAYED RELEASE ORAL
COMMUNITY
Start: 2022-01-01

## 2022-01-01 RX ORDER — IPRATROPIUM BROMIDE 21 UG/1
2 SPRAY, METERED NASAL 4 TIMES DAILY
Qty: 1 EACH | Refills: 1 | Status: SHIPPED | OUTPATIENT
Start: 2022-01-01

## 2022-01-01 RX ORDER — NICOTINE POLACRILEX 4 MG
15 LOZENGE BUCCAL PRN
Status: DISCONTINUED | OUTPATIENT
Start: 2022-01-01 | End: 2022-01-01 | Stop reason: HOSPADM

## 2022-01-01 RX ORDER — SODIUM CHLORIDE 9 MG/ML
25 INJECTION, SOLUTION INTRAVENOUS PRN
Status: DISCONTINUED | OUTPATIENT
Start: 2022-01-01 | End: 2022-01-01 | Stop reason: HOSPADM

## 2022-01-01 RX ORDER — METOPROLOL TARTRATE 50 MG/1
50 TABLET, FILM COATED ORAL 2 TIMES DAILY
Status: DISCONTINUED | OUTPATIENT
Start: 2022-01-01 | End: 2022-01-01 | Stop reason: HOSPADM

## 2022-01-01 RX ORDER — SODIUM CHLORIDE 9 MG/ML
INJECTION, SOLUTION INTRAVENOUS CONTINUOUS
Status: CANCELLED | OUTPATIENT
Start: 2022-01-01

## 2022-01-01 RX ORDER — POLYETHYLENE GLYCOL 3350 17 G/17G
17 POWDER, FOR SOLUTION ORAL DAILY PRN
Status: DISCONTINUED | OUTPATIENT
Start: 2022-01-01 | End: 2022-01-01 | Stop reason: HOSPADM

## 2022-01-01 RX ORDER — ALBUTEROL SULFATE 90 UG/1
2 AEROSOL, METERED RESPIRATORY (INHALATION) EVERY 6 HOURS PRN
Status: DISCONTINUED | OUTPATIENT
Start: 2022-01-01 | End: 2022-01-01

## 2022-01-01 RX ORDER — LOSARTAN POTASSIUM 100 MG/1
100 TABLET ORAL DAILY
Status: DISCONTINUED | OUTPATIENT
Start: 2022-01-01 | End: 2022-01-01 | Stop reason: HOSPADM

## 2022-01-01 RX ORDER — TORSEMIDE 20 MG/1
40 TABLET ORAL DAILY
Qty: 60 TABLET | Refills: 1 | Status: SHIPPED | OUTPATIENT
Start: 2022-01-01 | End: 2022-01-01 | Stop reason: DRUGHIGH

## 2022-01-01 RX ORDER — ONDANSETRON 2 MG/ML
4 INJECTION INTRAMUSCULAR; INTRAVENOUS EVERY 6 HOURS PRN
Status: DISCONTINUED | OUTPATIENT
Start: 2022-01-01 | End: 2022-01-01 | Stop reason: HOSPADM

## 2022-01-01 RX ORDER — SODIUM CHLORIDE 9 MG/ML
INJECTION, SOLUTION INTRAVENOUS PRN
Status: DISCONTINUED | OUTPATIENT
Start: 2022-01-01 | End: 2022-01-01 | Stop reason: HOSPADM

## 2022-01-01 RX ORDER — ONDANSETRON 4 MG/1
4 TABLET, ORALLY DISINTEGRATING ORAL EVERY 8 HOURS PRN
Status: DISCONTINUED | OUTPATIENT
Start: 2022-01-01 | End: 2022-01-01 | Stop reason: HOSPADM

## 2022-01-01 RX ORDER — SODIUM CHLORIDE 9 MG/ML
250 INJECTION, SOLUTION INTRAVENOUS CONTINUOUS
Status: DISCONTINUED | OUTPATIENT
Start: 2022-01-01 | End: 2022-01-01

## 2022-01-01 RX ORDER — ONDANSETRON 2 MG/ML
4 INJECTION INTRAMUSCULAR; INTRAVENOUS
Status: DISCONTINUED | OUTPATIENT
Start: 2022-01-01 | End: 2022-01-01 | Stop reason: HOSPADM

## 2022-01-01 RX ORDER — AMLODIPINE BESYLATE 5 MG/1
5 TABLET ORAL DAILY
Status: DISCONTINUED | OUTPATIENT
Start: 2022-01-01 | End: 2022-01-01

## 2022-01-01 RX ORDER — OXYCODONE HYDROCHLORIDE 5 MG/1
5 TABLET ORAL PRN
Status: DISCONTINUED | OUTPATIENT
Start: 2022-01-01 | End: 2022-01-01 | Stop reason: HOSPADM

## 2022-01-01 RX ORDER — HEPARIN SODIUM 1000 [USP'U]/ML
2000 INJECTION, SOLUTION INTRAVENOUS; SUBCUTANEOUS PRN
Status: DISCONTINUED | OUTPATIENT
Start: 2022-01-01 | End: 2022-01-01 | Stop reason: ALTCHOICE

## 2022-01-01 RX ORDER — ACETAMINOPHEN 325 MG/1
650 TABLET ORAL EVERY 6 HOURS PRN
Status: DISCONTINUED | OUTPATIENT
Start: 2022-01-01 | End: 2022-01-01 | Stop reason: HOSPADM

## 2022-01-01 RX ORDER — DEXTROSE MONOHYDRATE 50 MG/ML
100 INJECTION, SOLUTION INTRAVENOUS PRN
Status: DISCONTINUED | OUTPATIENT
Start: 2022-01-01 | End: 2022-01-01 | Stop reason: HOSPADM

## 2022-01-01 RX ORDER — ALBUTEROL SULFATE 90 UG/1
2 AEROSOL, METERED RESPIRATORY (INHALATION) EVERY 4 HOURS PRN
Status: DISCONTINUED | OUTPATIENT
Start: 2022-01-01 | End: 2022-01-01 | Stop reason: HOSPADM

## 2022-01-01 RX ORDER — VITAMIN B COMPLEX
2000 TABLET ORAL DAILY
Status: DISCONTINUED | OUTPATIENT
Start: 2022-01-01 | End: 2022-01-01 | Stop reason: HOSPADM

## 2022-01-01 RX ORDER — IPRATROPIUM BROMIDE 21 UG/1
2 SPRAY, METERED NASAL 4 TIMES DAILY
Status: DISCONTINUED | OUTPATIENT
Start: 2022-01-01 | End: 2022-01-01 | Stop reason: HOSPADM

## 2022-01-01 RX ORDER — POTASSIUM CHLORIDE 20 MEQ/1
40 TABLET, EXTENDED RELEASE ORAL PRN
Status: DISCONTINUED | OUTPATIENT
Start: 2022-01-01 | End: 2022-01-01 | Stop reason: HOSPADM

## 2022-01-01 RX ORDER — SODIUM CHLORIDE, SODIUM LACTATE, POTASSIUM CHLORIDE, CALCIUM CHLORIDE 600; 310; 30; 20 MG/100ML; MG/100ML; MG/100ML; MG/100ML
INJECTION, SOLUTION INTRAVENOUS CONTINUOUS
Status: DISCONTINUED | OUTPATIENT
Start: 2022-01-01 | End: 2022-01-01 | Stop reason: DRUGHIGH

## 2022-01-01 RX ORDER — SODIUM CHLORIDE 0.9 % (FLUSH) 0.9 %
5-40 SYRINGE (ML) INJECTION EVERY 12 HOURS SCHEDULED
Status: DISCONTINUED | OUTPATIENT
Start: 2022-01-01 | End: 2022-01-01 | Stop reason: HOSPADM

## 2022-01-01 RX ORDER — TORSEMIDE 20 MG/1
TABLET ORAL
Qty: 120 TABLET | Refills: 0 | Status: SHIPPED | OUTPATIENT
Start: 2022-01-01

## 2022-01-01 RX ORDER — ASCORBIC ACID 500 MG
250 TABLET ORAL DAILY
Status: DISCONTINUED | OUTPATIENT
Start: 2022-01-01 | End: 2022-01-01 | Stop reason: HOSPADM

## 2022-01-01 RX ORDER — OMEPRAZOLE 40 MG/1
40 CAPSULE, DELAYED RELEASE ORAL
Qty: 30 CAPSULE | Refills: 1 | Status: SHIPPED | OUTPATIENT
Start: 2022-01-01 | End: 2022-01-01

## 2022-01-01 RX ORDER — FUROSEMIDE 10 MG/ML
40 INJECTION INTRAMUSCULAR; INTRAVENOUS 2 TIMES DAILY
Status: DISCONTINUED | OUTPATIENT
Start: 2022-01-01 | End: 2022-01-01

## 2022-01-01 RX ORDER — PRAVASTATIN SODIUM 40 MG
40 TABLET ORAL NIGHTLY
Status: DISCONTINUED | OUTPATIENT
Start: 2022-01-01 | End: 2022-01-01 | Stop reason: HOSPADM

## 2022-01-01 RX ORDER — GUAIFENESIN 100 MG/5ML
400 SOLUTION ORAL NIGHTLY
Status: DISCONTINUED | OUTPATIENT
Start: 2022-01-01 | End: 2022-01-01 | Stop reason: HOSPADM

## 2022-01-01 RX ORDER — PROPOFOL 10 MG/ML
INJECTION, EMULSION INTRAVENOUS PRN
Status: DISCONTINUED | OUTPATIENT
Start: 2022-01-01 | End: 2022-01-01 | Stop reason: SDUPTHER

## 2022-01-01 RX ORDER — SODIUM CHLORIDE 0.9 % (FLUSH) 0.9 %
5-40 SYRINGE (ML) INJECTION PRN
Status: DISCONTINUED | OUTPATIENT
Start: 2022-01-01 | End: 2022-01-01 | Stop reason: HOSPADM

## 2022-01-01 RX ORDER — HEPARIN SODIUM 1000 [USP'U]/ML
4000 INJECTION, SOLUTION INTRAVENOUS; SUBCUTANEOUS PRN
Status: DISCONTINUED | OUTPATIENT
Start: 2022-01-01 | End: 2022-01-01 | Stop reason: ALTCHOICE

## 2022-01-01 RX ORDER — AMLODIPINE BESYLATE 5 MG/1
5 TABLET ORAL DAILY
Status: DISCONTINUED | OUTPATIENT
Start: 2022-01-01 | End: 2022-01-01 | Stop reason: HOSPADM

## 2022-01-01 RX ORDER — SODIUM CHLORIDE 9 MG/ML
250 INJECTION, SOLUTION INTRAVENOUS CONTINUOUS
Status: DISCONTINUED | OUTPATIENT
Start: 2022-01-01 | End: 2022-01-01 | Stop reason: HOSPADM

## 2022-01-01 RX ORDER — HYDRALAZINE HYDROCHLORIDE 20 MG/ML
10 INJECTION INTRAMUSCULAR; INTRAVENOUS EVERY 6 HOURS PRN
Status: DISCONTINUED | OUTPATIENT
Start: 2022-01-01 | End: 2022-01-01 | Stop reason: HOSPADM

## 2022-01-01 RX ORDER — LANCETS
EACH MISCELLANEOUS
Qty: 200 EACH | Refills: 3 | Status: SHIPPED | OUTPATIENT
Start: 2022-01-01 | End: 2022-01-01

## 2022-01-01 RX ORDER — ASPIRIN 81 MG/1
81 TABLET ORAL DAILY
Status: DISCONTINUED | OUTPATIENT
Start: 2022-01-01 | End: 2022-01-01 | Stop reason: HOSPADM

## 2022-01-01 RX ORDER — FERROUS SULFATE 325(65) MG
325 TABLET ORAL
Status: DISCONTINUED | OUTPATIENT
Start: 2022-01-01 | End: 2022-01-01 | Stop reason: HOSPADM

## 2022-01-01 RX ORDER — ACETAMINOPHEN 650 MG/1
650 SUPPOSITORY RECTAL EVERY 6 HOURS PRN
Status: DISCONTINUED | OUTPATIENT
Start: 2022-01-01 | End: 2022-01-01 | Stop reason: HOSPADM

## 2022-01-01 RX ORDER — METOLAZONE 5 MG/1
5 TABLET ORAL WEEKLY
Qty: 30 TABLET | Refills: 5 | Status: SHIPPED | OUTPATIENT
Start: 2022-01-01

## 2022-01-01 RX ORDER — LEVOTHYROXINE SODIUM 88 UG/1
88 TABLET ORAL DAILY
Status: DISCONTINUED | OUTPATIENT
Start: 2022-01-01 | End: 2022-01-01 | Stop reason: HOSPADM

## 2022-01-01 RX ORDER — TORSEMIDE 20 MG/1
40 TABLET ORAL DAILY
Status: DISCONTINUED | OUTPATIENT
Start: 2022-01-01 | End: 2022-01-01 | Stop reason: HOSPADM

## 2022-01-01 RX ORDER — FUROSEMIDE 10 MG/ML
40 INJECTION INTRAMUSCULAR; INTRAVENOUS 2 TIMES DAILY
Status: DISCONTINUED | OUTPATIENT
Start: 2022-01-01 | End: 2022-01-01 | Stop reason: HOSPADM

## 2022-01-01 RX ORDER — PRAVASTATIN SODIUM 40 MG
TABLET ORAL
Qty: 90 TABLET | Refills: 3 | Status: SHIPPED | OUTPATIENT
Start: 2022-01-01

## 2022-01-01 RX ORDER — SODIUM CHLORIDE 0.9 % (FLUSH) 0.9 %
10 SYRINGE (ML) INJECTION PRN
Status: DISCONTINUED | OUTPATIENT
Start: 2022-01-01 | End: 2022-01-01 | Stop reason: HOSPADM

## 2022-01-01 RX ORDER — PREDNISONE 10 MG/1
TABLET ORAL
Qty: 30 TABLET | Refills: 0 | Status: SHIPPED | OUTPATIENT
Start: 2022-01-01 | End: 2022-01-01

## 2022-01-01 RX ORDER — SODIUM CHLORIDE 0.9 % (FLUSH) 0.9 %
10 SYRINGE (ML) INJECTION EVERY 12 HOURS SCHEDULED
Status: DISCONTINUED | OUTPATIENT
Start: 2022-01-01 | End: 2022-01-01 | Stop reason: HOSPADM

## 2022-01-01 RX ORDER — POTASSIUM CHLORIDE 7.45 MG/ML
10 INJECTION INTRAVENOUS PRN
Status: DISCONTINUED | OUTPATIENT
Start: 2022-01-01 | End: 2022-01-01 | Stop reason: HOSPADM

## 2022-01-01 RX ORDER — FUROSEMIDE 10 MG/ML
40 INJECTION INTRAMUSCULAR; INTRAVENOUS ONCE
Status: COMPLETED | OUTPATIENT
Start: 2022-01-01 | End: 2022-01-01

## 2022-01-01 RX ORDER — ALBUTEROL SULFATE 90 UG/1
2 AEROSOL, METERED RESPIRATORY (INHALATION) EVERY 6 HOURS PRN
Status: DISCONTINUED | OUTPATIENT
Start: 2022-01-01 | End: 2022-01-01 | Stop reason: HOSPADM

## 2022-01-01 RX ORDER — AMLODIPINE BESYLATE 5 MG/1
TABLET ORAL
Qty: 90 TABLET | Refills: 3 | Status: ON HOLD | OUTPATIENT
Start: 2022-01-01 | End: 2022-01-01 | Stop reason: HOSPADM

## 2022-01-01 RX ORDER — OMEGA-3/DHA/EPA/FISH OIL 60 MG-90MG
1 CAPSULE ORAL DAILY
Status: DISCONTINUED | OUTPATIENT
Start: 2022-01-01 | End: 2022-01-01 | Stop reason: RX

## 2022-01-01 RX ORDER — GUAIFENESIN 100 MG/5ML
400 SOLUTION ORAL DAILY
Status: DISCONTINUED | OUTPATIENT
Start: 2022-01-01 | End: 2022-01-01 | Stop reason: HOSPADM

## 2022-01-01 RX ORDER — POTASSIUM CHLORIDE 750 MG/1
40 TABLET, EXTENDED RELEASE ORAL ONCE
Status: DISCONTINUED | OUTPATIENT
Start: 2022-01-01 | End: 2022-01-01 | Stop reason: HOSPADM

## 2022-01-01 RX ORDER — TORSEMIDE 20 MG/1
TABLET ORAL
Qty: 60 TABLET | Refills: 0 | Status: ON HOLD
Start: 2022-01-01 | End: 2022-01-01 | Stop reason: SDUPTHER

## 2022-01-01 RX ORDER — OXYCODONE HYDROCHLORIDE 5 MG/1
10 TABLET ORAL PRN
Status: DISCONTINUED | OUTPATIENT
Start: 2022-01-01 | End: 2022-01-01 | Stop reason: HOSPADM

## 2022-01-01 RX ORDER — LOSARTAN POTASSIUM 100 MG/1
TABLET ORAL
Qty: 90 TABLET | Refills: 3 | Status: ON HOLD | OUTPATIENT
Start: 2022-01-01 | End: 2022-01-01 | Stop reason: HOSPADM

## 2022-01-01 RX ORDER — DEXTROSE MONOHYDRATE 25 G/50ML
12.5 INJECTION, SOLUTION INTRAVENOUS PRN
Status: DISCONTINUED | OUTPATIENT
Start: 2022-01-01 | End: 2022-01-01

## 2022-01-01 RX ORDER — DEXTROSE MONOHYDRATE 25 G/50ML
12.5 INJECTION, SOLUTION INTRAVENOUS PRN
Status: DISCONTINUED | OUTPATIENT
Start: 2022-01-01 | End: 2022-01-01 | Stop reason: CLARIF

## 2022-01-01 RX ORDER — INSULIN LISPRO 100 [IU]/ML
0-6 INJECTION, SOLUTION INTRAVENOUS; SUBCUTANEOUS
Status: DISCONTINUED | OUTPATIENT
Start: 2022-01-01 | End: 2022-01-01 | Stop reason: HOSPADM

## 2022-01-01 RX ORDER — MAGNESIUM SULFATE IN WATER 40 MG/ML
2000 INJECTION, SOLUTION INTRAVENOUS PRN
Status: DISCONTINUED | OUTPATIENT
Start: 2022-01-01 | End: 2022-01-01 | Stop reason: HOSPADM

## 2022-01-01 RX ORDER — MULTIVIT WITH MINERALS/LUTEIN
250 TABLET ORAL DAILY
COMMUNITY

## 2022-01-01 RX ORDER — METOPROLOL TARTRATE 50 MG/1
TABLET, FILM COATED ORAL
Qty: 180 TABLET | Refills: 3 | Status: ON HOLD | OUTPATIENT
Start: 2022-01-01 | End: 2022-01-01 | Stop reason: SDUPTHER

## 2022-01-01 RX ORDER — ALBUTEROL SULFATE 90 UG/1
2 AEROSOL, METERED RESPIRATORY (INHALATION) 2 TIMES DAILY
Status: DISCONTINUED | OUTPATIENT
Start: 2022-01-01 | End: 2022-01-01 | Stop reason: HOSPADM

## 2022-01-01 RX ORDER — PREDNISONE 20 MG/1
40 TABLET ORAL DAILY
Qty: 10 TABLET | Refills: 0 | Status: ON HOLD | OUTPATIENT
Start: 2022-01-01 | End: 2022-01-01 | Stop reason: HOSPADM

## 2022-01-01 RX ORDER — AZITHROMYCIN 250 MG/1
250 TABLET, FILM COATED ORAL SEE ADMIN INSTRUCTIONS
Qty: 6 TABLET | Refills: 0 | Status: ON HOLD | OUTPATIENT
Start: 2022-01-01 | End: 2022-01-01 | Stop reason: HOSPADM

## 2022-01-01 RX ORDER — MAGNESIUM OXIDE 400 MG/1
400 TABLET ORAL DAILY
Status: DISCONTINUED | OUTPATIENT
Start: 2022-01-01 | End: 2022-01-01 | Stop reason: SDUPTHER

## 2022-01-01 RX ORDER — SODIUM CHLORIDE, SODIUM LACTATE, POTASSIUM CHLORIDE, CALCIUM CHLORIDE 600; 310; 30; 20 MG/100ML; MG/100ML; MG/100ML; MG/100ML
INJECTION, SOLUTION INTRAVENOUS CONTINUOUS PRN
Status: DISCONTINUED | OUTPATIENT
Start: 2022-01-01 | End: 2022-01-01 | Stop reason: SDUPTHER

## 2022-01-01 RX ORDER — BUDESONIDE AND FORMOTEROL FUMARATE DIHYDRATE 160; 4.5 UG/1; UG/1
1 AEROSOL RESPIRATORY (INHALATION) 2 TIMES DAILY
Status: DISCONTINUED | OUTPATIENT
Start: 2022-01-01 | End: 2022-01-01 | Stop reason: HOSPADM

## 2022-01-01 RX ORDER — HEPARIN SODIUM 10000 [USP'U]/100ML
1170 INJECTION, SOLUTION INTRAVENOUS CONTINUOUS
Status: DISCONTINUED | OUTPATIENT
Start: 2022-01-01 | End: 2022-01-01

## 2022-01-01 RX ORDER — GUAIFENESIN 600 MG/1
600 TABLET, EXTENDED RELEASE ORAL 2 TIMES DAILY PRN
Status: DISCONTINUED | OUTPATIENT
Start: 2022-01-01 | End: 2022-01-01 | Stop reason: HOSPADM

## 2022-01-01 RX ADMIN — GUAIFENESIN 400 MG: 100 SOLUTION ORAL at 21:46

## 2022-01-01 RX ADMIN — FUROSEMIDE 40 MG: 10 INJECTION, SOLUTION INTRAMUSCULAR; INTRAVENOUS at 22:51

## 2022-01-01 RX ADMIN — METOPROLOL TARTRATE 50 MG: 50 TABLET, FILM COATED ORAL at 09:20

## 2022-01-01 RX ADMIN — PRAVASTATIN SODIUM 40 MG: 40 TABLET ORAL at 21:00

## 2022-01-01 RX ADMIN — IOPAMIDOL 85 ML: 755 INJECTION, SOLUTION INTRAVENOUS at 16:53

## 2022-01-01 RX ADMIN — PROPOFOL 50 MG: 10 INJECTION, EMULSION INTRAVENOUS at 09:19

## 2022-01-01 RX ADMIN — Medication 400 MG: at 09:44

## 2022-01-01 RX ADMIN — MULTIPLE VITAMINS W/ MINERALS TAB 1 TABLET: TAB at 09:49

## 2022-01-01 RX ADMIN — PANTOPRAZOLE SODIUM 40 MG: 40 TABLET, DELAYED RELEASE ORAL at 05:52

## 2022-01-01 RX ADMIN — FERROUS SULFATE TAB 325 MG (65 MG ELEMENTAL FE) 325 MG: 325 (65 FE) TAB at 09:47

## 2022-01-01 RX ADMIN — FAMOTIDINE 20 MG: 10 INJECTION INTRAVENOUS at 08:51

## 2022-01-01 RX ADMIN — Medication 1 TABLET: at 09:47

## 2022-01-01 RX ADMIN — GUAIFENESIN 400 MG: 100 SOLUTION ORAL at 09:03

## 2022-01-01 RX ADMIN — Medication 400 MG: at 09:20

## 2022-01-01 RX ADMIN — ASPIRIN 81 MG: 81 TABLET, COATED ORAL at 09:49

## 2022-01-01 RX ADMIN — ASPIRIN 81 MG: 81 TABLET, COATED ORAL at 09:20

## 2022-01-01 RX ADMIN — FERRIC CARBOXYMALTOSE INJECTION 750 MG: 50 INJECTION, SOLUTION INTRAVENOUS at 10:05

## 2022-01-01 RX ADMIN — FERROUS SULFATE TAB 325 MG (65 MG ELEMENTAL FE) 325 MG: 325 (65 FE) TAB at 09:25

## 2022-01-01 RX ADMIN — LEVOTHYROXINE SODIUM 88 MCG: 88 TABLET ORAL at 06:09

## 2022-01-01 RX ADMIN — Medication 400 MG: at 09:47

## 2022-01-01 RX ADMIN — APIXABAN 2.5 MG: 5 TABLET, FILM COATED ORAL at 22:50

## 2022-01-01 RX ADMIN — SODIUM CHLORIDE 250 ML: 9 INJECTION, SOLUTION INTRAVENOUS at 10:05

## 2022-01-01 RX ADMIN — INSULIN LISPRO 1 UNITS: 100 INJECTION, SOLUTION INTRAVENOUS; SUBCUTANEOUS at 21:01

## 2022-01-01 RX ADMIN — METOPROLOL TARTRATE 50 MG: 50 TABLET, FILM COATED ORAL at 21:15

## 2022-01-01 RX ADMIN — FERRIC CARBOXYMALTOSE INJECTION 750 MG: 50 INJECTION, SOLUTION INTRAVENOUS at 08:52

## 2022-01-01 RX ADMIN — GUAIFENESIN 400 MG: 100 SOLUTION ORAL at 21:00

## 2022-01-01 RX ADMIN — LEVOTHYROXINE SODIUM 88 MCG: 88 TABLET ORAL at 05:52

## 2022-01-01 RX ADMIN — Medication 2 PUFF: at 07:53

## 2022-01-01 RX ADMIN — FERROUS SULFATE TAB 325 MG (65 MG ELEMENTAL FE) 325 MG: 325 (65 FE) TAB at 09:44

## 2022-01-01 RX ADMIN — AMLODIPINE BESYLATE 5 MG: 5 TABLET ORAL at 09:44

## 2022-01-01 RX ADMIN — METOPROLOL TARTRATE 50 MG: 50 TABLET, FILM COATED ORAL at 21:14

## 2022-01-01 RX ADMIN — OXYCODONE HYDROCHLORIDE AND ACETAMINOPHEN 250 MG: 500 TABLET ORAL at 09:49

## 2022-01-01 RX ADMIN — SODIUM CHLORIDE, POTASSIUM CHLORIDE, SODIUM LACTATE AND CALCIUM CHLORIDE: 600; 310; 30; 20 INJECTION, SOLUTION INTRAVENOUS at 08:59

## 2022-01-01 RX ADMIN — Medication 10 ML: at 08:59

## 2022-01-01 RX ADMIN — Medication 1 PUFF: at 07:43

## 2022-01-01 RX ADMIN — FERROUS SULFATE TAB 325 MG (65 MG ELEMENTAL FE) 325 MG: 325 (65 FE) TAB at 09:03

## 2022-01-01 RX ADMIN — PANTOPRAZOLE SODIUM 40 MG: 40 TABLET, DELAYED RELEASE ORAL at 06:09

## 2022-01-01 RX ADMIN — Medication 10 ML: at 22:51

## 2022-01-01 RX ADMIN — Medication 10 ML: at 21:14

## 2022-01-01 RX ADMIN — OXYCODONE HYDROCHLORIDE AND ACETAMINOPHEN 250 MG: 500 TABLET ORAL at 08:57

## 2022-01-01 RX ADMIN — METOPROLOL TARTRATE 50 MG: 50 TABLET, FILM COATED ORAL at 23:01

## 2022-01-01 RX ADMIN — TIOTROPIUM BROMIDE INHALATION SPRAY 2 PUFF: 3.12 SPRAY, METERED RESPIRATORY (INHALATION) at 07:30

## 2022-01-01 RX ADMIN — LOSARTAN POTASSIUM 100 MG: 100 TABLET, FILM COATED ORAL at 09:48

## 2022-01-01 RX ADMIN — FUROSEMIDE 40 MG: 10 INJECTION, SOLUTION INTRAMUSCULAR; INTRAVENOUS at 09:20

## 2022-01-01 RX ADMIN — HEPARIN SODIUM 4000 UNITS: 1000 INJECTION, SOLUTION INTRAVENOUS; SUBCUTANEOUS at 02:00

## 2022-01-01 RX ADMIN — PROPOFOL 100 MG: 10 INJECTION, EMULSION INTRAVENOUS at 09:03

## 2022-01-01 RX ADMIN — PANTOPRAZOLE SODIUM 40 MG: 40 TABLET, DELAYED RELEASE ORAL at 05:40

## 2022-01-01 RX ADMIN — AMLODIPINE BESYLATE 5 MG: 5 TABLET ORAL at 09:47

## 2022-01-01 RX ADMIN — SODIUM CHLORIDE, PRESERVATIVE FREE 10 ML: 5 INJECTION INTRAVENOUS at 09:26

## 2022-01-01 RX ADMIN — LOSARTAN POTASSIUM 100 MG: 100 TABLET, FILM COATED ORAL at 09:03

## 2022-01-01 RX ADMIN — FERROUS SULFATE TAB 325 MG (65 MG ELEMENTAL FE) 325 MG: 325 (65 FE) TAB at 08:57

## 2022-01-01 RX ADMIN — PROPOFOL 50 MG: 10 INJECTION, EMULSION INTRAVENOUS at 09:14

## 2022-01-01 RX ADMIN — LEVOTHYROXINE SODIUM 88 MCG: 88 TABLET ORAL at 10:28

## 2022-01-01 RX ADMIN — LEVOTHYROXINE SODIUM 88 MCG: 88 TABLET ORAL at 05:40

## 2022-01-01 RX ADMIN — METOPROLOL TARTRATE 50 MG: 50 TABLET, FILM COATED ORAL at 21:00

## 2022-01-01 RX ADMIN — Medication 400 MG: at 09:03

## 2022-01-01 RX ADMIN — INSULIN LISPRO 1 UNITS: 100 INJECTION, SOLUTION INTRAVENOUS; SUBCUTANEOUS at 12:59

## 2022-01-01 RX ADMIN — PRAVASTATIN SODIUM 40 MG: 40 TABLET ORAL at 21:14

## 2022-01-01 RX ADMIN — METOPROLOL TARTRATE 50 MG: 50 TABLET, FILM COATED ORAL at 09:49

## 2022-01-01 RX ADMIN — SODIUM CHLORIDE, PRESERVATIVE FREE 10 ML: 5 INJECTION INTRAVENOUS at 09:44

## 2022-01-01 RX ADMIN — Medication 2000 UNITS: at 09:48

## 2022-01-01 RX ADMIN — APIXABAN 2.5 MG: 5 TABLET, FILM COATED ORAL at 21:14

## 2022-01-01 RX ADMIN — Medication 1 TABLET: at 09:20

## 2022-01-01 RX ADMIN — METOPROLOL TARTRATE 50 MG: 50 TABLET, FILM COATED ORAL at 22:51

## 2022-01-01 RX ADMIN — APIXABAN 2.5 MG: 5 TABLET, FILM COATED ORAL at 09:53

## 2022-01-01 RX ADMIN — IOPAMIDOL 75 ML: 755 INJECTION, SOLUTION INTRAVENOUS at 08:49

## 2022-01-01 RX ADMIN — AMLODIPINE BESYLATE 5 MG: 5 TABLET ORAL at 09:20

## 2022-01-01 RX ADMIN — GUAIFENESIN 400 MG: 100 SOLUTION ORAL at 08:57

## 2022-01-01 RX ADMIN — APIXABAN 2.5 MG: 5 TABLET, FILM COATED ORAL at 08:57

## 2022-01-01 RX ADMIN — Medication 1 PUFF: at 07:30

## 2022-01-01 RX ADMIN — PROPOFOL 50 MG: 10 INJECTION, EMULSION INTRAVENOUS at 09:08

## 2022-01-01 RX ADMIN — ENOXAPARIN SODIUM 40 MG: 100 INJECTION SUBCUTANEOUS at 09:21

## 2022-01-01 RX ADMIN — SODIUM CHLORIDE, PRESERVATIVE FREE 10 ML: 5 INJECTION INTRAVENOUS at 21:01

## 2022-01-01 RX ADMIN — HEPARIN SODIUM 4000 UNITS: 1000 INJECTION INTRAVENOUS; SUBCUTANEOUS at 17:46

## 2022-01-01 RX ADMIN — METOPROLOL TARTRATE 50 MG: 50 TABLET, FILM COATED ORAL at 09:47

## 2022-01-01 RX ADMIN — FERROUS SULFATE TAB 325 MG (65 MG ELEMENTAL FE) 325 MG: 325 (65 FE) TAB at 09:49

## 2022-01-01 RX ADMIN — PRAVASTATIN SODIUM 40 MG: 40 TABLET ORAL at 21:15

## 2022-01-01 RX ADMIN — METOPROLOL TARTRATE 50 MG: 50 TABLET, FILM COATED ORAL at 09:44

## 2022-01-01 RX ADMIN — Medication 2 PUFF: at 19:21

## 2022-01-01 RX ADMIN — SODIUM CHLORIDE, PRESERVATIVE FREE 10 ML: 5 INJECTION INTRAVENOUS at 09:48

## 2022-01-01 RX ADMIN — Medication 10 ML: at 09:05

## 2022-01-01 RX ADMIN — SODIUM CHLORIDE 250 ML: 9 INJECTION, SOLUTION INTRAVENOUS at 08:52

## 2022-01-01 RX ADMIN — SODIUM CHLORIDE, PRESERVATIVE FREE 10 ML: 5 INJECTION INTRAVENOUS at 21:08

## 2022-01-01 RX ADMIN — TIOTROPIUM BROMIDE INHALATION SPRAY 2 PUFF: 3.12 SPRAY, METERED RESPIRATORY (INHALATION) at 07:43

## 2022-01-01 RX ADMIN — LOSARTAN POTASSIUM 100 MG: 100 TABLET, FILM COATED ORAL at 08:57

## 2022-01-01 RX ADMIN — IOHEXOL 50 ML: 240 INJECTION, SOLUTION INTRATHECAL; INTRAVASCULAR; INTRAVENOUS; ORAL at 07:49

## 2022-01-01 RX ADMIN — FUROSEMIDE 40 MG: 10 INJECTION, SOLUTION INTRAMUSCULAR; INTRAVENOUS at 17:36

## 2022-01-01 RX ADMIN — Medication 400 MG: at 08:57

## 2022-01-01 RX ADMIN — PROPOFOL 50 MG: 10 INJECTION, EMULSION INTRAVENOUS at 09:25

## 2022-01-01 RX ADMIN — APIXABAN 2.5 MG: 5 TABLET, FILM COATED ORAL at 09:03

## 2022-01-01 RX ADMIN — Medication 400 MG: at 09:49

## 2022-01-01 RX ADMIN — ASPIRIN 81 MG: 81 TABLET, COATED ORAL at 09:03

## 2022-01-01 RX ADMIN — METOPROLOL TARTRATE 50 MG: 50 TABLET, FILM COATED ORAL at 08:57

## 2022-01-01 RX ADMIN — ASPIRIN 81 MG: 81 TABLET, COATED ORAL at 08:57

## 2022-01-01 RX ADMIN — FUROSEMIDE 40 MG: 10 INJECTION, SOLUTION INTRAMUSCULAR; INTRAVENOUS at 09:04

## 2022-01-01 RX ADMIN — Medication 1 PUFF: at 19:21

## 2022-01-01 RX ADMIN — Medication 10 ML: at 09:50

## 2022-01-01 RX ADMIN — MULTIPLE VITAMINS W/ MINERALS TAB 1 TABLET: TAB at 08:57

## 2022-01-01 RX ADMIN — HEPARIN SODIUM 880 UNITS/HR: 10000 INJECTION, SOLUTION INTRAVENOUS; SUBCUTANEOUS at 17:47

## 2022-01-01 RX ADMIN — PRAVASTATIN SODIUM 40 MG: 40 TABLET ORAL at 22:51

## 2022-01-01 RX ADMIN — OXYCODONE HYDROCHLORIDE AND ACETAMINOPHEN 250 MG: 500 TABLET ORAL at 09:03

## 2022-01-01 RX ADMIN — MULTIPLE VITAMINS W/ MINERALS TAB 1 TABLET: TAB at 09:03

## 2022-01-01 RX ADMIN — Medication 10 ML: at 23:02

## 2022-01-01 RX ADMIN — Medication 1 TABLET: at 09:44

## 2022-01-01 RX ADMIN — INSULIN LISPRO 1 UNITS: 100 INJECTION, SOLUTION INTRAVENOUS; SUBCUTANEOUS at 11:44

## 2022-01-01 RX ADMIN — Medication 2 PUFF: at 07:52

## 2022-01-01 RX ADMIN — FUROSEMIDE 40 MG: 10 INJECTION, SOLUTION INTRAMUSCULAR; INTRAVENOUS at 17:30

## 2022-01-01 RX ADMIN — Medication 1 PUFF: at 20:04

## 2022-01-01 RX ADMIN — PRAVASTATIN SODIUM 40 MG: 40 TABLET ORAL at 23:01

## 2022-01-01 RX ADMIN — HEPARIN SODIUM AND DEXTROSE 1170 UNITS/HR: 10000; 5 INJECTION INTRAVENOUS at 02:56

## 2022-01-01 RX ADMIN — FUROSEMIDE 40 MG: 10 INJECTION, SOLUTION INTRAVENOUS at 22:18

## 2022-01-01 RX ADMIN — FUROSEMIDE 40 MG: 10 INJECTION, SOLUTION INTRAMUSCULAR; INTRAVENOUS at 08:57

## 2022-01-01 RX ADMIN — PANTOPRAZOLE SODIUM 40 MG: 40 TABLET, DELAYED RELEASE ORAL at 09:25

## 2022-01-01 ASSESSMENT — ENCOUNTER SYMPTOMS
WHEEZING: 0
EYE ITCHING: 0
EYE ITCHING: 0
VOICE CHANGE: 0
SHORTNESS OF BREATH: 1
VOMITING: 0
TROUBLE SWALLOWING: 0
COUGH: 0
NAUSEA: 0
ABDOMINAL PAIN: 0
APNEA: 0
DIARRHEA: 0
CHEST TIGHTNESS: 0
RHINORRHEA: 0
VOMITING: 0
APNEA: 0
COUGH: 0
NAUSEA: 0
ABDOMINAL PAIN: 0
SHORTNESS OF BREATH: 1
SHORTNESS OF BREATH: 1
DIARRHEA: 0
SORE THROAT: 0
FACIAL SWELLING: 0
SORE THROAT: 0
SORE THROAT: 0
COUGH: 1
COUGH: 0
RHINORRHEA: 0
WHEEZING: 0
BLOOD IN STOOL: 0
WHEEZING: 0
DIARRHEA: 0
RHINORRHEA: 0
ABDOMINAL PAIN: 0
SHORTNESS OF BREATH: 0
SINUS PRESSURE: 0
EYE PAIN: 0
GASTROINTESTINAL NEGATIVE: 1
NAUSEA: 0
SORE THROAT: 0
BACK PAIN: 0
VOICE CHANGE: 0
COUGH: 1
COUGH: 1
TROUBLE SWALLOWING: 0
SHORTNESS OF BREATH: 1
SHORTNESS OF BREATH: 1
CHEST TIGHTNESS: 0
RHINORRHEA: 0
SINUS PRESSURE: 0
ABDOMINAL PAIN: 0
SORE THROAT: 0
SHORTNESS OF BREATH: 0
SORE THROAT: 0
SHORTNESS OF BREATH: 1
NAUSEA: 0
VOMITING: 0
DIARRHEA: 0
VOMITING: 0
FACIAL SWELLING: 0
CHEST TIGHTNESS: 0

## 2022-01-01 ASSESSMENT — PAIN SCALES - GENERAL
PAINLEVEL_OUTOF10: 0
PAINLEVEL_OUTOF10: 4
PAINLEVEL_OUTOF10: 0

## 2022-01-01 ASSESSMENT — PAIN SCALES - WONG BAKER

## 2022-01-01 ASSESSMENT — PATIENT HEALTH QUESTIONNAIRE - PHQ9
SUM OF ALL RESPONSES TO PHQ QUESTIONS 1-9: 0
SUM OF ALL RESPONSES TO PHQ9 QUESTIONS 1 & 2: 0
1. LITTLE INTEREST OR PLEASURE IN DOING THINGS: 0
2. FEELING DOWN, DEPRESSED OR HOPELESS: 0

## 2022-01-01 ASSESSMENT — LIFESTYLE VARIABLES: SMOKING_STATUS: 0

## 2022-01-01 ASSESSMENT — PAIN - FUNCTIONAL ASSESSMENT: PAIN_FUNCTIONAL_ASSESSMENT: NONE - DENIES PAIN

## 2022-01-06 NOTE — TELEPHONE ENCOUNTER
Future appt scheduled 05/19/2022             Last appt 11/18/2021    Last Written 07/19/2021    metFORMIN (GLUCOPHAGE) 1000 MG tablet  #180  1 RF

## 2022-01-07 NOTE — TELEPHONE ENCOUNTER
Pt called and sts that he is still having the leg cramping from the Pravastatin. Last OV 12/3/21. Please advise, thank you  Plan:  1. Stop pravastatin for 1 week and then go back on half dose (20 mg daily) to see if that would make a difference. May try crestor if this does not help with cramping. Call my office in 1 month to let me know how you are doing. 2. Check lipids in 2-3 months  Recommend Pfizer or [address] [place] [address] booster  3.  Plan to follow up in 6 months

## 2022-01-07 NOTE — TELEPHONE ENCOUNTER
He needs to d/w PCP Hira about leg cramps. Statin is likely NOT causing them. He can restart pravachol at prior dose if he is willing. If wants to wait and d/w PCP then that is his prerogative. I would like him on at least lower dose statin if he is willing given his hx stents and CABG prior.

## 2022-01-07 NOTE — TELEPHONE ENCOUNTER
This is confusing. I got message saying he still was having leg cramps OFF pravachol. Please clarify. Is he taking statin or not now? Is he having leg cramps or not now?

## 2022-01-14 NOTE — PROGRESS NOTES
MA Communication:   The following orders are received by verbal communication from Millicent Kidd MD    Orders include:    3 month follow up

## 2022-01-14 NOTE — PATIENT INSTRUCTIONS
Remember to bring a list of pulmonary medications and any CPAP or BiPAP machines to your next appointment with the office. Please keep all of your future appointments scheduled by [address], [facility]. Out of respect for other patients and providers, you may be asked to reschedule your appointment if you arrive later than your scheduled appointment time. Appointments cancelled less than 24hrs in advance will be considered a no show. Patients with three missed appointments within 1 year or four missed appointments within 2 years can be dismissed from the practice. Please be aware that our physicians are required to work in the Intensive Care Unit at [facility].  Your appointment may need to be rescheduled if they are designated to work during your appointment time. You may receive a survey regarding the care you received during your visit. Your input is valuable to us. We encourage you to complete and return your survey. We hope you will choose us in the future for your healthcare needs. Pt instructed of all future appointment dates & times, including radiology, labs, procedures & referrals. If procedures were scheduled preparation instructions provided. Instructions on future appointments with [facility] were given.

## 2022-01-14 NOTE — PROGRESS NOTES
C/O Pulmonary follow up and to discuss the clinical status     Patient is an 54-year-old male who has come to the office for pulmonary follow-up, patient states that he continues to have breathing issues and patient states that his symptoms are somewhat worse, patient states that he has been using albuterol inhaler twice a day with improvement, patient was given Trelegy Ellipta sample to try and patient has not tried it so far, patient when asked why he did not try it patient states that he has heard that it is very expensive, patient also has been having some sinus congestion and postnasal drainage which is significant and patient had gone to ENT and patient was told that he has sinus infection and patient was given antibiotics, patient's continues to have some sinus congestion, patient has been using nasal spray, patient does not have any increasing cough or expectoration per se, no chest pain or palpitations, no fever no chills, patient does not have any significant abdominal discomfort of concern, patient states that he cannot take a deep breath, patient has occasional wheezing once in a while but not on regular basis, patient also was referred to pulm rehab but patient has not gone there patient states that he tried to call there twice but nobody picked up the phone and for that reason he did stop trying it, patient has an electric base heat, patient has started using a vaporizer at nighttime, patient also has 2 cats with exposure to animal dander, patient does not have any confusion lethargy, no other pertinent review of system of course     Previous HPI: Patient has come to the office for a pulmonary follow-up, patient used to follow Dr. Karen Douglass, patient was subjected to a CT of the chest along with PFT and patient was told to follow-up, patient continues to have shortness of breath, patient's exercise tolerance is limited, patient does not have any increasing cough or expectoration, patient does have some sinus congestion and patient has been given Astelin nasal spray with limited success, patient does not have any sore throat or difficulty in swallowing, no coughing or choking while eating, patient does not have any obvious odynophagia or dysphagia, patient does not have any increasing wheezing, patient does not have any palpitations or diaphoresis, no chest pain per se patient does not have any fever or chills, patient has occasional chest tightness, patient does not have any increasing abdominal discomfort nausea vomiting, patient takes water pill on a regular basis, patient does not have any significant dysuria hematuria or any hematochezia or melena, patient does not have any epistaxis or hemoptysis, patient sees cardiology on regular basis, patient has had thoracentesis done in the past, patient does not have any sleep fragmentation, patient's thyroid function is controlled with Synthroid, patient does not have any change in the ambient environment any sick contacts, no other pertinent review of system of concern          Review of Systems same as abovbe    Physical Exam:  Blood pressure 124/71, pulse 62, temperature 98.2 °F (36.8 °C), temperature source Temporal, resp. rate 18, height 5' 11\" (1.803 m), weight 161 lb (73 kg), SpO2 95 %.'  Constitutional:  No acute distress. HENT:  Oropharynx is clear and moist. Nothyromegaly. Eyes:  Conjunctivae are normal. Pupils equal, round, and reactive to light. No scleral icterus. Neck: . No tracheal deviation present. No obviousthyroid mass. Cardiovascular: Normal rate, regular rhythm, normal heart sounds. No right ventricular heave. No lower extremity edema. Pulmonary/Chest: No wheezes. Minimal scattered  rales. Chest wall is not dull to percussion. No accessory muscle usage or stridor. Decreased bSI   Abdominal: Soft. Bowel sounds present. No distension or hernia. No tenderness. Musculoskeletal: No cyanosis. No clubbing. No obvious joint deformity. Lymphadenopathy: No cervical or supraclavicularadenopathy. Skin: Skin is warm and dry. No rash or nodules on the exposed extremities. Psychiatric: Normal mood and affect. Behavior is normal.  No anxiety. Neurologic : Alert, awake and oriented. PERRL. Speechfluent          Data:     Imaging:  I have reviewed radiology images personally. No orders to display     CT CHEST WO CONTRAST    Result Date: 7/1/2021  EXAMINATION: CT OF THE CHEST WITHOUT CONTRAST 7/1/2021 11:05 am TECHNIQUE: CT of the chest was performed without the administration of intravenous contrast. Multiplanar reformatted images are provided for review. Dose modulation, iterative reconstruction, and/or weight based adjustment of the mA/kV was utilized to reduce the radiation dose to as low as reasonably achievable. COMPARISON: September 2020 HISTORY: ORDERING SYSTEM PROVIDED HISTORY: Bilateral pleural effusion TECHNOLOGIST PROVIDED HISTORY: Reason for Exam: sob; bilateral pleural effusion; Acuity: Acute Type of Exam: Initial Relevant Medical/Surgical History: 4 way bypass 2018; former smoker; FINDINGS: Mediastinum: Thyroid gland appears normal.  Coronary artery calcification is seen. No pericardial effusion is seen. Aortic valve calcification is seen. Small hiatal hernia seen. There is nonspecific thickening at the GE junction. .  Small mildly enlarged mediastinal and hilar nodes are noted. Index precarinal node seen previously measures 2.4 cm by 1.4 cm, unchanged from prior. Lungs/pleura: Small pleural effusion is seen on the left. Curvilinear nodular opacity in the lingula is unchanged. Curvilinear and nodular opacities posteriorly in the left lower lobe are unchanged. On the right, small right-sided pleural effusion is seen. Curvilinear opacity is seen in the right middle lobe. Curvilinear opacity in the right lower lobe is unchanged. There is chronic thickening of the pleura bilaterally.   Non dependent portions of pleural fluid are seen on the right, compatible with loculation. Scattered superimpose reticular opacities are seen throughout the lungs, similar to prior Upper Abdomen: Adrenal glands appear unchanged. Atherosclerotic change seen in abdominal aorta. Gallstones are seen. Mild stool load is seen in the colon. Soft Tissues/Bones: Spurring is seen in the spine. Spurring is seen in the shoulder joints. Stable chest CT. No change in chronic pleural effusions, right greater than left, with chronic consolidative changes in both the right and left lung. Small mildly enlarged mediastinal nodes are noted,, similar compared to prior Scattered superimpose reticular opacities are seen throughout the lungs similar to prior, either due to scarring or mild fibrosis     PFT INTERPRETATION     The patient is an 35-year-old male who underwent a PFT for shortness of  breath. Spirometry shows FVC to be 40%, FEV1 to be 40%, FEV1 to FVC  ratio was 100%, FNU86-40% was 35%. The patient had significant  postbronchodilator improvement on the study. The patient's lung volume  shows the total capacity was significantly reduced. The patient also  has some air trapping. Along with that, the patient's diffusion  capacity when adjusted for volume was normal.  The patient's flow-volume  loop was suggestive of restrictive pattern.     The patient also underwent a 6-minute walk test which shows baseline  oxygen saturation of 98% at room air at rest with a heart rate of 62,  respiratory rate of 18, dyspnea-modified Javier scale of 0,  fatigue-modified Javier scale of 0. The patient walked 1000 feet. The  patient did not have any exertional hypoxemia on the study. Total  distance walked was 1000 feet. Total expected 6-minute walk distance  was 1694 feet. The patient achieved 59% of the expected distance. The  patient had severe back pain after the walking.   The patient also had a  PFT done in 2018, at that time, the patient had FVC of 88%, FEV1 of 95%  and FEV1 to FVC ratio was 106% at that time. The patient had ATR70-13%  was 120% at that time. The patient's total lung capacity was 67% at  that time and diffusion capacity when adjusted for volume was normal at  that time. On the basis of this PFT, the patient has severe restrictive  lung disease along with reactive airway disease. The patient also has  air trapping suggestive of some tendency towards emphysema as compared  to 2018, the patient's FVC, FEV1, ICZ59-62% has significantly reduced. Also the patient has reduction in total lung capacity significantly as  compared to 2018. The patient does not have any exertional hypoxemia on  suboptimal exercise. Please correlate clinically. TSH 3.70          Assessment:    1. Shortness of breath        2. Allergic sinusitis      3. Restrictive lung disease      4.  Chronic diastolic congestive heart failure ([address]                Plan:   · Patient's review of system were discussed  · Patient and family were told about the auscultatory findings and its implications  · Patient and family have been shown the CT scan of the chest along with interpretation/differential diagnosis/implications/options  · Patient to continue with diuretics as given by cardiology  · Patient needs to weigh himself on a daily basis and if he gains more than 2 pounds in 24 hours time or more than 4 pounds in a week then patient may require extra diuresis after discussion with cardiology/internal  medicine  · Patient Synthroid as per PCP as per TSH to continue  · Salt and fluid restrictions discussed  · Patient's PFT results were interpreted along with interpretation/implications and options  · Patient continues to be symptomatic because patient has not followed up on any recommendations which was given to him last time, patient was told that he has a sample of Trelegy Ellipta and he is to try that and see if that makes a difference or not and once again was told that once he tries it and sees if it is better not then we will can decide upon discontinuation and if he has to be continued then can discuss about options to get him the medications and patient should not worry about cost at this time till he has tried it and patient has samples and which does not cost him anything and patient was willing to do that  · Some sugar-free lozenges may also help  · Patient was told to rinse mouth with water after use of Trelegy Ellipta otherwise he can have hoarseness of voice oral thrush  · Patient to take albuterol inhaler 2 puffs every 6 and whenever necessary basis  · Patient may benefit from a pulmonary rehab and after discussion patient was agreeable and patient was referred to [facility] rehab for the same as it is closer to his house-reinforced to try that again  · Patient to follow-up in 3 months time or earlier if required  · A patient's long-acting inhaler is helping him -patient was told to call back for refill  · Patient states that he is up-to-date on the vaccines  · Patient to continue with nasal sprays as given by ENT  · Stimulation at nighttime before going to bed will help  · Diet and lifestyle modification discussed  · Patient was told a humidifier around-the-clock may help  · Patient also will benefit from buying a HEPA filter given that he has exposures to animal dander  · Further management depending on patient's clinical status after follow-up on above recommendations

## 2022-02-01 NOTE — TELEPHONE ENCOUNTER
Left message for pt informing of Dr. Opal Pierce response. Patient to return call if any questions or concerns.

## 2022-02-01 NOTE — TELEPHONE ENCOUNTER
Patient call to give a update for his SOB  Patient stated that  while using Trelegy Ellipta qd, he was having trouble  w/ SOB while sleeping at night ,pt use Trlegy for 8 days and now he's only  using his albutero inhale  and only helping little he stated   ,pt also wonder if he may need Oxygen, his O2 been at 96% to 97%  Please advise

## 2022-02-08 NOTE — PROGRESS NOTES
[facility], [address], [address], [facility] [place]  P: [phone]       Patient     Nicola Sellers  1940    ChiefComplaint     Chief Complaint   Patient presents with    Follow-up     Patient here with wife Mirna Mane- States that last night he felt like his throat was going to close up and he had trouble breathing. States that he took a mucous pill before going to bed and this is the only thing that is different. Used his inhaler and it did not help. Is feeling better today.  Nasal Congestion     States that his nose is secondary to constricted throat feeling       History of Present Illness     Amelie Lopes is an 78-year-old male here today for evaluation. Last night reports 4 hours of difficulty breathing. Resolved spontaneously, did not seek any care. At baseline he is doing well. Nasal congestion improved. Continues to have thick mucus makes sleeping at night difficult.     Past Medical History     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 ([address] 04/2016    TIA in 1997    Pleural effusion, bilateral 7/13/2021    Routine health maintenance     refuses immunizations; declines PSA    Tinnitus     Vasovagal reaction     to needles    Vitamin D deficiency disease 12/15       Past Surgical History     Past Surgical History:   Procedure Laterality Date    CORONARY ANGIOPLASTY WITH STENT PLACEMENT  08/14/2000    RX Tristar 2.5 x 13 mCX  RX Tristar 2.5 x 13 pCX    CORONARY ANGIOPLASTY WITH STENT PLACEMENT  10/19/2000    Tetra 2.75 x 18 CX  Tetra 3.0 x 13 CX    OTHER SURGICAL HISTORY  1-23-12    phaco emulsification cataract right eye    TONSILLECTOMY      TOOTH EXTRACTION         Family History     Family History   Problem Relation Age of Onset    Emphysema Mother     Stroke Father     Other Daughter         Factor V deficiency    Other Grandchild         Factor V deficiency       Social History     Social History     Tobacco Use    Smoking status: Former Smoker     Packs/day: 2.00     Years: 20.00     Pack years: 40.00     Types: Cigarettes     Start date: 1956     Quit date: 1975     Years since quittin.3    Smokeless tobacco: Never Used   Vaping Use    Vaping Use: Never used   Substance Use Topics    Alcohol use: No    Drug use: No        Allergies     Allergies   Allergen Reactions    Metolazone      Sodium 120 on , was on metolazone, no hypovolemic synptoms on presentation    Lipitor      Myalgias at 20 mg    Simvastatin      myalgias       Medications     Current Outpatient Medications   Medication Sig Dispense Refill    Ascorbic Acid (VITAMIN C) 250 MG tablet Take 250 mg by mouth daily      omeprazole (PRILOSEC) 40 MG delayed release capsule Take 1 capsule by mouth every morning (before breakfast) 30 capsule 1    metFORMIN (GLUCOPHAGE) 1000 MG tablet TAKE 1 TABLET BY MOUTH  TWICE DAILY WITH MEALS 180 tablet 3    pravastatin (PRAVACHOL) 40 MG tablet TAKE 1 TABLET BY MOUTH  DAILY AT NIGHT 90 tablet 1    azelastine (ASTELIN) 0.1 % nasal spray 1 spray by Nasal route 2 times daily Use in each nostril as directed 3 each 3    torsemide (DEMADEX) 20 MG tablet TAKE 1 TABLET BY MOUTH  DAILY 90 tablet 3    fluticasone-umeclidin-vilant (TRELEGY ELLIPTA) 100-62.5-25 MCG/INH AEPB Inhale 1 puff into the lungs daily 2 each 0    albuterol sulfate HFA (PROAIR HFA) 108 (90 Base) MCG/ACT inhaler Inhale 2 puffs into the lungs every 6 hours as needed for Wheezing 1 Inhaler 5    blood glucose test strips (ACCU-CHEK DIONTE PLUS) strip TEST TWO TIMES DAILY dx:E11.9 200 strip 3    levothyroxine (SYNTHROID) 88 MCG tablet TAKE 1 TABLET BY MOUTH  DAILY 90 tablet 3    amLODIPine (NORVASC) 5 MG tablet TAKE 1 TABLET EVERY DAY 90 tablet 5    metoprolol tartrate (LOPRESSOR) 50 MG tablet TAKE 1 TABLET TWICE DAILY 180 tablet 5    losartan (COZAAR) 100 MG tablet TAKE 1 TABLET EVERY DAY 90 tablet 5    Accu-Chek Softclix Lancets MISC Tests two times daily Dx: E11.9 200 each 3    Omega-3 Fatty Acids (FISH OIL PO) Take by mouth      Multiple Vitamin (MULTI-VITAMIN DAILY PO) Take by mouth      Coenzyme Q10 (COQ10 PO) Take by mouth      Ascorbic Acid (VITAMIN C PO) Take by mouth 2 times daily      guaiFENesin 400 MG tablet Take 400 mg by mouth nightly       magnesium oxide (MAG-OX) 400 MG tablet Take 400 mg by mouth daily      ferrous sulfate 325 (65 Fe) MG tablet Take 325 mg by mouth daily (with breakfast)       aspirin 81 MG EC tablet Take 1 tablet by mouth daily 30 tablet 0    Cholecalciferol (VITAMIN D) 2000 UNITS CAPS capsule Take  by mouth daily. No current facility-administered medications for this visit. Review of Systems     Review of Systems   Constitutional: Negative for appetite change, chills, fatigue, fever and unexpected weight change. HENT: Positive for postnasal drip. Negative for congestion, ear discharge, ear pain, facial swelling, hearing loss, nosebleeds, sinus pressure, sneezing, sore throat, tinnitus, trouble swallowing and voice change. Eyes: Negative for itching. Respiratory: Negative for apnea, cough and shortness of breath. Endocrine: Negative for cold intolerance and heat intolerance. Musculoskeletal: Negative for myalgias and neck pain. Skin: Negative for rash. Allergic/Immunologic: Negative for environmental allergies. Neurological: Negative for dizziness and headaches. Psychiatric/Behavioral: Negative for confusion, decreased concentration and sleep disturbance.          PhysicalExam     Vitals:    02/08/22 0951   BP: 129/75   Site: Right Upper Arm   Position: Sitting   Cuff Size: Medium Adult   Pulse: 59   Resp: 17   Temp: 97.7 °F (36.5 °C)   TempSrc: Infrared   Weight: 161 lb (73 kg)   Height: 5' 11\" (1.803 m)       Physical Exam  Constitutional:       General: He is not in acute distress. Appearance: He is well-developed. HENT:      Head: Normocephalic and atraumatic. Right Ear: Tympanic membrane, ear canal and external ear normal. No drainage. No middle ear effusion. Tympanic membrane is not bulging. Tympanic membrane has normal mobility. Left Ear: Tympanic membrane, ear canal and external ear normal. No drainage. No middle ear effusion. Tympanic membrane is not bulging. Tympanic membrane has normal mobility. Nose: Septal deviation (severe to left) present. No mucosal edema or rhinorrhea. Mouth/Throat:      Lips: Pink. Mouth: Mucous membranes are moist.      Tongue: No lesions. Palate: No mass. Pharynx: Uvula midline. Comments: Mirror exam was performed. The nasopharynx, larynx,or hypopharynx were examined. Vocal fold motion was examined. Pertinent findings include: normal examination- no edema, mass or mucus    Eyes:      Pupils: Pupils are equal, round, and reactive to light. Neck:      Thyroid: No thyroid mass or thyromegaly. Trachea: Trachea and phonation normal.   Cardiovascular:      Pulses: Normal pulses. Pulmonary:      Effort: Pulmonary effort is normal. No accessory muscle usage or respiratory distress. Breath sounds: No stridor. Musculoskeletal:      Cervical back: Full passive range of motion without pain. Lymphadenopathy:      Head:      Right side of head: No submental or submandibular adenopathy. Left side of head: No submental or submandibular adenopathy. Cervical: No cervical adenopathy. Right cervical: No superficial, deep or posterior cervical adenopathy. Left cervical: No superficial, deep or posterior cervical adenopathy. Skin:     General: Skin is warm and dry. Neurological:      Mental Status: He is alert and oriented to person, place, and time. Cranial Nerves: No cranial nerve deficit. Coordination: Coordination normal.      Gait: Gait normal.   Psychiatric:         Thought Content: Thought content normal.           Assessment and Plan     1. Nasal congestion  -Improved with Flonase and Astelin    2. Post-nasal drainage  -Ongoing despite nasal congestion improvement  -We will try a trial of her reflux medications to see if this improves the mucus    3. Gastroesophageal reflux disease, unspecified whether esophagitis present    - omeprazole (PRILOSEC) 40 MG delayed release capsule; Take 1 capsule by mouth every morning (before breakfast)  Dispense: 30 capsule; Refill: 1      Follow-up in 1 month. Can consider atropine drops sublingually. Tamy Reyna DO  2/8/22      Portions of this note were dictated using Dragon.  There may be linguistic errors secondary to the use of this program.

## 2022-02-11 NOTE — PATIENT INSTRUCTIONS
Please read the healthy family handout that you were given and share it with your family. Please compare this printed medication list with your medications at home to be sure they are the same. If you have any medications that are different please contact us immediately at [phone]. Also review your allergies that we have listed, these may also include medications that you have not been able to tolerate, make sure everything listed is correct. If you have any allergies that are different please contact us immediately at [phone].

## 2022-02-11 NOTE — PROGRESS NOTES
CHIEF COMPLAINT  Chief Complaint   Patient presents with    Shortness of Breath    Congestion        HPI   Carlo Guzman is a 80 y.o. male who presents to the office complaining of cough, congestion, shortness of breath upon exertion for several days to maybe weeks. Patient reports that he is also had ongoing body aches for several months as well. Patient's wife does report that his symptoms worsened approximately 3 to 4 days ago. No fever or chills. No episodes of dizziness, lightheadedness, chest pain. Patient did have Covid testing done earlier today which was negative. Patient sees pulmonology, ENT, and cardiology on a regular basis. Patient reports that he stopped his cholesterol medication for approximately 1 week and continued to have myalgias and was told that that was not contributing to his pain therefore he went back to his normal dose of 40 mg daily. Patient feels as if 1 week was not long enough to see if medication was the cause. Patient has history of CHF, chronic bronchitis, restrictive lung disease. Patient sees pulmonology and cardiology on a regular basis for treatment and evaluation. Patient also sees ENT for chronic sinusitis. Last antibiotics were in November. Patient reports he was placed on a new inhaler however it made his symptoms worse. No other complaints, modifying factors or associated symptoms. Nursing notes reviewed.    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 ([facility] [address] 04/2016    TIA in 1997    Pleural effusion, bilateral 7/13/2021    Routine health maintenance     refuses immunizations; declines PSA    Tinnitus     Vasovagal reaction     to needles    Vitamin D deficiency disease 12/15     Past Surgical History:   Procedure Laterality Date  CORONARY ANGIOPLASTY WITH STENT PLACEMENT  2000    RX Tristar 2.5 x 13 mCX  RX Tristar 2.5 x 13 pCX    CORONARY ANGIOPLASTY WITH STENT PLACEMENT  10/19/2000    Tetra 2.75 x 18 CX  Tetra 3.0 x 13 CX    OTHER SURGICAL HISTORY  12    phaco emulsification cataract right eye    TONSILLECTOMY      TOOTH EXTRACTION       Family History   Problem Relation Age of Onset    Emphysema Mother     Stroke Father     Other Daughter         Factor V deficiency    Other Grandchild         Factor V deficiency     Social History     Socioeconomic History    Marital status:      Spouse name: Not on file    Number of children: Not on file    Years of education: Not on file    Highest education level: Not on file   Occupational History    Not on file   Tobacco Use    Smoking status: Former Smoker     Packs/day: 2.00     Years: 20.00     Pack years: 40.00     Types: Cigarettes     Start date: 1956     Quit date: 1975     Years since quittin.3    Smokeless tobacco: Never Used   Vaping Use    Vaping Use: Never used   Substance and Sexual Activity    Alcohol use: No    Drug use: No    Sexual activity: Yes     Partners: Female   Other Topics Concern    Not on file   Social History Narrative    Not on file     Social Determinants of Health     Financial Resource Strain:     Difficulty of Paying Living Expenses: Not on file   Food Insecurity:     Worried About Running Out of Food in the Last Year: Not on file    [place] of Food in the Last Year: Not on file   Transportation Needs:     Lack of Transportation (Medical): Not on file    Lack of Transportation (Non-Medical):  Not on file   Physical Activity:     Days of Exercise per Week: Not on file    Minutes of Exercise per Session: Not on file   Stress:     Feeling of Stress : Not on file   Social Connections:     Frequency of Communication with Friends and Family: Not on file    Frequency of Social Gatherings with Friends and Family: Not on file    Attends Mandaeism Services: Not on file    Active Member of Clubs or Organizations: Not on file    Attends Club or Organization Meetings: Not on file    Marital Status: Not on file   Intimate Partner Violence:     Fear of Current or Ex-Partner: Not on file    Emotionally Abused: Not on file    Physically Abused: Not on file    Sexually Abused: Not on file   Housing Stability:     Unable to Pay for Housing in the Last Year: Not on file    Number of [place] in the Last Year: Not on file    Unstable Housing in the Last Year: Not on file     Current Outpatient Medications   Medication Sig Dispense Refill    azithromycin (ZITHROMAX) 250 MG tablet Take 1 tablet by mouth See Admin Instructions for 5 days 500mg on day 1 followed by 250mg on days 2 - 5 6 tablet 0    predniSONE (DELTASONE) 20 MG tablet Take 2 tablets by mouth daily for 5 days 10 tablet 0    Accu-Chek Softclix Lancets MISC TEST TWICE DAILY 200 each 3    Ascorbic Acid (VITAMIN C) 250 MG tablet Take 250 mg by mouth daily      omeprazole (PRILOSEC) 40 MG delayed release capsule Take 1 capsule by mouth every morning (before breakfast) 30 capsule 1    metFORMIN (GLUCOPHAGE) 1000 MG tablet TAKE 1 TABLET BY MOUTH  TWICE DAILY WITH MEALS 180 tablet 3    pravastatin (PRAVACHOL) 40 MG tablet TAKE 1 TABLET BY MOUTH  DAILY AT NIGHT 90 tablet 1    azelastine (ASTELIN) 0.1 % nasal spray 1 spray by Nasal route 2 times daily Use in each nostril as directed 3 each 3    torsemide (DEMADEX) 20 MG tablet TAKE 1 TABLET BY MOUTH  DAILY 90 tablet 3    fluticasone-umeclidin-vilant (TRELEGY ELLIPTA) 100-62.5-25 MCG/INH AEPB Inhale 1 puff into the lungs daily 2 each 0    albuterol sulfate HFA (PROAIR HFA) 108 (90 Base) MCG/ACT inhaler Inhale 2 puffs into the lungs every 6 hours as needed for Wheezing 1 Inhaler 5    blood glucose test strips (ACCU-CHEK DIONTE PLUS) strip TEST TWO TIMES DAILY dx:E11.9 200 strip 3    levothyroxine (SYNTHROID) 88 MCG tablet TAKE 1 TABLET BY MOUTH  DAILY 90 tablet 3    amLODIPine (NORVASC) 5 MG tablet TAKE 1 TABLET EVERY DAY 90 tablet 5    metoprolol tartrate (LOPRESSOR) 50 MG tablet TAKE 1 TABLET TWICE DAILY 180 tablet 5    losartan (COZAAR) 100 MG tablet TAKE 1 TABLET EVERY DAY 90 tablet 5    Omega-3 Fatty Acids (FISH OIL PO) Take by mouth      Multiple Vitamin (MULTI-VITAMIN DAILY PO) Take by mouth      Coenzyme Q10 (COQ10 PO) Take by mouth      Ascorbic Acid (VITAMIN C PO) Take by mouth 2 times daily      guaiFENesin 400 MG tablet Take 400 mg by mouth nightly       magnesium oxide (MAG-OX) 400 MG tablet Take 400 mg by mouth daily      ferrous sulfate 325 (65 Fe) MG tablet Take 325 mg by mouth daily (with breakfast)       aspirin 81 MG EC tablet Take 1 tablet by mouth daily 30 tablet 0    Cholecalciferol (VITAMIN D) 2000 UNITS CAPS capsule Take  by mouth daily. No current facility-administered medications for this visit. Allergies   Allergen Reactions    Metolazone      Sodium 120 on 5/19, was on metolazone, no hypovolemic synptoms on presentation    Lipitor      Myalgias at 20 mg    Simvastatin      myalgias       REVIEW OF SYSTEMS  Review of Systems   Constitutional: Negative for activity change, appetite change, chills and fever. HENT: Negative for congestion, rhinorrhea and sore throat. Eyes: Negative for visual disturbance. Respiratory: Positive for cough and shortness of breath. Negative for chest tightness and wheezing. Cardiovascular: Negative for chest pain and leg swelling. Gastrointestinal: Negative for abdominal pain, diarrhea, nausea and vomiting. Genitourinary: Negative for dysuria, frequency, hematuria and urgency. Musculoskeletal: Positive for myalgias. Negative for gait problem. Skin: Negative for rash. Neurological: Negative for dizziness, weakness, light-headedness, numbness and headaches.    Psychiatric/Behavioral: Negative for sleep disturbance. PHYSICAL EXAM  BP (!) 155/78   Pulse 73   Temp 97.8 °F (36.6 °C) (Oral)   Wt 175 lb (79.4 kg)   SpO2 92%   BMI 24.41 kg/m²   Physical Exam  Constitutional:       Appearance: Normal appearance. He is not toxic-appearing. HENT:      Head: Normocephalic and atraumatic. Right Ear: External ear normal.      Left Ear: External ear normal.      Nose: Nose normal.      Mouth/Throat:      Mouth: Mucous membranes are moist.      Pharynx: Oropharynx is clear. Eyes:      Extraocular Movements: Extraocular movements intact. Conjunctiva/sclera: Conjunctivae normal.      Pupils: Pupils are equal, round, and reactive to light. Cardiovascular:      Rate and Rhythm: Normal rate and regular rhythm. Pulses: Normal pulses. Pulmonary:      Effort: Pulmonary effort is normal.      Breath sounds: Normal breath sounds. Abdominal:      Palpations: Abdomen is soft. Tenderness: There is no guarding. Musculoskeletal:         General: Normal range of motion. Cervical back: Normal range of motion. Right lower leg: No edema. Left lower leg: No edema. Skin:     General: Skin is warm and dry. Capillary Refill: Capillary refill takes less than 2 seconds. Neurological:      Mental Status: He is alert and oriented to person, place, and time. ASSESSMENT/PLAN:   1. Simple chronic bronchitis ([address]  Patient presents today with worsening shortness of breath upon exertion, cough, and chest tightness. Patient reports that he has had symptoms for several weeks but have worsened over the past 3 days. Patient did go for COVID testing earlier today which was negative. Patient has history of chronic bronchitis and restrictive lung disease. Patient does follow with pulmonology on a regular basis. Patient reports using his albuterol inhaler 1-2 times a day. Patient reports as long as he is resting he feels fine.   Patient reports that he was trialed on a different inhaler however it made his shortness of breath worse. I did recommend obtaining chest x-ray for further evaluation however patient was placed on a course of steroids as well as antibiotics at this time. Patient aware of steroid-induced hyperglycemia and that he must be more compliant with his diet due to diabetes mellitus. Patient encouraged to drink plenty of fluids continue with over-the-counter decongestant/Mucinex as well as using nasal sprays and drinking plenty of fluids. Patient aware that if symptoms do not improve or worsen he will need to follow back up with pulmonologist for further treatment/evaluation. Patient verbalized and acknowledges with plan of care at this time. - azithromycin (ZITHROMAX) 250 MG tablet; Take 1 tablet by mouth See Admin Instructions for 5 days 500mg on day 1 followed by 250mg on days 2 - 5  Dispense: 6 tablet; Refill: 0  - predniSONE (DELTASONE) 20 MG tablet; Take 2 tablets by mouth daily for 5 days  Dispense: 10 tablet; Refill: 0    2. Cough  See above #1  - XR CHEST STANDARD (2 VW); Future    3. Short of breath on exertion  See above #1  - XR CHEST STANDARD (2 VW); Future    4. Myalgia  Patient reports ongoing muscle aches and pains. Patient reports that he contributes it to his cholesterol medication. Patient reports that he was off of his medication for only 1 week and notified his cardiologist that his symptoms had not improved therefore he was placed back on it at 40 mg daily. Patient feels that being off of it for 1 week was not long enough. I did recommend that patient hold medication for 2 weeks and call our office to see if symptoms improve if not patient may need to go on Crestor. I did recommend that we obtain labs for evaluation due to patient's history of hypomagnesium as well. Patient encouraged to monitor his symptoms and call our office for any new or worsening symptoms. Patient verbalized acknowledges with plan of care at this time.   - MAGNESIUM         The note was completed using Dragon voice recognition transcription. Every effort was made to ensure accuracy; however, inadvertent  transcription errors may be present despite my best efforts to edit errors.     AICHA Ackerman - CNP

## 2022-02-15 PROBLEM — I50.9 ACUTE ON CHRONIC CONGESTIVE HEART FAILURE (HCC): Status: ACTIVE | Noted: 2022-01-01

## 2022-02-15 NOTE — H&P
Hospital Medicine History & Physical      PCP: AICHA Parr - CNP    Date of Service: Pt seen/examined on 2/15/22 and admitted on 2/15/22 to Inpatient    Chief Complaint   Patient presents with    Shortness of Breath     pt c/o increased SOB over last week. no hx of home o2. no fever. feeling tightness in chest. LE swelling worsening. PCP increased water pill today. 40mg x 3 days then back to 20mg daily. conversational dyspnea w exertion. no confirmed covid , +vaccine. History Of Present Illness: The patient is a 80 y.o. male with PMH below, presents with SOB/IBARRA, orthopnea. Pt reports that the above Sx have been worsening over the last 2-3 weeks. He called his PCP yd and they increased his home diuretic (doubled over baseline). He did take an extra dose today. He is most concerned that eh has been becoming increasingly SOB w/ short ambulation. He also notes that he is now unable to lay flat which is unusual for him. He was found to be satting 83% in the ED and was initially requiring 2L O2. He was subsequently weaned off O2. He is not normally on O2. He dneis CP. He does have hx of CAD and has had CABG in the past.  He laso has hx of stroke/TIA.        Past Medical History:        Diagnosis Date    Allergic rhinitis     Family history of factor V deficiency     daughter and grand daughter    Hyponatremia 05/09/2019    admitted; secondary to metolazone    Ischemic cerebrovascular accident (CVA) of frontal lobe ([facility] [place] [address] 04/2016    TIA in 1997    Pleural effusion, bilateral 7/13/2021    Routine health maintenance     refuses immunizations; declines PSA    Tinnitus     Vasovagal reaction     to needles    Vitamin D deficiency disease 12/15       Past Surgical History:        Procedure Laterality Date    CORONARY ANGIOPLASTY WITH STENT PLACEMENT  08/14/2000    RX Tristar 2.5 x 13 mCX  RX Tristar 2.5 x 13 pCX    CORONARY ANGIOPLASTY WITH STENT PLACEMENT  10/19/2000    Tetra 2.75 x 18 CX  Tetra 3.0 x 13 CX    OTHER SURGICAL HISTORY  1-23-12    phaco emulsification cataract right eye    TONSILLECTOMY      TOOTH EXTRACTION         Medications Prior to Admission:    Prior to Admission medications    Medication Sig Start Date End Date Taking?  Authorizing Provider   azithromycin (ZITHROMAX) 250 MG tablet Take 1 tablet by mouth See Admin Instructions for 5 days 500mg on day 1 followed by 250mg on days 2 - 5 2/11/22 2/16/22  AICHA Perry CNP   predniSONE (DELTASONE) 20 MG tablet Take 2 tablets by mouth daily for 5 days 2/11/22 2/16/22  AICHA Perry CNP   Accu-Chek Softclix Lancets MISC TEST TWICE DAILY 2/10/22   AICHA Perry CNP   Ascorbic Acid (VITAMIN C) 250 MG tablet Take 250 mg by mouth daily    Historical Provider, MD   omeprazole (PRILOSEC) 40 MG delayed release capsule Take 1 capsule by mouth every morning (before breakfast) 2/8/22   [place], DO   metFORMIN (GLUCOPHAGE) 1000 MG tablet TAKE 1 TABLET BY MOUTH  TWICE DAILY WITH MEALS 1/6/22   AICHA Perry CNP   pravastatin (PRAVACHOL) 40 MG tablet TAKE 1 TABLET BY MOUTH  DAILY AT NIGHT 11/11/21   AICHA Perry CNP   azelastine (ASTELIN) 0.1 % nasal spray 1 spray by Nasal route 2 times daily Use in each nostril as directed 11/9/21   Tamy Reyna DO   torsemide (DEMADEX) 20 MG tablet TAKE 1 TABLET BY MOUTH  DAILY 10/26/21   AICHA Perry CNP   fluticasone-umeclidin-vilant (TRELEGY ELLIPTA) 100-62.5-25 MCG/INH AEPB Inhale 1 puff into the lungs daily 7/13/21   Honorio Hawk MD   albuterol sulfate HFA (PROAIR HFA) 108 (90 Base) MCG/ACT inhaler Inhale 2 puffs into the lungs every 6 hours as needed for Wheezing 7/13/21   Honorio Hawk MD   blood glucose test strips (ACCU-CHEK DIONTE PLUS) strip TEST TWO TIMES DAILY dx:E11.9 6/30/21   AICHA Perry CNP   levothyroxine (SYNTHROID) 88 MCG tablet TAKE 1 TABLET BY MOUTH  DAILY 6/21/21   AICHA Perry CNP   amLODIPine (NORVASC) 5 MG tablet TAKE 1 TABLET EVERY DAY 3/26/21   Nancy Fierro MD   metoprolol tartrate (LOPRESSOR) 50 MG tablet TAKE 1 TABLET TWICE DAILY 3/11/21   Nancy Fierro MD   losartan (COZAAR) 100 MG tablet TAKE 1 TABLET EVERY DAY 2/8/21   Nancy Fierro MD   Omega-3 Fatty Acids (FISH OIL PO) Take by mouth    Historical Provider, MD   Multiple Vitamin (MULTI-VITAMIN DAILY PO) Take by mouth    Historical Provider, MD   Coenzyme Q10 (COQ10 PO) Take by mouth    Historical Provider, MD   Ascorbic Acid (VITAMIN C PO) Take by mouth 2 times daily    Historical Provider, MD   guaiFENesin 400 MG tablet Take 400 mg by mouth nightly     Historical Provider, MD   magnesium oxide (MAG-OX) 400 MG tablet Take 400 mg by mouth daily    Historical Provider, MD   ferrous sulfate 325 (65 Fe) MG tablet Take 325 mg by mouth daily (with breakfast)     Historical Provider, MD   aspirin 81 MG EC tablet Take 1 tablet by mouth daily 3/5/18   Nancy Fierro MD   Cholecalciferol (VITAMIN D) 2000 UNITS CAPS capsule Take  by mouth daily. Historical Provider, MD       Allergies:  Metolazone, Lipitor, and Simvastatin    Social History:    TOBACCO:   reports that he quit smoking about 47 years ago. His smoking use included cigarettes. He started smoking about 65 years ago. He has a 40.00 pack-year smoking history. He has never used smokeless tobacco.  ETOH:   reports no history of alcohol use. Family History:  Reviewed in detail and negative for DM, Early CAD, Cancer (except as below).  Positive as follows:        Problem Relation Age of Onset    Emphysema Mother     Stroke Father     Other Daughter         Factor V deficiency    Other Grandchild         Factor V deficiency       REVIEW OF SYSTEMS:   Pertinent positives/negatives as follows: SOB/IBARRA, orthopnea, and as discussed in HPI, otherwise a complete ROS performed and all other systems are negative  PHYSICAL EXAM PERFORMED:    BP (!) 143/71   Pulse 77   Temp 98.1 °F (36.7 °C)   Resp 18 SpO2 95%     GEN:  A&Ox3, NAD. HEENT:  NC/AT,EOMI, MMM, no erythema/exudates or visible masses. CVS:  Normal S1,S2. RRR. Without M/G/R.   LUNG:   CTA-B. no wheezes, rales or rhonchi  ABD:  Soft, ND/NT, BS+ x4. Without G/R.  EXT: 2+ pulses, no c/c/e. Brisk cap refill. PSY:  Thought process intact, affect appropriate. ALMA ROSA:  CN III-XII intact, moves all 4 spontaneously, sensory grossly intact. SKIN: No rash or lesions on visible skin. Chart review shows recent radiographs:  XR CHEST (2 VW)    Result Date: 2/13/2022  EXAMINATION: TWO XRAY VIEWS OF THE CHEST 2/11/2022 4:32 pm COMPARISON: Chest two views November 24, 2020. HISTORY: ORDERING SYSTEM PROVIDED HISTORY: Cough TECHNOLOGIST PROVIDED HISTORY: Reason for exam:->sob with exertion, cough/congestion Reason for Exam: Cough, congestion, SOB with exertion FINDINGS: The heart is mildly to moderately enlarged with otherwise unremarkable configuration. The mediastinal contours are within normal limits. Moderate bilateral pleural effusions are seen with adjacent bibasilar compressive atelectasis. Pulmonary vasculature is prominent and mildly indistinct. . Bones and soft tissues are unremarkable. Mild-to-moderate cardiomegaly, suspected mild-to-moderate pulmonary interstitial edema and moderate bilateral pleural effusions suggestive of congestive heart failure. XR CHEST 1 VIEW    Result Date: 2/14/2022  EXAMINATION: ONE XRAY VIEW OF THE CHEST 2/14/2022 5:27 pm COMPARISON: 02/11/2022. HISTORY: ORDERING SYSTEM PROVIDED HISTORY: sob TECHNOLOGIST PROVIDED HISTORY: Reason for exam:->sob Reason for Exam: SOB after cycling today FINDINGS: The cardiomediastinal silhouette is stable status post median sternotomy. Aortic vascular calcification. Vascular congestion with perihilar edema and moderate bilateral pleural effusions consistent with CHF without significant interval change. There is associated bibasilar atelectasis. Stable apical fibrotic changes. Stable chest.  CHF with moderate bilateral pleural effusions with bibasilar atelectasis. EKG:    EKG 12 Lead [[id number]]    Collected: 02/14/22 1722    Updated: 02/14/22 1814     Ventricular Rate 75 BPM    Atrial Rate 416 BPM    QRS Duration 112 ms    Q-T Interval 380 ms    QTc Calculation (Bazett) 424 ms    R Axis 113 degrees    T Axis -41 degrees    Diagnosis Normal sinus rhythmRight axis deviationIncomplete left bundle branch blockST & T wave abnormality inferolateral lead consider ischenia vs repolarization changepossible LVH with repolarization changeAbnormal ECGNo previous ECGs availableConfirmed by CA HOLLIDAY MD ([id number]) on 2/14/2022 6:14:15 PM     EKG 12 Lead [[id number]]    Collected: 02/14/22 2157    Updated: 02/14/22 2208     Ventricular Rate 80 BPM    Atrial Rate 77 BPM    QRS Duration 110 ms    Q-T Interval 368 ms    QTc Calculation (Bazett) 424 ms    R Axis -9 degrees    T Axis 177 degrees    Diagnosis Accelerated Junctional rhythmSeptal infarct , age undeterminedST & T wave abnormality, consider lateral ischemiaAbnormal ECGNo previous ECGs available         Transthoracic Echocardiography Report (TTE)      Demographics      Patient Name       Jenniffer Sun      Date of Study      08/25/2020         Gender              Male      Patient Number     [id number]         Date of Birth       1940      Visit Number       [id number]          Age                 [de-identified] year(s)      Accession Number   [id number]          Room Number         OP      Corporate ID       [id number]           Alla Ildefonso, [address] [place], [place] [address]      Ordering Physician Arminda Fong MD, [facility]           Physician           John Don MD, [facility]     Procedure     Type of Study      TTE procedure:ECHOCARDIOGRAM COMPLETE 2D W DOPPLER W COLOR.      Procedure Date  Date: 08/25/2020 Start: 07:27 AM     Study Location: [address] [place] [address] - Echo Lab  Technical Quality: Adequate visualization     Indications:Dyspnea/SOB.     Patient Status: Routine     Height: 70 inches Weight: 169 pounds BSA: 1.94 m2 BMI: 24.25 kg/m2     BP: 147/75 mmHg      Conclusions      Summary   Left ventricular systolic function is low normal with a visually estimated   ejection fraction of 50-55%. There is mild hypokinesis of the basal inferior   and inferoseptal walls. Grade III diastolic dysfunction with elevated LV pressure. The left atrium is mildly dilated. Mild posterior mitral annular calcification. Mild mitral, tricuspid and aortic regurgitation. Systolic pulmonary artery pressure (SPAP) is normal and estimated at 35 mmHg   (right atrial pressure 3 mmHg). There is a large left pleural effusion.       Signature      ------------------------------------------------------------------   Electronically signed by Taya Cohen MD, [facility] (Interpreting   physician) on 08/25/2020 at 10:14 AM   ------------------------------------------------------------------      CBC:  Recent Labs     02/14/22 2126   WBC 11.8*   HGB 9.7*   HCT 29.7*           RENAL  Recent Labs     02/14/22 2126   *   K 5.0   CL 88*   CO2 25   BUN 34*   CREATININE 1.2   GLUCOSE 239*       Hemoglobin a1c:  Lab Results   Component Value Date    LABA1C 6.2 11/18/2021    LABA1C 6.6 05/17/2021    LABA1C 6.2 11/16/2020       LFT'S:  Recent Labs     02/14/22 2126   AST 36   ALT 45*   BILITOT 0.3   ALKPHOS 181*     CARDIAC ENZYMES:   Recent Labs     02/14/22 2126   TROPONINI <0.01     Lab Results   Component Value Date    PROBNP 5,346 (H) 02/14/2022    PROBNP 1,286 (H) 06/10/2019    PROBNP 1,286 (H) 05/28/2019       LACTIC ACID:  Recent Labs     02/14/22 2126   LACTA 3.1*     VBG:  Recent Labs     02/14/22 2126   PHVEN 7.330*   ZTY6FPY 47.0   KOJ6TIG 24.2   PO2VEN 41.8*   L3WBYILQ 74        PHYSICIAN CERTIFICATION  I certify that Julia Decker Kavitha David is expected to be hospitalized for 2 midnights based on the following assessment and plan:    ASSESSMENT/PLAN:  1. Acute on chr dCHF (Gr 3) in exacerbation. BNP 5,346. IV Lasix BID, tele, serial trops. Cards and CHF RN c/s.   2. Acute respiratory failure with hypoxia, likely related to above, supplemental O2 to maintain SPO2 ? 92%, continuous pulse ox. Satting 83% on RA in the ER. Currently requiring weaned off O2. Patient normally not on O2 at home. Wean as tolerated. 3. Hyponatremia, 127 corrected (Sindhu Yoder). Pt reports has had in past and was thought to be related to metolazone. Hold ARB and home Demadex. Chk urine and serum osmo. Repeat metabolic panel in am.  Nephro c/s. 4. EKG change, initially T wave inversions in INF leads. Improved on repeat. Extensive hx of CAD including CABG and stents. Tele, serial trops. Denies CP. 5. DM2, mildly uncontrolled w/ initial , hold oral Rx, chk A1c, add Low SSI. 6. Lactic acidosis, 3.1. Repeats ordered. 7. Anemia, normocytic/hypochromic. HGB 9.7, no recent values but last was 12.6 in 12/2020. Denies melena or known bleeding. Chk occult stool. 8. Hypothyroidism, check TSH with reflex and continue home levothyroxine. DVT Prophylaxis: Lx  Diet: CC, Low Na. Code Status: Full Code  PT/OT Eval Status: Will order if needed and as patient condition allows  Dispo - Admit to inpatient PCU    Yari Jacobo MD    Thank you AICHA Barnard - KENRICK for the opportunity to be involved in this patient's care. If you have any questions or concerns please feel free to contact me via the [email or web address] Answering Service at [phone]. This chart was generated using the [address] [place] [address] [address]  [employer] system. I created this record but it may contain dictation errors given the limitations of this technology.

## 2022-02-15 NOTE — ED NOTES
Writer checked pulse ox by ambulating pt from T2 to C-chair nearest exit to lobby. Pt's 02 remained at 97%. Pt denied SOB during ambulation.      Eliot Rogers RN  02/14/22 [id number]

## 2022-02-15 NOTE — PLAN OF CARE
Problem: Falls - Risk of:  Goal: Will remain free from falls  Description: Will remain free from falls  2/15/2022 1142 by Fiona Castellon RN  Outcome: Ongoing  2/15/2022 0258 by Mary Ely RN  Outcome: Ongoing  Goal: Absence of physical injury  Description: Absence of physical injury  2/15/2022 1142 by Fiona Castellon RN  Outcome: Ongoing  2/15/2022 0258 by Mary Ely RN  Outcome: Ongoing     Problem: Infection:  Goal: Will remain free from infection  Description: Will remain free from infection  Outcome: Ongoing     Problem: Safety:  Goal: Free from accidental physical injury  Description: Free from accidental physical injury  Outcome: Ongoing  Goal: Free from intentional harm  Description: Free from intentional harm  Outcome: Ongoing     Problem: Daily Care:  Goal: Daily care needs are met  Description: Daily care needs are met  Outcome: Ongoing     Problem: Pain:  Goal: Patient's pain/discomfort is manageable  Description: Patient's pain/discomfort is manageable  Outcome: Ongoing     Problem: Skin Integrity:  Goal: Skin integrity will stabilize  Description: Skin integrity will stabilize  Outcome: Ongoing     Problem: Discharge Planning:  Goal: Patients continuum of care needs are met  Description: Patients continuum of care needs are met  Outcome: Ongoing     Problem:  Activity:  Goal: Capacity to carry out activities will improve  Description: Capacity to carry out activities will improve  Outcome: Ongoing  Goal: Will verbalize the importance of balancing activity with adequate rest periods  Description: Will verbalize the importance of balancing activity with adequate rest periods  Outcome: Ongoing     Problem: Cardiac:  Goal: Hemodynamic stability will improve  Description: Hemodynamic stability will improve  Outcome: Ongoing  Goal: Ability to maintain an adequate cardiac output will improve  Description: Ability to maintain an adequate cardiac output will improve  Outcome: Ongoing     Problem: Coping:  Goal: Verbalizations of decreased anxiety will decrease  Description: Verbalizations of decreased anxiety will decrease  Outcome: Ongoing     Problem: Fluid Volume:  Goal: Risk for excess fluid volume will decrease  Description: Risk for excess fluid volume will decrease  Outcome: Ongoing  Goal: Maintenance of adequate hydration will improve  Description: Maintenance of adequate hydration will improve  Outcome: Ongoing  Goal: Will show no signs and symptoms of electrolyte imbalance  Description: Will show no signs and symptoms of electrolyte imbalance  Outcome: Ongoing     Problem: Health Behavior:  Goal: Ability to manage health-related needs will improve  Description: Ability to manage health-related needs will improve  Outcome: Ongoing  Goal: Ability to seek appropriate health care will improve  Description: Ability to seek appropriate health care will improve  Outcome: Ongoing     Problem: Nutritional:  Goal: Maintenance of adequate nutrition will improve  Description: Maintenance of adequate nutrition will improve  Outcome: Ongoing     Problem: Physical Regulation:  Goal: Complications related to the disease process, condition or treatment will be avoided or minimized  Description: Complications related to the disease process, condition or treatment will be avoided or minimized  Outcome: Ongoing     Problem: Respiratory:  Goal: Ability to maintain adequate ventilation will improve  Description: Ability to maintain adequate ventilation will improve  Outcome: Ongoing  Goal: Respiratory status will improve  Description: Respiratory status will improve  Outcome: Ongoing

## 2022-02-15 NOTE — ED NOTES
Update given to patient by Donis Matos. Pt resting comfortably at this time.       Thalia Ndiaye RN  02/15/22 5634

## 2022-02-15 NOTE — FLOWSHEET NOTE
02/15/22 0900   Vital Signs   Temp Source Oral   Pulse 81   Resp 17   BP (!) 122/54   BP Location Left upper arm   Level of Consciousness Alert (0)   MEWS Score 1   Oxygen Therapy   SpO2 96 %   O2 Device None (Room air)     Alert and pleasant. Awake in room. resp e/e unlabored. No s/s distress noted. Nursing asmt completed. Call light in easy reach and urinal in easy reach. No voiced concerns.

## 2022-02-15 NOTE — ED NOTES
Bed: 18  Expected date:   Expected time:   Means of arrival:   Comments:     Mayra Engle RN  02/15/22 0145

## 2022-02-15 NOTE — CARE COORDINATION
Case Management Assessment  Initial Evaluation      Patient Name: Braden Grier  YOB: 1940  Diagnosis: Acute on chronic heart failure, unspecified heart failure type ([facility] [I50.9]  Acute on chronic congestive heart failure, unspecified heart failure type [facility]) [I50.9]  Date / Time: 2/14/2022  8:30 PM    Admission status/Date:2/15/2022  Chart Reviewed: Yes      Patient Interviewed: Yes   Family Interviewed:  Yes - Juli Masterson at bedside with pt permission      Hospitalization in the last 30 days:  No      Health Care Decision Maker :   Primary Decision MakerPhoebe Harp - Spouse - [phone]    (CM - must 1st enter selection under Navigator - emergency contact- Health Care Decision Maker Relationship and pick relationship)   Who do you trust or have selected to make healthcare decisions for you      Met with: pt and spouse  Interview conducted  (bedside/phone):bedside    Current PCP:   Enriqueta Kwan required for SNF : Y          3 night stay required - [employer]  Support Systems/Care Needs: Spouse/Significant Other  Transportation: self    Meal Preparation: self    Housing  Living Arrangements: lives in 2 story house with spouse  Steps: 4-5  Intent for return to present living arrangements: Yes  Identified Issues:     [address] with [facility] : No Agency:(Services)  Type of Home Care Services: None  Passport/Waiver : No  :                      Phone Number:    Passport/Waiver Services:           Durable Medical Equiptment   DME Provider: none  Equipment:   [id number]__[id number]__[id number]___ BSC___Shower Chair___Hospital Bed___W/C____Other________  02 at ____Liter(s)---wears(frequency)_______ HHN ___ CPAP___ BiPap___   N/A____      Home O2 Use :  No    If No for home O2---if presently on O2 during hospitalization:  No    Community Service Affiliation  Dialysis:  No    · Agency:  · Location:  · Dialysis Schedule:  · Phone: · Fax: Other Community Services:none     DISCHARGE PLAN: Explained Case Management role/services. Chart reviewed. Met with pt and spouse at bedside. Pt is from house with spouse and plans to return. Pt is IPTA +drives. Nephrology and Cardiology consults. Will follow for dc needs.

## 2022-02-15 NOTE — ACP (ADVANCE CARE PLANNING)
Advance Care Planning     General Advance Care Planning (ACP) Conversation    Date of Conversation: 2/14/2022  Conducted with: Patient with Decision Making Capacity    Healthcare Decision Maker:    Primary Decision Maker: Lilliam Neves - [phone]  Click here to complete Healthcare Decision Makers including selection of the Healthcare Decision Maker Relationship (ie \"Primary\"). Today we documented Decision Maker(s) consistent with Legal Next of Kin hierarchy. Content/Action Overview:  Has NO ACP documents/care preferences - information provided, considering goals and options  Reviewed DNR/DNI and patient elects Full Code (Attempt Resuscitation)  ventilation preferences and resuscitation preferences  Pt elects to be Full Code and wants CPR if heart should stop and intubation if indicated.      Length of Voluntary ACP Conversation in minutes:  16 minutes    Chacha Kuo RN

## 2022-02-15 NOTE — CONSULTS
[employer]  Nephrology Consult Note           Reason for Consult: hyponatremia, CKD   Requesting Physician:  Dr. Ren Jamison    Chief Complaint:    Chief Complaint   Patient presents with    Shortness of Breath     pt c/o increased SOB over last week. no hx of home o2. no fever. feeling tightness in chest. LE swelling worsening. PCP increased water pill today. 40mg x 3 days then back to 20mg daily. conversational dyspnea w exertion. no confirmed covid , +vaccine. History of Present Illness on 2/15/22:    80 y.o. yo male with PMH of CAD s/p CABG in 2018, CVA, CHFpEF, DM II, former smoker who is admitted for sob  Pt has had increased sob for few months but in the last week had increased symptoms and could not lay flat. He also noticed increased swelling of his both legs ; received lasix 40 mg iv times one on 2/14 and 2/15 and reports of feeling well this am  Cr was noted to be 1.2 on admission  Since 12/2020, his cr as been  1.2-1.5    Past Medical History:        Diagnosis Date    Allergic rhinitis     Family history of factor V deficiency     daughter and grand daughter    Hyponatremia 05/09/2019    admitted; secondary to metolazone    Ischemic cerebrovascular accident (CVA) of frontal lobe ([facility] [address] 04/2016    TIA in 1997    Pleural effusion, bilateral 7/13/2021    Routine health maintenance     refuses immunizations; declines PSA    Tinnitus     Vasovagal reaction     to needles    Vitamin D deficiency disease 12/15       Past Surgical History:        Procedure Laterality Date    CORONARY ANGIOPLASTY WITH STENT PLACEMENT  08/14/2000    RX Tristar 2.5 x 13 mCX  RX Tristar 2.5 x 13 pCX    CORONARY ANGIOPLASTY WITH STENT PLACEMENT  10/19/2000    Tetra 2.75 x 18 CX  Tetra 3.0 x 13 CX    OTHER SURGICAL HISTORY  1-23-12    phaco emulsification cataract right eye    TONSILLECTOMY      TOOTH EXTRACTION         Home Medications:    No current facility-administered medications on file prior to encounter. Current Outpatient Medications on File Prior to Encounter   Medication Sig Dispense Refill    azithromycin (ZITHROMAX) 250 MG tablet Take 1 tablet by mouth See Admin Instructions for 5 days 500mg on day 1 followed by 250mg on days 2 - 5 6 tablet 0    predniSONE (DELTASONE) 20 MG tablet Take 2 tablets by mouth daily for 5 days 10 tablet 0    Accu-Chek Softclix Lancets MISC TEST TWICE DAILY 200 each 3    Ascorbic Acid (VITAMIN C) 250 MG tablet Take 250 mg by mouth daily      omeprazole (PRILOSEC) 40 MG delayed release capsule Take 1 capsule by mouth every morning (before breakfast) 30 capsule 1    metFORMIN (GLUCOPHAGE) 1000 MG tablet TAKE 1 TABLET BY MOUTH  TWICE DAILY WITH MEALS 180 tablet 3    pravastatin (PRAVACHOL) 40 MG tablet TAKE 1 TABLET BY MOUTH  DAILY AT NIGHT 90 tablet 1    azelastine (ASTELIN) 0.1 % nasal spray 1 spray by Nasal route 2 times daily Use in each nostril as directed 3 each 3    torsemide (DEMADEX) 20 MG tablet TAKE 1 TABLET BY MOUTH  DAILY 90 tablet 3    fluticasone-umeclidin-vilant (TRELEGY ELLIPTA) 100-62.5-25 MCG/INH AEPB Inhale 1 puff into the lungs daily 2 each 0    albuterol sulfate HFA (PROAIR HFA) 108 (90 Base) MCG/ACT inhaler Inhale 2 puffs into the lungs every 6 hours as needed for Wheezing 1 Inhaler 5    blood glucose test strips (ACCU-CHEK DIONTE PLUS) strip TEST TWO TIMES DAILY dx:E11.9 200 strip 3    levothyroxine (SYNTHROID) 88 MCG tablet TAKE 1 TABLET BY MOUTH  DAILY 90 tablet 3    amLODIPine (NORVASC) 5 MG tablet TAKE 1 TABLET EVERY DAY 90 tablet 5    metoprolol tartrate (LOPRESSOR) 50 MG tablet TAKE 1 TABLET TWICE DAILY 180 tablet 5    losartan (COZAAR) 100 MG tablet TAKE 1 TABLET EVERY DAY 90 tablet 5    Omega-3 Fatty Acids (FISH OIL PO) Take by mouth      Multiple Vitamin (MULTI-VITAMIN DAILY PO) Take by mouth      Coenzyme Q10 (COQ10 PO) Take by mouth      Ascorbic Acid (VITAMIN C PO) Take by mouth 2 times daily      guaiFENesin 400 MG tablet Take 400 mg by mouth nightly       magnesium oxide (MAG-OX) 400 MG tablet Take 400 mg by mouth daily      ferrous sulfate 325 (65 Fe) MG tablet Take 325 mg by mouth daily (with breakfast)       aspirin 81 MG EC tablet Take 1 tablet by mouth daily 30 tablet 0    Cholecalciferol (VITAMIN D) 2000 UNITS CAPS capsule Take  by mouth daily. Allergies:  Metolazone, Lipitor, and Simvastatin    Social History:    Social History     Socioeconomic History    Marital status:      Spouse name: Not on file    Number of children: Not on file    Years of education: Not on file    Highest education level: Not on file   Occupational History    Not on file   Tobacco Use    Smoking status: Former Smoker     Packs/day: 2.00     Years: 20.00     Pack years: 40.00     Types: Cigarettes     Start date: 1956     Quit date: 1975     Years since quittin.3    Smokeless tobacco: Never Used   Vaping Use    Vaping Use: Never used   Substance and Sexual Activity    Alcohol use: No    Drug use: No    Sexual activity: Yes     Partners: Female   Other Topics Concern    Not on file   Social History Narrative    Not on file     Social Determinants of Health     Financial Resource Strain:     Difficulty of Paying Living Expenses: Not on file   Food Insecurity:     Worried About [employer] in the Last Year: Not on file    [place]bin of Food in the Last Year: Not on file   Transportation Needs:     Lack of Transportation (Medical): Not on file    Lack of Transportation (Non-Medical):  Not on file   Physical Activity:     Days of Exercise per Week: Not on file    Minutes of Exercise per Session: Not on file   Stress:     Feeling of Stress : Not on file   Social Connections:     Frequency of Communication with Friends and Family: Not on file    Frequency of Social Gatherings with Friends and Family: Not on file    Attends [employer] Services: Not on file    Active Member of Clubs or Organizations: Not atelectasis.         UA bland    Assessment  -Hyponatremia in the setting of CHF exacerbation and hyperglycemia   Normal TSH    -Acute on chronic CHFpEF    -CKD III w baseline cr of 1.2-1.5, likely cardiorenal syndrome    -DM 2    -HTN    -h/o CAD and CVA    Plan  -agree with lasix 40 mg iv bid  -fluid restriction 1500 ml/day  -low sodium diet  -DM control per IM  -urine micro albumin cr ratio  -Serial renal panel  -daily wts and strict i/o  -renal dose medications   -avoid nephrotoxins        Thank you for the consultation. Please do not hesitate to call with questions. Luis Paige MD  Office: [phone]  Fax:    [phone]  [facility]

## 2022-02-15 NOTE — CONSULTS
[address] [facility]  [phone]        Reason for Consultation/Chief Complaint: \"I have been having SOB especially walking and laying flat. \"  Consulted for Respiratory failure/aeCHF    History of Present Illness:  Leonardo Molina is a 80 y.o. patient who [facility] with c/o SOB especially with walking and laying flat. He has PMH CAD s/p 4V CABG 2018, CVA frontal lobe 2016, and TIA 1997. I follow in office--last OV 6/21. Most recent [place]a [place] 8/25/20 Negative. Most recent ECHO 8/25/20 LVEF=50-55%. mild HK basal inferior and inferoseptal walls. Grade III DD with elevated LV pressure. LA is mildly dilated. Mild posterior MAC. Mild MR, TR, AR. Large left pleural effusion. Most recent LHC/RHC 12/30/20 Severe native 3V CAD; Patent LIMA to LAD. Patent SVG to diagonal with sequential to OM2. Patent SVG to distal RCA. Mildly elevated right-sided cardiac filling pressures. Continue med mgt without PCI. Mr. Oliver Bullock now presents with complaints of SOB especially with laying flat. Symptoms began 6 months ago but worse last few days  He also has exertional SOB. His family doctor had him double his lasix. Admitting EKG showed NSR; right axis; ILBBB; ST change inferolateral consider ischemia; possible LVH (ST change new from 12/20); Second EKG shows ST changes resolved; Pro-BNP 5,346, Troponin negative x 2; , 128; Lactic Acid 3.1, Glucose 239, ALT 45, WBC 11.8, RBC 3.46, H/H 9.7/29.7. Reports taking lasix but drinking over 64 oz fluid daily. Patient with no c/o CP, palpitations, dizziness, edema, or orthopnea/PND. I have been asked to provide consultation regarding further management and testing.       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 Surgical History:   has a past surgical history that includes Tooth Extraction; Coronary angioplasty with stent (08/14/2000); other surgical history (1-23-12); Coronary angioplasty with stent (10/19/2000); and Tonsillectomy. Social History:   reports that he quit smoking about 47 years ago. His smoking use included cigarettes. He started smoking about 65 years ago. He has a 40.00 pack-year smoking history. He has never used smokeless tobacco. He reports that he does not drink alcohol and does not use drugs. Family History:  family history includes Emphysema in his mother; Other in his daughter and grandchild; Stroke in his father. Home Medications:  Were reviewed and are listed in nursing record. and/or listed below  Prior to Admission medications    Medication Sig Start Date End Date Taking?  Authorizing Provider   azithromycin (ZITHROMAX) 250 MG tablet Take 1 tablet by mouth See Admin Instructions for 5 days 500mg on day 1 followed by 250mg on days 2 - 5 2/11/22 2/16/22  AICHA Thomas CNP   predniSONE (DELTASONE) 20 MG tablet Take 2 tablets by mouth daily for 5 days 2/11/22 2/16/22  AICHA Thomas CNP   Accu-Chek Softclix Lancets MISC TEST TWICE DAILY 2/10/22   AICHA Thomas CNP   Ascorbic Acid (VITAMIN C) 250 MG tablet Take 250 mg by mouth daily    Historical Provider, MD   omeprazole (PRILOSEC) 40 MG delayed release capsule Take 1 capsule by mouth every morning (before breakfast) 2/8/22   El Garcia DO   metFORMIN (GLUCOPHAGE) 1000 MG tablet TAKE 1 TABLET BY MOUTH  TWICE DAILY WITH MEALS 1/6/22   AICHA Thomas CNP   pravastatin (PRAVACHOL) 40 MG tablet TAKE 1 TABLET BY MOUTH  DAILY AT NIGHT 11/11/21   AICHA Thomas CNP   azelastine (ASTELIN) 0.1 % nasal spray 1 spray by Nasal route 2 times daily Use in each nostril as directed 11/9/21   El Garcia DO   torsemide (DEMADEX) 20 MG tablet TAKE 1 TABLET BY MOUTH  DAILY 10/26/21 AICHA Parr CNP   fluticasone-umeclidin-vilant (TRELEGY ELLIPTA) 100-62.5-25 MCG/INH AEPB Inhale 1 puff into the lungs daily 7/13/21   Attila Dorsey MD   albuterol sulfate HFA (PROAIR HFA) 108 (90 Base) MCG/ACT inhaler Inhale 2 puffs into the lungs every 6 hours as needed for Wheezing 7/13/21   Attila Dorsey MD   blood glucose test strips (ACCU-CHEK DIONTE PLUS) strip TEST TWO TIMES DAILY dx:E11.9 6/30/21   AICHA Parr CNP   levothyroxine (SYNTHROID) 88 MCG tablet TAKE 1 TABLET BY MOUTH  DAILY 6/21/21   AICHA Parr CNP   amLODIPine (NORVASC) 5 MG tablet TAKE 1 TABLET EVERY DAY 3/26/21   Luis Holden MD   metoprolol tartrate (LOPRESSOR) 50 MG tablet TAKE 1 TABLET TWICE DAILY 3/11/21   Luis Holden MD   losartan (COZAAR) 100 MG tablet TAKE 1 TABLET EVERY DAY 2/8/21   Luis Holden MD   Omega-3 Fatty Acids (FISH OIL PO) Take by mouth    Historical Provider, MD   Multiple Vitamin (MULTI-VITAMIN DAILY PO) Take by mouth    Historical Provider, MD   Coenzyme Q10 (COQ10 PO) Take by mouth    Historical Provider, MD   Ascorbic Acid (VITAMIN C PO) Take by mouth 2 times daily    Historical Provider, MD   guaiFENesin 400 MG tablet Take 400 mg by mouth nightly     Historical Provider, MD   magnesium oxide (MAG-OX) 400 MG tablet Take 400 mg by mouth daily    Historical Provider, MD   ferrous sulfate 325 (65 Fe) MG tablet Take 325 mg by mouth daily (with breakfast)     Historical Provider, MD   aspirin 81 MG EC tablet Take 1 tablet by mouth daily 3/5/18   Luis Holden MD   Cholecalciferol (VITAMIN D) 2000 UNITS CAPS capsule Take  by mouth daily.     Historical Provider, MD        Allergies:  Metolazone, Lipitor, and Simvastatin     Review of Systems:   12 point ROS negative in all areas as listed below except as in [facility]  Constitutional, EENT, Cardiovascular, pulmonary, GI, , Musculoskeletal, skin, neurological, hematological, endocrine, Psychiatric    Physical Examination:    Vitals: 02/14/22 2040   BP: (!) 143/71   Pulse: 77   Resp: 18   Temp:    SpO2: 95%              General Appearance:  Alert, cooperative, no distress, appears stated age   Head:  Normocephalic, without obvious abnormality, atraumatic   Eyes:  PERRL, conjunctiva/corneas clear       Nose: Nares normal, no drainage or sinus tenderness   Throat: Lips, mucosa, and tongue normal   Neck: Supple, symmetrical, trachea midline, no adenopathy, thyroid: not enlarged, symmetric, no tenderness/mass/nodules, no carotid bruit or JVD       Lungs:   Soft, clear bs but diminished bases, respirations unlabored   Chest Wall:  No tenderness or deformity   Heart:  Regular rate and rhythm, S1, S2 normal, no rub or gallop; +soft HALIE   Abdomen:   Soft, non-tender, bowel sounds active all four quadrants,  no masses, no organomegaly           Extremities: Extremities 2+ BLE edema   Pulses: 2+ and symmetric   Skin: Skin color, texture, turgor normal, no rashes or lesions   Pysch: Normal mood and affect   Neurologic: Normal gross motor and sensory exam.         Labs  CBC:   Lab Results   Component Value Date    WBC 11.0 02/15/2022    RBC 3.43 02/15/2022    HGB 9.8 02/15/2022    HCT 29.4 02/15/2022    MCV 85.5 02/15/2022    RDW 14.9 02/15/2022     02/15/2022     CMP:    Lab Results   Component Value Date     02/15/2022    K 4.2 02/15/2022    K 5.0 02/14/2022    CL 90 02/15/2022    CO2 26 02/15/2022    BUN 34 02/15/2022    CREATININE 1.2 02/15/2022    CREATININE 1.1 02/02/2012    GFRAA >60 02/15/2022    GFRAA >60 09/07/2012    AGRATIO 1.0 02/15/2022    LABGLOM 58 02/15/2022    GLUCOSE 116 02/15/2022    PROT 7.7 02/15/2022    PROT 6.8 03/13/2013    CALCIUM 9.3 02/15/2022    BILITOT 0.4 02/15/2022    ALKPHOS 164 02/15/2022    AST 29 02/15/2022    ALT 41 02/15/2022     PT/INR:  No results found for: PTINR  Lab Results   Component Value Date    TROPONINI <0.01 02/15/2022       EKG:  I have reviewed EKG with the following interpretation:  Impression:  See HPI    Assessment:  Ric Lefort is a 80 y.o. patient who presented [facility] the [facility]/14/21 with c/o SOB especially with walking and laying flat. He has PMH CAD s/p 4V CABG 2018, CVA frontal lobe 2016, and TIA 1997. I follow in office--last OV 6/21. Most recent [place] 8/25/20 Negative. Most recent ECHO 8/25/20 LVEF=50-55%. mild HK basal inferior and inferoseptal walls. Grade III DD with elevated LV pressure. LA is mildly dilated. Mild posterior MAC. Mild MR, TR, AR. Large left pleural effusion. Most recent LHC/RHC 12/30/20 Severe native 3V CAD; Patent LIMA to LAD. Patent SVG to diagonal with sequential to OM2. Patent SVG to distal RCA. Mildly elevated right-sided cardiac filling pressures. Continue med mgt without PCI. Mr. Joe Lau now presents with complaints of SOB especially with laying flat. Symptoms began 6 months ago but worse last few days  He also has exertional SOB. His family doctor had him double his lasix. Admitting EKG showed NSR; right axis; ILBBB; ST change inferolateral consider ischemia; possible LVH (ST change new from 12/20); Second EKG shows ST changes resolved; Pro-BNP 5,346, Troponin negative x 2; , 128; Lactic Acid 3.1, Glucose 239, ALT 45, WBC 11.8, RBC 3.46, H/H 9.7/29.7. Reports taking lasix but drinking over 64 oz fluid daily. Diagnosis of acute CHF of undetermined type in older male with hx CAD s/p 4V CABG and CVA. Possibly overdrinking and not enough diuretic is reason for decompensation. There is ST change c/w possible ischemia that resolved on subsequent study. No angina symptoms. Recs:  1. Pending ECHO  2. Continue IV lasix 40mg BID and adjust accordingly. 3. Continue baby asa, norvasc 5mg qd, metoprolol 50mg BID, pravachol 40mg qhs.  4. Further recs pending ECHO result and response to diuresis. Consider nuc stress testing if issues with ECHO that are new.    5. Repeat EKG tomorrow AM.    Patient Active Problem List   Diagnosis    Bilateral carotid artery occlusion    Essential hypertension    Hypothyroidism, acquired, autoimmune    Vitamin D deficiency disease    Type 2 diabetes mellitus without complication, without long-term current use of insulin (HCC)    Coronary artery disease    Pure hypercholesterolemia    History of CVA (cerebrovascular accident)    Chronic diastolic congestive heart failure (HCC)    Allergic sinusitis    History of tobacco abuse    Shortness of breath    Chronic eczematous otitis externa of both ears    Restrictive lung disease    Pulmonary infiltrates    Mediastinal adenopathy    Simple chronic bronchitis (HCC)    Allergic rhinitis    Acute on chronic congestive heart failure ([facility] [address]       Thank you for allowing to us to participate in the care or Ermias Rivera. Further evaluation will be based upon the patient's clinical course and testing results.

## 2022-02-15 NOTE — ED PROVIDER NOTES
[address] ENCOUNTER        Pt Name: Tamiko Moscoso  MRN: [id number]  [place] 1940  Date of evaluation: 2/14/2022  Provider: AICHA Blakely CNP  PCP: AICHA Donald CNP  Note Started: 10:14 PM EST       I have seen and evaluated this patient with my supervising physician Aniya Kovacs MD.      [address]       Chief Complaint   Patient presents with    Shortness of Breath     pt c/o increased SOB over last week. no hx of home o2. no fever. feeling tightness in chest. LE swelling worsening. PCP increased water pill today. 40mg x 3 days then back to 20mg daily. conversational dyspnea w exertion. no confirmed covid , +vaccine. HISTORY OF PRESENT ILLNESS   (Location/Symptom, Timing/Onset, Context/Setting, Quality, Duration, Modifying Factors, Severity)  Note limiting factors. Chief Complaint: Shortness of breath    Tamiok Moscoso is a 80 y.o. male who presents to the emergency department with symptoms of shortness of breath especially with laying flat. Apparently the patient began with symptoms last couple weeks. Reported that the last couple weeks his symptoms of shortness of breath have worsened especially at night. States he also has exertional shortness of breath. Denies any chest pain. Denies any abdominal pain or back pain. Patient states that he does have a history of cardiac problems including history of coronary artery bypass. States that he has no symptoms of cough or congestion. States that when laying flat he becomes very short of breath. States he did call his family doctor today and they told him to double his Lasix dosage. He states that day he did take an extra dose of his Lasix. Nursing Notes were all reviewed and agreed with or any disagreements were addressed in the HPI.     REVIEW OF SYSTEMS    (2-9 systems for level 4, 10 or more for level 5)     Review of Systems   Constitutional: Negative for chills, diaphoresis and fever. HENT: Negative for congestion, ear pain, rhinorrhea and sore throat. Eyes: Negative for pain and visual disturbance. Respiratory: Positive for shortness of breath. Negative for cough. Cardiovascular: Negative for chest pain and leg swelling. Gastrointestinal: Negative for abdominal pain, blood in stool, diarrhea, nausea and vomiting. Genitourinary: Negative for difficulty urinating, dysuria, flank pain and frequency. Musculoskeletal: Negative for back pain and neck pain. Skin: Negative for rash and wound. Neurological: Negative for dizziness and light-headedness. 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 ([facility] [address] 04/2016    TIA in 1997    Pleural effusion, bilateral 7/13/2021    Routine health maintenance     refuses immunizations; declines PSA    Tinnitus     Vasovagal reaction     to needles    Vitamin D deficiency disease 12/15       SURGICAL HISTORY     Past Surgical History:   Procedure Laterality Date    CORONARY ANGIOPLASTY WITH STENT PLACEMENT  08/14/2000    RX Tristar 2.5 x 13 mCX  RX Tristar 2.5 x 13 pCX    CORONARY ANGIOPLASTY WITH STENT PLACEMENT  10/19/2000    Tetra 2.75 x 18 CX  Tetra 3.0 x 13 CX    OTHER SURGICAL HISTORY  1-23-12    phaco emulsification cataract right eye    TONSILLECTOMY      TOOTH EXTRACTION         CURRENTMEDICATIONS       Discharge Medication List as of 2/17/2022  3:24 PM      CONTINUE these medications which have NOT CHANGED    Details   ciclopirox (PENLAC) 8 % solution Historical Med      Accu-Chek Softclix Lancets MISC Disp-200 each, R-3, NormalTEST TWICE DAILY      !!  Ascorbic Acid (VITAMIN C) 250 MG tablet Take 250 mg by mouth dailyHistorical Med      omeprazole (PRILOSEC) 40 MG delayed release capsule Take 1 capsule by mouth every morning (before breakfast), Disp-30 capsule, R-1Normal      metFORMIN (GLUCOPHAGE) 1000 MG tablet TAKE 1 TABLET BY MOUTH  TWICE DAILY WITH MEALS, Disp-180 tablet, R-3Requesting 1 year supplyNormal      pravastatin (PRAVACHOL) 40 MG tablet TAKE 1 TABLET BY MOUTH  DAILY AT NIGHT, Disp-90 tablet, R-1Requesting 1 year supplyNormal      azelastine (ASTELIN) 0.1 % nasal spray 1 spray by Nasal route 2 times daily Use in each nostril as directed, Disp-3 each, R-3Normal      fluticasone-umeclidin-vilant (TRELEGY ELLIPTA) 100-62.5-25 MCG/INH AEPB Inhale 1 puff into the lungs daily, Disp-2 each, R-02 samples given:  Lot #[id number], Exp. 11/22NO PRINT      albuterol sulfate HFA (PROAIR HFA) 108 (90 Base) MCG/ACT inhaler Inhale 2 puffs into the lungs every 6 hours as needed for Wheezing, Disp-1 Inhaler, R-5Normal      blood glucose test strips (ACCU-CHEK DIONTE PLUS) strip TEST TWO TIMES DAILY dx:E11.9, Disp-200 strip, R-3Normal      levothyroxine (SYNTHROID) 88 MCG tablet TAKE 1 TABLET BY MOUTH  DAILY, Disp-90 tablet, R-3Requesting 1 year supplyNormal      amLODIPine (NORVASC) 5 MG tablet TAKE 1 TABLET EVERY DAY, Disp-90 tablet, R-5Normal      metoprolol tartrate (LOPRESSOR) 50 MG tablet TAKE 1 TABLET TWICE DAILY, Disp-180 tablet, R-5Normal      losartan (COZAAR) 100 MG tablet TAKE 1 TABLET EVERY DAY, Disp-90 tablet, R-5Normal      Omega-3 Fatty Acids (FISH OIL PO) Take by mouthHistorical Med      Multiple Vitamin (MULTI-VITAMIN DAILY PO) Take by mouthHistorical Med      Coenzyme Q10 (COQ10 PO) Take by mouthHistorical Med      !!  Ascorbic Acid (VITAMIN C PO) Take by mouth 2 times dailyHistorical Med      guaiFENesin 400 MG tablet Take 400 mg by mouth nightly Historical Med      magnesium oxide (MAG-OX) 400 MG tablet Take 400 mg by mouth dailyHistorical Med      ferrous sulfate 325 (65 Fe) MG tablet Take 325 mg by mouth daily (with breakfast) Historical Med      aspirin 81 MG EC tablet Take 1 tablet by mouth daily, Disp-30 tablet, R-0Adjust Sig      Cholecalciferol (VITAMIN D) 2000 UNITS CAPS capsule Take  by mouth daily. !! - Potential duplicate medications found. Please discuss with provider. ALLERGIES     Metolazone, Lipitor, and Simvastatin    FAMILYHISTORY       Family History   Problem Relation Age of Onset    Emphysema Mother     Stroke Father     Other Daughter         Factor V deficiency    Other Grandchild         Factor V deficiency        SOCIAL HISTORY       Social History     Socioeconomic History    Marital status:      Spouse name: None    Number of children: None    Years of education: None    Highest education level: None   Occupational History    None   Tobacco Use    Smoking status: Former Smoker     Packs/day: 2.00     Years: 20.00     Pack years: 40.00     Types: Cigarettes     Start date: 1956     Quit date: 1975     Years since quittin.1    Smokeless tobacco: Never Used   Vaping Use    Vaping Use: Never used   Substance and Sexual Activity    Alcohol use: No    Drug use: No    Sexual activity: Yes     Partners: Female   Other Topics Concern    None   Social History Narrative    None     Social Determinants of Health     Financial Resource Strain:     Difficulty of Paying Living Expenses: Not on file   Food Insecurity:     Worried About Running Out of Food in the Last Year: Not on file    [place] of Food in the Last Year: Not on file   Transportation Needs:     Lack of Transportation (Medical): Not on file    Lack of Transportation (Non-Medical):  Not on file   Physical Activity:     Days of Exercise per Week: Not on file    Minutes of Exercise per Session: Not on file   Stress:     Feeling of Stress : Not on file   Social Connections:     Frequency of Communication with Friends and Family: Not on file    Frequency of Social Gatherings with Friends and Family: Not on file    Attends Yazidism Services: Not on file   [address] [facility] [employer] or Organizations: Not on file    Attends Club or Organization Meetings: Not on file    Marital Status: Not on file   Intimate Partner Violence:     Fear of Current or Ex-Partner: Not on file    Emotionally Abused: Not on file    Physically Abused: Not on file    Sexually Abused: Not on file   Housing Stability:     Unable to Pay for Housing in the Last Year: Not on file    Number of [place] in the Last Year: Not on file    Unstable Housing in the Last Year: Not on file       SCREENINGS    [place] Coma Scale  Eye Opening: Spontaneous  Best Verbal Response: Oriented  Best Motor Response: Obeys commands  Jose Coma Scale Score: 15        PHYSICAL EXAM    (up to 7 for level 4, 8 or more for level 5)     ED Triage Vitals   BP Temp Temp src Pulse Resp SpO2 Height Weight   02/14/22 2040 02/14/22 1711 -- 02/14/22 1711 02/14/22 1839 02/14/22 1711 -- --   (!) 143/71 98.1 °F (36.7 °C)  89 20 (!) 83 %         Physical Exam  Vitals and nursing note reviewed. Constitutional:       Appearance: Normal appearance. He is not toxic-appearing or diaphoretic. HENT:      Head: Normocephalic and atraumatic. Nose: Nose normal.   Eyes:      General:         Right eye: No discharge. Left eye: No discharge. Cardiovascular:      Rate and Rhythm: Normal rate and regular rhythm. Pulmonary:      Effort: Pulmonary effort is normal. No respiratory distress. Breath sounds: Examination of the right-lower field reveals rales. Examination of the left-lower field reveals rales. Rales present. No wheezing. Abdominal:      Palpations: Abdomen is soft. Tenderness: There is no abdominal tenderness. Musculoskeletal:         General: Normal range of motion. Cervical back: Normal range of motion and neck supple. Right lower leg: No tenderness. Edema present. Left lower leg: No tenderness. Edema present.       Comments: 1+ pitting edema in bilateral lower extremities   Skin:     General: Skin is warm and dry. Capillary Refill: Capillary refill takes less than 2 seconds. Neurological:      General: No focal deficit present. Mental Status: He is alert and oriented to person, place, and time.    Psychiatric:         Mood and Affect: Mood normal.         Behavior: Behavior normal.         DIAGNOSTIC RESULTS   LABS:    Labs Reviewed   CBC WITH AUTO DIFFERENTIAL - Abnormal; Notable for the following components:       Result Value    WBC 11.8 (*)     RBC 3.46 (*)     Hemoglobin 9.7 (*)     Hematocrit 29.7 (*)     Neutrophils Absolute 11.0 (*)     Lymphocytes Absolute 0.3 (*)     All other components within normal limits    Narrative:     Performed at:  [facility],  [place]   Phone [phone]   COMPREHENSIVE METABOLIC PANEL W/ REFLEX TO MG FOR LOW K - Abnormal; Notable for the following components:    Sodium 124 (*)     Chloride 88 (*)     Glucose 239 (*)     BUN 34 (*)     GFR Non-African American 58 (*)     Alkaline Phosphatase 181 (*)     ALT 45 (*)     All other components within normal limits    Narrative:     Performed at:  [facility],  [employer]   Phone [phone]   BRAIN NATRIURETIC PEPTIDE - Abnormal; Notable for the following components:    Pro-BNP 5,346 (*)     All other components within normal limits    Narrative:     Performed at:  [facility],  [employer]   Phone [phone]   LACTIC ACID, PLASMA - Abnormal; Notable for the following components:    Lactic Acid 3.1 (*)     All other components within normal limits    Narrative:     Performed at:  [facility]) - [facility],  [place]   Phone [phone]   BLOOD GAS, VENOUS - Abnormal; Notable for the following components:    pH, Ivan 7.330 (*)     pO2, Ivan 41.8 (*)     Carboxyhemoglobin 3.3 (*)     All other components within normal limits    Narrative:     Performed at:  [facility],  [employer], [place] [employer]   Phone [phone]   OSMOLALITY, URINE - Abnormal; Notable for the following components:    Osmolality, Ur 241 (*)     All other components within normal limits    Narrative:     Performed at:  [facility],  [employer], [facility]   Phone [phone]   IRON AND TIBC - Abnormal; Notable for the following components:    Iron 39 (*)     TIBC 253 (*)     Iron Saturation 15 (*)     All other components within normal limits    Narrative:     Performed at:  [place] [address]   Phone [phone]   CBC WITH AUTO DIFFERENTIAL - Abnormal; Notable for the following components:    RBC 3.43 (*)     Hemoglobin 9.8 (*)     Hematocrit 29.4 (*)     Neutrophils Absolute 9.7 (*)     Lymphocytes Absolute 0.5 (*)     All other components within normal limits    Narrative:     Performed at:  [facility],  [employer] [facility]   Phone [phone]   BLOOD OCCULT STOOL DIAGNOSTIC - Abnormal; Notable for the following components:    Occult Blood Diagnostic   (*)     Value: Result: POSITIVE  Normal range: Negative      All other components within normal limits    Narrative:     ORDER#: [id number]                          ORDERED BY: MAGEN RIOS  SOURCE: Stool                              COLLECTED:  02/15/22 21:00  ANTIBIOTICS AT SAMANTHA.:                      RECEIVED :  02/15/22 21:43  Performed at:  [facility]) [facility],  [place]   Phone [phone]   COMPREHENSIVE METABOLIC PANEL - Abnormal; Notable for the following components:    Sodium 128 (*)     Chloride 90 (*)     Glucose 116 (*)     BUN 34 (*) GFR Non-African American 58 (*)     Albumin/Globulin Ratio 1.0 (*)     Alkaline Phosphatase 164 (*)     ALT 41 (*)     All other components within normal limits    Narrative:     Performed at:  [facility],  [employer] [facility]   Phone [phone]   LIPID PANEL - Abnormal; Notable for the following components:    LDL Calculated 114 (*)     All other components within normal limits    Narrative:     Performed at:  [facility]  [address] [place], [place] [address]   Phone [phone]   MICROALBUMIN / [address] [facility] - Abnormal; Notable for the following components:    Creatinine, Ur 18.5 (*)     All other components within normal limits    Narrative:     Performed at:  [facility]  [address] [place], [place] [address]   Phone [phone]   RENAL FUNCTION PANEL - Abnormal; Notable for the following components:    Sodium 133 (*)     Chloride 92 (*)     Glucose 115 (*)     BUN 36 (*)     CREATININE 1.5 (*)     GFR Non- 45 (*)     GFR  54 (*)     All other components within normal limits    Narrative:     Performed at:  [facility],  [employer] [facility]   Phone [phone]   CBC WITH AUTO DIFFERENTIAL - Abnormal; Notable for the following components:    RBC 3.51 (*)     Hemoglobin 10.1 (*)     Hematocrit 30.4 (*)     RDW 15.5 (*)     Neutrophils Absolute 7.8 (*)     Lymphocytes Absolute 0.5 (*)     All other components within normal limits    Narrative:     Performed at:  [facility],  [employer] [facility]   Phone [phone]   RENAL FUNCTION PANEL - Abnormal; Notable for the following components:    Chloride 98 (*)     Glucose 124 (*)     BUN 29 (*)     GFR Non- 58 (*)     All other components within normal limits    Narrative: Performed at:  [address]   Phone [phone]   CBC - Abnormal; Notable for the following components:    RBC 3.49 (*)     Hemoglobin 10.0 (*)     Hematocrit 30.1 (*)     RDW 15.5 (*)     All other components within normal limits    Narrative:     Performed at:  [address]   Phone [phone]   POCT GLUCOSE - Abnormal; Notable for the following components:    POC Glucose 106 (*)     All other components within normal limits    Narrative:     Performed at:  [address]   Phone [phone]   POCT GLUCOSE - Abnormal; Notable for the following components:    POC Glucose 117 (*)     All other components within normal limits    Narrative:     Performed at:  [address]   Phone [phone]   POCT GLUCOSE - Abnormal; Notable for the following components:    POC Glucose 135 (*)     All other components within normal limits    Narrative:     Performed at:  [address]   Phone [phone]   POCT GLUCOSE - Abnormal; Notable for the following components:    POC Glucose 113 (*)     All other components within normal limits    Narrative:     Performed at:  [address]   Phone [phone]   POCT GLUCOSE - Abnormal; Notable for the following components:    POC Glucose 123 (*)     All other components within normal limits    Narrative:     Performed at:  [address]   Phone [phone]   POCT GLUCOSE - Abnormal; Notable for the following components:    POC Glucose 157 (*)     All other components within normal limits    Narrative:     Performed at:  [facility]  [place] [address],  [place] [employer]   Phone [phone]   POCT GLUCOSE - Abnormal; Notable for the following components:    POC Glucose 198 (*)     All other components within normal limits    Narrative:     Performed at:  [facility],  [place]   Phone [phone]   POCT GLUCOSE - Abnormal; Notable for the following components:    POC Glucose 121 (*)     All other components within normal limits    Narrative:     Performed at:  [facility],  [place]   Phone [phone]    Narrative:     Performed at:  [facility] 75,  [place]   Phone [phone]   TROPONIN    Narrative:     Performed at:  [facility],  [place]   Phone [phone]   OSMOLALITY    Narrative:     Performed at:  [facility],  [place], [employer]   Phone [phone]   URINE RT REFLEX TO CULTURE    Narrative:     Performed at:  [facility],  [place], [employer]   Phone [phone]   TSH WITH REFLEX    Narrative:     Performed at:  [facility],  [place] [employer]   Phone [phone]   HEMOGLOBIN A1C    Narrative:     Performed at:  [facility]  [address] [facility] [address]   Phone [phone]   MAGNESIUM    Narrative:     Performed at:  [facility],  [place] [employer]   Phone [phone]   LACTIC ACID, PLASMA    Narrative:     Performed at:  [facility], [place] [facility]   Phone [phone]   TROPONIN    Narrative:     Performed at:  [facility]) - [facility] 75,  [place], [facility]   Phone [phone]   TROPONIN    Narrative:     Performed at:  [facility]) [facility] 75,  [place], [facility]   Phone [phone]   LACTIC ACID, PLASMA    Narrative:     Performed at:  [facility],  [place], [facility]   Phone [phone]   POCT GLUCOSE    Narrative:     Performed at:  [facility]) [facility],  [place], [facility]   Phone [phone]   POCT GLUCOSE   POCT GLUCOSE   POCT GLUCOSE   POCT GLUCOSE   POCT GLUCOSE   POCT GLUCOSE   POCT GLUCOSE   POCT GLUCOSE   POCT GLUCOSE   POCT GLUCOSE       When ordered, only abnormal lab results are displayed. All other labs were within normal range or not returned as of this dictation. EKG: When ordered, EKG's are interpreted by the Emergency Department Physician in the absence of a cardiologist.  Please see their note for interpretation of EKG. RADIOLOGY:   Non-plain film images such as CT, Ultrasound and MRI are read by the radiologist. Plain radiographic images are visualized andpreliminarily interpreted by the  ED Provider with the below findings:        Interpretation perthe Radiologist below, if available at the time of this note:    CT ABDOMEN PELVIS W IV CONTRAST Additional Contrast? Oral   Final Result   1. Chronic stable bibasilar exudative pleural effusions. 2. Cholelithiasis. RECOMMENDATIONS:   Unavailable         XR CHEST 1 VIEW   Final Result   Stable chest.  CHF with moderate bilateral pleural effusions with bibasilar   atelectasis. XR CHEST 1 VIEW    Result Date: 2/14/2022  EXAMINATION: ONE XRAY VIEW OF THE CHEST 2/14/2022 5:27 pm COMPARISON: 02/11/2022.  HISTORY: ORDERING SYSTEM PROVIDED HISTORY: sob TECHNOLOGIST PROVIDED electrolyte abnormalities or evidence of significant kidney dysfunction [ER]   2316 Liver function testing shows mildly elevated alkaline phosphatase and ALT. Otherwise unremarkable [ER]      ED Course User Index  [ER] Landry Norris MD        Patient to be admitted to the hospitalist service. For acute on chronic heart failure exacerbation. Patient otherwise appears well nontoxic at this time. Be admitted for further evaluation and treatment. The hospitalist was notified and they are evaluating the patient for admission. FINAL IMPRESSION      1.  Acute on chronic heart failure, unspecified heart failure type [facility])          DISPOSITION/PLAN   DISPOSITION Admitted 02/15/2022 12:33:10 AM      PATIENT REFERREDTO:  AICHA Perry CNP  [address] [place]  [phone]    In 1 week  follow up to have cardiac clearance for GI follow up     Yen Botello, [facility], [address] [place] [address] [phone]    In 2 weeks  Follow up after given clearance from cardiology      DISCHARGE MEDICATIONS:  Discharge Medication List as of 2/17/2022  3:24 PM          DISCONTINUED MEDICATIONS:  Discharge Medication List as of 2/17/2022  3:24 PM      STOP taking these medications       azithromycin (ZITHROMAX) 250 MG tablet Comments:   Reason for Stopping:         predniSONE (DELTASONE) 20 MG tablet Comments:   Reason for Stopping:                      (Please note that portions ofthis note were completed with a voice recognition program.  Efforts were made to edit the dictations but occasionally words are mis-transcribed.)    AICHA Mcghee CNP (electronically signed)            AICHA Mcghee CNP  02/19/22 2892

## 2022-02-16 NOTE — PLAN OF CARE
Problem: Falls - Risk of:  Goal: Will remain free from falls  Description: Will remain free from falls  2/15/2022 2135 by Matilde Ray RN  Outcome: Ongoing  2/15/2022 1142 by Charlie Hyatt RN  Outcome: Ongoing  Goal: Absence of physical injury  Description: Absence of physical injury  2/15/2022 2135 by Matilde Ray RN  Outcome: Ongoing  2/15/2022 1142 by Charlie Hyatt RN  Outcome: Ongoing     Problem: Infection:  Goal: Will remain free from infection  Description: Will remain free from infection  2/15/2022 2135 by Matidle Ray RN  Outcome: Ongoing  2/15/2022 1142 by Charlie Hyatt RN  Outcome: Ongoing     Problem: Safety:  Goal: Free from accidental physical injury  Description: Free from accidental physical injury  2/15/2022 2135 by Matilde Ray RN  Outcome: Ongoing  2/15/2022 1142 by Charlie Hyatt RN  Outcome: Ongoing  Goal: Free from intentional harm  Description: Free from intentional harm  2/15/2022 2135 by Matilde Ray RN  Outcome: Ongoing  2/15/2022 1142 by Charlie Hyatt RN  Outcome: Ongoing     Problem: Daily Care:  Goal: Daily care needs are met  Description: Daily care needs are met  2/15/2022 2135 by Matilde Ray RN  Outcome: Ongoing  2/15/2022 1142 by Charlie Hyatt RN  Outcome: Ongoing     Problem: Pain:  Goal: Patient's pain/discomfort is manageable  Description: Patient's pain/discomfort is manageable  2/15/2022 2135 by Matilde Ray RN  Outcome: Ongoing  2/15/2022 1142 by Charlie Hyatt RN  Outcome: Ongoing     Problem: Skin Integrity:  Goal: Skin integrity will stabilize  Description: Skin integrity will stabilize  2/15/2022 2135 by Matilde Ray RN  Outcome: Ongoing  2/15/2022 1142 by Charlie Hyatt RN  Outcome: Ongoing     Problem: Discharge Planning:  Goal: Patients continuum of care needs are met  Description: Patients continuum of care needs are met  2/15/2022 2135 by Matilde Ray RN  Outcome: Ongoing  2/15/2022 1142 by Elisa Valladares RN  Outcome: Ongoing     Problem:  Activity:  Goal: Capacity to carry out activities will improve  Description: Capacity to carry out activities will improve  2/15/2022 2135 by Myla Carreon RN  Outcome: Ongoing  2/15/2022 1142 by Elisa Valladares RN  Outcome: Ongoing  Goal: Will verbalize the importance of balancing activity with adequate rest periods  Description: Will verbalize the importance of balancing activity with adequate rest periods  2/15/2022 2135 by Myla Carreon RN  Outcome: Ongoing  2/15/2022 1142 by Elisa Valladares RN  Outcome: Ongoing     Problem: Cardiac:  Goal: Hemodynamic stability will improve  Description: Hemodynamic stability will improve  2/15/2022 2135 by Myla Carreon RN  Outcome: Ongoing  2/15/2022 1142 by Elisa Valladares RN  Outcome: Ongoing  Goal: Ability to maintain an adequate cardiac output will improve  Description: Ability to maintain an adequate cardiac output will improve  2/15/2022 2135 by Myla Carreon RN  Outcome: Ongoing  2/15/2022 1142 by Elisa Valladares RN  Outcome: Ongoing     Problem: Coping:  Goal: Verbalizations of decreased anxiety will decrease  Description: Verbalizations of decreased anxiety will decrease  2/15/2022 2135 by Myla Carreon RN  Outcome: Ongoing  2/15/2022 1142 by Elisa Valladares RN  Outcome: Ongoing     Problem: Fluid Volume:  Goal: Risk for excess fluid volume will decrease  Description: Risk for excess fluid volume will decrease  2/15/2022 2135 by Myla Carreon RN  Outcome: Ongoing  2/15/2022 1142 by Elisa Valladares RN  Outcome: Ongoing  Goal: Maintenance of adequate hydration will improve  Description: Maintenance of adequate hydration will improve  2/15/2022 2135 by Myla Carreon RN  Outcome: Ongoing  2/15/2022 1142 by Elisa Valladares RN  Outcome: Ongoing  Goal: Will show no signs and symptoms of electrolyte imbalance  Description: Will show no signs and symptoms of electrolyte imbalance  2/15/2022 2135 by Shamar Barahona RN  Outcome: Ongoing  2/15/2022 1142 by Valarie Corral RN  Outcome: Ongoing     Problem: Health Behavior:  Goal: Ability to manage health-related needs will improve  Description: Ability to manage health-related needs will improve  2/15/2022 2135 by Shamar Barahona RN  Outcome: Ongoing  2/15/2022 1142 by Valarie Corral RN  Outcome: Ongoing  Goal: Ability to seek appropriate health care will improve  Description: Ability to seek appropriate health care will improve  2/15/2022 2135 by Shamar Barahona RN  Outcome: Ongoing  2/15/2022 1142 by Valarie Corral RN  Outcome: Ongoing     Problem: Nutritional:  Goal: Maintenance of adequate nutrition will improve  Description: Maintenance of adequate nutrition will improve  2/15/2022 2135 by Shamar Barahona RN  Outcome: Ongoing  2/15/2022 1142 by Valarie Corral RN  Outcome: Ongoing     Problem: Physical Regulation:  Goal: Complications related to the disease process, condition or treatment will be avoided or minimized  Description: Complications related to the disease process, condition or treatment will be avoided or minimized  2/15/2022 2135 by Shamar Barahona RN  Outcome: Ongoing  2/15/2022 1142 by Valarie Corral RN  Outcome: Ongoing     Problem: Respiratory:  Goal: Ability to maintain adequate ventilation will improve  Description: Ability to maintain adequate ventilation will improve  2/15/2022 2135 by Shamar Barahona RN  Outcome: Ongoing  2/15/2022 1142 by Valarie Corral RN  Outcome: Ongoing  Goal: Respiratory status will improve  Description: Respiratory status will improve  2/15/2022 2135 by Shamar Barahona RN  Outcome: Ongoing  2/15/2022 1142 by Valarie Corral RN  Outcome: Ongoing   Reviewed with pt plan of care for shfit and medications, all questions answered, pt voices no concerns.

## 2022-02-16 NOTE — CONSULTS
Gastroenterology Consult Note    Patient:   Nisha Everett   :    1940   Facility:   [facility]  Referring/PCP: AICHA Diop - KENRICK  Date:     2022  Consultant:   Leslee Penn PA-C      Chief Complaint   Patient presents with    Shortness of Breath     pt c/o increased SOB over last week. no hx of home o2. no fever. feeling tightness in chest. LE swelling worsening. PCP increased water pill today. 40mg x 3 days then back to 20mg daily. conversational dyspnea w exertion. no confirmed covid , +vaccine. History of Present illness     80year old male with a history of CAD s/p CABG, CVA, CHF, DM, and HTN presented to the ED with SOB and lower extremity edema x 2 weeks. He admits to weakness, fatigue, and dysphagia x 2-3 weeks. He is passing brown BMs. He denies nausea, vomiting, heartburn, abdominal pain, cramping, bloating, melena, rectal bleeding, early satiety, and weight loss. He has never had an EGD or colonoscopy. He denies family history of colon cancer or colon polyps. GI was consulted for anemia. He is hemoccult positive. Labs show iron deficiency anemia.      Past Medical History:   Diagnosis Date    Allergic rhinitis     Family history of factor V deficiency     daughter and grand daughter    Hyponatremia 2019    admitted; secondary to metolazone    Ischemic cerebrovascular accident (CVA) of frontal lobe ([facility] [address] 2016    TIA in     Pleural effusion, bilateral 2021    Routine health maintenance     refuses immunizations; declines PSA    Tinnitus     Vasovagal reaction     to needles    Vitamin D deficiency disease 12/15     Past Surgical History:   Procedure Laterality Date    CORONARY ANGIOPLASTY WITH STENT PLACEMENT  2000    RX Tristar 2.5 x 13 mCX  RX Tristar 2.5 x 13 pCX    CORONARY ANGIOPLASTY WITH STENT PLACEMENT  10/19/2000    Tetra 2.75 x 18 CX  Tetra 3.0 x 13 CX    [facility]    TONSILLECTOMY      TOOTH EXTRACTION         Social:   Social History     Tobacco Use    Smoking status: Former Smoker     Packs/day: 2.00     Years: 20.00     Pack years: 40.00     Types: Cigarettes     Start date: 1956     Quit date: 1975     Years since quittin.1    Smokeless tobacco: Never Used   Substance Use Topics    Alcohol use: No     Family:   Family History   Problem Relation Age of Onset    Emphysema Mother     Stroke Father     Other Daughter         Factor V deficiency    Other Grandchild         Factor V deficiency     No current facility-administered medications on file prior to encounter.      Current Outpatient Medications on File Prior to Encounter   Medication Sig Dispense Refill    azithromycin (ZITHROMAX) 250 MG tablet Take 1 tablet by mouth See Admin Instructions for 5 days 500mg on day 1 followed by 250mg on days 2 - 5 6 tablet 0    predniSONE (DELTASONE) 20 MG tablet Take 2 tablets by mouth daily for 5 days 10 tablet 0    Accu-Chek Softclix Lancets MISC TEST TWICE DAILY 200 each 3    Ascorbic Acid (VITAMIN C) 250 MG tablet Take 250 mg by mouth daily      omeprazole (PRILOSEC) 40 MG delayed release capsule Take 1 capsule by mouth every morning (before breakfast) 30 capsule 1    metFORMIN (GLUCOPHAGE) 1000 MG tablet TAKE 1 TABLET BY MOUTH  TWICE DAILY WITH MEALS 180 tablet 3    pravastatin (PRAVACHOL) 40 MG tablet TAKE 1 TABLET BY MOUTH  DAILY AT NIGHT 90 tablet 1    azelastine (ASTELIN) 0.1 % nasal spray 1 spray by Nasal route 2 times daily Use in each nostril as directed 3 each 3    torsemide (DEMADEX) 20 MG tablet TAKE 1 TABLET BY MOUTH  DAILY 90 tablet 3    fluticasone-umeclidin-vilant (TRELEGY ELLIPTA) 100-62.5-25 MCG/INH AEPB Inhale 1 puff into the lungs daily 2 each 0    albuterol sulfate HFA (PROAIR HFA) 108 (90 Base) MCG/ACT inhaler Inhale 2 puffs into the lungs every 6 hours as needed for Wheezing 1 Inhaler 5    blood glucose test strips (ACCU-CHEK DIONTE PLUS) strip TEST TWO TIMES DAILY dx:E11.9 200 strip 3    levothyroxine (SYNTHROID) 88 MCG tablet TAKE 1 TABLET BY MOUTH  DAILY 90 tablet 3    amLODIPine (NORVASC) 5 MG tablet TAKE 1 TABLET EVERY DAY 90 tablet 5    metoprolol tartrate (LOPRESSOR) 50 MG tablet TAKE 1 TABLET TWICE DAILY 180 tablet 5    losartan (COZAAR) 100 MG tablet TAKE 1 TABLET EVERY DAY 90 tablet 5    Omega-3 Fatty Acids (FISH OIL PO) Take by mouth      Multiple Vitamin (MULTI-VITAMIN DAILY PO) Take by mouth      Coenzyme Q10 (COQ10 PO) Take by mouth      Ascorbic Acid (VITAMIN C PO) Take by mouth 2 times daily      guaiFENesin 400 MG tablet Take 400 mg by mouth nightly       magnesium oxide (MAG-OX) 400 MG tablet Take 400 mg by mouth daily      ferrous sulfate 325 (65 Fe) MG tablet Take 325 mg by mouth daily (with breakfast)       aspirin 81 MG EC tablet Take 1 tablet by mouth daily 30 tablet 0    Cholecalciferol (VITAMIN D) 2000 UNITS CAPS capsule Take  by mouth daily. Infusions:    dextrose      sodium chloride       PRN Medications: glucose, glucagon (rDNA), dextrose, sodium chloride flush, sodium chloride, ondansetron **OR** ondansetron, polyethylene glycol, acetaminophen **OR** acetaminophen, hydrALAZINE, perflutren lipid microspheres, potassium chloride **OR** potassium alternative oral replacement **OR** potassium chloride, magnesium sulfate, dextrose bolus (hypoglycemia) **OR** dextrose bolus (hypoglycemia)  Allergies:    Allergies   Allergen Reactions    Metolazone      Sodium 120 on 5/19, was on metolazone, no hypovolemic synptoms on presentation    Lipitor      Myalgias at 20 mg    Simvastatin      myalgias       ROS:   Constitutional: negative for chills, fevers and sweats  Eyes: negative for cataracts, icterus and redness  Ears, nose, mouth, throat, and face: negative for epistaxis, hearing loss and sore throat  Respiratory: negative for cough, hemoptysis and sputum  Cardiovascular: negative for chest pain, dyspnea and lower extremity edema  Gastrointestinal: as per HPI  Genitourinary:negative for dysuria, frequency and hematuria  Neurological: negative for coordination problems, dizziness and gait problems  Behavioral/Psych: negative for anxiety, depression and mood swings    Physical Exam   /60   Pulse 63   Temp 97.8 °F (36.6 °C) (Oral)   Resp 24   Wt 164 lb 9.6 oz (74.7 kg)   SpO2 98%   BMI 22.96 kg/m²       General appearance: alert, appears stated age, cooperative and no distress  Head: Normocephalic, without obvious abnormality, atraumatic  Eyes: conjunctivae/corneas clear. PERRL, EOM's intact. Fundi benign. Neck: no adenopathy and supple, symmetrical, trachea midline  Lungs: clear to auscultation bilaterally  Heart: regular rate and rhythm, S1, S2 normal, no murmur, click, rub or gallop  Abdomen: soft, non-tender; bowel sounds normal; no masses,  no organomegaly  Extremities: extremities normal, atraumatic, no cyanosis or edema    Lab and Imaging Review   Labs:  CBC:   Recent Labs     02/14/22  2126 02/15/22  0626 02/16/22  0452   WBC 11.8* 11.0 9.1   HGB 9.7* 9.8* 10.1*   HCT 29.7* 29.4* 30.4*   MCV 86.0 85.5 86.5    301 326     BMP:   Recent Labs     02/14/22  2126 02/15/22  0626 02/16/22  0452   * 128* 133*   K 5.0 4.2 4.1   CL 88* 90* 92*   CO2 25 26 31   PHOS  --   --  3.1   BUN 34* 34* 36*   CREATININE 1.2 1.2 1.5*     LIVER PROFILE:   Recent Labs     02/14/22  2126 02/15/22  0626   AST 36 29   ALT 45* 41*   PROT 7.8 7.7   BILITOT 0.3 0.4   ALKPHOS 181* 164*       IMAGING:  XR CHEST 1 VIEW   Final Result   Stable chest.  CHF with moderate bilateral pleural effusions with bibasilar   atelectasis. Attending Supervising [de-identified] Attestation Statement  The patient is a 80 y.o. male. I have performed a history and physical examination of the patient.  I discussed the case with my physician assistant Rajendra Rodriguez ONIEL HERNANDEZ reviewed the patient's Past Medical History, Past Surgical History, Medications, and Allergies. Physical Exam:  Vitals:    02/16/22 0730 02/16/22 0823 02/16/22 1212 02/16/22 1622   BP:  (!) 123/95 124/60 133/71   Pulse:  84 63 73   Resp:  26 24 28   Temp:  97.7 °F (36.5 °C) 97.8 °F (36.6 °C) 97.8 °F (36.6 °C)   TempSrc:  Oral Oral Oral   SpO2: 99% 95% 98% 97%   Weight:           Physical Examination: General appearance - alert, well appearing, and in no distress  Mental status - alert, oriented to person, place, and time  Eyes - pupils equal and reactive, extraocular eye movements intact  Neck - supple, no significant adenopathy  Chest - clear to auscultation, no wheezes, rales or rhonchi, symmetric air entry  Heart - normal rate, regular rhythm, normal S1, S2, no murmurs, rubs, clicks or gallops  Abdomen - soft, nontender, nondistended, no masses or organomegaly  Extremities - no pedal edema noted          Assessment:     80year old male with a history of CAD s/p CABG, CVA, CHF, DM, and HTN admitted with acute on chronic CHF c/b iron deficiency anemia and hemoccult positive stool. Plan:   1. Continue supportive care  2. Monitor Hgb  3. Observe for signs of bleeding  4. Monitor and document output  5. Continue Pantoprazole 40 mg qAM  6. Continue iron supplementation   7. Continue diet as tolerated  8. Check CT abd/pelvis   9. Cardiology and nephrology following  10. Will schedule colonoscopy + EGD as outpatient pending cardiology clearence    Rossana Connelly PA-C  3:06 PM 2/16/2022                        80year old male with a history of DM, HTN, HLD, CAD s/p CABG, CVA, CHF admitted with acute on chronic CHF. Iron deficiency anemia and hemoccult positive stool concerning for PUD, colon polyps and less likely malignancy    Continue supportive care. PPI daily. CHF management and diuresis.  Outpatient EGD and colonoscopy once cleared from cardiology standpoint    Kemar Mccain MD

## 2022-02-16 NOTE — PROGRESS NOTES
[facility] 81   Progress Note  Cardiology      HPI: Seeing Mr. Oliver Bullock today for f/u CHF. He feels better this AM and says breathing is improved. No nursing issues overnight. Physical Examination:    Vitals:    02/16/22 0823   BP: (!) 123/95   Pulse: 84   Resp: 26   Temp: 97.7 °F (36.5 °C)   SpO2: 95%          Constitutional and General Appearance: NAD   Respiratory:  · Normal excursion and expansion without use of accessory muscles  · Resp Auscultation: Normal breath sounds without dullness  Cardiovascular:  · The apical impulses not displaced  · Heart tones are crisp and normal  · Cervical veins are not engorged  · The carotid upstroke is normal in amplitude and contour without delay or bruit  · Normal S1S2, No S3, No Murmur  · Peripheral pulses are symmetrical and full  · There is no clubbing, cyanosis of the extremities. · Trace-1+ BLE edema  · Pedal Pulses: 2+ and equal   Abdomen:  · No masses or tenderness  · Liver/Spleen: No Abnormalities Noted  Neurological/Psychiatric:  · Alert and oriented in all spheres  · Moves all extremities well  · No abnormalities of mood, affect, memory, mentation, or behavior are noted  Skin: warm and dry        Lab Results   Component Value Date    WBC 9.1 02/16/2022    HGB 10.1 (L) 02/16/2022    HCT 30.4 (L) 02/16/2022    MCV 86.5 02/16/2022     02/16/2022     Lab Results   Component Value Date    CREATININE 1.5 (H) 02/16/2022    BUN 36 (H) 02/16/2022     (L) 02/16/2022    K 4.1 02/16/2022    CL 92 (L) 02/16/2022    CO2 31 02/16/2022     Lab Results   Component Value Date    INR 1.14 12/29/2020    PROTIME 13.2 12/29/2020         Assessment:  Leonardo Molina is a 80 y.o. patient who presented to the [facility] 2/14/21 with c/o SOB especially with walking and laying flat. He has PMH CAD s/p 4V CABG 2018, CVA frontal lobe 2016, and TIA 1997. I follow in office--last OV 6/21. Most recent Ricka Obregon 8/25/20 Negative. Most recent ECHO 8/25/20 LVEF=50-55%.  mild HK basal inferior and inferoseptal walls. Grade III DD with elevated LV pressure. LA is mildly dilated. Mild posterior MAC. Mild MR, TR, AR. Large left pleural effusion. Most recent LHC/RHC 12/30/20 Severe native 3V CAD; Patent LIMA to LAD. Patent SVG to diagonal with sequential to OM2. Patent SVG to distal RCA. Mildly elevated right-sided cardiac filling pressures. Continue med mgt without PCI. Mr. Kylee Avila now presents with complaints of SOB especially with laying flat. Symptoms began 6 months ago but worse last few days  He also has exertional SOB. His family doctor had him double his lasix. Admitting EKG showed NSR; right axis; ILBBB; ST change inferolateral consider ischemia; possible LVH (ST change new from 12/20); Second EKG shows ST changes resolved; Pro-BNP 5,346, Troponin negative x 2; , 128; Lactic Acid 3.1, Glucose 239, ALT 45, WBC 11.8, RBC 3.46, H/H 9.7/29.7. Reports taking lasix but drinking over 64 oz fluid daily. Diagnosis of acute CHF of undetermined type in older male with hx CAD s/p 4V CABG and CVA. Possibly overdrinking and not enough diuretic is reason for decompensation. There is ST change c/w possible ischemia that resolved on subsequent study. No angina symptoms.     Recs:  1. Pending ECHO today  2. Holding IV lasix this AM given increased Cr to 1.5 and swelling/SOB improved on room air. 3. Continue norvasc 5mg qd, metoprolol 50mg BID, pravachol 40mg qhs.  4. Further recs pending ECHO result and response to diuresis. Consider nuc stress testing if issues with ECHO that are new. 5. Repeat EKG today NSR; IVCD; NST change; septal infarct (no change 2/14)  6. Holding baby aspirin given hemoccult + and Fe deficiency anemia. GI consult pending  7. Renal Dr. Humphrey Leiva following.  I d/w him and will increase demadex to 40mg daily as outpatient    Patient Active Problem List   Diagnosis    Bilateral carotid artery occlusion    Essential hypertension    Hypothyroidism, acquired, autoimmune    Vitamin D deficiency disease    Type 2 diabetes mellitus without complication, without long-term current use of insulin (HCC)    Coronary artery disease    Pure hypercholesterolemia    History of CVA (cerebrovascular accident)    Chronic diastolic congestive heart failure (HCC)    S/P CABG x 4    Hyponatremia    Allergic sinusitis    History of tobacco abuse    SOB (shortness of breath)    Chronic eczematous otitis externa of both ears    Restrictive lung disease    Pulmonary infiltrates    Mediastinal adenopathy    Simple chronic bronchitis (HCC)    Allergic rhinitis    Acute on chronic heart failure (HCC)    Iron deficiency anemia

## 2022-02-16 NOTE — PROGRESS NOTES
Admit: 2022    Name:  Nava Morris  Room:  0196/9792-51  MRN:    [id number]     Daily Progress Note for 2022   Admitted with acute on chronic CHF    Interval History:     Shortness of breath or leg swelling  Currently on room air    Scheduled Meds:   aspirin  81 mg Oral Daily    amLODIPine  5 mg Oral Daily    ferrous sulfate  325 mg Oral Daily with breakfast    guaiFENesin  400 mg Oral Nightly    levothyroxine  88 mcg Oral Daily    metoprolol tartrate  50 mg Oral BID    therapeutic multivitamin-minerals  1 tablet Oral Daily    pantoprazole  40 mg Oral QAM AC    pravastatin  40 mg Oral Nightly    furosemide  40 mg IntraVENous BID    sodium chloride flush  5-40 mL IntraVENous 2 times per day    enoxaparin  40 mg SubCUTAneous Daily    insulin lispro  0-6 Units SubCUTAneous TID WC    insulin lispro  0-3 Units SubCUTAneous Nightly    budesonide-formoterol  1 puff Inhalation BID    And    tiotropium  2 puff Inhalation Daily    magnesium oxide  400 mg Oral Daily       Continuous Infusions:   dextrose      sodium chloride         PRN Meds:  glucose, glucagon (rDNA), dextrose, sodium chloride flush, sodium chloride, ondansetron **OR** ondansetron, polyethylene glycol, acetaminophen **OR** acetaminophen, hydrALAZINE, perflutren lipid microspheres, potassium chloride **OR** potassium alternative oral replacement **OR** potassium chloride, magnesium sulfate, dextrose bolus (hypoglycemia) **OR** dextrose bolus (hypoglycemia)                  Objective:     Temp  Av.9 °F (36.6 °C)  Min: 97.3 °F (36.3 °C)  Max: 98.7 °F (37.1 °C)  Pulse  Av.8  Min: 57  Max: 83  BP  Min: 121/58  Max: 146/67  SpO2  Av.7 %  Min: 94 %  Max: 97 %  Patient Vitals for the past 4 hrs:   BP Temp Temp src Pulse Resp SpO2 Weight   22 0454 -- -- -- -- -- -- 164 lb 9.6 oz (74.7 kg)   22 0400 (!) 131/56 97.3 °F (36.3 °C) Oral 63 20 97 % --         Intake/Output Summary (Last 24 hours) at [address] filed at 2/16/2022 0553  Gross per 24 hour   Intake 580 ml   Output 1926 ml   Net -1346 ml       Physical Exam:  General:  Awake, alert and oriented. Appears to be not in any distress  Mucous Membranes:  Pink , anicteric  Neck: No JVD, no carotid bruit, no thyromegaly  Chest:  Clear to auscultation bilaterally, no added sounds  Cardiovascular:  RRR S1S2 heard, no murmurs or gallops  Abdomen:  Soft, undistended, non tender, no organomegaly, BS present  Extremities: No edema or cyanosis. Distal pulses well felt  Neurological : no focal deficits    Lab Data:  CBC:   Recent Labs     02/14/22  2126 02/15/22  0626   WBC 11.8* 11.0   RBC 3.46* 3.43*   HGB 9.7* 9.8*   HCT 29.7* 29.4*   MCV 86.0 85.5   RDW 15.4 14.9    301     BMP:   Recent Labs     02/14/22  2126 02/15/22  0626 02/16/22  0452   * 128* 133*   K 5.0 4.2 4.1   CL 88* 90* 92*   CO2 25 26 31   PHOS  --   --  3.1   BUN 34* 34* 36*   CREATININE 1.2 1.2 1.5*     BNP: No results for input(s): BNP in the last 72 hours. PT/INR: No results for input(s): PROTIME, INR in the last 72 hours. APTT:No results for input(s): APTT in the last 72 hours. CARDIAC ENZYMES:   Recent Labs     02/14/22  2126 02/15/22  0530 02/15/22  1443   TROPONINI <0.01 <0.01 <0.01     FASTING LIPID PANEL:  Lab Results   Component Value Date    CHOL 186 02/15/2022    HDL 57 02/15/2022    HDL 39 01/18/2012    TRIG 76 02/15/2022     LIVER PROFILE:   Recent Labs     02/14/22  2126 02/15/22  0626   AST 36 29   ALT 45* 41*   BILITOT 0.3 0.4   ALKPHOS 181* 164*         XR CHEST 1 VIEW   Final Result   Stable chest.  CHF with moderate bilateral pleural effusions with bibasilar   atelectasis.                Assessment & Plan:     Patient Active Problem List    Diagnosis Date Noted    History of CVA (cerebrovascular accident) 04/12/2016    Type 2 diabetes mellitus without complication, without long-term current use of insulin ([facility] [address]     Coronary artery disease     Essential hypertension     Hypothyroidism, acquired, autoimmune     Bilateral carotid artery occlusion     Acute congestive heart failure (HCC) 02/15/2022    Allergic rhinitis     Simple chronic bronchitis ([facility] [address] 11/18/2021    Restrictive lung disease 07/13/2021    Pulmonary infiltrates 07/13/2021    Mediastinal adenopathy 07/13/2021    Chronic eczematous otitis externa of both ears 02/03/2021    SOB (shortness of breath)     History of tobacco abuse 12/14/2020    Allergic sinusitis 09/24/2019    S/P CABG x 4     Chronic diastolic congestive heart failure ([facility] [address] 01/11/2018    Pure hypercholesterolemia 12/08/2015    Vitamin D deficiency disease        Acute on chronic diastolic congestive heart failure. Placed on IV Lasix. Cardiology consult. Echocardiogram ordered. Currently on room air. Hold IV Lasix this morning. Acute respiratory failure with hypoxia secondary to CHF. Was requiring 2 L of oxygen. Now on room air. Hyponatremia. Sodium 124 on admission. Resolved with IV Lasix. Mild acute kidney injury today. Hold Lasix. Coronary disease status post CABG and stents. Continue metoprolol and Pravachol. Hold aspirin given guaiac positive stools and anemia. Anemia. Drop in his hemoglobin of about 2 points in the past year. Iron studies reveal iron deficiency and anemia of chronic disease. Hemoccult is positive. Has never had a colonoscopy. Obtain GI consult. Hypertension. Stable. Continue Norvasc and metoprolol. Hold losartan for now. Type 2 diabetes. Hold Metformin. Continue sliding scale insulin.     DVT prophylaxis-SCDs  Hold Lovenox given anemia and possible GI bleed  Full code    Chloe Garcia MD

## 2022-02-16 NOTE — PROGRESS NOTES
Shift assessment completed, see flow sheet. RASS ***. Following commands. Intubated and sedated on AC # *** ETT, at *** LL. *** / *** / *** %/ +5. SpO2 ***%. Respirations are easy, even, and unlabored. Bilateral lung sounds ***.     VSS  *** on the monitor  OG in place at ***, with TF at *** mL/hr, *** residual.      PICC/CVC/PIV, WNL with ***    All lines and monitoring devices in place. Suh is patent and secured. Bilateral soft wrist restraints in place for patient safety. Bed in lowest position with wheels locked. No needs expressed at this time. Will continue to monitor.

## 2022-02-16 NOTE — PROGRESS NOTES
Called pt PCP office sees Lionel Maciel NP. Pt has not had labs for CBC since 2020. Pt most recent routine visit was 11/21.

## 2022-02-16 NOTE — PROGRESS NOTES
[facility] [facility]  Nephrology Follow up Note           Reason for Consult: hyponatremia, CKD   Requesting Physician:  Dr. Travis Jarrett history  Feels better and reports that swelling has improved  Cr up to 1.5    Last 24 h uop 2l    ROS: No chest pain/shortness of breath/fever/nausea/vomiting  PSFH: No visitor    Scheduled Meds:   [Held by provider] aspirin  81 mg Oral Daily    amLODIPine  5 mg Oral Daily    ferrous sulfate  325 mg Oral Daily with breakfast    guaiFENesin  400 mg Oral Nightly    levothyroxine  88 mcg Oral Daily    metoprolol tartrate  50 mg Oral BID    therapeutic multivitamin-minerals  1 tablet Oral Daily    pantoprazole  40 mg Oral QAM AC    pravastatin  40 mg Oral Nightly    [Held by provider] furosemide  40 mg IntraVENous BID    sodium chloride flush  5-40 mL IntraVENous 2 times per day    [Held by provider] enoxaparin  40 mg SubCUTAneous Daily    insulin lispro  0-6 Units SubCUTAneous TID WC    insulin lispro  0-3 Units SubCUTAneous Nightly    budesonide-formoterol  1 puff Inhalation BID    And    tiotropium  2 puff Inhalation Daily    magnesium oxide  400 mg Oral Daily     Continuous Infusions:   dextrose      sodium chloride       PRN Meds:.glucose, glucagon (rDNA), dextrose, sodium chloride flush, sodium chloride, ondansetron **OR** ondansetron, polyethylene glycol, acetaminophen **OR** acetaminophen, hydrALAZINE, perflutren lipid microspheres, potassium chloride **OR** potassium alternative oral replacement **OR** potassium chloride, magnesium sulfate, dextrose bolus (hypoglycemia) **OR** dextrose bolus (hypoglycemia)    History of Present Illness on 2/15/22:    80 y.o. yo male with PMH of CAD s/p CABG in 2018, CVA, CHFpEF, DM II, former smoker who is admitted for sob  Pt has had increased sob for few months but in the last week had increased symptoms and could not lay flat.  He also noticed increased swelling of his both legs ; received lasix 40 mg iv times one on 2/14 and 2/15 and reports of feeling well this am  Cr was noted to be 1.2 on admission  Since 12/2020, his cr as been  1.2-1.5    Physical exam:   Constitutional:  VITALS:  BP (!) 123/95   Pulse 84   Temp 97.7 °F (36.5 °C) (Oral)   Resp 26   Wt 164 lb 9.6 oz (74.7 kg)   SpO2 95%   BMI 22.96 kg/m²   Gen: alert, awake  Cardiovascular:  S1, S2 without m/r/g 1+ lower extremity edema  Respiratory: CTA B without w/r/r; respiratory effort normal  Abdomen:  soft, nt, nd,   Neuro/Psy: AAoriented times 3 ; moves all 4 ext    Data/  Recent Labs     02/14/22  2126 02/15/22  0626 02/16/22  0452   WBC 11.8* 11.0 9.1   HGB 9.7* 9.8* 10.1*   HCT 29.7* 29.4* 30.4*   MCV 86.0 85.5 86.5    301 326     Recent Labs     02/14/22  2126 02/15/22  0626 02/16/22  0452   * 128* 133*   K 5.0 4.2 4.1   CL 88* 90* 92*   CO2 25 26 31   GLUCOSE 239* 116* 115*   PHOS  --   --  3.1   MG  --  2.00  --    BUN 34* 34* 36*   CREATININE 1.2 1.2 1.5*   LABGLOM 58* 58* 45*   GFRAA >60 >60 54*     CXR  FINDINGS:   The cardiomediastinal silhouette is stable status post median sternotomy.    Aortic vascular calcification.  Vascular congestion with perihilar edema and   moderate bilateral pleural effusions consistent with CHF without significant   interval change.  There is associated bibasilar atelectasis.  Stable apical   fibrotic changes.           Impression   Stable chest.  CHF with moderate bilateral pleural effusions with bibasilar   atelectasis.         UA bland  MCR undetectable    Assessment  -Hyponatremia in the setting of CHF exacerbation and hyperglycemia   Normal TSH   Improved     -Acute on chronic CHFpEF    -CKD III w baseline cr of 1.2-1.5, likely cardiorenal syndrome    -DM 2    -HTN    -h/o CAD and CVA    Plan  -diuretic holiday on 2/16  -torsemide 40 mg daily from 2/17  -fluid restriction 1500 ml/day  -low sodium diet  -DM control per IM  -Serial renal panel  -daily wts and strict i/o  -renal dose medications   -avoid

## 2022-02-17 NOTE — CARE COORDINATION
[facility]  Received referral regarding [facility] [employer], [facility]. services from Digna LÓPEZ CM. CM aware [facility] may not be able to see pt until Mon 2/21. Sent request to [facility] for [facility] coverage. [facility] able to see pt for SN [facility] 2/21. Liaison to follow up with pt prior to discharge. Spoke with pt in follow up. Agreeable to [facility] for SN. Verified demographics. Pt confirms he follows up with Ari Ho CNP at [facility]. Pt aware SOC will be a plan for [facility] 2/21. Sent referral with orders to [facility].       Electronically signed by Gisele Reyes RN on 2/17/2022 at 11:53 AM

## 2022-02-17 NOTE — PROGRESS NOTES
9:25 PM  Shift assessment completed, see flow sheet. PM meds administered per MAR. A&O x4. VSS, SpO2 98% on RA.   Clear yellow urine emptied via urinal.   Carb control diet in effect, breakfast to be held in AM in preparation of ABD CT tomorrow. PIV in place and functioning appropriately, dressing C/D/I. Educated on importance of frequent repositioning, pt turns self. No other needs at this time. 3:42 AM  Reassessment completed, see flow sheet. No changes noted. Pt resting quietly in bed w/o complaints. 7:17 AM  No big changes throughout the night. Pt currently drinking contrast for CT today. EOS bedside report given to Zach Puente RN. Pt stable at this time, care transferred.

## 2022-02-17 NOTE — PROGRESS NOTES
[facility]  Nephrology Follow up Note           Reason for Consult: hyponatremia, CKD   Requesting Physician:  Dr. Alize Basurto history  Had ct w contrast this am before I could see him  Cr up to 1.5-->1.2    Last 24 h uop 750 ml    Scheduled Meds:   [Held by provider] aspirin  81 mg Oral Daily    amLODIPine  5 mg Oral Daily    ferrous sulfate  325 mg Oral Daily with breakfast    guaiFENesin  400 mg Oral Nightly    levothyroxine  88 mcg Oral Daily    metoprolol tartrate  50 mg Oral BID    therapeutic multivitamin-minerals  1 tablet Oral Daily    pantoprazole  40 mg Oral QAM AC    pravastatin  40 mg Oral Nightly    sodium chloride flush  5-40 mL IntraVENous 2 times per day    [Held by provider] enoxaparin  40 mg SubCUTAneous Daily    insulin lispro  0-6 Units SubCUTAneous TID WC    insulin lispro  0-3 Units SubCUTAneous Nightly    budesonide-formoterol  1 puff Inhalation BID    And    tiotropium  2 puff Inhalation Daily    magnesium oxide  400 mg Oral Daily     Continuous Infusions:   dextrose      sodium chloride       PRN Meds:.glucose, glucagon (rDNA), dextrose, sodium chloride flush, sodium chloride, ondansetron **OR** ondansetron, polyethylene glycol, acetaminophen **OR** acetaminophen, hydrALAZINE, perflutren lipid microspheres, potassium chloride **OR** potassium alternative oral replacement **OR** potassium chloride, magnesium sulfate, dextrose bolus (hypoglycemia) **OR** dextrose bolus (hypoglycemia)    History of Present Illness on 2/15/22:    80 y.o. yo male with PMH of CAD s/p CABG in 2018, CVA, CHFpEF, DM II, former smoker who is admitted for sob  Pt has had increased sob for few months but in the last week had increased symptoms and could not lay flat.  He also noticed increased swelling of his both legs ; received lasix 40 mg iv times one on 2/14 and 2/15 and reports of feeling well this am  Cr was noted to be 1.2 on admission  Since 12/2020, his cr as been 1.2-1.5    Physical exam:   Constitutional:  VITALS:  BP (!) 125/57   Pulse 81   Temp 98.7 °F (37.1 °C) (Oral)   Resp 16   Ht 5' 11\" (1.803 m)   Wt 165 lb 9.1 oz (75.1 kg)   SpO2 95%   BMI 23.09 kg/m²       Data/  Recent Labs     02/15/22  0626 02/16/22  0452 02/17/22  0414   WBC 11.0 9.1 8.6   HGB 9.8* 10.1* 10.0*   HCT 29.4* 30.4* 30.1*   MCV 85.5 86.5 86.3    326 321     Recent Labs     02/15/22  0626 02/16/22  0452 02/17/22  0414   * 133* 136   K 4.2 4.1 4.1   CL 90* 92* 98*   CO2 26 31 28   GLUCOSE 116* 115* 124*   PHOS  --  3.1 3.4   MG 2.00  --   --    BUN 34* 36* 29*   CREATININE 1.2 1.5* 1.2   LABGLOM 58* 45* 58*   GFRAA >60 54* >60     CXR  FINDINGS:   The cardiomediastinal silhouette is stable status post median sternotomy. Aortic vascular calcification.  Vascular congestion with perihilar edema and   moderate bilateral pleural effusions consistent with CHF without significant   interval change.  There is associated bibasilar atelectasis.  Stable apical   fibrotic changes.           Impression   Stable chest.  CHF with moderate bilateral pleural effusions with bibasilar   atelectasis.         UA bland  MCR undetectable    Assessment  -Hyponatremia in the setting of CHF exacerbation and hyperglycemia   Normal TSH   Improved     -Acute on chronic CHFpEF    -CKD III w baseline cr of 1.2-1.5, likely cardiorenal syndrome    -DM 2    -HTN    -h/o CAD and CVA    Plan  -torsemide 40 mg daily from 2/17 and as he received the iv dye for CT scan a/p, would recommend checking renal panel on Monday as OP ( will arrange)   -fluid restriction 1500 ml/day  -low sodium diet  -DM control per IM  -avoid nephrotoxins        Thank you for the consultation. Please do not hesitate to call with questions. Thomas Chase MD  Office: [phone]  Fax:    [phone] [address] [facility]

## 2022-02-17 NOTE — PROGRESS NOTES
DC instructions reviewed with patient and spouse with a verbal understanding. IV removed with no complications. Patient dressed and stated he had all of his belongings.

## 2022-02-17 NOTE — PROGRESS NOTES
PROGRESS NOTE  S:81 yrs Patient  admitted on 2/14/2022 with Acute on chronic heart failure, unspecified heart failure type ([facility] [address] [I50.9]  Acute on chronic congestive heart failure, unspecified heart failure type ([facility] [address] [I50.9] . Today he feels well. He is tolerating diet. Exam:   Vitals:    02/17/22 0945   BP:    Pulse: 81   Resp: 16   Temp: 98.7 °F (37.1 °C)   SpO2: 95%      General appearance: alert, appears older than stated age, cooperative, no distress, pale and syndromic appearance - chronically ill appearing  HEENT: Neck supple with midline trachea  Neck: no adenopathy and supple, symmetrical, trachea midline  Lungs: clear to auscultation bilaterally  Heart: regular rate and rhythm, S1, S2 normal, no murmur, click, rub or gallop  Abdomen: soft, non-tender; bowel sounds normal; no masses,  no organomegaly  Extremities: extremities normal, atraumatic, no cyanosis or edema     Medications: Reviewed    Labs:  CBC:   Recent Labs     02/15/22  0626 02/16/22  0452 02/17/22  0414   WBC 11.0 9.1 8.6   HGB 9.8* 10.1* 10.0*   HCT 29.4* 30.4* 30.1*   MCV 85.5 86.5 86.3    326 321     BMP:   Recent Labs     02/15/22  0626 02/16/22  0452 02/17/22  0414   * 133* 136   K 4.2 4.1 4.1   CL 90* 92* 98*   CO2 26 31 28   PHOS  --  3.1 3.4   BUN 34* 36* 29*   CREATININE 1.2 1.5* 1.2     LIVER PROFILE:   Recent Labs     02/14/22  2126 02/15/22  0626   AST 36 29   ALT 45* 41*   PROT 7.8 7.7   BILITOT 0.3 0.4   ALKPHOS 181* 164*     PT/INR: No results for input(s): INR in the last 72 hours. Invalid input(s): PT      IMAGING:  CT ABDOMEN PELVIS W IV CONTRAST   Impression   1. Chronic stable bibasilar exudative pleural effusions. 2. Cholelithiasis.       RECOMMENDATIONS:   Unavailable       Impression: 80year old male with a history of CAD s/p CABG, CVA, CHF, DM, and HTN admitted with acute on chronic CHF c/b iron deficiency anemia and hemoccult positive stool.  CT abd/pelvis showed cholelithiasis and stable bilateral pleural effusions, otherwise normal.      Recommendation:  1. Continue supportive care  2. Monitor Hgb  3. Observe for signs of bleeding  4. Monitor and document output  5. Continue Pantoprazole 40 mg qAM  6. Continue iron supplementation   7. Continue diet as tolerated  8. Cardiology and nephrology following  9. Ok to d/c from GI standpoint   10. Will schedule colonoscopy + EGD as outpatient pending cardiology clearance      Kennedy Jack PA-C  11:51 AM 2/17/2022                      I reviewed and agree with the findings and plan documented in her note with the appropriate changes made to it.     Electronically signed by Taina Felix MD on 2/17/22 at 4:58 PM EST          (O) [phone]  (O) [phone]

## 2022-02-17 NOTE — CARE COORDINATION
DISCHARGE ORDER  Date/Time 2022 11:21 AM  Completed by: Rito Schmid RN, Case Management    Patient Name: Shanon Juarez      : 1940  Admitting Diagnosis: Acute on chronic heart failure, unspecified heart failure type ([facility] [address] [I50.9]  Acute on chronic congestive heart failure, unspecified heart failure type ([address] [I50.9]      Admit order Date and Status:inpt  (verify MD's last order for status of admission)      Noted discharge order. If applicable PT/OT recommendation at Discharge: na  DME recommendation by PT/OT:na  Confirmed discharge plan  (pt):       Discharge Plan: Reviewed chart. Writer met with pt and spouse at bedside. Pt requests SN, no preference on [employer]. Referral called to [facility] for SN and she can accept the pt at d/c today, she has epic access. Referral emailed to hospital to home program for meals. No other d/c needs voiced or identified. Reviewed chart. Role of discharge planner explained and patient verbalized understanding. Discharge order is noted. Has Home O2 in place on admit:  No  Informed of need to bring portable home O2 tank on day of discharge for nursing to connect prior to leaving:   No  Verbalized agreement/Understanding:   Not Indicated  Pt is being d/c'd to home today. Pt's O2 sats are 95% on RA. Discharge timeout done with SARITHA Reyes. All discharge needs and concerns addressed.

## 2022-02-17 NOTE — DISCHARGE INSTR - COC
Continuity of Care Form    Patient Name: Dejuan Samaniego   :  1940  MRN:  [id number]    Admit date:  2022  Discharge date:  ***    Code Status Order: Full Code   Advance Directives:      Admitting Physician:  Nancy Han MD  PCP: AICHA Thomas CNP    Discharging Nurse: [facility] Unit/Room#: 5523/0750-71  Discharging Unit Phone Number: ***    Emergency Contact:   Extended Emergency Contact Information  Primary Emergency Contact: Traci Blanc  Address: [address] [place] [address] 812, [address] Phone: [phone]  Mobile Phone: [phone]  Relation: Spouse  Secondary Emergency Contact:  Maye Varela  Home Phone: [phone]  Mobile Phone: [phone]  Relation: Child    Past Surgical History:  Past Surgical History:   Procedure Laterality Date    CORONARY ANGIOPLASTY WITH STENT PLACEMENT  2000    RX Tristar 2.5 x 13 mCX  RX Tristar 2.5 x 13 pCX    CORONARY ANGIOPLASTY WITH STENT PLACEMENT  10/19/2000    Tetra 2.75 x 18 CX  Tetra 3.0 x 13 CX    OTHER SURGICAL HISTORY  12    phaco emulsification cataract right eye    TONSILLECTOMY      TOOTH EXTRACTION         Immunization History:   Immunization History   Administered Date(s) Administered    COVID-19, J&J, PF, 0.5 mL 2021    COVID-19, Nargis Coup, Primary or Immunocompromised, PF, 100mcg/0.5mL 2021    Influenza, Quadv, adjuvanted, 65 yrs +, IM, PF (Fluad) 2020    Rabies 2011    Rabies Immune Globulin 2011    Tdap (Boostrix, Adacel) 2011       Active Problems:  Patient Active Problem List   Diagnosis Code    Bilateral carotid artery occlusion I65.23    Essential hypertension I10    Hypothyroidism, acquired, 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    Vitamin D deficiency disease E55.9    Type 2 diabetes mellitus without complication, without long-term current use of insulin (HCC) E11.9    Coronary artery disease I25.10    Pure hypercholesterolemia E78.00    History of CVA (cerebrovascular accident) Z86.73    Chronic diastolic congestive heart failure (HCC) I50.32    S/P CABG x 4 Z95.1    Hyponatremia E87.1    Allergic sinusitis J30.9    History of tobacco abuse Z87.891    SOB (shortness of breath) R06.02    Chronic eczematous otitis externa of both ears H60.8X3    Restrictive lung disease J98.4    Pulmonary infiltrates R91.8    Mediastinal adenopathy R59.0    Simple chronic bronchitis (HCC) J41.0    Allergic rhinitis J30.9    Acute on chronic heart failure (HCC) I50.9    Iron deficiency anemia D50.9       Isolation/Infection:   Isolation            No Isolation          Patient Infection Status       Infection Onset Added Last Indicated Last Indicated By Review Planned Expiration Resolved Resolved By    None active    Resolved    COVID-19 (Rule Out) 22 COVID-19 & Influenza Combo (Ordered)   22 Rule-Out Test Resulted    COVID-19 (Rule Out) 22 COVID-19 (Ordered)   22 Rule-Out Test Resulted            Nurse Assessment:  Last Vital Signs: BP (!) 125/57   Pulse 81   Temp 98.7 °F (37.1 °C) (Oral)   Resp 16   Ht 5' 11\" (1.803 m)   Wt 165 lb 9.1 oz (75.1 kg)   SpO2 95%   BMI 23.09 kg/m²     Last documented pain score (0-10 scale): Pain Level: 0  Last Weight:   Wt Readings from Last 1 Encounters:   22 165 lb 9.1 oz (75.1 kg)     Mental Status:  {IP PT MENTAL STATUS:92034}    IV Access:  { GI IV ACCESS:827289659}    Nursing Mobility/ADLs:  Walking   {[facility] DME OPPV:403145096}  Transfer  {[facility] DME MJWF:612856997}  Bathing  {[facility] DME IVAA:485996718}  Dressing  {[facility] DME ETGN:217929876}  Toileting  {[facility] DME GXSO:077878808}  Feeding  {[facility] GZZZ:308340534}  Med Admin  {[facility] DME TXRT:024947378}  Med Delivery   { GI MED Delivery:969477951}    Wound Care Documentation and Therapy:        Elimination:  Continence:    Bowel: {YES / EP:26013}  Bladder: {YES / T}  Urinary Catheter: {Urinary Catheter:470346469} Colostomy/Ileostomy/Ileal Conduit: {YES / HA:24214}       Date of Last BM: ***    Intake/Output Summary (Last 24 hours) at 2022 1133  Last data filed at 2022 1000  Gross per 24 hour   Intake 600 ml   Output 800 ml   Net -200 ml     I/O last 3 completed shifts: In: 940 [P.O.:930;  I.V.:10]  Out: 2676 [Urine:2675; Stool:1]    Safety Concerns:     [address] Safety Concerns:545663928}    Impairments/Disabilities:      [facility] Impairments/Disabilities:764212198}    Nutrition Therapy:  Current Nutrition Therapy:   [facility] Diet List:919167717}    Routes of Feeding: {[facility] DME Other Feedings:767262780}  Liquids: {Slp liquid thickness:59981}  Daily Fluid Restriction: {CHP DME Yes amt example:991209717}  Last Modified Barium Swallow with Video (Video Swallowing Test): {Done Not Done QJVM:429330456}    Treatments at the Time of Hospital Discharge:   Respiratory Treatments: ***  Oxygen Therapy:  {Therapy; copd oxygen:07222}  Ventilator:    { CC Vent ASBA:917974364}    Rehab Therapies: {THERAPEUTIC INTERVENTION:0706131946}  Weight Bearing Status/Restrictions: [facility] Weight Bearin}  Other Medical Equipment (for information only, NOT a DME order):  {EQUIPMENT:452179165}  Other Treatments: ***    Patient's personal belongings (please select all that are sent with patient):  {P DME Belongings:613484287}    RN SIGNATURE:  {Esignature:038535595}    CASE MANAGEMENT/SOCIAL WORK SECTION    Inpatient Status Date: 02/15/22    Readmission Risk Assessment Score:  Readmission Risk              Risk of Unplanned Readmission:  24           Discharging to Facility/ Agency   Name: [facility]) for     Dialysis Facility (if applicable)   Name:  Address:  Dialysis Schedule:  Phone:  Fax:    / signature: Electronically signed by Siri Sims RN on 22 at 11:33 AM EST    PHYSICIAN SECTION    Prognosis: {Prognosis:5517350702}    Condition at Discharge: [address] [facility] Patient Condition:860917763}    Rehab Potential (if transferring to Rehab): {Prognosis:7818080868}    Recommended Labs or Other Treatments After Discharge: SN G[facility] for [facility] 2/21  HCV and Zoom Programs    Physician Certification: I certify the above information and transfer of Armani Link  is necessary for the continuing treatment of the diagnosis listed and that he requires {Admit to Appropriate Level of Care:18079} for {GREATER/LESS:084714667} 30 days.      Update Admission H&P: {CHP DME Changes in JWW:184119817}    PHYSICIAN SIGNATURE:  Electronically signed by Edin Parson RN on 2/17/22 at 11:33 AM EST

## 2022-02-17 NOTE — PROGRESS NOTES
Admit: 2022    Name:  [facility]  Room:  [address] [place], [place] [address]  MRN:    [id number]     Daily Progress Note for 2022   Admitted with acute on chronic CHF    Interval History:     No Shortness of breath or leg swelling  Currently on room air    Scheduled Meds:   iohexol  50 mL Oral Once    [Held by provider] aspirin  81 mg Oral Daily    amLODIPine  5 mg Oral Daily    ferrous sulfate  325 mg Oral Daily with breakfast    guaiFENesin  400 mg Oral Nightly    levothyroxine  88 mcg Oral Daily    metoprolol tartrate  50 mg Oral BID    therapeutic multivitamin-minerals  1 tablet Oral Daily    pantoprazole  40 mg Oral QAM AC    pravastatin  40 mg Oral Nightly    sodium chloride flush  5-40 mL IntraVENous 2 times per day    [Held by provider] enoxaparin  40 mg SubCUTAneous Daily    insulin lispro  0-6 Units SubCUTAneous TID WC    insulin lispro  0-3 Units SubCUTAneous Nightly    budesonide-formoterol  1 puff Inhalation BID    And    tiotropium  2 puff Inhalation Daily    magnesium oxide  400 mg Oral Daily       Continuous Infusions:   dextrose      sodium chloride         PRN Meds:  glucose, glucagon (rDNA), dextrose, sodium chloride flush, sodium chloride, ondansetron **OR** ondansetron, polyethylene glycol, acetaminophen **OR** acetaminophen, hydrALAZINE, perflutren lipid microspheres, potassium chloride **OR** potassium alternative oral replacement **OR** potassium chloride, magnesium sulfate, dextrose bolus (hypoglycemia) **OR** dextrose bolus (hypoglycemia)                  Objective:     Temp  Av.9 °F (36.6 °C)  Min: 97.7 °F (36.5 °C)  Max: 98.2 °F (36.8 °C)  Pulse  Av.6  Min: 63  Max: 84  BP  Min: 123/95  Max: 140/54  SpO2  Av.7 %  Min: 95 %  Max: 98 %  Patient Vitals for the past 4 hrs:   Resp SpO2   22 0746 18 97 %         Intake/Output Summary (Last 24 hours) at 2022 0802  Last data filed at 2022 1806  Gross per 24 hour   Intake 840 ml   Output 750 ml   Net 90 ml Physical Exam:  General:  Awake, alert and oriented. Appears to be not in any distress  Mucous Membranes:  Pink , anicteric  Neck: No JVD, no carotid bruit, no thyromegaly  Chest:  Clear to auscultation bilaterally, no added sounds  Cardiovascular:  RRR S1S2 heard, no murmurs or gallops  Abdomen:  Soft, undistended, non tender, no organomegaly, BS present  Extremities: No edema or cyanosis. Distal pulses well felt  Neurological : no focal deficits    Lab Data:  CBC:   Recent Labs     02/15/22  0626 02/16/22  0452 02/17/22  0414   WBC 11.0 9.1 8.6   RBC 3.43* 3.51* 3.49*   HGB 9.8* 10.1* 10.0*   HCT 29.4* 30.4* 30.1*   MCV 85.5 86.5 86.3   RDW 14.9 15.5* 15.5*    326 321     BMP:   Recent Labs     02/15/22  0626 02/16/22  0452 02/17/22  0414   * 133* 136   K 4.2 4.1 4.1   CL 90* 92* 98*   CO2 26 31 28   PHOS  --  3.1 3.4   BUN 34* 36* 29*   CREATININE 1.2 1.5* 1.2     BNP: No results for input(s): BNP in the last 72 hours. PT/INR: No results for input(s): PROTIME, INR in the last 72 hours. APTT:No results for input(s): APTT in the last 72 hours. CARDIAC ENZYMES:   Recent Labs     02/14/22  2126 02/15/22  0530 02/15/22  1443   TROPONINI <0.01 <0.01 <0.01     FASTING LIPID PANEL:  Lab Results   Component Value Date    CHOL 186 02/15/2022    HDL 57 02/15/2022    HDL 39 01/18/2012    TRIG 76 02/15/2022     LIVER PROFILE:   Recent Labs     02/14/22  2126 02/15/22  0626   AST 36 29   ALT 45* 41*   BILITOT 0.3 0.4   ALKPHOS 181* 164*         XR CHEST 1 VIEW   Final Result   Stable chest.  CHF with moderate bilateral pleural effusions with bibasilar   atelectasis.          CT ABDOMEN PELVIS W IV CONTRAST Additional Contrast? Oral    (Results Pending)         Assessment & Plan:     Patient Active Problem List    Diagnosis Date Noted    History of CVA (cerebrovascular accident) 04/12/2016    Type 2 diabetes mellitus without complication, without long-term current use of insulin ([facility] [address]     Coronary artery disease     Essential hypertension     Hypothyroidism, acquired, autoimmune     Bilateral carotid artery occlusion     Iron deficiency anemia     Acute congestive heart failure (HCC) 02/15/2022    Allergic rhinitis     Simple chronic bronchitis (HCC) 11/18/2021    Restrictive lung disease 07/13/2021    Pulmonary infiltrates 07/13/2021    Mediastinal adenopathy 07/13/2021    Chronic eczematous otitis externa of both ears 02/03/2021    SOB (shortness of breath)     History of tobacco abuse 12/14/2020    Allergic sinusitis 09/24/2019    Hyponatremia 05/09/2019    S/P CABG x 4     Chronic diastolic congestive heart failure ([facility] [address] 01/11/2018    Pure hypercholesterolemia 12/08/2015    Vitamin D deficiency disease        Acute on chronic diastolic congestive heart failure. Placed on IV Lasix. Cardiology consult. Echocardiogram ordered. Currently on room air. IV Lasix held yesterday. Start torsemide 40 mg daily today. Acute respiratory failure with hypoxia secondary to CHF. Was requiring 2 L of oxygen. Now on room air. Hyponatremia. Sodium 124 on admission. Resolved with IV Lasix. Mild acute kidney injury today. IV Lasix held yesterday. Coronary disease status post CABG and stents. Continue metoprolol and Pravachol. Hold aspirin given guaiac positive stools and anemia. Anemia. Drop in his hemoglobin of about 2 points in the past year. Iron studies reveal iron deficiency and anemia of chronic disease. Hemoccult is positive. Has never had a colonoscopy. Obtain GI consult. CT abdomen pelvis ordered. Esophagogastroduodenoscopy and colonoscopy as an outpatient. H&H is stable. Hypertension. Stable. Continue Norvasc and metoprolol. Hold losartan for now. Type 2 diabetes. Hold Metformin. Continue sliding scale insulin.     DVT prophylaxis-SCDs  Hold Lovenox given anemia and possible GI bleed  Full code    Discharge planning    Drea Rachel MD

## 2022-02-18 NOTE — CARE COORDINATION
Gloria [facility] Transitions Initial Follow Up Call    Call within 2 business days of discharge: Yes    Patient: Salome Hope Patient : 1940   MRN: [id number]  Reason for Admission: acute on chronic CHF with exacerbation, acute repiratory failure with hypoxia, hyponatremia, mild POLY, EKG changes, hx CAD s/p CABG and stents, DM2, lactic acidosis, anemia normocytic/hypochromic, hemoccult +, hypothyroidism -> home with [facility] SN, FR 1500ml/day, low sodium diet, OP EGD & Colonoscopy  Discharge Date: 22 RARS: Readmission Risk Score: 14.7 ( )      Last Discharge [facility]       Complaint Diagnosis Description Type Department Provider    22 Shortness of Breath Acute on chronic heart failure, unspecified heart failure type [facility]) ED to Hosp-Admission (Discharged) (ADMITTED) Jorge Castanon MD; Noah Kay,... Non-face-to-face services provided:  Scheduled appointment with PCP-TCM scheduled day 7 after discharge  Obtained and reviewed discharge summary and/or continuity of care documents  Communication with home health agencies or other community services the patient is currently [facility]  Education of patient/family/caregiver/guardian to support self-management-CHF education  Assessment and support for treatment adherence and medication management-1111F completed    Was this an external facility discharge? No Discharge Facility: NA    Challenges to be reviewed by the provider   Additional needs identified to be addressed with provider Yes  medications-states omeprazole was discontinued so marked as not taking       Method of communication with provider : none    Advance Care Planning:   Does patient have an Advance Directive:  reviewed and current. Was this a readmission?  No  Patient stated reason for admission: SOB  Patients top risk factors for readmission: medical condition-CHF, POLY, CAD, DM    Care Transition Nurse (CTN) contacted the patient by telephone to perform post hospital discharge assessment. Provided introduction to self, and explanation of the CTN role. CTN reviewed discharge instructions, medical action plan and red flags with patient who verbalized understanding. Patient given an opportunity to ask questions and does not have any further questions or concerns at this time. Were discharge instructions available to patient? Yes. Reviewed appropriate site of care based on symptoms and resources available to patient including: PCP, Specialist and Home health. The patient agrees to contact the PCP office for questions related to their healthcare. Medication reconciliation was performed with patient, who verbalizes understanding of administration of home medications. COVID Risk Education    COVID-19 negative. Patient was given an opportunity to verbalize any questions and concerns and agrees to contact CTN or health care provider for questions related to their healthcare. Today's weight 161.5#    States he still has difficulty breathing, but reports overall improvement. He is in no distress in conversation. BLE without edema \"look good\". Appetite good. Limits salt and fluid intake. Reviewed FR 1500ml/day. Reviewed high sodium foods to avoid. Last week he was sleeping in the recliner but he was able to sleep in his bed last night and uses one regular pillow and one small pillow. CHF education:  -weigh daily in the morning at the same time and in the same clothing  -report weight gain of >3#/day or >5#/week  -report increased SOB, edema, abd fullness, difficulty lying flat, needing to move to recliner to sleep  -report palpitations, cough, difficulty urinating  -eat a low sodium diet and limit fluid intake to 50 ounces daily    He asks about oxygen.  CTN did not see walk test but states that his difficulty breathing is likely 2/2 fluid overload and again stressed importance of reporting symptoms, managing fluid and salt intake to goals, staying as mobile as possible, taking medications as prescribed and completing follow up appointments. [facility] nephrology office called and scheduled to see him 3/22/2022 at 10:30am. PCP TCM visit scheduled during this call. Reviewed in chart that [facility] SN to visit on Monday. They noted this on calendar. CTN has message out to [facility] liaison to confirm referral and plan for [facility] Monday. He denies needs going into weekend. CTN provided contact information for future needs. Per [facility], care call will be completed today with plan for [facility] on 2/21 (Monday) and patient is aware. Plan for follow-up call in 5-7 days based on severity of symptoms and risk factors.     Follow Up  Future Appointments   Date Time Provider Getachew Cottrell   2/24/2022 10:00 AM Esther Mitul, APRN - CNP [facility] [place] - DYD   [facility]15/2022 10:[facility]   4/14/2022  9:00 AM Wilfred Reynolds MD AND PULM [facility]   5/19/2022  8:00 AM AICHA Kaur CNP [place] - DYDANIELLE   6/22/2022 12:30 PM Lulú Coates MD P CLER CAR ISADORA Grullon RN

## 2022-02-20 NOTE — ED PROVIDER NOTES
I independently examined and evaluated Shanon Juarez. I personally saw the patient and performed a substantive portion of the visit including all aspects of the medical decision making    In brief, Shanon Juarez is a 80 y.o. male with a past medical history of hypertension, diabetes coronary artery disease, CVA, CHF, who presents to the ED complaining of shortness of breath. Patient reports symptoms over the last couple weeks. He reports shortness of breath, worse with laying flat. He also has some exertion as of breath. He denies chest pain, diaphoresis, nausea, abdominal pain, cough, fever, diarrhea. He spoke to his PCP today who told him to increase his Lasix dosage, but he had already been taking increased doses of his Lasix so he decided to come to the emergency department. Denies history of blood clots or active malignancy. Patient denies unilateral leg swelling, hemoptysis, recent travel or surgery/immobilization, or OCP or other hormone use. REVIEW OF SYSTEMS  All systems reviewed, pertinent positives per HPI otherwise noted to be negative. Focused exam revealed   PHYSICAL EXAM  BP (!) 143/71   Pulse 77   Temp 98.1°F  (Oral)   Resp 18   Ht 5' 11\" (1.803 m)   Wt 165 lb 9.1 oz (75.1 kg)   SpO2 95%   BMI 23.09 kg/m²    GENERAL APPEARANCE: Awake and alert. Cooperative. no distress. HENT: Normocephalic. Atraumatic. Mucous membranes are moist  NECK: Supple. Full range of motion of the neck without stiffness or pain. EYES: PERRL. EOM's grossly intact. HEART/CHEST: RRR. No murmurs. Chest wall is not tender to palpation. LUNGS: Respirations unlabored. Bilateral crackles. Speaking comfortably in full sentences. ABDOMEN: No tenderness. Soft. Non-distended. No masses. No organomegaly. No guarding or rebound. MUSCULOSKELETAL: Bilateral pitting extremity edema. Compartments soft. No deformity. No tenderness in the extremities. All extremities neurovascularly intact.   SKIN: Warm and dry. No acute rashes. NEUROLOGICAL: Alert and oriented. No gross facial drooping. Strength 5/5, sensation intact. PSYCHIATRIC: Normal mood and affect. ED course / MDM:   Overall well appearing patient, in no acute distress, presenting for shortness of breath for 2 weeks. Physical exam remarkable for evidence of fluid overload. I personally saw the patient and performed a substantive portion of the visit including all aspects of the medical decision making. Differential diagnosis includes but is not limited to:  Pneumonia, pneumothorax, pleural effusion, ACS, CHF exacerbation, COPD exacerbation, asthma, pulmonary embolism, arrhythmia, anemia    EKG, laboratory studies, and imaging obtained. Workup showed:    XR CHEST 1 VIEW   Final Result   Stable chest.  CHF with moderate bilateral pleural effusions with bibasilar   atelectasis. The Ekg interpreted by me shows  normal sinus rhythm with a rate of 75  Axis is   Left axis deviation  QTc is  within an acceptable range  Intervals and Durations are unremarkable. ST Segments: nonspecific changes  Significant change from prior EKG dated 12/29/20       ED Course as of 02/20/22 1518   Mon Feb 14, 2022 2205 Blood gas shows mild acidemia to 7.33, no hypercarbia. [ER]   2206 Mild leukocytosis to 11.8. Anemia to 9.7. No thrombocytopenia. [ER]   2206 Lactate is elevated to 3.1. [ER]   3773 Covid and flu negative [ER]   2316 BNP is elevated compared to baseline, today it is 5346. [ER]   2316 Troponin within normal limits. [ER]   2316 Hyponatremia to 124, hypochloremia 88. Hyperglycemia to 239. No other significant electrolyte abnormalities or evidence of significant kidney dysfunction [ER]   2316 Liver function testing shows mildly elevated alkaline phosphatase and ALT. Otherwise unremarkable [ER]      ED Course User Index  [ER] Chao Augustine MD     Based on results of work-up, I am concerned for CHF exacerbation.   Patient has evidence of fluid overload on exam and chest x-ray with an elevated BNP. Patient did receive Lasix in the emergency department. EKG has nonspecific changes, patient denies chest pain, troponin within normal limits. Patient also has an elevated lactate, cause unknown. He denies any actual symptoms and does not meet SIRS criteria. Further evaluation can be completed while patient is in the hospital..  At this time, do feel the patient requires admission for further work-up and management. Discussed the patient with hospital team.      CLINICAL IMPRESSION  1. Acute on chronic heart failure, unspecified heart failure type (HCC)        Blood pressure (!) 125/57, pulse 81, temperature 98.7 °F (37.1 °C), temperature source Oral, resp. rate 16, height 5' 11\" (1.803 m), weight 165 lb 9.1 oz (75.1 kg), SpO2 95 %. DISPOSITION  Alyce Sevilla was admitted in stable condition. All diagnostic, treatment, and disposition decisions were made by myself in conjunction with the advanced practice provider. For all further details of the patient's emergency department visit, please see the advanced practice provider's documentation. Comment: Please note this report has been produced using speech recognition software and may contain errors related to that system including errors in grammar, punctuation, and spelling, as well as words and phrases that may be inappropriate. If there are any questions or concerns please feel free to contact the dictating provider for clarification.         Calos Gerber MD  02/20/22 4545

## 2022-02-21 NOTE — CARE COORDINATION
[facility] Transitions Follow Up Call    2022    Patient: Qamar Glass  Patient : 1940   MRN: [id number]  Reason for Admission: acute on chronic CHF with exacerbation, acute repiratory failure with hypoxia, hyponatremia, mild POLY, EKG changes, hx CAD s/p CABG and stents, DM2, lactic acidosis, anemia normocytic/hypochromic, hemoccult +, hypothyroidism -> home with [facility] SN, FR 1500ml/day, low sodium diet, OP EGD & Colonoscopy  Discharge Date: 22 RARS: Readmission Risk Score: 14.7 ( )       Spoke with: Lelon Frankel (daughter)    Received inbound call from patient's daughter asking about labwork to be drawn today per our conversation on Friday. Reviewed chart, per EZEQUIEL CASTILLO discharge note his office to arrange labwork for today. Daughter states the [facility] office scheduled follow up appointment but did not notify them about labwork. CTN states today they were also supposed to have SN SOC. Maribell Mariscal states this has been rescheduled to tomorrow, Tuesday, 2022. CTN will outreach Fredy office to check if labs needed and if so will ask them to send to [facility] to be completed by SN at [facility] visit. Daughter is agreeable to plan. Outreach to Dr Daniel office. Spoke with Oni Sapp. She states order for renal panel was entered in their system on Friday, , and recommended to be drawn today. CTN asked Oni Sapp to fax this order to [facility] ([phone]) for RN to draw at [facility] visit tomorrow. She will send now. Outreach to [place] [phone] to confirm [facility] for tomorrow and notify them that lab order for renal panel to be drawn during [facility] visit. She will watch for order and noted on [facility] visit notes. Outreach again to Maribell Mariscal. She is aware of plan and if for some reason this does not happen as planned, they will ask PCP for renal panel at [facility] visit on Thursday.     Follow Up  Future Appointments   Date Time Provider Getachew Cottrell   2022 10:00 AM AICHA Thomas - CNP [facility] TURPIN 3/15/2022 10:00 AM Shanice Huynh DO [facility] ENT [facility]   4/14/2022  9:00 AM Rohit Larson MD AND PULM [facility]   5/19/2022  8:00 AM AICHA Ly - CNP [facility] [place] - [facility]   6/22/2022 12:30 PM Haskel Meigs, MD P CLER CAR [facility]       Bhavik Shultz RN

## 2022-02-24 NOTE — PROGRESS NOTES
Post-Discharge Transitional Care Follow Up      Salome Hope   YOB: 1940    Date of Office Visit:  2/24/2022  Date of Hospital Admission: 2/14/22  Date of Hospital Discharge: 2/17/22  Readmission Risk Score (high >=14%. Medium >=10%):Readmission Risk Score: 14.7 ( )      Care management risk score Rising risk (score 2-5) and Complex Care (Scores >=6): 2     Non face to face  following discharge, date last encounter closed (first attempt may have been earlier): 2/18/2022  3:02 PM     Call initiated 2 business days of discharge: Yes     Acute on chronic heart failure, unspecified heart failure type ([address]  -     VT DISCHARGE MEDS RECONCILED W/ CURRENT OUTPATIENT MED LIST  Iron deficiency anemia, unspecified iron deficiency anemia type  -     VT DISCHARGE MEDS RECONCILED W/ CURRENT OUTPATIENT MED LIST  Hyponatremia  -     VT DISCHARGE MEDS RECONCILED W/ CURRENT OUTPATIENT MED LIST  POLY (acute kidney injury) ([facility] [address]  -     VT DISCHARGE MEDS RECONCILED W/ CURRENT OUTPATIENT MED LIST      Medical Decision Making: straightforward  Return if symptoms worsen or fail to improve. On this date 2/24/2022 I have spent 30 minutes reviewing previous notes, test results and face to face with the patient discussing the diagnosis and importance of compliance with the treatment plan as well as documenting on the day of the visit. Subjective:   HPI    Inpatient course: Discharge summary reviewed- see chart. Interval history/Current status: Stable. Patient presents today for hospital follow-up. Patient was recently hospitalized for congestive heart failure exacerbation, iron deficiency anemia, hyponatremia, GI. Patient was hospitalized from 2/14 to 2/17/2022. Patient reports that since being home he is doing well. Patient has follow-up labs with nephrologist next week. Patient ports that he is doing well other than ongoing feeling tired and shortness of breath upon exertion.   Patient reports weighing himself daily. Patient reports that this morning his weight was 159 without clothes on. Patient denies any increase in edema, cough or congestion. Patient reports that he did have home health nurse come out earlier this week. Patient eating and drinking appropriately with fluid restriction. Patient reports that he has not scheduled his appointment with follow-up for cardiology at this time. Patient denies any dark tarry stools or abdominal discomfort. Patient has labs scheduled for nephrology therefore we will hold off at today's visit. Patient encouraged to continue with current medications at this time following up for any new or worsening symptoms. Patient and family both verbalized and acknowledged with plan of care at this time. Patient Active Problem List   Diagnosis    Bilateral carotid artery occlusion    Essential hypertension    Hypothyroidism, acquired, autoimmune    Vitamin D deficiency disease    Type 2 diabetes mellitus without complication, without long-term current use of insulin ([facility] [address]    Coronary artery disease    Pure hypercholesterolemia    History of CVA (cerebrovascular accident)    Chronic diastolic congestive heart failure (HCC)    S/P CABG x 4    Hyponatremia    Allergic sinusitis    History of tobacco abuse    SOB (shortness of breath)    Chronic eczematous otitis externa of both ears    Restrictive lung disease    Pulmonary infiltrates    Mediastinal adenopathy    Simple chronic bronchitis (HCC)    Allergic rhinitis    Acute on chronic heart failure (HCC)    Iron deficiency anemia       Medications listed as ordered at the time of discharge from hospital     Medication List          Accurate as of February 24, 2022 10:23 AM. If you have any questions, ask your nurse or doctor.             CONTINUE taking these medications    Accu-Chek Estefani Plus strip  Generic drug: blood glucose test strips  TEST TWO TIMES DAILY dx:E11.9     Accu-Chek Softclix Lancets Misc  TEST TWICE DAILY     albuterol sulfate  (90 Base) MCG/ACT inhaler  Commonly known as: ProAir HFA  Inhale 2 puffs into the lungs every 6 hours as needed for Wheezing     amLODIPine 5 MG tablet  Commonly known as: NORVASC  TAKE 1 TABLET EVERY DAY     aspirin 81 MG EC tablet  Take 1 tablet by mouth daily     azelastine 0.1 % nasal spray  Commonly known as: ASTELIN  1 spray by Nasal route 2 times daily Use in each nostril as directed     ciclopirox 8 % solution  Commonly known as: PENLAC     COQ10 PO     ferrous sulfate 325 (65 Fe) MG tablet  Commonly known as: IRON 325     FISH OIL PO     fluticasone-umeclidin-vilant 100-62.5-25 MCG/INH Aepb  Commonly known as: TRELEGY ELLIPTA  Inhale 1 puff into the lungs daily     guaiFENesin 400 MG tablet     levothyroxine 88 MCG tablet  Commonly known as: SYNTHROID  TAKE 1 TABLET BY MOUTH  DAILY     losartan 100 MG tablet  Commonly known as: COZAAR  TAKE 1 TABLET BY MOUTH  DAILY     magnesium oxide 400 MG tablet  Commonly known as: MAG-OX     metFORMIN 1000 MG tablet  Commonly known as: GLUCOPHAGE  TAKE 1 TABLET BY MOUTH  TWICE DAILY WITH MEALS     metoprolol tartrate 50 MG tablet  Commonly known as: LOPRESSOR  TAKE 1 TABLET TWICE DAILY     MULTI-VITAMIN DAILY PO     omeprazole 40 MG delayed release capsule  Commonly known as: PRILOSEC  Take 1 capsule by mouth every morning (before breakfast)     pravastatin 40 MG tablet  Commonly known as: PRAVACHOL  TAKE 1 TABLET BY MOUTH  DAILY AT NIGHT     torsemide 20 MG tablet  Commonly known as: DEMADEX  Take 2 tablets by mouth daily     vitamin C 250 MG tablet     VITAMIN C PO     vitamin D 50 MCG (2000 UT) Caps capsule              Medications marked \"taking\" at this time  No outpatient medications have been marked as taking for the 2/24/22 encounter (Office Visit) with AICHA De La Torre CNP.         Medications patient taking as of now reconciled against medications ordered at time of hospital discharge: Yes    Review of Systems   Constitutional: Negative for activity change, appetite change, chills and fever. HENT: Negative for congestion, rhinorrhea and sore throat. Eyes: Negative for visual disturbance. Respiratory: Positive for cough and shortness of breath. Negative for chest tightness and wheezing. Cardiovascular: Negative for chest pain and leg swelling. Gastrointestinal: Negative for abdominal pain, diarrhea, nausea and vomiting. Genitourinary: Negative for dysuria, frequency, hematuria and urgency. Musculoskeletal: Positive for myalgias. Negative for gait problem. Skin: Negative for rash. Neurological: Negative for dizziness, weakness, light-headedness, numbness and headaches. Psychiatric/Behavioral: Negative for sleep disturbance. Objective:    /75   Pulse 67   Temp 98.2 °F (36.8 °C) (Oral)   Wt 167 lb 4 oz (75.9 kg)   SpO2 96%   BMI 23.33 kg/m²   Physical Exam  Vitals reviewed. Constitutional:       Appearance: Normal appearance. He is not toxic-appearing. HENT:      Head: Normocephalic and atraumatic. Right Ear: External ear normal.      Left Ear: External ear normal.      Nose: Nose normal.      Mouth/Throat:      Mouth: Mucous membranes are moist.      Pharynx: Oropharynx is clear. Eyes:      Extraocular Movements: Extraocular movements intact. Conjunctiva/sclera: Conjunctivae normal.      Pupils: Pupils are equal, round, and reactive to light. Cardiovascular:      Rate and Rhythm: Normal rate and regular rhythm. Pulses: Normal pulses. Pulmonary:      Effort: Pulmonary effort is normal.      Breath sounds: Normal breath sounds. Abdominal:      Palpations: Abdomen is soft. Tenderness: There is no guarding. Musculoskeletal:         General: Normal range of motion. Cervical back: Normal range of motion. Right lower leg: No edema. Left lower leg: No edema. Skin:     General: Skin is warm and dry.       Capillary Refill: Capillary refill takes less than 2 seconds. Neurological:      Mental Status: He is alert and oriented to person, place, and time. An electronic signature was used to authenticate this note.   --AICHA Stone - CNP

## 2022-02-24 NOTE — PATIENT INSTRUCTIONS
Please read the healthy family handout that you were given and share it with your family. Please compare this printed medication list with your medications at home to be sure they are the same. If you have any medications that are different please contact us immediately at [phone]. Also review your allergies that we have listed, these may also include medications that you have not been able to tolerate, make sure everything listed is correct. If you have any allergies that are different please contact us immediately at [phone].

## 2022-02-24 NOTE — DISCHARGE SUMMARY
Name:  Trudi Yi  Room:  0951/0548-84  MRN:    [id number]    Discharge Summary      This discharge summary is in conjunction with a complete physical exam done on the day of discharge. Discharging Physician: Jenny Gaming MD      Admit: 2/14/2022  Discharge:  2/17/2022    Diagnoses this Admission    Active Problems:    S/P CABG x 4    Hyponatremia    SOB (shortness of breath)    Acute on chronic heart failure (HCC)    Iron deficiency anemia  Resolved Problems:    * No resolved hospital problems. *          Procedures (Please Review Full Report for Details)      Consults    IP CONSULT TO HOSPITALIST  IP CONSULT TO HEART FAILURE NURSE/COORDINATOR  IP CONSULT TO DIETITIAN  IP CONSULT TO CARDIOLOGY  IP CONSULT TO NEPHROLOGY  IP CONSULT TO GI      HPI:    The patient is a 80 y.o. male with PMH below, presents with SOB/IBARRA, orthopnea. Pt reports that the above Sx have been worsening over the last 2-3 weeks. He called his PCP yd and they increased his home diuretic (doubled over baseline). He did take an extra dose today. He is most concerned that eh has been becoming increasingly SOB w/ short ambulation. He also notes that he is now unable to lay flat which is unusual for him. He was found to be satting 83% in the ED and was initially requiring 2L O2. He was subsequently weaned off O2. He is not normally on O2. He dneis CP. He does have hx of CAD and has had CABG in the past.  He laso has hx of stroke/TIA. Physical Exam at Discharge:  BP (!) 125/57   Pulse 81   Temp 98.7 °F (37.1 °C) (Oral)   Resp 16   Ht 5' 11\" (1.803 m)   Wt 165 lb 9.1 oz (75.1 kg)   SpO2 95%   BMI 23.09 kg/m²     General:  Awake, alert and oriented.  Appears to be not in any distress  Mucous Membranes:  Pink , anicteric  Neck: No JVD, no carotid bruit, no thyromegaly  Chest:  Clear to auscultation bilaterally, no added sounds  Cardiovascular:  RRR S1S2 heard, no murmurs or gallops  Abdomen:  Soft, undistended, non tender, no organomegaly, BS present  Extremities: No edema or cyanosis. Distal pulses well felt  Neurological : no focal deficits      Hospital Course      Acute on chronic diastolic congestive heart failure. Placed on IV Lasix. Cardiology consult. Echocardiogram ordered. Currently on room air. Hold IV Lasix this morning.     Acute respiratory failure with hypoxia secondary to CHF. Was requiring 2 L of oxygen. Now on room air.     Hyponatremia. Sodium 124 on admission. Resolved with IV Lasix.     Mild acute kidney injury today. Hold Lasix.     Coronary disease status post CABG and stents. Continue metoprolol and Pravachol. Hold aspirin given guaiac positive stools and anemia.     Anemia. Drop in his hemoglobin of about 2 points in the past year. Iron studies reveal iron deficiency and anemia of chronic disease. Hemoccult is positive. Has never had a colonoscopy. Obtain GI consult. CT a/p- unremarkable   EGD and colon as outpatient      Hypertension. Stable. Continue Norvasc and metoprolol. Hold losartan for now.     Type 2 diabetes. Hold Metformin. Continue sliding scale insulin.          CT ABDOMEN PELVIS W IV CONTRAST Additional Contrast? Oral   Final Result   1. Chronic stable bibasilar exudative pleural effusions. 2. Cholelithiasis. RECOMMENDATIONS:   Unavailable         XR CHEST 1 VIEW   Final Result   Stable chest.  CHF with moderate bilateral pleural effusions with bibasilar   atelectasis.                 Discharge Medications     Medication List      CHANGE how you take these medications    torsemide 20 MG tablet  Commonly known as: DEMADEX  Take 2 tablets by mouth daily  What changed: how much to take        CONTINUE taking these medications    Accu-Chek Estefani Plus strip  Generic drug: blood glucose test strips  TEST TWO TIMES DAILY dx:E11.9     Accu-Chek Softclix Lancets Misc  TEST TWICE DAILY     albuterol sulfate  (90 Base) MCG/ACT inhaler  Commonly known as: ProAir HFA  Inhale 2 puffs into the lungs every 6 hours as needed for Wheezing     amLODIPine 5 MG tablet  Commonly known as: NORVASC  TAKE 1 TABLET EVERY DAY     aspirin 81 MG EC tablet  Take 1 tablet by mouth daily     azelastine 0.1 % nasal spray  Commonly known as: ASTELIN  1 spray by Nasal route 2 times daily Use in each nostril as directed     ciclopirox 8 % solution  Commonly known as: PENLAC     COQ10 PO     ferrous sulfate 325 (65 Fe) MG tablet  Commonly known as: IRON 325     FISH OIL PO     fluticasone-umeclidin-vilant 100-62.5-25 MCG/INH Aepb  Commonly known as: TRELEGY ELLIPTA  Inhale 1 puff into the lungs daily     guaiFENesin 400 MG tablet     levothyroxine 88 MCG tablet  Commonly known as: SYNTHROID  TAKE 1 TABLET BY MOUTH  DAILY     magnesium oxide 400 MG tablet  Commonly known as: MAG-OX     metFORMIN 1000 MG tablet  Commonly known as: GLUCOPHAGE  TAKE 1 TABLET BY MOUTH  TWICE DAILY WITH MEALS     metoprolol tartrate 50 MG tablet  Commonly known as: LOPRESSOR  TAKE 1 TABLET TWICE DAILY     MULTI-VITAMIN DAILY PO     omeprazole 40 MG delayed release capsule  Commonly known as: PRILOSEC  Take 1 capsule by mouth every morning (before breakfast)     pravastatin 40 MG tablet  Commonly known as: PRAVACHOL  TAKE 1 TABLET BY MOUTH  DAILY AT NIGHT     vitamin C 250 MG tablet     VITAMIN C PO     vitamin D 50 MCG (2000 UT) Caps capsule        STOP taking these medications    azithromycin 250 MG tablet  Commonly known as: ZITHROMAX     losartan 100 MG tablet  Commonly known as: COZAAR     predniSONE 20 MG tablet  Commonly known as: DELTASONE           Where to Get Your Medications      These medications were sent to [address] [place] [address], [address] [place] [address],[address]  [address]    Phone: [phone]   · torsemide 20 MG tablet           Discharge Condition/Location: Stable to lucina e    Follow Up:   Follow up with PCP and GI    Total time spent on discharge is 35 minutes            Alonzo Land MD 2/24/2022 11:35 AM

## 2022-02-25 NOTE — CARE COORDINATION
[facility] Transitions Follow Up Call    2022    Patient: Ric Lefort  Patient : 1940   MRN: [id number]  Reason for Admission: acute on chronic CHF with exacerbation, acute repiratory failure with hypoxia, hyponatremia, mild POLY, EKG changes, hx CAD s/p CABG and stents, DM2, lactic acidosis, anemia normocytic/hypochromic, hemoccult +, hypothyroidism -> home with [facility] SN, FR 1500ml/day, low sodium diet, OP EGD & Colonoscopy  Discharge Date: 22 RARS: Readmission Risk Score: 14.7 ( )         Spoke with: Ric Lefort (patient)    Reached him while he was at [facility]. Continues to be SOB. No edema or abd fullness. Forgot to take his water pill yesterday because he held it to get to his PCP OV. He did gain 2# overnight. States he restricts fluid to 50 ounces/day. Encouraged low sodium foods. He plans to ask Dr Vannesa Avila if he should have a nebulizer at home. He does use his albuterol inhaler sporadically. States he will use it about once a day with good response. Has appt with Vannesa Avila as below - reviewed. Denies needs at this time. Call ended so he could resume shopping. He has CTN contact info for future needs. CTN will follow up in 5-7 days.     Follow Up  Future Appointments   Date Time Provider Getachew Cottrell   3/15/2022 10:00 AM Igor Cullen DO [facility] ENT [facility]   2022  9:00 AM Juan Bonds MD AND PULM [facility]   2022  8:00 AM Danielle Anderson APRN - CNP [facility] [place] - DY[place]   2022 12:30 PM Nuzhat Fritz MD P CLER CAR ISADORA Mix RN

## 2022-03-04 NOTE — CARE COORDINATION
[facility] Transitions Follow Up Call    3/4/2022    Patient: Harjit Wagoner  Patient : 1940   MRN: [id number]  Reason for Admission: acute on chronic CHF with exacerbation, acute repiratory failure with hypoxia, hyponatremia, mild POLY, EKG changes, hx CAD s/p CABG and stents, DM2, lactic acidosis, anemia normocytic/hypochromic, hemoccult +, hypothyroidism -> home with [facility] SN, FR 1500ml/day, low sodium diet, OP EGD & Colonoscopy  Discharge Date: 22 RARS: Readmission Risk Score: 14.7 ( )    Needs to be reviewed by the provider   Additional needs identified to be addressed with provider: Yes  labs-nephrology appt/labs on monday 3/7         Method of communication with provider : none    Care Transition Nurse (CTN) contacted the patient by telephone to follow up after recent hospital admission. Addressed changes since last contact: none  Discussed follow-up appointments. If no appointment was previously scheduled, appointment scheduling offered: completed TCM visit. Advance Care Planning:   Does patient have an Advance Directive: Reviewed on prior CTN outreach. Discussed appropriate site of care based on symptoms and resources available to patient including: PCP, Specialist and Home health. The patient agrees to contact the PCP office for questions related to their healthcare. Patients top risk factors for readmission: medical condition-CHF  Interventions to address risk factors: Education of patient/family/caregiver/guardian to support self-management-CHF diet and fluid education    States he is still having trouble lying flat to sleep and moves to reclined position. Reviewed weight log last 3 days:   159#  160#  159#  Weight 2 weeks ago today was 161.5# and these weights are within that range. He has labwork on Monday ordered by nephrology. He states he still has a lot of weakness - when he gets up he feels he needs to sit down again. This is not new.  he denies edema in LE, abd fullness, appetite is fair - doesn't enjoy food like he used to because of lack of taste and smell since undiagnosed COVID at beginning of pandemic. He does salt his food a little bit. Reviewed foods high in sodium to avoid as well as reading labels for goal sodium intake 2-3k/day no more, encouraged not adding salt to foods. Reviewed fluid intake restriction and he states 50 ounces/day is goal but he states he gets close because of dry mouth. Recommended biotene, increased oral care. States still has mucus in back of throat and sees ENT next week. Takes guaifenesin prn and not recently at all. He will take leading up to appt with ENT and report if did not work. States he still feels SOB - no better/no worse. Recommended calling Dr Rajiv Vargas appt for sooner OV. He voices understanding. Denies needs going into weekend. CTN provided contact information for future needs. Plan for follow-up call in 5-7 days based on severity of symptoms and risk factors.   Plan for next call: symptom management-weight, SOB, weakness    David Dorsey RN  Care Transition Nurse  [phone] mobile    Future Appointments   Date Time Provider Getachew Cottrell   3/15/2022 10:00 AM Kat Rodriguez [facility] ENT [facility]   4/14/2022  9:00 AM Justice Diaz MD AND PULM [facility]   5/19/2022  8:00 AM AICHA Mcmullen - CNP [facility] [place] - [place]YD   6/22/2022 12:30 PM Osman Orozco MD P CLER CAR [facility]     Non-[facility] follow up appointment(s): [place]

## 2022-03-04 NOTE — TELEPHONE ENCOUNTER
Cancelled appointment for 3 mon f/u with Dr Roya Vasquez on 4/14/22    We left a VM on 3/4/22 and informed patient appt been cancel of reason Provider no in office and would call back to R/S on a later time

## 2022-03-10 NOTE — CARE COORDINATION
Gloria [facility] Transitions Follow Up Call    3/10/2022    Patient: Ct Rater  Patient : 1940   MRN: [id number]  Reason for Admission: acute on chronic CHF with exacerbation, acute repiratory failure with hypoxia, hyponatremia, mild POLY, EKG changes, hx CAD s/p CABG and stents, DM2, lactic acidosis, anemia normocytic/hypochromic, hemoccult +, hypothyroidism -> home with [facility] SN, FR 1500ml/day, low sodium diet, OP EGD & Colonoscopy  Discharge Date: 22 RARS: Readmission Risk Score: 14.7 ( )    Needs to be reviewed by the provider   Additional needs identified to be addressed with provider: No         Method of communication with provider : none    Care Transition Nurse (CTN) contacted the patient by telephone to follow up after recent hospital admission. Addressed changes since last contact: none  Discussed follow-up appointments. If no appointment was previously scheduled, appointment scheduling offered: completed. Non-[facility] follow up appointment(s): nephro - completed    Advance Care Planning:   Does patient have an Advance Directive: Reviewed on prior CTN outreach. Discussed appropriate site of care based on symptoms and resources available to patient including: PCP, Specialist and Home health. The patient agrees to contact the PCP office for questions related to their healthcare. Patients top risk factors for readmission: medical condition-     Feels same - no better no worse. Still with IBARRA. Using albuterol inhaler more often. Using pickle now that he knows how to use it. States he is going to start pulm rehab. CTN encouraged this. States weight is slowly decreasing. He is eating all he wants but wife wants him to eat more. Encouraged protein. [facility] has one more visit scheduled. Had labwork for nephrology and was notified a new med to be started but needs cardiac approval - the nephro office to [facility] down [address] to check with Dr Elin Cordova.  Plans to have colonoscopy and EGD but not scheduled yet. Has never had either procedure. We discussed this. Sees nephrology again 3/22. EGD/colonoscopy needs cardiac clearance. He will also contact Dr Luiz Loomis office to ask if appt sooner for clearance. He was disappointed his puljoanna RINALDI rescheduled his April gumaro to May but aware he can contact their office at any time for prn concerns. Denies needs today. CTN provided contact information for future needs. Plan for follow-up call in 7-10 days based on severity of symptoms and risk factors.   Plan for next call: follow up appointment-ENT    Follow Up  Future Appointments   Date Time Provider Getachew Cottrell   3/15/2022 10:00 AM Shashi Sotomayor [facility] ENT [facility]   5/17/2022 11:20 AM Thierry Hammond MD AND PULM [facility]   5/19/2022  8:00 AM AICHA Flores - CNP [facility] [place] [facility]   6/22/2022 12:30 PM Tj Rachel MD [facility]       Leonila Rater, RN

## 2022-03-15 NOTE — PROGRESS NOTES
Social History     Social History     Tobacco Use    Smoking status: Former Smoker     Packs/day: 2.00     Years: 20.00     Pack years: 40.00     Types: Cigarettes     Start date: 1956     Quit date: 1975     Years since quittin.3    Smokeless tobacco: Never Used   Vaping Use    Vaping Use: Never used   Substance Use Topics    Alcohol use: No    Drug use: No        Allergies     Allergies   Allergen Reactions    Metolazone      Sodium 120 on , was on metolazone, no hypovolemic synptoms on presentation    Lipitor      Myalgias at 20 mg    Simvastatin      myalgias       Medications     Current Outpatient Medications   Medication Sig Dispense Refill    ipratropium (ATROVENT) 0.03 % nasal spray 2 sprays by Each Nostril route 4 times daily 1 each 1    pantoprazole (PROTONIX) 40 MG tablet TAKE 1 BY MOUTH ONCE DAILY IN THE MORNING 30 MINUTES TO AN HOUR BEFORE BREAKFAST      losartan (COZAAR) 100 MG tablet TAKE 1 TABLET BY MOUTH  DAILY 90 tablet 3    ciclopirox (PENLAC) 8 % solution       torsemide (DEMADEX) 20 MG tablet Take 2 tablets by mouth daily 60 tablet 1    Accu-Chek Softclix Lancets MISC TEST TWICE DAILY 200 each 3    Ascorbic Acid (VITAMIN C) 250 MG tablet Take 250 mg by mouth daily      omeprazole (PRILOSEC) 40 MG delayed release capsule Take 1 capsule by mouth every morning (before breakfast) (Patient not taking: Reported on 2022) 30 capsule 1    metFORMIN (GLUCOPHAGE) 1000 MG tablet TAKE 1 TABLET BY MOUTH  TWICE DAILY WITH MEALS 180 tablet 3    pravastatin (PRAVACHOL) 40 MG tablet TAKE 1 TABLET BY MOUTH  DAILY AT NIGHT 90 tablet 1    azelastine (ASTELIN) 0.1 % nasal spray 1 spray by Nasal route 2 times daily Use in each nostril as directed 3 each 3    fluticasone-umeclidin-vilant (TRELEGY ELLIPTA) 100-62.5-25 MCG/INH AEPB Inhale 1 puff into the lungs daily (Patient not taking: Reported on 2022) 2 each 0    albuterol sulfate HFA (PROAIR HFA) 108 (90 Base) MCG/ACT inhaler Inhale 2 puffs into the lungs every 6 hours as needed for Wheezing 1 Inhaler 5    blood glucose test strips (ACCU-CHEK DIONTE PLUS) strip TEST TWO TIMES DAILY dx:E11.9 200 strip 3    levothyroxine (SYNTHROID) 88 MCG tablet TAKE 1 TABLET BY MOUTH  DAILY 90 tablet 3    amLODIPine (NORVASC) 5 MG tablet TAKE 1 TABLET EVERY DAY 90 tablet 5    metoprolol tartrate (LOPRESSOR) 50 MG tablet TAKE 1 TABLET TWICE DAILY 180 tablet 5    Omega-3 Fatty Acids (FISH OIL PO) Take by mouth      Multiple Vitamin (MULTI-VITAMIN DAILY PO) Take by mouth      Coenzyme Q10 (COQ10 PO) Take by mouth      Ascorbic Acid (VITAMIN C PO) Take by mouth 2 times daily      guaiFENesin 400 MG tablet Take 400 mg by mouth as needed for Cough       magnesium oxide (MAG-OX) 400 MG tablet Take 400 mg by mouth daily      ferrous sulfate 325 (65 Fe) MG tablet Take 325 mg by mouth daily (with breakfast)       aspirin 81 MG EC tablet Take 1 tablet by mouth daily 30 tablet 0    Cholecalciferol (VITAMIN D) 2000 UNITS CAPS capsule Take  by mouth daily. No current facility-administered medications for this visit. Review of Systems     Review of Systems   Constitutional: Negative for appetite change, chills, fatigue, fever and unexpected weight change. HENT: Positive for postnasal drip. Negative for congestion, ear discharge, ear pain, facial swelling, hearing loss, nosebleeds, sinus pressure, sneezing, sore throat, tinnitus, trouble swallowing and voice change. Eyes: Negative for itching. Respiratory: Negative for apnea, cough and shortness of breath. Endocrine: Negative for cold intolerance and heat intolerance. Musculoskeletal: Negative for myalgias and neck pain. Skin: Negative for rash. Allergic/Immunologic: Negative for environmental allergies. Neurological: Negative for dizziness and headaches. Psychiatric/Behavioral: Negative for confusion, decreased concentration and sleep disturbance. PhysicalExam     Vitals:    03/15/22 1000   Temp: 97 °F (36.1 °C)   Weight: 160 lb (72.6 kg)   Height: 5' 10.5\" (1.791 m)       Physical Exam  Constitutional:       General: He is not in acute distress. Appearance: He is well-developed. HENT:      Head: Normocephalic and atraumatic. Right Ear: Tympanic membrane, ear canal and external ear normal. No drainage. No middle ear effusion. Tympanic membrane is not bulging. Tympanic membrane has normal mobility. Left Ear: Tympanic membrane, ear canal and external ear normal. No drainage. No middle ear effusion. Tympanic membrane is not bulging. Tympanic membrane has normal mobility. Nose: No mucosal edema or rhinorrhea. Mouth/Throat:      Lips: Pink. Mouth: Mucous membranes are moist.      Tongue: No lesions. Palate: No mass. Pharynx: Uvula midline. Comments: Clear postnasal drainage and oropharynx  Mirror exam was performed. The nasopharynx, larynx,or hypopharynx were examined. Vocal fold motion was examined. Pertinent findings include: Normal    Eyes:      Pupils: Pupils are equal, round, and reactive to light. Neck:      Thyroid: No thyroid mass or thyromegaly. Trachea: Trachea and phonation normal.   Cardiovascular:      Pulses: Normal pulses. Pulmonary:      Effort: Pulmonary effort is normal. No accessory muscle usage or respiratory distress. Breath sounds: No stridor. Musculoskeletal:      Cervical back: Full passive range of motion without pain. Lymphadenopathy:      Head:      Right side of head: No submental or submandibular adenopathy. Left side of head: No submental or submandibular adenopathy. Cervical: No cervical adenopathy. Right cervical: No superficial, deep or posterior cervical adenopathy. Left cervical: No superficial, deep or posterior cervical adenopathy. Skin:     General: Skin is warm and dry.    Neurological:      Mental Status: He is alert and oriented to person, place, and time. Cranial Nerves: No cranial nerve deficit. Coordination: Coordination normal.      Gait: Gait normal.   Psychiatric:         Thought Content: Thought content normal.           Assessment and Plan     1. Post-nasal drainage  -No improvement on reflux trial-okay to stop medication from my perspective  -Recommend trial of Atrovent  - ipratropium (ATROVENT) 0.03 % nasal spray; 2 sprays by Each Nostril route 4 times daily  Dispense: 1 each; Refill: 1    2. Nasal congestion  -Improved with Astelin  -We will stop Astelin to see if Atrovent manages both symptoms      Discussed trial of medication for 2 weeks. If improvement but not resolution he will call for increased dose      Gen Lassiter DO  3/15/22      Portions of this note were dictated using Dragon.  There may be linguistic errors secondary to the use of this program.

## 2022-03-16 NOTE — TELEPHONE ENCOUNTER
Patient called and said he cancelled his EGD and Colonoscopy because he was unsure of why he was having it done. I did explained to him the reason and encouraged him to call and rescheduled. He was very concerned that he had open heart surgery on 1- by Dr. Arabella Ordonez and it was not listed on his surgery list. Can you put that on his list of surgeries.

## 2022-03-17 NOTE — CARE COORDINATION
Gloria [facility] Transitions Follow Up Call    3/17/2022    Patient: Melissa Jenkins  Patient : 1940   MRN: [id number]  Reason for Admission: acute on chronic CHF with exacerbation, acute repiratory failure with hypoxia, hyponatremia, mild POLY, EKG changes, hx CAD s/p CABG and stents, DM2, lactic acidosis, anemia normocytic/hypochromic, hemoccult +, hypothyroidism -> home with [facility] SN, FR 1500ml/day, low sodium diet, OP EGD & Colonoscopy  Discharge Date: 22 RARS: Readmission Risk Score: 14.7 ( )       Needs to be reviewed by the provider   Additional needs identified to be addressed with provider: No         Method of communication with provider : none    Care Transition Nurse (CTN) contacted the patient by telephone to follow up after recent hospital admission. Addressed changes since last contact: med changes by ENT  Discussed follow-up appointments. If no appointment was previously scheduled, appointment scheduling offered:  completed. Non-[facility] follow up appointment(s): Dr Louis Ivory:   Does patient have an Advance Directive: Reviewed on prior CTN outreach. Discussed appropriate site of care based on symptoms and resources available to patient including: PCP and Specialist. The patient agrees to contact the PCP office for questions related to their healthcare. Patients top risk factors for readmission: medical condition-   Interventions to address risk factors: CHF education    Completed OV with ENT. Starting new med for post nasal drip. Continues PPI until f/up with GI. Plans to start cardiac rehab once he f/up with GI doctor. Today's weight 160# - so staying stable. Appetite is good. Had ham and sauerkraut for lunch. Reviewed this is high in sodium and stressed avoiding this, monitor weight, keep fluid intake down.      CHF education:  -weigh daily in the morning at the same time and in the same clothing  -report weight gain of >3#/day or >5#/week  -report increased SOB, edema, abd fullness, difficulty lying flat  -report palpitations, cough, difficulty urinating  -eat a low sodium diet and limit fluid intake to 64 ounces daily     He repeated weight gain parameters. States he still has mucous in back throat. Takes mucous relief HS only. Instructed him to take additional dose now follow package instructions. His ENT just changed his medications for pnd so he could be having some transition symptoms, plus it's spring. He voices understanding. Denies needs at this time. CTN provided contact information for future needs. Care transition outreach complete. No further follow-up call indicated based on severity of symptoms and risk factors.     Follow Up  Future Appointments   Date Time Provider Getachew Cottrell   5/17/2022 11:20 AM Rosales Navarro MD AND [facility]IE [facility]   5/19/2022  8:00 AM Kinga Cormier, APRN - CNP [facility]  [place] - [facility]   6/22/2022 12:30 PM Mckayla Taylor MD [facility] CLER CAR ISADORA Mandel RN

## 2022-03-17 NOTE — TELEPHONE ENCOUNTER
I would recommend him discussing concerns with EGD and colonoscopy with GI. Also okay to update medical history with his surgery information.

## 2022-03-30 NOTE — TELEPHONE ENCOUNTER
Is he gaining weight? Is he using his diuretics ?     His shortness of breath can be secondary to CHF+/- lung issues     If he is not gaining weight/using diuretics -can try steroids ,if patient wants

## 2022-03-30 NOTE — TELEPHONE ENCOUNTER
Pt. Having an  EGD and colonoscopy next week and he is very sob can hardly walk from bedroom to bathroom without being completely out of breath. Not on O2. No cough just gasp for air with exertion. They wanted him to call and see if he should have procedures.

## 2022-03-30 NOTE — TELEPHONE ENCOUNTER
Pt. Has not gained weight. Is not swelling and is taking diuretics. He would like you to call in steroids please.

## 2022-03-31 NOTE — TELEPHONE ENCOUNTER
Carlton Najjar from [address] [facility] called    ([phone]) regarding if Trae Mcelroy should go ahead with the EGD and colonoscopy. Carlton Najjar stated he is having SOB. I explained that Dr. Autumn Rodarte wanted him to go to the ED for an Evaluation and that Prednisone was called into his pharmacy.

## 2022-04-12 NOTE — PROGRESS NOTES

## 2022-04-20 NOTE — OP NOTE
[address]                 [address] [address]                                OPERATIVE REPORT    PATIENT NAME: Chelsea Macdonald                   :        1940  MED REC NO:   [id number]                          ROOM:  ACCOUNT NO:   [de-identified]                           ADMIT DATE: 2022  PROVIDER:     Herman Levi MD    DATE OF PROCEDURE:  2022    PREPROCEDURE DIAGNOSES:  2.  Iron-deficiency anemia. 2.  Heme-positive stool. POSTPROCEDURE DIAGNOSES:  1.  A 6 cm hiatal hernia. 2.  Gastritis. 3.  Rectal polyps. 4. Internal hemorrhoids. 5.  Otherwise normal colonoscopy. PROCEDURE PERFORMED:  1. EGD with biopsy. 2.  Colonoscopy to cecum with snare polypectomy. SURGEON:  Herman Levi MD    MEDICATIONS:  MAC per Anesthesia. INDICATION FOR PROCEDURE:  An 80-year-old male with history of diabetes,  hypertension, hyperlipidemia, coronary artery disease status post CABG,  CVA, CHF, recently admitted with acute-on-chronic congestive heart  failure. He was also found to have iron deficiency anemia with  heme-positive stool. EGD and colonoscopy are being performed for  diagnostic procedures. DETAILS OF PROCEDURE:  Informed consent obtained after discussing risks,  benefits, and alternatives. Full history and physical was performed. The patient was classified as ASA class III. Medications were given by  Anesthesia. Cardiopulmonary status was continuously monitored  throughout the procedure. The patient was placed in left lateral  decubitus position. Once adequately sedated, standard upper gastroscope  was inserted in mouth and advanced under direct visualization to second  portion of the duodenum.   The entire mucosa of the esophagus, stomach  (retroflexed and forward views), duodenum (bulb, sweep, and second  portion) was examined carefully during withdrawal.    While the patient is lying in left lateral decubitus position, the bed  was repositioned and a standard pediatric colonoscope was inserted in  the anus and advanced to the cecum identified by landmarks up until  orifice and IC valve. Entire mucosa of the cecum, ascending colon,  transverse colon, descending colon, sigmoid colon, and rectum was  examined carefully during withdrawal.  The patient tolerated the  procedure well without any difficulties. FINDINGS:  ESOPHAGUS:  There was a 6 cm hiatal hernia from 36 to 42 cm from entry. No evidence of ulcers, strictures, or Juarez's. STOMACH:  There was evidence of mild gastritis in the antrum  characterized by erythema and granularity. Biopsies were taken to rule  out H. Pylori. Retroflexed view of the stomach was normal.  DUODENUM:  Examined portion of the duodenum appeared normal.  Biopsies  were taken from second portion to rule out celiac sprue. RECTUM:  The digital rectal exam was normal.  No masses were felt. COLON:  There were two sessile 4 and 7 mm polyps in the rectum. Both of  these were completely resected and retrieved using cold and hot snare  polypectomy method. Remaining examined colon mucosa appeared normal and  healthy without any evidence of inflammation, ulcers, pseudomembranes,  or masses. Retroflexed view of the rectum showed medium-sized internal  hemorrhoids. No active bleeding identified. SUMMARY:  1.  A 6 cm hiatal hernia. 2.  Gastritis. 3.  Rectal polyps. 4. Internal hemorrhoids. 5.  No active bleeding. RECOMMENDATIONS:  1. Discharge the patient home when standard parameters are met. 2.  Continue PPI daily. 3.  Await pathology results. 4.  Encourage high-fiber diet with Metamucil and probiotics daily. 5.  Repeat CBC and iron studies in 4 weeks. 6.  Consider IV iron therapy if iron deficiency anemia persists.     EBL: <5mL    Mildred More MD    D: 04/20/2022 9:40:02       T: 04/20/2022 9:42:30     [id number]  Job#: [id number]     Doc#: [id number]    CC:  Edi Leon, CNP

## 2022-04-20 NOTE — BRIEF OP NOTE
Brief Postoperative Note      Patient: Marisel Mora  YOB: 1940  MRN: [id number]    Date of Procedure: 4/20/2022    Pre-Op Diagnosis: ANEMIA, OCCULT BLOOD IN STOOL    Post-Op Diagnosis: gastritis, hiatal hernia, rectal polyps       Procedure(s):  EGD BIOPSY  COLONOSCOPY POLYPECTOMY SNARE/COLD BIOPSY  COLONOSCOPY CONTROL HEMORRHAGE    Surgeon(s):  Sanju Toscano MD    Assistant:  * No surgical staff found *    Anesthesia: Monitor Anesthesia Care    Estimated Blood Loss (mL): Minimal    Complications: None    Specimens:   ID Type Source Tests Collected by Time Destination   A :  Tissue Biopsy SURGICAL PATHOLOGY Sanju Toscano MD 4/20/2022 9408    B :  Tissue Biopsy SURGICAL PATHOLOGY Snaju Toscano MD 4/20/2022 9851    C :  Tissue Biopsy SURGICAL PATHOLOGY Sajnu Toscano MD 4/20/2022 6545        Implants:  * No implants in log *      Drains: * No LDAs found *    Findings: gastritis, hiatal hernia, rectal polyps    Electronically signed by Sanju Toscano MD on 4/20/2022 at 9:44 AM

## 2022-04-20 NOTE — ANESTHESIA POSTPROCEDURE EVALUATION
Department of Anesthesiology  Postprocedure Note    Patient: Morena Carlin  MRN: [id number]  YOB: 1940  Date of evaluation: 4/20/2022  Time:  10:30 AM     Procedure Summary     Date: 04/20/22 Room / Location: [facility] ENDO 01 / [facility]    Anesthesia Start: 1605 Anesthesia Stop: 5737    Procedures:       EGD BIOPSY (N/A )      COLONOSCOPY POLYPECTOMY SNARE/COLD BIOPSY (N/A )      COLONOSCOPY CONTROL HEMORRHAGE (N/A ) Diagnosis: (ANEMIA, OCCULT BLOOD IN STOOL)    Surgeons: Bull Griffith MD Responsible Provider: Darnell Jaramillo MD    Anesthesia Type: TIVA ASA Status: 3          Anesthesia Type: TIVA    Sonia Phase I: Sonia Score: 10    Sonia Phase II: Sonia Score: 10    Last vitals: Reviewed and per EMR flowsheets.      Vitals:    04/12/22 1434 04/20/22 0829 04/20/22 0935 04/20/22 0944   BP:  (!) 126/58 (!) 103/53 (!) 108/52   Pulse:  79 70 85   Resp:  18 14 18   Temp:  98.4 °F (36.9 °C) 97.2 °F (36.2 °C) 98.2 °F (36.8 °C)   TempSrc:  Temporal     SpO2:  96% 93% 96%   Weight: 158 lb (71.7 kg) 158 lb (71.7 kg)     Height: 5' 10\" (1.778 m) 5' 10\" (1.778 m)       Anesthesia Post Evaluation    Patient location during evaluation: bedside  Patient participation: complete - patient participated  Level of consciousness: awake and alert  Airway patency: patent  Nausea & Vomiting: no nausea  Complications: no  Cardiovascular status: hemodynamically stable  Respiratory status: acceptable  Hydration status: euvolemic

## 2022-04-20 NOTE — ANESTHESIA PRE PROCEDURE
(FISH OIL PO) Take by mouth    Historical Provider, MD   Multiple Vitamin (MULTI-VITAMIN DAILY PO) Take by mouth    Historical Provider, MD   Coenzyme Q10 (COQ10 PO) Take by mouth    Historical Provider, MD   guaiFENesin 400 MG tablet Take 400 mg by mouth as needed for Cough     Historical Provider, MD   magnesium oxide (MAG-OX) 400 MG tablet Take 400 mg by mouth daily    Historical Provider, MD   ferrous sulfate 325 (65 Fe) MG tablet Take 325 mg by mouth daily (with breakfast)     Historical Provider, MD   aspirin 81 MG EC tablet Take 1 tablet by mouth daily 3/5/18   Arvind Dinh MD   Cholecalciferol (VITAMIN D) 2000 UNITS CAPS capsule Take  by mouth daily. Historical Provider, MD       Current medications:    Current Facility-Administered Medications   Medication Dose Route Frequency Provider Last Rate Last Admin    famotidine (PEPCID) 20 mg in sodium chloride (PF) 10 mL injection  20 mg IntraVENous Once Amber Kwong MD        lactated ringers infusion   IntraVENous Continuous Amber Kwong MD        sodium chloride flush 0.9 % injection 10 mL  10 mL IntraVENous 2 times per day Amber Kwong MD        sodium chloride flush 0.9 % injection 10 mL  10 mL IntraVENous PRN Amber Kwong MD        0.9 % sodium chloride infusion  25 mL IntraVENous PRN Amber Kwong MD           Allergies:     Allergies   Allergen Reactions    Metolazone      Sodium 120 on 5/19, was on metolazone, no hypovolemic synptoms on presentation    Lipitor      Myalgias at 20 mg    Simvastatin      myalgias       Problem List:    Patient Active Problem List   Diagnosis Code    Bilateral carotid artery occlusion I65.23    Essential hypertension I10    Hypothyroidism, acquired, 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    Vitamin D deficiency disease E55.9    Type 2 diabetes mellitus without complication, without long-term current use of insulin (HCC) E11.9    Coronary artery disease I25.10    Pure hypercholesterolemia E78.00    History of CVA (cerebrovascular accident) Z80.78    Chronic diastolic congestive heart failure (HCC) I50.32    S/P CABG x 4 Z95.1    Hyponatremia E87.1    Allergic sinusitis J30.9    History of tobacco abuse Z87.891    SOB (shortness of breath) R06.02    Chronic eczematous otitis externa of both ears H60.8X3    Restrictive lung disease J98.4    Pulmonary infiltrates R91.8    Mediastinal adenopathy R59.0    Simple chronic bronchitis (HCC) J41.0    Allergic rhinitis J30.9    Acute on chronic heart failure (HCC) I50.9    Iron deficiency anemia D50.9       Past Medical History:        Diagnosis Date    Allergic rhinitis     Family history of factor V deficiency     daughter and grand daughter   Eben Wynn GERD (gastroesophageal reflux disease)     Hyponatremia 2019    admitted; secondary to metolazone    Ischemic cerebrovascular accident (CVA) of frontal lobe ([address] 2016    TIA in     Pleural effusion, bilateral 2021    Routine health maintenance     refuses immunizations; declines PSA    Tinnitus     Vasovagal reaction     to needles    Vitamin D deficiency disease 12/15       Past Surgical History:        Procedure Laterality Date    CORONARY ANGIOPLASTY WITH STENT PLACEMENT  2000    RX Tristar 2.5 x 13 mCX  RX Tristar 2.5 x 13 pCX    CORONARY ANGIOPLASTY WITH STENT PLACEMENT  10/19/2000    Tetra 2.75 x 18 CX  Tetra 3.0 x 13 CX    CORONARY ARTERY BYPASS GRAFT N/A 2018    OTHER SURGICAL HISTORY  2012    phaco emulsification cataract right eye    TONSILLECTOMY      TOOTH EXTRACTION         Social History:    Social History     Tobacco Use    Smoking status: Former Smoker     Packs/day: 2.00     Years: 20.00     Pack years: 40.00     Types: Cigarettes     Start date: 1956     Quit date: 1975     Years since quittin.3    Smokeless tobacco: Never Used   Substance Use Topics    Alcohol use:  No Counseling given: Not Answered      Vital Signs (Current):   Vitals:    04/12/22 1434 04/20/22 0829   BP:  (!) 126/58   Pulse:  79   Resp:  18   Temp:  98.4 °F (36.9 °C)   TempSrc:  Temporal   SpO2:  96%   Weight: 158 lb (71.7 kg) 158 lb (71.7 kg)   Height: 5' 10\" (1.778 m) 5' 10\" (1.778 m)                                              BP Readings from Last 3 Encounters:   04/20/22 (!) 126/58   02/24/22 129/75   02/17/22 (!) 125/57       NPO Status:  mn+, see mar for am meds                                                                               BMI:   Wt Readings from Last 3 Encounters:   04/20/22 158 lb (71.7 kg)   03/15/22 160 lb (72.6 kg)   02/24/22 167 lb 4 oz (75.9 kg)     Body mass index is 22.67 kg/m². CBC:   Lab Results   Component Value Date    WBC 7.6 03/08/2022    RBC 4.13 03/08/2022    HGB 11.9 03/08/2022    HCT 35.7 03/08/2022    MCV 86.4 03/08/2022    RDW 15.6 03/08/2022     03/08/2022       CMP:   Lab Results   Component Value Date     03/08/2022    K 4.4 03/08/2022    K 5.0 02/14/2022    CL 95 03/08/2022    CO2 29 03/08/2022    BUN 38 03/08/2022    CREATININE 1.6 03/08/2022    CREATININE 1.1 02/02/2012    GFRAA 50 03/08/2022    GFRAA >60 09/07/2012    AGRATIO 1.0 02/15/2022    LABGLOM 42 03/08/2022    GLUCOSE 90 03/08/2022    PROT 7.7 02/15/2022    PROT 6.8 03/13/2013    CALCIUM 9.6 03/08/2022    BILITOT 0.4 02/15/2022    ALKPHOS 164 02/15/2022    AST 29 02/15/2022    ALT 41 02/15/2022       POC Tests: No results for input(s): POCGLU, POCNA, POCK, POCCL, POCBUN, POCHEMO, POCHCT in the last 72 hours.     Coags:   Lab Results   Component Value Date    PROTIME 13.2 12/29/2020    INR 1.14 12/29/2020    APTT 32.0 01/31/2018       HCG (If Applicable): No results found for: PREGTESTUR, PREGSERUM, HCG, HCGQUANT     ABGs:   Lab Results   Component Value Date    PHART 7.555 01/31/2018    PO2ART 136.2 01/31/2018    DWY3UTI 23.3 01/31/2018    GFS8HPM 20.6 01/31/2018    BEART -2 01/31/2018 ADDENDUM:    Pt seen and examined, chart reviewed (including anesthesia, drug and allergy history). No interval changes to history and physical examination. Anesthetic plan, risks, benefits, alternatives, and personnel involved discussed with patient. Patient verbalized an understanding and agrees to proceed.       Darnell Jaramillo MD  April 20, 2022  8:39 AM      Darnell Jaramillo MD   4/20/2022

## 2022-04-20 NOTE — H&P
History and Physical / Pre-Sedation Assessment    Patient:  Armando Peña   :   1940     Intended Procedure:  EGD+Colon    HPI: 80year old male with a history of DM, HTN, HLD, CAD s/p CABG, CVA, CHF recently admitted with acute on chronic CHF. Iron deficiency anemia and hemoccult positive stool     Past Medical History:   Diagnosis Date    Allergic rhinitis     Diabetes mellitus ([facility] [address]     Family history of factor V deficiency     daughter and grand daughter    GERD (gastroesophageal reflux disease)     Hyponatremia 2019    admitted; secondary to metolazone    Ischemic cerebrovascular accident (CVA) of frontal lobe ([facility] [address] 2016    TIA in     Pleural effusion, bilateral 2021    Routine health maintenance     refuses immunizations; declines PSA    Tinnitus     Vasovagal reaction     to needles    Vitamin D deficiency disease 12/15     Past Surgical History:   Procedure Laterality Date    CORONARY ANGIOPLASTY WITH STENT PLACEMENT  2000    RX Tristar 2.5 x 13 mCX  RX Tristar 2.5 x 13 pCX    CORONARY ANGIOPLASTY WITH STENT PLACEMENT  10/19/2000    Tetra 2.75 x 18 CX  Tetra 3.0 x 13 CX    CORONARY ARTERY BYPASS GRAFT N/A 2018    OTHER SURGICAL HISTORY  2012    phaco emulsification cataract right eye    TONSILLECTOMY      TOOTH EXTRACTION         Medications reviewed  Prior to Admission medications    Medication Sig Start Date End Date Taking?  Authorizing Provider   pravastatin (PRAVACHOL) 40 MG tablet TAKE 1 TABLET BY MOUTH  DAILY AT NIGHT 22   AICHA Tavares - KENRICK   metoprolol tartrate (LOPRESSOR) 50 MG tablet TAKE 1 TABLET BY MOUTH  TWICE DAILY 22   Mela Dwyer MD   amLODIPine (NORVASC) 5 MG tablet TAKE 1 TABLET BY MOUTH  DAILY 22   Mela Dwyer MD   pantoprazole (PROTONIX) 40 MG tablet TAKE 1 BY MOUTH ONCE DAILY IN THE MORNING 30 MINUTES TO AN HOUR BEFORE BREAKFAST 3/10/22   Historical Provider, MD   ipratropium (ATROVENT) 0.03 % nasal spray 2 sprays by Each Nostril route 4 times daily 3/15/22   Surendra Leak, DO   losartan (COZAAR) 100 MG tablet TAKE 1 TABLET BY MOUTH  DAILY 2/21/22   Mikala Bolton MD   ciclopirox [facility]) 8 % solution  1/4/22   Historical Provider, MD   torsemide (DEMADEX) 20 MG tablet Take 2 tablets by mouth daily 2/17/22   Marbella Reddy MD   Ascorbic Acid (VITAMIN C) 250 MG tablet Take 250 mg by mouth daily    Historical Provider, MD   metFORMIN (GLUCOPHAGE) 1000 MG tablet TAKE 1 TABLET BY MOUTH  TWICE DAILY WITH MEALS 1/6/22   [facility], APRN - CNP   azelastine (ASTELIN) 0.1 % nasal spray 1 spray by Nasal route 2 times daily Use in each nostril as directed 11/9/21   Surendra Leak, DO   albuterol sulfate HFA (PROAIR HFA) 108 (90 Base) MCG/ACT inhaler Inhale 2 puffs into the lungs every 6 hours as needed for Wheezing 7/13/21   Lisa Jones MD   levothyroxine (SYNTHROID) 88 MCG tablet TAKE 1 TABLET BY MOUTH  DAILY 6/21/21   [facility], APRN - CNP   Omega-3 Fatty Acids (FISH OIL PO) Take by mouth    Historical Provider, MD   Multiple Vitamin (MULTI-VITAMIN DAILY PO) Take by mouth    Historical Provider, MD   Coenzyme Q10 (COQ10 PO) Take by mouth    Historical Provider, MD   guaiFENesin 400 MG tablet Take 400 mg by mouth as needed for Cough     Historical Provider, MD   magnesium oxide (MAG-OX) 400 MG tablet Take 400 mg by mouth daily    Historical Provider, MD   ferrous sulfate 325 (65 Fe) MG tablet Take 325 mg by mouth daily (with breakfast)     Historical Provider, MD   aspirin 81 MG EC tablet Take 1 tablet by mouth daily 3/5/18   Mikala Bolton MD   Cholecalciferol (VITAMIN D) 2000 UNITS CAPS capsule Take  by mouth daily. Historical Provider, MD        Allergies:    Allergies   Allergen Reactions    Metolazone      Sodium 120 on 5/19, was on metolazone, no hypovolemic synptoms on presentation    Lipitor      Myalgias at 20 mg    Simvastatin      myalgias       Nurses notes reviewed and agreed. Physical Exam:  Vital Signs: BP (!) 126/58   Pulse 79   Temp 98.4 °F (36.9 °C) (Temporal)   Resp 18   Ht 5' 10\" (1.778 m)   Wt 158 lb (71.7 kg)   SpO2 96%   BMI 22.67 kg/m²    Airway: Mallampati: II (soft palate, uvula, fauces visible)  Pulmonary:Normal  Cardiac:Normal  Abdomen:Normal    Pre-Procedure Assessment / Plan:  ASA: Class 3 - A patient with severe systemic disease that limits activity but is not incapacitating  Level of Sedation Plan: Moderate sedation  Post Procedure plan: Return to same level of care    I assessed the patient and find that the patient is in satisfactory condition to proceed with the planned procedure and sedation plan. I have explained the risk, benefits, and alternatives to the procedure; the patient understands and agrees to proceed.        Immanuel Mcintyre MD  4/20/2022

## 2022-04-29 PROBLEM — I48.91 ATRIAL FIBRILLATION (HCC): Status: ACTIVE | Noted: 2022-01-01

## 2022-04-29 NOTE — ED PROVIDER NOTES
I independently examined and evaluated Palma Aj. All diagnostic, treatment, and disposition decisions were made by myself in conjunction with the advanced practice provider. For all further details of the patient's emergency department visit, please see the advanced practice provider's documentation. I personally saw the patient and performed a substantive portion of the visit including all aspects of the medical decision making      Primary Care Physician: Lore Tolliver, AICHA - CNP    History: This is a 80 y.o. male who presents to the Emergency Department with complaint of here from pulmonary rehab have concern for new onset A. fib with rapid ventricular spots. Rates up to the 120s, patient has been rate controlled since his presentation to ER, EKG with rates in the 70s. Patient during my evaluation ranging from 76s to 80. Patient feels at baseline. Is in pulmonary rehab for history of exertional dyspnea for \"years \". Does have known coronary disease status post bypass, stenting follows with Dr. Nasim Ledesma of cardiology. No prior history of A. fib. MDM new onset A. fib, initially with RVR however heart rate was obtained after patient had complained of bilateral thigh pain as he was exercised on an exercise bike during pulmonary rehab. Has been well controlled here no fevers no chills no chest pain palpitations chest tightness no shortness of breath at rest.  Patient not anticoagulated. See [facility] documentation for conversation with cardiology. 3:27 PM EDT patient's troponin is mildly elevated 0.02, plan for anticoagulation, admission his EKG shows an irregular heart rhythm, without acute ST or T wave change when compared to prior EKG dated February 16, 2022      Physical:     oral temperature is 98 °F (36.7 °C). His blood pressure is 116/61 and his pulse is 87. His respiration is 23 and oxygen saturation is 99%.     80 y.o. male   Awake alert oriented  Lungs clear  No respite stress  Abdomen soft nontender  No lower extremity edema  Heart regular rate, irregularly irregular. Impression: Elevated troponin, A. fib    Plan: Admit    EKG Interpretation    The Ekg interpreted by me shows  atrial fibrillation with a rate of 75  Axis is   Left axis deviation  QTc is  normal  Intervals and Durations are unremarkable. ST Segments: no acute change    No significant change from prior EKG dated 2/16/2020      CRITICAL CARE: There was a high probability of clinically significant/life threatening deterioration in this patient's condition which required my urgent intervention. Total critical care time was 0 minutes. This excludes any time for separately reportable procedures. Josefina Stevenson DO  Emergency Physician        Comment: Please note this report has been produced using speech recognition software and may contain errors related to that system including errors in grammar, punctuation, and spelling, as well as words and phrases that may be inappropriate. If there are any questions or concerns please feel free to contact the dictating provider for clarification.           Josefina Stevenson DO  04/29/22 1529

## 2022-04-29 NOTE — PLAN OF CARE
Diagnosis new onset A. Fib.     80-year-old male with history of hypertension, hyperlipidemia, type 2 diabetes mellitus, CAD, s/p CABG presenting with dyspnea on exertion. Patient was in pulmonary rehab, when he became dyspneic and tachycardic with heart rates up to 120. Noted to be in rapid A. Fib. .  On arrival to the ER heart rate stabilized in the 60s, patient still in A. Fib. .    Cardiology recommends admission. Started on heparin drip in the ED per cardiology recommendations. .  Patient on beta-blockers continued. Check serial troponins echocardiogram thyroid panel. ..

## 2022-04-29 NOTE — PROGRESS NOTES
Over the phone report given to Linnea Ross RN PCU [id number], transport will be request, pt is alert and oriented, wife at bedside and was updated of pt's admit info

## 2022-04-29 NOTE — CONSULTS
Low dose Heparin GTT:  Once baseline labs drawn/ sent give a 4,000 unit IV Bolus dose and begin the infusion at 8.8mL/hr. Check an Anti-Xa 6hrs after starting. Desired range is 0.3-0.7IU/mL.   Enid Joya, [id number] [facility] PharmD 4/29/2022 4:51 PM

## 2022-04-29 NOTE — ED PROVIDER NOTES
[address] ED  EMERGENCY DEPARTMENT ENCOUNTER        Pt Name: Lalitha Goldman  MRN: [id number]  [place] 1940  Date of evaluation: 4/29/2022  Provider: Jamarcus Guerrier PA-C  PCP: AICHA Cordova CNP  Note Started: 2:43 PM EDT        I have seen and evaluated this patient with my supervising physician Yane Davila, [facility]       Chief Complaint   Patient presents with    Atrial Fibrillation     pt at cardiac rehab and experienced new onset afib        HISTORY OF PRESENT ILLNESS   (Location, Timing/Onset, Context/Setting, Quality, Duration, Modifying Factors, Severity, Associated Signs and Symptoms)  Note limiting factors. Chief Complaint: Shortness of breath    Lalitha Goldman is a 80 y.o. male with a past medical history of hypertension, hypothyroidism, type 2 diabetes, CAD, history of CVA, bilateral carotid artery occlusion, hyperlipidemia, CHF, history of CABG, tobacco use brought in today to the ED from cardiac rehab for complaints of shortness of breath. He was found to be in new onset A. fib at pulmonary rehab and sent over to the ED. Denies history of A. fib. Reports shortness of breath. Denies chest pain. Denies leg swelling. Denies lightheadedness or dizziness. Onset occurred earlier today while at cardiac pulmonary rehab. Duration of symptoms have been intermittent since onset. Context includes increased shortness of breath. No aggravating or alleviating symptoms. Otherwise denies any other complaints. Nothing seems to make symptoms better or worse. He is not currently on any anticoagulations. He has not tried anything at this time. Denies any nausea vomiting. Denies recent illness. Denies numbness or tingling. Nursing Notes were all reviewed and agreed with or any disagreements were addressed in the HPI. REVIEW OF SYSTEMS    (2-9 systems for level 4, 10 or more for level 5)     Review of Systems   Constitutional: Negative. Respiratory: Positive for shortness of breath. Cardiovascular: Negative. Gastrointestinal: Negative. Genitourinary: Negative. Musculoskeletal: Negative. Skin: Negative. Neurological: Negative. Positives and Pertinent negatives as per HPI. Except as noted above in the ROS, all other systems were reviewed and negative.        PAST MEDICAL HISTORY     Past Medical History:   Diagnosis Date    Allergic rhinitis     Diabetes mellitus ([facility]     Family history of factor V deficiency     daughter and grand daughter    GERD (gastroesophageal reflux disease)     Hyponatremia 05/09/2019    admitted; secondary to metolazone    Ischemic cerebrovascular accident (CVA) of frontal lobe ([facility] 04/2016    TIA in 1997    Pleural effusion, bilateral 7/13/2021    Routine health maintenance     refuses immunizations; declines PSA    Tinnitus     Vasovagal reaction     to needles    Vitamin D deficiency disease 12/15         SURGICAL HISTORY     Past Surgical History:   Procedure Laterality Date    COLONOSCOPY N/A 4/20/2022    COLONOSCOPY POLYPECTOMY SNARE/COLD BIOPSY performed by Rachel Lynch MD at [address] [place] [address] 4/20/2022    COLONOSCOPY CONTROL HEMORRHAGE performed by Rachel Lynch MD at [address] [place] [address]  08/14/2000    RX Tristar 2.5 x 13 mCX  RX Tristar 2.5 x 13 pCX    CORONARY ANGIOPLASTY WITH STENT PLACEMENT  10/19/2000    Tetra 2.75 x 18 CX  Tetra 3.0 x 13 CX    CORONARY ARTERY BYPASS GRAFT N/A 01/31/2018    OTHER SURGICAL HISTORY  01/23/2012    phaco emulsification cataract right eye    TONSILLECTOMY      TOOTH EXTRACTION      UPPER GASTROINTESTINAL ENDOSCOPY N/A 4/20/2022    EGD BIOPSY performed by Rachel Lynch MD at [address]       Previous Medications    ALBUTEROL SULFATE HFA (PROAIR HFA) 108 (90 BASE) MCG/ACT INHALER    Inhale 2 puffs into the lungs every 6 hours as needed for Wheezing    AMLODIPINE (NORVASC) 5 MG TABLET    TAKE 1 TABLET BY MOUTH  DAILY    ASCORBIC ACID (VITAMIN C) 250 MG TABLET    Take 250 mg by mouth daily    ASPIRIN 81 MG EC TABLET    Take 1 tablet by mouth daily    AZELASTINE (ASTELIN) 0.1 % NASAL SPRAY    1 spray by Nasal route 2 times daily Use in each nostril as directed    CHOLECALCIFEROL (VITAMIN D) 2000 UNITS CAPS CAPSULE    Take  by mouth daily.     CICLOPIROX (PENLAC) 8 % SOLUTION        COENZYME Q10 (COQ10 PO)    Take by mouth    FERROUS SULFATE 325 (65 FE) MG TABLET    Take 325 mg by mouth daily (with breakfast)     GUAIFENESIN 400 MG TABLET    Take 400 mg by mouth as needed for Cough     IPRATROPIUM (ATROVENT) 0.03 % NASAL SPRAY    2 sprays by Each Nostril route 4 times daily    LEVOTHYROXINE (SYNTHROID) 88 MCG TABLET    TAKE 1 TABLET BY MOUTH  DAILY    LOSARTAN (COZAAR) 100 MG TABLET    TAKE 1 TABLET BY MOUTH  DAILY    MAGNESIUM OXIDE (MAG-OX) 400 MG TABLET    Take 400 mg by mouth daily    METFORMIN (GLUCOPHAGE) 1000 MG TABLET    TAKE 1 TABLET BY MOUTH  TWICE DAILY WITH MEALS    METOPROLOL TARTRATE (LOPRESSOR) 50 MG TABLET    TAKE 1 TABLET BY MOUTH  TWICE DAILY    MULTIPLE VITAMIN (MULTI-VITAMIN DAILY PO)    Take by mouth    OMEGA-3 FATTY ACIDS (FISH OIL PO)    Take by mouth    PANTOPRAZOLE (PROTONIX) 40 MG TABLET    TAKE 1 BY MOUTH ONCE DAILY IN THE MORNING 30 MINUTES TO AN HOUR BEFORE BREAKFAST    PRAVASTATIN (PRAVACHOL) 40 MG TABLET    TAKE 1 TABLET BY MOUTH  DAILY AT NIGHT    TORSEMIDE (DEMADEX) 20 MG TABLET    Take 2 tablets by mouth daily         ALLERGIES     Metolazone, Lipitor, and Simvastatin    FAMILYHISTORY       Family History   Problem Relation Age of Onset    Emphysema Mother     Stroke Father     Other Daughter         Factor V deficiency    Other Grandchild         Factor V deficiency          SOCIAL HISTORY       Social History     Tobacco Use    Smoking status: Former Smoker     Packs/day: 2.00 Years: 20.00     Pack years: 40.00     Types: Cigarettes     Start date: 1956     Quit date: 1975     Years since quittin.2    Smokeless tobacco: Never Used   Vaping Use    Vaping Use: Never used   Substance Use Topics    Alcohol use: No    Drug use: No       SCREENINGS    [place] Coma Scale  Eye Opening: Spontaneous  Best Verbal Response: Oriented  Best Motor Response: Obeys commands  [place] Coma Scale Score: 15        PHYSICAL EXAM    (up to 7 for level 4, 8 or more for level 5)     ED Triage Vitals   BP Temp Temp src Pulse Resp SpO2 Height Weight   -- -- -- -- -- -- -- --       Physical Exam  Vitals and nursing note reviewed. Constitutional:       General: He is awake. He is not in acute distress. Appearance: Normal appearance. He is well-developed and overweight. He is not ill-appearing, toxic-appearing or diaphoretic. HENT:      Head: Normocephalic and atraumatic. Nose: Nose normal.   Eyes:      General:         Right eye: No discharge. Left eye: No discharge. Cardiovascular:      Rate and Rhythm: Normal rate and regular rhythm. Pulses:           Radial pulses are 2+ on the right side and 2+ on the left side. Heart sounds: Normal heart sounds. No murmur heard. No gallop. Pulmonary:      Effort: Pulmonary effort is normal. No respiratory distress. Breath sounds: Normal breath sounds. No decreased breath sounds, wheezing, rhonchi or rales. Chest:      Chest wall: No tenderness. Musculoskeletal:         General: No deformity. Normal range of motion. Cervical back: Normal range of motion and neck supple. Right lower leg: No edema. Left lower leg: No edema. Skin:     General: Skin is warm and dry. Neurological:      General: No focal deficit present. Mental Status: He is alert and oriented to person, place, and time. GCS: GCS eye subscore is 4. GCS verbal subscore is 5. GCS motor subscore is 6.    Psychiatric: Behavior: Behavior normal. Behavior is cooperative. DIAGNOSTIC RESULTS   LABS:    Labs Reviewed   CBC WITH AUTO DIFFERENTIAL - Abnormal; Notable for the following components:       Result Value    RBC 3.96 (*)     Hemoglobin 11.5 (*)     Hematocrit 34.6 (*)     RDW 16.7 (*)     Lymphocytes Absolute 0.4 (*)     All other components within normal limits   COMPREHENSIVE METABOLIC PANEL W/ REFLEX TO MG FOR LOW K - Abnormal; Notable for the following components:    Sodium 135 (*)     Chloride 93 (*)     Glucose 110 (*)     BUN 39 (*)     CREATININE 1.6 (*)     GFR Non- 42 (*)     GFR African American 50 (*)     Alkaline Phosphatase 154 (*)     All other components within normal limits   TROPONIN - Abnormal; Notable for the following components:    Troponin 0.02 (*)     All other components within normal limits   BRAIN NATRIURETIC PEPTIDE - Abnormal; Notable for the following components:    Pro-BNP 4,792 (*)     All other components within normal limits   D-DIMER, QUANTITATIVE - Abnormal; Notable for the following components:    D-Dimer, Quant 2261 (*)     All other components within normal limits   COVID-19 & INFLUENZA COMBO   TSH WITH REFLEX   PROCALCITONIN   TROPONIN   APTT   PROTIME-INR   APTT   APTT       When ordered only abnormal lab results are displayed. All other labs were within normal range or not returned as of this dictation. EKG: When ordered, EKG's are interpreted by the Emergency Department Physician in the absence of a cardiologist.  Please see their note for interpretation of EKG. RADIOLOGY:   Non-plain film images such as CT, Ultrasound and MRI are read by the radiologist. Plain radiographic images are visualized and preliminarily interpreted by the ED Provider with the below findings:        Interpretation per the Radiologist below, if available at the time of this note:    CT CHEST PULMONARY EMBOLISM W CONTRAST   Preliminary Result   1.  No evidence of acute pulmonary embolism or acute aortic disease. 2. Redemonstration of lymphadenopathy which is slightly more pronounced   compared to the previous evaluation in the mediastinum. 3. Redemonstration of bilateral pleural effusions partly loculated which are   mild to moderate as well as lower lobe atelectatic and/or consolidative   changes. These were previously seen as well. 4. Cholelithiasis but no acute cholecystitis. XR CHEST PORTABLE   Final Result   Persistent bilateral pleural effusions and bibasilar opacities which could   represent atelectasis or infection           No results found. PROCEDURES   Unless otherwise noted below, none     Procedures    CRITICAL CARE TIME     Total Critical Care time was 35 minutes, excluding separately reportable procedures. There was a high probability of clinically significant/life threatening deterioration in the patient's condition which required my urgent intervention. CONSULTS:  IP CONSULT TO CARDIOLOGY  PHARMACY TO DOSE MEDICATION  IP CONSULT TO HOSPITALIST      EMERGENCY DEPARTMENT COURSE and DIFFERENTIAL DIAGNOSIS/MDM:   Vitals:    Vitals:    04/29/22 1625 04/29/22 1632 04/29/22 1701 04/29/22 1716   BP:  119/62 (!) 114/51    Pulse: 75 83 65 68   Resp: 20 19 17 19   Temp:       TempSrc:       SpO2: 98% 97% 99% 97%   Weight:           Patient was given the following medications:  Medications   heparin (porcine) injection 4,000 Units (has no administration in time range)   heparin (porcine) injection 2,000 Units (has no administration in time range)   heparin (porcine) 25,000 Units in dextrose 5 % 250 mL infusion (has no administration in time range)   iopamidol (ISOVUE-370) 76 % injection 85 mL (85 mLs IntraVENous Given 4/29/22 1653)   heparin (porcine) injection 4,000 Units (4,000 Units IntraVENous Given 4/29/22 1746)           Patient brought in today from pulmonary cardiac rehab with complaints of shortness of breath.   He was found to be in A. fib with a heart rate of 120 at cardiac rehab. On exam today he is alert oriented afebrile breathing on room air. He appears nontoxic and in no acute respiratory distress. Old labs and records reviewed. Was seen and evaluated by myself as well as my attending. CBC shows no white count. Hemoglobin of 11.5. No acute electrolyte abnormalities. BUN of 39 with a creatinine of 1.6. Troponin of 0.02. BNP of 4792. Chest x-ray shows persistent bilateral pleural effusions and bibasilar opacities which could represent atelectasis or infection. Flu are negative. D-dimer is elevated over 2000 will obtain CT PE study. Spoke to cardiology, Dr. Max Ochoa who recommends starting him on heparin and admit to [facility]. He did feel as though patient would be okay to stay at [facility] at this time. Troponin repeat 0.01. Pro calcitonin of 0.03. Study shows no evidence of acute PE or acute aortic disease. Redemonstration of lymphadenopathy which is slightly more pronounced compared to previous evaluation of the mediastinum. Redemonstration of bilateral pleural effusions partially loculated which are mild to moderate as well as lower lobe atelectatic and are consolidative changes these were previously seen as well. Cholelithiasis but no acute cholecystitis.       At this time will be to admit to the hospital team.  We will consult the hospitalist.  Patient was started on IV heparin per cardiology's recommendation prior to admission to the hospital.      [id number] Score for Atrial Fibrillation Stroke Risk   Risk   Factors  Component Value   C CHF Yes 1   H HTN Yes 1   A2 Age >= 76 Yes,  (80 y.o.) 2   D DM Yes 1   S2 Prior Stroke/TIA Yes 2   V Vascular Disease No 0   A Age 74-69 No,  (80 y.o.) 0   Sc Sex male 0    [id number]  Score  7   Score last updated 4/29/22 0:45 PM EDT    Click here for a link to the UpToDate guideline \"Atrial Fibrillation: Anticoagulation therapy to prevent embolization    Disclaimer: Risk Score calculation is dependent on accuracy of patient problem list and past encounter diagnosis. FINAL IMPRESSION      1. New onset a-fib (HCC)    2. Elevated troponin    3. Chronic kidney disease, unspecified CKD stage          DISPOSITION/PLAN   DISPOSITION        PATIENT REFERRED TO:  No follow-up provider specified.     DISCHARGE MEDICATIONS:  New Prescriptions    No medications on file       DISCONTINUED MEDICATIONS:  Discontinued Medications    No medications on file              (Please note that portions of this note were completed with a voice recognition program.  Efforts were made to edit the dictations but occasionally words are mis-transcribed.)    Kaitlin Baugh PA-C (electronically signed)           Kaitlin Baugh PA-C  04/29/22 [facility]  04/29/22 6227

## 2022-04-29 NOTE — ED NOTES
1544 - Left voicemail for [facility] Cardiology on call cell. Dx: sob and new onset a-fib came over from cardiac rehab     Uri   04/29/22 1545    1558 - Office called and said Dr. Tiffany Chilel can be paged if want to talk to him. Pt wouldn't be seen till tomorrow. Told office to have him call back. Uri   04/29/22 1609    1612 - Dr. Cheo Resendez called back for Getachew Grewal   04/29/22 161

## 2022-04-30 NOTE — H&P
Hospital Medicine History & Physical      PCP: AICHA Zurita - CNP    Date of Service: Pt seen/examined on 4/29/22 and admitted on 4/29/22 to Inpatient    Chief Complaint   Patient presents with    Atrial Fibrillation     pt at cardiac rehab and experienced new onset afib        History Of Present Illness: The patient is an 26-year-old male with history of hypertension, hyperlipidemia, type 2 diabetes mellitus, CVA, bilat carotid occlusion, CAD, s/p CABG presenting with dyspnea on exertion and fast . Patient was in pulmonary rehab, when he became dyspneic and tachycardic with heart rates up to 120. Noted to be in rapid A. Fib. On arrival to the ER heart rate stabilized in the 60s, patient still in A. Fib. Does not appear to have Hx of a fib. Denies CP, lightheadedness. Not particularly SOB at rest but does have some IBARRA w/ exertion. Card rec admission and heparin gtt.         Past Medical History:        Diagnosis Date    Allergic rhinitis     Diabetes mellitus ([facility] [address]     Family history of factor V deficiency     daughter and grand daughter    GERD (gastroesophageal reflux disease)     Hyponatremia 05/09/2019    admitted; secondary to metolazone    Ischemic cerebrovascular accident (CVA) of frontal lobe ([address] 04/2016    TIA in 1997    Pleural effusion, bilateral 7/13/2021    Routine health maintenance     refuses immunizations; declines PSA    Tinnitus     Vasovagal reaction     to needles    Vitamin D deficiency disease 12/15       Past Surgical History:        Procedure Laterality Date    COLONOSCOPY N/A 4/20/2022    COLONOSCOPY POLYPECTOMY SNARE/COLD BIOPSY performed by Zahira Díaz MD at [address] [place] [address] 4/20/2022    COLONOSCOPY CONTROL HEMORRHAGE performed by Zahira Díaz MD at [address] [place] [address]  08/14/2000    RX Tristar 2.5 x 13 mCX  RX Tristar 2.5 x 13 pCX    CORONARY ANGIOPLASTY WITH STENT PLACEMENT  10/19/2000    Tetra 2.75 x 18 CX  Tetra 3.0 x 13 CX    CORONARY ARTERY BYPASS GRAFT N/A 01/31/2018    OTHER SURGICAL HISTORY  01/23/2012    phaco emulsification cataract right eye    TONSILLECTOMY      TOOTH EXTRACTION      UPPER GASTROINTESTINAL ENDOSCOPY N/A 4/20/2022    [facility]SY [facility] [address] [facility]       Medications Prior to Admission:    Prior to Admission medications    Medication Sig Start Date End Date Taking?  Authorizing Provider   pravastatin (PRAVACHOL) 40 MG tablet TAKE 1 TABLET BY MOUTH  DAILY AT NIGHT 4/1/22   [facility], APRN - [facility]   metoprolol tartrate (LOPRESSOR) 50 MG tablet TAKE 1 TABLET BY MOUTH  TWICE DAILY 4/8/22   Cassie Nguyen MD   amLODIPine (NORVASC) 5 MG tablet TAKE 1 TABLET BY MOUTH  DAILY 4/8/22   Cassie Nguyen MD   pantoprazole (PROTONIX) 40 MG tablet TAKE 1 BY MOUTH ONCE DAILY IN THE MORNING 30 MINUTES TO AN HOUR BEFORE BREAKFAST 3/10/22   Historical Provider, MD   ipratropium (ATROVENT) 0.03 % nasal spray 2 sprays by Each Nostril route 4 times daily 3/15/22   Narda Melton DO   losartan (COZAAR) 100 MG tablet TAKE 1 TABLET BY MOUTH  DAILY 2/21/22   Mark Clemens MD   ciclopirox [facility]) 8 % solution  1/4/22   Historical Provider, MD   torsemide (DEMADEX) 20 MG tablet Take 2 tablets by mouth daily 2/17/22   Judit Farmer MD   Ascorbic Acid (VITAMIN C) 250 MG tablet Take 250 mg by mouth daily    Historical Provider, MD   metFORMIN (GLUCOPHAGE) 1000 MG tablet TAKE 1 TABLET BY MOUTH  TWICE DAILY WITH MEALS 1/6/22   [facility], APRN - CNP   azelastine (ASTELIN) 0.1 % nasal spray 1 spray by Nasal route 2 times daily Use in each nostril as directed 11/9/21   Narda Melton DO   albuterol sulfate HFA (PROAIR HFA) 108 (90 Base) MCG/ACT inhaler Inhale 2 puffs into the lungs every 6 hours as needed for Wheezing 7/13/21   Pio Millan MD   levothyroxine (SYNTHROID) 88 MCG tablet TAKE 1 TABLET BY MOUTH DAILY 6/21/21   Dwight Chavez APRN - CNP   Omega-3 Fatty Acids (FISH OIL PO) Take by mouth    Historical Provider, MD   Multiple Vitamin (MULTI-VITAMIN DAILY PO) Take by mouth    Historical Provider, MD   Coenzyme Q10 (COQ10 PO) Take by mouth    Historical Provider, MD   guaiFENesin 400 MG tablet Take 400 mg by mouth as needed for Cough     Historical Provider, MD   magnesium oxide (MAG-OX) 400 MG tablet Take 400 mg by mouth daily    Historical Provider, MD   ferrous sulfate 325 (65 Fe) MG tablet Take 325 mg by mouth daily (with breakfast)     Historical Provider, MD   aspirin 81 MG EC tablet Take 1 tablet by mouth daily 3/5/18   Homero Calabrese MD   Cholecalciferol (VITAMIN D) 2000 UNITS CAPS capsule Take  by mouth daily. Historical Provider, MD       Allergies:  Metolazone, Lipitor, and Simvastatin    Social History:    TOBACCO:   reports that he quit smoking about 47 years ago. His smoking use included cigarettes. He started smoking about 65 years ago. He has a 40.00 pack-year smoking history. He has never used smokeless tobacco.  ETOH:   reports no history of alcohol use. Family History:  Reviewed in detail and negative for DM, Early CAD, Cancer (except as below). Positive as follows:        Problem Relation Age of Onset    Emphysema Mother     Stroke Father     Other Daughter         Factor V deficiency    Other Grandchild         Factor V deficiency       REVIEW OF SYSTEMS:   Pertinent positives/negatives as follows: IBARRA, tachycardia, and as discussed in HPI, otherwise a complete ROS performed and all other systems are negative. PHYSICAL EXAM PERFORMED:  /79   Pulse 78   Temp 98.6 °F (37 °C) (Oral)   Resp 18   Ht 5' 10\" (1.778 m)   Wt 161 lb (73 kg)   SpO2 96%   BMI 23.10 kg/m²     GEN:  A&Ox3, NAD. HEENT:  NC/AT,EOMI, MMM, no erythema/exudates or visible masses. CVS:  Normal S1,S2.  ii. Without M/G/R.   LUNG:   CTA-B. no wheezes, rales or rhonchi  ABD:  Soft, ND/NT, BS+ x4. Without G/R.  EXT: 2+ pulses, no c/c/e. Brisk cap refill. PSY:  Thought process intact, affect appropriate. ALMA ROSA:  CN III-XII intact, moves all 4 spontaneously, sensory grossly intact. SKIN: No rash or lesions on visible skin. Chart review shows recent radiographs:  XR CHEST PORTABLE    Result Date: 4/29/2022  EXAMINATION: ONE XRAY VIEW OF THE CHEST 4/29/2022 2:44 pm COMPARISON: 02/14/2022 HISTORY: ORDERING SYSTEM PROVIDED HISTORY: sob TECHNOLOGIST PROVIDED HISTORY: Reason for exam:->sob Reason for Exam: AFIB, cardiac history FINDINGS: Status post median sternotomy. Heart size stable. Bilateral pleural effusions and bibasilar opacities persist.  No pneumothorax. Persistent bilateral pleural effusions and bibasilar opacities which could represent atelectasis or infection     CT CHEST PULMONARY EMBOLISM W CONTRAST    Result Date: 4/29/2022  EXAMINATION: CTA OF THE CHEST 4/29/2022 3:54 pm TECHNIQUE: CTA of the chest was performed after the administration of intravenous contrast.  Multiplanar reformatted images are provided for review. MIP images are provided for review. Dose modulation, iterative reconstruction, and/or weight based adjustment of the mA/kV was utilized to reduce the radiation dose to as low as reasonably achievable. COMPARISON: CT chest from July 1, 2021. HISTORY: ORDERING SYSTEM PROVIDED HISTORY: SOB, new onset A-fib. TECHNOLOGIST PROVIDED HISTORY: Reason for exam:->SOB, new onset A-fib. Decision Support Exception - unselect if not a suspected or confirmed emergency medical condition->Emergency Medical Condition (MA) Reason for Exam: Atrial Fibrillation (pt at cardiac rehab and experienced new onset A-fib). FINDINGS: Pulmonary Arteries: Pulmonary arteries are adequately opacified for evaluation. No evidence of intraluminal filling defect to suggest pulmonary embolism. Main pulmonary artery is normal in caliber. Mediastinum: Paratracheal nodes measure up to 1.6 cm.   Precarinal nodes measure up to 1.8 cm. AP window nodes measure 1 cm. Subcarinal nodes measure up to 1.3 cm. Lymphadenopathy slightly more pronounced compared to previous evaluation. The heart and pericardium demonstrate no acute abnormality. There is no acute abnormality of the thoracic aorta. Lungs/pleura: There is evidence of bilateral pleural effusions which are likely partly loculated. Effusions are mild-to-moderate. Evidence of bilateral lower lobe atelectatic and/or consolidative changes. No evidence of pulmonary edema. Some of these findings were previously seen as well and there is no dramatic change compared to the previous evaluation. Upper Abdomen: Cholelithiasis but no acute cholecystitis. Soft Tissues/Bones: Moderate multilevel degenerative disc disease. Postthoracotomy changes. No other acute abnormality. 1. No evidence of acute pulmonary embolism or acute aortic disease. 2. Redemonstration of lymphadenopathy which is slightly more pronounced compared to the previous evaluation in the mediastinum. 3. Redemonstration of bilateral pleural effusions partly loculated which are mild to moderate as well as lower lobe atelectatic and/or consolidative changes. These were previously seen as well. 4. Cholelithiasis but no acute cholecystitis.        EKG:    EKG 12 Lead [[id number]]    Collected: 04/29/22 1443    Updated: 04/29/22 1851     Ventricular Rate 75 BPM    Atrial Rate 104 BPM    QRS Duration 114 ms    Q-T Interval 374 ms    QTc Calculation (Bazett) 417 ms    R Axis -26 degrees    T Axis 80 degrees    Diagnosis Atrial fibrillation vs atrial tachycardia with variable AV blockMinimal voltage criteria for LVH, may be normal variantSeptal infarct (cited on or before 14-FEB-2022)Non-specific intra-ventricular conduction delayNonspecific ST abnormalityWhen compared with ECG of 16-FEB-2022 07:17,Atrial fibrillation has replaced Sinus rhythmConfirmed by CA Pereira MD ([id number]) on 4/29/2022 6:50:54 PM CBC:  Recent Labs     04/29/22  1455   WBC 7.7   HGB 11.5*   HCT 34.6*           RENAL  Recent Labs     04/29/22  1455   *   K 4.6   CL 93*   CO2 26   BUN 39*   CREATININE 1.6*   GLUCOSE 110*       Hemoglobin a1c:  Lab Results   Component Value Date    LABA1C 6.1 02/15/2022    LABA1C 6.2 11/18/2021    LABA1C 6.6 05/17/2021       LFT'S:  Recent Labs     04/29/22  1455   AST 18   ALT 19   BILITOT 0.3   ALKPHOS 154*       COAG:  Recent Labs     04/29/22  1445   INR 1.08       CARDIAC ENZYMES:   No results for input(s): TROPONINT in the last 72 hours. Recent Labs     04/29/22  1455 04/29/22  1625 04/29/22  2118   TROPONINI 0.02* 0.01 0.01     Lab Results   Component Value Date    PROBNP 4,792 (H) 04/29/2022    PROBNP 5,346 (H) 02/14/2022    PROBNP 1,286 (H) 06/10/2019       PHYSICIAN CERTIFICATION  I certify that Lisa Coronado is expected to be hospitalized for 2 midnights based on the following assessment and plan:    ASSESSMENT/PLAN:  1. A fib RVR, apparently new onset. Rates at rehab 120's. Rate improved since arrival to ED. Extensive cardiac/vascular Hx. Heparin gtt, trops,tele. Cont ASA, BB, ARB. Cards c/s. 2. Hypothyroidism, continue home levothyroxine, TSH 4.37, f/u T4.   3. DM2, hold oral Rx, add Low SSI. 4. GERD, PPI. 5. HLD, continue statin  6. Indeterminate troponin, initial 0.02, 0.01 x2 since. Likely 2/2 tachycardia. DVT Prophylaxis: hep gtt  Diet: CC, NPO aft MN for cards c/s . Code Status: Full Code   PT/OT Eval Status: Will order if needed and as patient condition allows  Dispo - Admit to inpatient PCU    Won Atkins MD    Thank you AICHA Hawkins CNP for the opportunity to be involved in this patient's care. If you have any questions or concerns please feel free to contact me via the [employer] at [phone]. This chart was generated using the [address] [place] [address] dictation system.  I created this record but it may contain dictation errors given the limitations of this technology.

## 2022-04-30 NOTE — PROGRESS NOTES
Patient educated on discharge instructions as well as new medications use, dosage, administration and possible side effects. Patient verified knowledge. IV removed without difficulty and dry dressing in place. Telemetry monitor removed and returned to [facility]. Pt left facility in stable condition to Home with all of their personal belongings.

## 2022-04-30 NOTE — PROGRESS NOTES
Pt settled into room 310, Admission complete, shift assessment complete, still need to verify patients home medications tomorrow with patients wife. Pt is A&O x 4, VSS. Pt c/o weakness in right shoulder possibly related to \"arthritis\" per patient, Pt ambulated independently to bathroom, pt steady on feet with no issues. Pt states he sometimes is SOBOE and at rest, no noticeable SOB during walk test. Pt had no issues swallowing/eating. [place] [facility] box lunch provided, beverage provided. Pt NPO at midnight for Cardiology consult, pt aware. Pts . All meds given per [facility]. Pt aware of morning labs, and vitals needed later in shift. Call light within reach with all personal belongings. Bed in lowest position. All pts needs addressed. Will continue to monitor.

## 2022-04-30 NOTE — PROGRESS NOTES
Pt taken to room 310 per W/C transferred to bed per self, remains A/O. Call light given to patient, staff aware of pt's arrival to floor, paperwork given to staff on floor.

## 2022-04-30 NOTE — CARE COORDINATION
Reviewed chart and met with pt and wife at bedside briefly prior to dc. States lives home with wife and denies any in home services. States is active with outpt pulm rehab. Pt up and about in room without assistive devices. No O2 in place. Discussed HHC and pt and wife declines. Noted pt to dc on Eliquis and 30 day free trial card given to pt. Spoke with [facility] where Rx was sent and pt co pay is 45$ per month. Pt and wife states will be able to afford this amount. No other dc needs identified.  O2 sat is 96% on RA

## 2022-04-30 NOTE — PROGRESS NOTES
Patient admitted to room 310 from ER. Patient oriented to room, call light, bed rails, phone, lights and bathroom. Patient instructed about the schedule of the day including: vital sign frequency, lab draws, possible tests, frequency of MD and staff rounds, daily weights, I &O's and prescribed diet. No bed alarm in place, patient A&O x 4, low fall risk. Yes, Telemetry box in place, patient aware of placement and reason. Bed locked, in lowest position, side rails up 2/4, call light within reach. Recliner Assessment  Patient is able to demonstrate the ability to move from a reclining position to an upright position within the recliner. 4 Eyes Skin Assessment     The patient is being assess for   Admission    I agree that 2 RN's have performed a thorough Head to Toe Skin Assessment on the patient. ALL assessment sites listed below have been assessed. Areas assessed for pressure by both nurses:   [x]   Head, Face, and Ears   [x]   Shoulders, Back, and Chest, Abdomen  [x]   Arms, Elbows, and Hands   [x]   Coccyx, Sacrum, and Ischium  [x]   Legs, Feet, and Heels     Skin pink, warm, dry/flaky, few scattered bruises. Skin Assessed Under all Medical Devices by both nurses:  None              All Mepilex Borders were peeled back and area peeked at by both nurses:  No: NA  Please list where Mepilex Borders are located:  NA             **SHARE this note so that the co-signing nurse is able to place an eSignature**    Co-signer eSignature: Electronically signed by Ashley Parr RN on 4/30/22 at 3:29 AM EDT    Does the Patient have Skin Breakdown related to pressure?   No              [place] Prevention initiated:  NA   Wound Care Orders initiated:  NA      [facility] nurse consulted for Pressure Injury (Stage 3,4, Unstageable, DTI, NWPT, Complex wounds)and New or Established Ostomies:  NA      Primary Nurse eSignature: Electronically signed by Bee Morataya RN on 4/29/22 at 11:14 PM EDT

## 2022-04-30 NOTE — PROGRESS NOTES
Vitals:    04/30/22 0246   BP: (!) 102/55   Pulse: 71   Resp: 16   Temp: 97.9 °F (36.6 °C)   SpO2: 96%     Pt A&O x 4, VSS. Pt ambulated to bathroom independently. Heparin infusion increased to 11.7, 4000 unit bolus given. Pt denies any further needs.

## 2022-04-30 NOTE — PROGRESS NOTES
Perfect Serve sent to [address] [place] [address] Drive - pt flipped to NSR with occasional PVC's @ [phone]

## 2022-04-30 NOTE — PROGRESS NOTES
Pharmacy - RE:  Low-dose Heparin drip  Current rate = 8.8 ml/h  (880 units/h)  Anti-Xa drawn @ 0023 = 0.05 IU/ml   Goal anti-Xa = 0.3 - 0.7 IU/ml  Per protocol, give a 4000 unit bolus and increase drip rate to 1170 units/h (11.7 ml/h). Obtain another anti-Xa 4/30 @ 5269.

## 2022-04-30 NOTE — CONSULTS
[address] [facility]  [phone]        Reason for Consultation/Chief Complaint: \"I have been asked to see this patient for new onset afib and elevated troponin . \"    History of Present Illness:  Armando Peña is a 80 y.o. patient who presented to the hospital with complaints right leg cramp while doing pulmonary rehab  He told that to nurse who put a cardiac monitor on and sent him to ED. He Is admitted with new afib  Denies chest pain, more than baseline  shortness of breath, edema, dizziness, palpitations and syncope. Known CAD stents in past but since CABG 2018  No angina or cardiac issues. He thinks he had COVID 19 3 yrs ago and ever since then has shortness of breath. colonoscopy and polypectomy 4.20.22   HEMORRHAGE performed by Laura Levin MD at [facility]   08/14/2000     RX Tristar 2.5 x 13 mCX  RX Tristar 2.5 x 13 pCX    CORONARY ANGIOPLASTY WITH STENT PLACEMENT   10/19/2000     Tetra 2.75 x 18 CX  Tetra 3.0 x 13 CX    CORONARY ARTERY BYPASS GRAFT        I have been asked to provide consultation regarding further management and testing. 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 Surgical History:   has a past surgical history that includes Tooth Extraction; Coronary angioplasty with stent (08/14/2000); other surgical history (01/23/2012); Coronary angioplasty with stent (10/19/2000); Tonsillectomy; Coronary artery bypass graft (N/A, 01/31/2018); Upper gastrointestinal endoscopy (N/A, 4/20/2022); Colonoscopy (N/A, 4/20/2022); and Colonoscopy (N/A, 4/20/2022).      Social History:   reports that he quit smoking about 47 years ago. His smoking use included cigarettes. He started smoking about 65 years ago. He has a 40.00 pack-year smoking history. He has never used smokeless tobacco. He reports that he does not drink alcohol and does not use drugs. Family History:  family history includes Emphysema in his mother; Other in his daughter and grandchild; Stroke in his father. Home Medications:  Were reviewed and are listed in nursing record. and/or listed below  Prior to Admission medications    Medication Sig Start Date End Date Taking?  Authorizing Provider   pravastatin (PRAVACHOL) 40 MG tablet TAKE 1 TABLET BY MOUTH  DAILY AT NIGHT 4/1/22   AICHA Rucker CNP   metoprolol tartrate (LOPRESSOR) 50 MG tablet TAKE 1 TABLET BY MOUTH  TWICE DAILY 4/8/22   Brisa Clark MD   amLODIPine (NORVASC) 5 MG tablet TAKE 1 TABLET BY MOUTH  DAILY 4/8/22   Brisa Clark MD   pantoprazole (PROTONIX) 40 MG tablet TAKE 1 BY MOUTH ONCE DAILY IN THE MORNING 30 MINUTES TO AN HOUR BEFORE BREAKFAST 3/10/22   Historical Provider, MD   ipratropium (ATROVENT) 0.03 % nasal spray 2 sprays by Each Nostril route 4 times daily 3/15/22   Giovanni Doss DO   losartan (COZAAR) 100 MG tablet TAKE 1 TABLET BY MOUTH  DAILY 2/21/22   Kayode Hawk MD   ciclopirox [facility]) 8 % solution  1/4/22   Historical Provider, MD   torsemide (DEMADEX) 20 MG tablet Take 2 tablets by mouth daily 2/17/22 Sunday MD Dayday   Ascorbic Acid (VITAMIN C) 250 MG tablet Take 250 mg by mouth daily    Historical Provider, MD   metFORMIN (GLUCOPHAGE) 1000 MG tablet TAKE 1 TABLET BY MOUTH  TWICE DAILY WITH MEALS 1/6/22   AICHA Rucker CNP   azelastine (ASTELIN) 0.1 % nasal spray 1 spray by Nasal route 2 times daily Use in each nostril as directed 11/9/21   Giovanni Doss DO   albuterol sulfate HFA (PROAIR HFA) 108 (90 Base) MCG/ACT inhaler Inhale 2 puffs into the lungs every 6 hours as needed for Wheezing 7/13/21 Dangelo Durán MD   levothyroxine (SYNTHROID) 88 MCG tablet TAKE 1 TABLET BY MOUTH  DAILY 6/21/21   AICHA Tavares - CNP   Omega-3 Fatty Acids (FISH OIL PO) Take by mouth    Historical Provider, MD   Multiple Vitamin (MULTI-VITAMIN DAILY PO) Take by mouth    Historical Provider, MD   Coenzyme Q10 (COQ10 PO) Take by mouth    Historical Provider, MD   guaiFENesin 400 MG tablet Take 400 mg by mouth as needed for Cough     Historical Provider, MD   magnesium oxide (MAG-OX) 400 MG tablet Take 400 mg by mouth daily    Historical Provider, MD   ferrous sulfate 325 (65 Fe) MG tablet Take 325 mg by mouth daily (with breakfast)     Historical Provider, MD   aspirin 81 MG EC tablet Take 1 tablet by mouth daily 3/5/18   Arvind Dinh MD   Cholecalciferol (VITAMIN D) 2000 UNITS CAPS capsule Take  by mouth daily.     Historical Provider, MD        Allergies:  Metolazone, Lipitor, and Simvastatin     Review of Systems:   12 point ROS negative in all areas as listed below except as in [place]  Constitutional, EENT,  GI, , Musculoskeletal, skin, neurological, hematological, endocrine, Psychiatric    Physical Examination:    Vitals:    04/30/22 0246   BP: (!) 102/55   Pulse: 71   Resp: 16   Temp: 97.9 °F (36.6 °C)   SpO2: 96%    Weight: 158 lb 1 oz (71.7 kg)         General Appearance:  Alert, cooperative, no distress, appears stated age   Head:  Normocephalic, without obvious abnormality, atraumatic   Eyes:  PERRL, conjunctiva/corneas clear       Nose: Nares normal, no drainage or sinus tenderness   Throat: Lips, mucosa, and tongue normal   Neck: Supple, symmetrical, trachea midline, no adenopathy, thyroid: not enlarged, symmetric, no tenderness/mass/nodules, no carotid bruit or JVD       Lungs:   Clear to auscultation bilaterally, respirations unlabored   Chest Wall:  No tenderness or deformity   Heart:  irreg irreg rate and rhythm, S1, S2 normal, no murmur, rub or gallop   Abdomen:   Soft, non-tender, bowel sounds active all four quadrants,  no masses, no organomegaly           Extremities: Extremities normal, atraumatic, no cyanosis or edema   Pulses: No palpable pedal pulses   Skin: Skin color, texture, turgor normal, no rashes or lesions   Pysch: Normal mood and affect   Neurologic: Normal gross motor and sensory exam.         Labs  CBC:   Lab Results   Component Value Date    WBC 7.7 04/29/2022    RBC 3.96 04/29/2022    HGB 11.5 04/29/2022    HCT 34.6 04/29/2022    MCV 87.3 04/29/2022    RDW 16.7 04/29/2022     04/29/2022     CMP:    Lab Results   Component Value Date     04/29/2022    K 4.6 04/29/2022    CL 93 04/29/2022    CO2 26 04/29/2022    BUN 39 04/29/2022    CREATININE 1.6 04/29/2022    CREATININE 1.1 02/02/2012    GFRAA 50 04/29/2022    GFRAA >60 09/07/2012    AGRATIO 1.4 04/29/2022    LABGLOM 42 04/29/2022    GLUCOSE 110 04/29/2022    PROT 7.6 04/29/2022    PROT 6.8 03/13/2013    CALCIUM 9.7 04/29/2022    BILITOT 0.3 04/29/2022    ALKPHOS 154 04/29/2022    AST 18 04/29/2022    ALT 19 04/29/2022     PT/INR:  No results found for: PTINR  Lab Results   Component Value Date    TROPONINI 0.02 (H) 04/30/2022   creatinine 1.6  Troponin 0.01 0.02 0.02  EKG:  I have reviewed EKG with the following interpretation:  Impression:     Atrial fibrillation vs atrial tachycardia with variable AV blockMinimal voltage criteria for LVH, may be normal variantSeptal infarct (cited on or before 14-FEB-2022)Non-specific intra-ventricular conduction delayNonspecific ST abnormalityWhen compared with ECG of 16-FEB-2022 07:17,Atrial fibrillation has replaced Sinus rhythmConfirmed by CA Erwin MD ([id number]) on 4/29/2022 6:50:54 PM Normal sinus rhythmSeptal infarct (cited on or before 14-FEB-2022)Non-specific intra-ventricular conduction delayLeft axis deviationNonspecific ST abnormalityWhen compared with ECG of 14-FEB-2022 21:57,Sinus rhythm has replaced Junctional rhythmConfirmed by Ann Correa MD, Kristen Hyde ([id number]) on 2/16/2022 10:23:14 AM Echo 2.16.22  Summary   LV systolic function is normal with EF estimated at 55%. No obvious segmental wall motion abnormalities. There is mild systolic and diastolic septal flattening c/w RV pressure and   volume overload. There is mild concentric left ventricular hypertrophy. Sigmoid-shaped basal septum. Grade III diastolic dysfunction with elevated filing pressure. Mild biatrial enlargement. Mild mitral, aortic, and pulmonic regurgitation. Moderate tricuspid regurgitation. Systolic pulmonary artery pressure (SPAP) estimated at 59 mmHg (RAP 8 mmHg),   c/w moderate pulm HTN. There is a pleural effusion. Assessment  afib new onset VS atrial tach. I reviewed tracing   I see well defined p waves in V1 which are reg  RR interval is variable I think it is atrial tach variable conduction  Cannot rule out afib   Elevated troponin demand ischemia vs decreased clearance due to CKD   CAD no ischemia on EKG and no angina  CKD 3a  Moderate pulmonary HTN    Recommendations:  Continue beta blockers as before  Start apixaban 2. 5BID + asa 81 mg  Follow up with Dr Glen Coelho  I will arrange 2 week event monitor as outpatient  OK to discharge today  Patient Active Problem List   Diagnosis    Bilateral carotid artery occlusion    Essential hypertension    Hypothyroidism, acquired, autoimmune    Vitamin D deficiency disease    Type 2 diabetes mellitus without complication, without long-term current use of insulin ([address]    Coronary artery disease    Pure hypercholesterolemia    History of CVA (cerebrovascular accident)    Chronic diastolic congestive heart failure (HCC)    S/P CABG x 4    Hyponatremia    Allergic sinusitis    History of tobacco abuse    SOB (shortness of breath)    Chronic eczematous otitis externa of both ears    Restrictive lung disease    Pulmonary infiltrates    Mediastinal adenopathy    Simple chronic bronchitis (HCC)    Allergic rhinitis    Acute on chronic [facility] failure [facility]    Iron deficiency anemia    Atrial fibrillation ([facility]

## 2022-05-02 NOTE — PATIENT INSTRUCTIONS
Please read the healthy family handout that you were given and share it with your family. Please compare this printed medication list with your medications at home to be sure they are the same. If you have any medications that are different please contact us immediately at [phone]. Also review your allergies that we have listed, these may also include medications that you have not been able to tolerate, make sure everything listed is correct. If you have any allergies that are different please contact us immediately at [phone].

## 2022-05-02 NOTE — PROGRESS NOTES
Post-Discharge Transitional Care  Follow Up      Jose Luis Conde   YOB: 1940    Date of Office Visit:  5/2/2022  Date of Hospital Admission: 4/29/22  Date of Hospital Discharge: 4/30/22  Risk of hospital readmission (high >=14%. Medium >=10%) :Readmission Risk Score: 17.6 ( )      Care management risk score Rising risk (score 2-5) and Complex Care (Scores >=6): 2     Non face to face  following discharge, date last encounter closed (first attempt may have been earlier): *No documented post hospital discharge outreach found in the last 14 days    Call initiated 2 business days of discharge: *No response recorded in the last 14 days    ASSESSMENT/PLAN:   Hospital discharge follow-up  -     AR DISCHARGE MEDS RECONCILED W/ CURRENT OUTPATIENT MED LIST  Paroxysmal atrial fibrillation ([facility]  Chronic kidney disease, unspecified CKD stage      Medical Decision Making: moderate complexity  Return if symptoms worsen or fail to improve, for schedule appointment with Dr Blanka Pedersen and Dr. Joselito Hoffmann for follow up. On this date 5/2/2022 I have spent 30 minutes reviewing previous notes, test results and face to face with the patient discussing the diagnosis and importance of compliance with the treatment plan as well as documenting on the day of the visit. Subjective:   HPI:  Follow up of Hospital problems/diagnosis(es): Patient is today for hospital follow-up. Patient was recently hospitalized from 4/29 -4/30 for new onset A. fib, chronic kidney disease, and elevated troponin. Patient reports that he has noticed some shortness of breath upon exertion worse when he lays down at night. Patient denies any dizziness or lightheadedness. No chest pain. Patient reports congestion and thick sputum with cough at times. Patient denies any fever or chills. Patient eating and drinking appropriately. No vomiting or diarrhea.   Patient reports that there was a 4 pound weight gain over the past 24 hours despite taking his medications. No lower extremity edema. Patient reports watching what he eats. Patient does not follow-up with pulmonology, nephrology, and cardiology as well. Inpatient course: Discharge summary reviewed- see chart. Interval history/Current status: Stable. Patient reports that he has noticed some leg pain since doing cardiac rehab. Patient reports that was prescribed by his pulmonologist.  Patient has an appointment with pulmonology later this month therefore I did recommend he discuss this with them as well. Patient reports taking medications as prescribed. Care coordinator/nurse discussed concerns with patient's 4 pound weight gain despite taking diuretics, Demadex 40 mg daily. Patient reports that he does not feel his urinary output is as strong as it used to be. Patient does have chronic kidney disease as well. I did recommend that patient continue monitoring diet, take medications as prescribed and follow-up with nephrologist as well as cardiologist for hospital follow-up as well. We did discuss the possibility of changing diuretic. Patient to continue on anticoagulation therapy at this time.     Patient Active Problem List   Diagnosis    Bilateral carotid artery occlusion    Essential hypertension    Hypothyroidism, acquired, autoimmune    Vitamin D deficiency disease    Type 2 diabetes mellitus without complication, without long-term current use of insulin ([facility]    Coronary artery disease    Pure hypercholesterolemia    History of CVA (cerebrovascular accident)    Chronic diastolic congestive heart failure (HCC)    S/P CABG x 4    Hyponatremia    Allergic sinusitis    History of tobacco abuse    SOB (shortness of breath)    Chronic eczematous otitis externa of both ears    Restrictive lung disease    Pulmonary infiltrates    Mediastinal adenopathy    Simple chronic bronchitis (HCC)    Allergic rhinitis    Acute on chronic heart failure (HCC)    Iron deficiency anemia  Atrial fibrillation ([facility]       Medications listed as ordered at the time of discharge from hospital     Medication List          Accurate as of May 2, 2022  3:36 PM. If you have any questions, ask your nurse or doctor.             CONTINUE taking these medications    albuterol sulfate  (90 Base) MCG/ACT inhaler  Commonly known as: ProAir HFA  Inhale 2 puffs into the lungs every 6 hours as needed for Wheezing     apixaban 2.5 MG Tabs tablet  Commonly known as: ELIQUIS  Take 1 tablet by mouth 2 times daily     aspirin 81 MG EC tablet  Take 1 tablet by mouth daily     ciclopirox 8 % solution  Commonly known as: PENLAC     COQ10 PO     ferrous sulfate 325 (65 Fe) MG tablet  Commonly known as: IRON 325     FISH OIL PO     guaiFENesin 400 MG tablet     ipratropium 0.03 % nasal spray  Commonly known as: ATROVENT  2 sprays by Each Nostril route 4 times daily     levothyroxine 88 MCG tablet  Commonly known as: SYNTHROID  TAKE 1 TABLET BY MOUTH  DAILY     losartan 100 MG tablet  Commonly known as: COZAAR  TAKE 1 TABLET BY MOUTH  DAILY     magnesium oxide 400 MG tablet  Commonly known as: MAG-OX     metFORMIN 1000 MG tablet  Commonly known as: GLUCOPHAGE  TAKE 1 TABLET BY MOUTH  TWICE DAILY WITH MEALS     metoprolol tartrate 50 MG tablet  Commonly known as: LOPRESSOR  TAKE 1 TABLET BY MOUTH  TWICE DAILY     MULTI-VITAMIN DAILY PO     pantoprazole 40 MG tablet  Commonly known as: PROTONIX     pravastatin 40 MG tablet  Commonly known as: PRAVACHOL  TAKE 1 TABLET BY MOUTH  DAILY AT NIGHT     torsemide 20 MG tablet  Commonly known as: DEMADEX  Take 2 tablets by mouth daily     vitamin C 250 MG tablet     vitamin D 50 MCG (2000 UT) Caps capsule              Medications marked \"taking\" at this time  Outpatient Medications Marked as Taking for the 5/2/22 encounter (Office Visit) with AICHA Mcgrath CNP   Medication Sig Dispense Refill    apixaban (ELIQUIS) 2.5 MG TABS tablet Take 1 tablet by mouth 2 times daily 60 tablet 2    metoprolol tartrate (LOPRESSOR) 50 MG tablet TAKE 1 TABLET BY MOUTH  TWICE DAILY 180 tablet 3    pravastatin (PRAVACHOL) 40 MG tablet TAKE 1 TABLET BY MOUTH  DAILY AT NIGHT 90 tablet 3    pantoprazole (PROTONIX) 40 MG tablet TAKE 1 BY MOUTH ONCE DAILY IN THE MORNING 30 MINUTES TO AN HOUR BEFORE BREAKFAST      ipratropium (ATROVENT) 0.03 % nasal spray 2 sprays by Each Nostril route 4 times daily 1 each 1    losartan (COZAAR) 100 MG tablet TAKE 1 TABLET BY MOUTH  DAILY 90 tablet 3    ciclopirox (PENLAC) 8 % solution       torsemide (DEMADEX) 20 MG tablet Take 2 tablets by mouth daily 60 tablet 1    Ascorbic Acid (VITAMIN C) 250 MG tablet Take 250 mg by mouth daily      metFORMIN (GLUCOPHAGE) 1000 MG tablet TAKE 1 TABLET BY MOUTH  TWICE DAILY WITH MEALS 180 tablet 3    albuterol sulfate HFA (PROAIR HFA) 108 (90 Base) MCG/ACT inhaler Inhale 2 puffs into the lungs every 6 hours as needed for Wheezing 1 Inhaler 5    levothyroxine (SYNTHROID) 88 MCG tablet TAKE 1 TABLET BY MOUTH  DAILY 90 tablet 3    Omega-3 Fatty Acids (FISH OIL PO) Take by mouth      Multiple Vitamin (MULTI-VITAMIN DAILY PO) Take by mouth      Coenzyme Q10 (COQ10 PO) Take by mouth      guaiFENesin 400 MG tablet Take 400 mg by mouth daily       magnesium oxide (MAG-OX) 400 MG tablet Take 400 mg by mouth daily      ferrous sulfate 325 (65 Fe) MG tablet Take 325 mg by mouth daily (with breakfast)       aspirin 81 MG EC tablet Take 1 tablet by mouth daily 30 tablet 0    Cholecalciferol (VITAMIN D) 2000 UNITS CAPS capsule Take  by mouth daily. Medications patient taking as of now reconciled against medications ordered at time of hospital discharge: Yes    A comprehensive review of systems was negative except for what was noted in the HPI.     Objective:    /81   Pulse 101   Temp 97.7 °F (36.5 °C) (Oral)   Wt 168 lb (76.2 kg)   SpO2 97%   BMI 24.11 kg/m²   General Appearance: alert and oriented to person, place and time  Skin: warm and dry, no rash or erythema  Head: normocephalic and atraumatic  Eyes: pupils equal, round, and reactive to light  ENT: tympanic membrane, external ear and ear canal normal bilaterally, oropharynx clear and moist with normal mucous membranes  Neck: neck supple and non tender without mass   Pulmonary/Chest: no chest wall tenderness and clear to auscultation throughout. Cardiovascular: normal rate, intact distal pulses and no carotid bruits  Abdomen: soft, non-tender, non-distended, normal bowel sounds, no masses or organomegaly  Extremities: no cyanosis and no clubbing  Musculoskeletal: normal range of motion, no joint swelling, deformity or tenderness  Neurologic: gait and coordination normal and speech normal      An electronic signature was used to authenticate this note.   --Cheryle Rear, APRN - CNP

## 2022-05-02 NOTE — TELEPHONE ENCOUNTER
Patient went to ED 4/30/2022 from cardiac rehab for new afib. Chris Barron from cardiac rehab wants to know when he can restart rehab.   Thank you

## 2022-05-04 NOTE — CARE COORDINATION
[facility] 45 Transitions Follow Up Call    2022    Patient: Millie Rosas  Patient : 1940   MRN: [id number]  Reason for Admission: A Fib RVR new onset, hypothyroidism, DM2 (A1C 6.7%), GERD, HLD -> home no services, new Eliquis ($45/mo)  Discharge Date: 22 RARS: Readmission Risk Score: 17.6 ( )      CTN attempted follow-up outreach to patient. Message left including CTN contact information for return call and/or future needs. Noted patient may be at cardiac rehab. CTN will follow up attempt in 1-2 days.     Follow Up  Future Appointments   Date Time Provider Getachew Cottrell   2022  1:30 PM SCHEDULE, [address] PULMONARY RM [facility] [address] [facility]   2022  1:30 PM SCHEDULE, [facility] PULMON[facility] [address] [facility]   2022  1:30 PM SCHEDULE, [facility] PULMONARY [facility] [address] [facility]   2022  1:30 PM SCHEDULE, [facility] PULMONARY [facility] [address] [facility]   2022  1:30 PM SCHEDULE, [facility] PULMONARY [facility] [address] [facility]   2022 11:20 AM Marysol Calderon MD AND PULM [facility]   2022  1:30 PM SCHEDULE, [facility] PULMONARY [facility] [address] [facility]   2022  8:00 AM AICHA Ramos - CNP [facility] [place] [facility]   2022  1:30 PM SCHEDULE, [facility] PULMONARY [facility] [address] [facility]   2022  1:30 PM SCHEDULE, [facility] PULMONARY [facility] [address] [facility]   2022  1:30 PM SCHEDULE, [facility] PULMONARY [facility] [address] [facility]   2022  1:30 PM SCHEDULE, [facility] PULMONARY [facility] [address] [facility]   2022  1:30 PM SCHEDULE, [facility] PULMONARY [facility] [address] [facility]   2022  1:30 PM SCHEDULE, [facility] PULMONARY [facility] [address] [facility]   2022  3:00 PM MD KAVYA Dunaway [facility]   6/3/2022  1:30 PM SCHEDULE, [facility] PULMONARY [facility] [address] [facility]   2022  1:30 PM SCHEDULE, [facility] PULMONARY [facility] [address] [facility]   2022  1:30 PM SCHEDULE, [facility] PULMONARY [facility] [address] [facility]   6/10/2022  1:30 PM SCHEDULE, [facility] PULMONARY [facility] [address] [facility]   6/13/2022  1:30 PM SCHEDULE, [facility] PULMONARY [facility] [address] [facility]   6/15/2022  1:30 PM SCHEDULE, [facility] PULMONARY [facility] [address] [facility]   6/17/2022  1:30 PM SCHEDULE, [facility] PULMONARY [facility] [address] [facility]   6/20/2022  1:30 PM SCHEDULE, [facility] PULMONARY [facility] CP [facility]   6/22/2022 12:30 PM Sravani Parry MD [facility]   6/22/2022  1:30 PM SCHEDULE, [facility] PULMONARY RM [facility] [address] [facility]   6/24/2022  1:30 PM SCHEDULE, [facility] PULMONARY RM [facility] [address] [facility]   6/27/2022  1:30 PM SCHEDULE, [facility] PULMONARY [facility] [address] [facility]   6/29/2022  1:30 PM SCHEDULE, [facility] PULMONARY [facility] [address] [facility]   7/1/2022  1:30 PM SCHEDULE, [facility] PULMONARY RM [facility] [address] [facility]   7/4/2022  1:30 PM SCHEDULE, [facility] PULMONARY RM [facility] [address] [facility]   7/6/2022  1:30 PM SCHEDULE, [facility] PULMONARY RM [facility] [address] [facility]   7/8/2022  1:30 PM SCHEDULE, [facility] PULMONARY RM [facility] [address] [facility]   7/11/2022  1:30 PM SCHEDULE, [facility] PULMONARY RM [facility] [address] [facility]   7/13/2022  1:30 PM SCHEDULE, [facility] PULMONARY RM [facility] [address] [facility]   7/15/2022  1:30 PM SCHEDULE, [facility] PULMONARY RM [facility] [address] [facility]   7/18/2022  1:30 PM SCHEDULE, [facility] PULMONARY RM [facility] [address] [facility]   7/20/2022  1:30 PM SCHEDULE, [facility] PULMONARY RM [facility] [address] [facility]   7/22/2022  1:30 PM SCHEDULE, [facility] PULMONARY RM [facility] [address] [facility]   7/25/2022  1:30 PM SCHEDULE, [facility] PULMONARY RM [facility] [address] [facility]   7/27/2022  1:30 PM SCHEDULE, [facility] PULMONARY RM [facility] [address] [facility]   7/29/2022  1:30 PM SCHEDULE, [facility] PULMONARY RM [facility] [address] [facility]   8/1/2022  1:30 PM SCHEDULE, [facility] PULMONARY RM [facility] [address] [facility]   8/3/2022  1:30 PM SCHEDULE, [facility] PULMONARY RM [facility] [address] [facility]   8/5/2022  1:30 PM SCHEDULE, [facility] PULMONARY RM [facility] [address] [facility]   8/8/2022  1:30 PM SCHEDULE, [facility] PULMONARY RM [facility] [address] [facility]   8/10/2022  1:30 PM SCHEDULE, [facility] PULMONARY RM [facility] [address] Skyla Gonzalez RN

## 2022-05-04 NOTE — TELEPHONE ENCOUNTER
Monitor placed by Anay El [address] [employer]  Length of monitor 14 days   Monitor ordered by Dr. Nick Kong  Serial number [id number]    Activation successful prior to pt leaving office?  Yes

## 2022-05-04 NOTE — PROGRESS NOTES
Physician Progress Note      PATIENT:               Dejuan Fernando  CSN #:                  [id number]  :                       1940  ADMIT DATE:       2022 2:40 PM  Tracy Clark DATE:        2022 11:58 AM  RESPONDING  PROVIDER #:        Sheryle Pepper MD          QUERY TEXT:    Pt admitted with new onset afib and has CHF documented. If possible, please   document in progress notes and discharge summary further specificity regarding   the type and acuity of CHF:    The medical record reflects the following:    Risk Factors: CAD s/p CABG, HTN, CKD    Clinical Indicators: CHF noted in pmhx. per EMR review, last ECHO : LV systolic function is normal with EF   estimated at 55%. No obvious segmental wall motion abnormalities. There is mild systolic and diastolic septal flattening c/w RV pressure and  volume overload. There is mild concentric left ventricular hypertrophy. Sigmoid-shaped basal septum. Grade III diastolic dysfunction with elevated filing pressure. Treatment: continue home meds cozaar, metoprolol, torsemide, supportive care   and monitoring    Thank you,  Vida Collins RN CDS  [email or web address]  Options provided:  -- Chronic Diastolic CHF/HFpEF  -- Other - I will add my own diagnosis  -- Disagree - Not applicable / Not valid  -- Disagree - Clinically unable to determine / Unknown  -- Refer to Clinical Documentation Reviewer    PROVIDER RESPONSE TEXT:    This patient has chronic diastolic CHF/HFpEF. Query created by:  Osman Mott on 2022 8:32 AM      Electronically signed by:  Sheryle Pepper MD 2022 4:40 PM

## 2022-05-05 NOTE — CARE COORDINATION
[place] [address] Transitions Follow Up Call    2022    Patient: Nico Ibarra  Patient : 1940   MRN: [id number]  Reason for Admission: A Fib RVR new onset, hypothyroidism, DM2 (A1C 6.7%), GERD, HLD -> home no services, new Eliquis ($45/mo)  Discharge Date: 22 RARS: Readmission Risk Score: 17.6 ( )       Spoke with: Nico Ibarra (patient)    Needs to be reviewed by the provider   Additional needs identified to be addressed with provider: Yes  see note         Method of communication with provider : chart routing to [employer] to forward to Dr Rigoberto Hatch for recommendation    Care Transition Nurse (CTN) contacted the patient by telephone to follow up after recent hospital admission. Addressed changes since last contact: weight gain continues   Discussed follow-up appointments. If no appointment was previously scheduled, appointment scheduling offered: no.   Non-[facility] follow up appointment(s): NA    Feeling good in daytime, but reports chest congestion and cough that made it difficult to sleep. Has not been sleeping well. Denies LE edema. Today he started Mucinex and mucous is coming up with some difficulty - it is grayish white and thick. Encouraged continuing the Mucinex. Today's weight same as yesterday - 170# (first \"stable\" day). Confirmed daily weight is same time/same clothes/before PO intake/after void. Limiting fluid and salt intake as instructed on prior outreach. Reviewed the weights this week - 8# weight gain now in 4 days:   - 162#   - 166# (4# weight gain reported to PCP by CTN because he had appointment that same day. No intervention was given at [address]  Tuesday - 168.5#  Wednesday - 170#  Thursday - 170#     Wearing a heart monitor as of yesterday for 2 weeks. Denies cp, palpitation. Still feeling weak. States SOB at rest is \"not bad, but shallow\". With exertion states he can barely walk 20-30' without huffing and puffing and leg muscles start aching. Taking torsemide 20mg two tablets in the morning. Does not feel like he urinates as much as he should after this dose. CTN will contact Dr Travis Peng office for recommendation. He states Dr Travis Peng is out of town, but CTN notes Dr Veronica Morillo or other provider is covering. Reviewed his cardiac rehab schedule and that Dr Veronica Morillo said he could return right away. States he doesn't feel he could participate in it at this time because of the dyspnea and achy in legs with activity. States he told them he wouldn't be back for a while. CTN contacted cardiology office and spoke with [facility]. She took message and instructed CTN to chart route to her so she could forward to Dr Veronica Morillo for recommendation. Plan for follow-up call in 1-2 days based on severity of symptoms and risk factors. Plan for next call: symptom management-SOB, chest congestion - Dr Veronica Morillo rec?     Roberto Taylor, RN  Care Transition Nurse  [phone] mobile      Follow Up  Future Appointments   Date Time Provider Getachew Cottrell   5/6/2022  1:30 PM SCHEDULE, [facility] [address] [facility]   5/9/2022  1:30 PM SCHEDULE, [facility] PULMON[facility] RM [facility] CP [address] [facility]   5/11/2022  1:30 PM SCHEDULE, [facility] PULMON[facility] CP [address] [facility]   5/13/2022  1:30 PM SCHEDULE, [facility]UL[facility] CP [address] [facility]   5/16/2022  1:30 PM SCHEDULE, [facility]UL[facility] CP [address] [facility]   5/17/2022 11:20 AM Elizabeth Villalobos MD AND PULM [facility]   5/18/2022  1:30 PM SCHEDULE, [facility] PULMONARY RM [facility] CP [address] [facility]   5/19/2022  8:00 AM AICHA Gallego - CNP [facility] [place] - DYD   5/20/2022  1:30 PM SCHEDULE, [facility] PULMONARY RM [facility] CP [address] [facility]   5/23/2022  1:30 PM SCHEDULE, [facility]UL[facility] CP [address] [facility]   5/25/2022  1:30 PM SCHEDULE, [facility]ULMONARY RM [facility] CP [address] [facility]   5/27/2022  1:30 PM SCHEDULE, [facility]ULMON[facility] RM [facility] CP [address] [facility]   5/30/2022  1:30 PM SCHEDULE, [facility] PULMON[facility] CP [address] [facility]   6/1/2022  1:30 PM SCHEDULE, [facility]ULMON[facility] CP [address] [facility]   6/1/2022  3:00 PM MD JONATHAN McgowanIA CAR [facility]   6/3/2022  1:30 PM SCHEDULE, [facility] RM [facility] CP [address] [facility]   6/6/2022  1:30 PM SCHEDULE, [facility] PULMON[facility] RM [facility] CP [address] [facility]   6/8/2022  1:30 PM SCHEDULE, [facility] PULMONARY RM [facility] CP [address] [facility]   6/10/2022  1:30 PM SCHEDULE, [facility] PULMONARY RM [facility] CP [address] [facility]   6/13/2022  1:30 PM SCHEDULE, [facility] PULMON[facility] RM [facility] CP [address] [facility]   6/15/2022  1:30 PM SCHEDULE, [facility] PULMONARY RM [facility] CP [address] [facility]   6/17/2022  1:30 PM SCHEDULE, [facility]UL[facility] RM [facility] CP [address] [facility]   6/20/2022  1:30 PM SCHEDULE, [facility] PULMONARY RM [facility] CP [facility]   6/22/2022 12:30 PM Shaniqua Patricio MD [facility] ROSHAN CASTILLO [facility]   6/22/2022  1:30 PM SCHEDULE, [facility] PULMONARY RM [facility] CP [address] [facility]   6/24/2022  1:30 PM SCHEDULE, [facility] PULMON[facility] RM [facility] CP [address] [facility]   6/27/2022  1:30 PM SCHEDULE, [facility] PULMONARY RM [facility] CP [address] [facility]   6/29/2022  1:30 PM SCHEDULE, [facility] PULMONARY RM [facility] CP [address] [facility]   7/1/2022  1:30 PM SCHEDULE, [facility] PULMONARY RM [facility] CP [address] [facility]   7/4/2022  1:30 PM SCHEDULE, [facility] PULMONARY RM [facility] CP [address] [facility]   7/6/2022  1:30 PM SCHEDULE, [facility] PULMONARY RM [facility] CP [address] [facility]   7/8/2022  1:30 PM SCHEDULE, [facility] PULMONARY RM [facility] CP [address] [facility]   7/11/2022  1:30 PM SCHEDULE, [facility] PULMONARY RM [facility] CP [address] [facility]   7/13/2022  1:30 PM SCHEDULE, [facility] PULMONARY RM [facility] CP [address] [facility]   7/15/2022  1:30 PM SCHEDULE, [facility] PULMONARY RM [facility] CP [address] [facility]   7/18/2022  1:30 PM SCHEDULE, [facility] PULMONARY RM [facility] CP [address] [facility]   7/20/2022  1:30 PM SCHEDULE, [facility] PULMON[facility] RM [facility] CP [address] [facility]   7/22/2022  1:30 PM SCHEDULE, [facility] PULMONARY RM [facility] CP [address] [facility]   7/25/2022  1:30 PM SCHEDULE, [facility] PULMONARY RM [facility] CP [address] [facility]   7/27/2022  1:30 PM SCHEDULE, [facility] PULMON[facility] RM [facility] CP [address] [facility] 7/29/2022  1:30 PM SCHEDULE, [facility] PULMONARY RM [facility] CP [facility]   8/1/2022  1:30 PM SCHEDULE, [facility] PULMONARY RM [facility] CP [facility]   8/3/2022  1:30 PM SCHEDULE, [facility] PULMONARY RM [facility] CP [facility]   8/5/2022  1:30 PM SCHEDULE, [facility] PULMONARY RM [facility] CP [facility]   8/8/2022  1:30 PM SCHEDULE, [facility] PULMONARY RM [facility] CP [address] [facility]   8/10/2022  1:30 PM SCHEDULE, [facility] PULMONARY RM [facility]  [facility]

## 2022-05-05 NOTE — TELEPHONE ENCOUNTER
Please see note below for Care Coord. Radha Bonds. SMM-OOT    Pt has had  8lb WT gain in 4 days. Pt seen PCP yesterday. Lalit Lipscomb did speak with PCP and was instructed to speak with Cardio. Please advise.

## 2022-05-06 NOTE — CARE COORDINATION
[place]  Transitions Follow Up Call    2022    Patient: Zenon Crow  Patient : 1940   MRN: [id number]  Reason for Admission: A Fib RVR new onset, hypothyroidism, DM2 (A1C 6.7%), GERD, HLD -> home no services, new Eliquis ($45/mo)  Discharge Date: 22 RARS: Readmission Risk Score: 17.6 ( )       Spoke with: Mr Smith January (patient)    Today's weight 169.5#     Had a rough night with trouble sleeping because of cough, congestion. States he does have some mucous coming up. CTN reviewed low sodium diet and 48-64 ounces/day fluid restrictions. He limits salt and fluid intake. CTN will contact cardiology office again today for recommendation.      Eufemia Tyler RN  Care Transition Nurse  [phone] mobile

## 2022-05-06 NOTE — CARE COORDINATION
CTN confirmed Mr Akshat Carr got the instructions from Dr Patricia Grant. He states he took the second 20mg of his torsemide at lunch and wife reports he already feels a little better. CTN will follow up early Monday. They are in agreement with plan.      Elizabeth Flores, RN  Care Transition Nurse  [phone] mobile    Future Appointments   Date Time Provider [facility]   5/17/2022 11:20 AM Magdalena Bedolla MD AND PULM MMA   5/19/2022  8:00 AM Vira Dobbs APRN - CNP [facility] [place] - DYD   6/1/2022  3:00 PM MD KAVYA Reyna   6/22/2022 12:30 PM Sravani Parry MD [facility] CLER CAR [facility]

## 2022-05-06 NOTE — TELEPHONE ENCOUNTER
Patient informed to increase Torsemide to 40 mg in am and 20 mg at noon  BMP in 1 week  Call us or Spencer Huston on Monday to see how he is doing.

## 2022-05-06 NOTE — TELEPHONE ENCOUNTER
Elizabeth Flores pts care coordinator called to see if we have reached out to pt. Saqib Lizarraga followed up with pt on 05/06/2022 in the morning and stated that pt is about the same as previous day. Pt stated that he had a rough night with coughing.

## 2022-05-06 NOTE — PROGRESS NOTES
Patients wife called and reports pt is unable to continue pulmonary rehab due to health issues. Pt discharged from pulmonary rehab as requested.

## 2022-05-06 NOTE — TELEPHONE ENCOUNTER
Call patient to increase Torsemide to 40 mg in am and additional 20 mg in pm  BMP in a week order placed in epic for blood work. Call back on Monday with update.

## 2022-05-09 NOTE — CARE COORDINATION
[facility]    2022    Patient: Malia Mendoza  Patient : 1940   MRN: [id number]  Reason for Admission: A Fib RVR new onset, hypothyroidism, DM2 (A1C 6.7%), GERD, HLD -> home no services, new Eliquis ($45/mo)  Discharge Date: 22 RARS: Readmission Risk Score: 17.6 ( )      CTN attempted outreach to Mr Deepa Tovar to see how his daily weights and symptoms are after cardiology increased his diuretic over the weekend. Message left including CTN and cardiology office numbers for return call to report back.      Follow Up  Future Appointments   Date Time Provider Getachew Cottrell   2022 11:20 AM Augusta Bauman MD AND LIZZIE MUIR   2022  8:00 AM AICHA Fuchs - CNP [facility] [place] - DYD   2022  3:00 PM MD KAVYA Whiting   2022 12:30 PM Ras Handy MD P CLER CAR ISADORA Lebron, RN

## 2022-05-10 NOTE — CARE COORDINATION
[facility]    5/10/2022    Patient: Arley Pascal  Patient : 1940   MRN: [id number]  Reason for Admission: A Fib RVR new onset, hypothyroidism, DM2 (A1C 6.7%), GERD, HLD, CHF -> home no services, new Eliquis ($45/mo)  Discharge Date: 22 RARS: Readmission Risk Score: 17.6 ( )      Spoke with: patient and spouse    States he had a bad day and that is why he missed the follow up call from me yesterday. He reports two episodes of feeling weak, dizzy, hot \"like something squeezing my chest\" yesterday - happened twice - lasted 5 min each time. States the episode comes on slow, he loses his ability breathe in, after focused breathing slowly resolves. He was sitting down, doing nothing when it happened. Wife monitored his VS when this happened. Wife added that he had the same episode on  and had BP 79/47 P59 - this was around 10AM. They did report on the halter monitor he is wearing. Wife states he was very SOB last night but doing well during this call. Weight lo# today  166# on   169# on     He continues to take the additional torsemide 20mg in early afternoon. He does note increased urination. His weights are dry weights for comparison. He limits fluid and restricts salt. CTN will send update to cardiology office for recommendation. CTN recommends ED evaluation if these symptoms/episodes happen again. He and spouse voice understanding. States he will get the BMP ordered on Thursday unless told to get sooner. CTN confirmed the order is in [place]. Phone outreach to [facility] and left message with [facility] office because [facility] office did not answer. CTN routing note to Dr Chaves Shown as well because Dr Morris Alvarez still out.       Follow Up  Future Appointments   Date Time Provider Getachew Cottrell   2022 11:20 AM Tawanna Stewart MD AND PULVEENA MUIR   2022  8:00 AM AICHA Lozano - CNP [facility] [place] - DYD   2022  3:00 PM Huey Valverde MD KAVYA MUIR   6/22/2022 12:30 PM Janneth Munoz MD P CLER ANNA Westfall RN

## 2022-05-10 NOTE — TELEPHONE ENCOUNTER
Opal Chinchilla called and said per RKG, pt needed to be seen ASAP. I scheduled an appt with JING for tomorrow.

## 2022-05-10 NOTE — TELEPHONE ENCOUNTER
Pt called and message was relayed and pt sts that if the episode happens again then he will go to the ER.

## 2022-05-10 NOTE — TELEPHONE ENCOUNTER
I called Edouard Rosas RN  Left a message re Mr Abran Steward for me to evaluate him on phone  Suggest ED visit or if problem not serious or urgent and can wait then schedule office visit.

## 2022-05-10 NOTE — TELEPHONE ENCOUNTER
Yudi Cunningham contact [address] stated that pts weight today is 171lbs. Pt is reporting squeezing in chest, weakness dizziness and hot flashes. Please contact and advise.

## 2022-05-10 NOTE — CARE COORDINATION
CTN update:     CTN received message from Dr Smiley Corrales that options for Mr Sanna Love include appt ASAP in office or ED if episodes happen again. Per chart patient was notified. CTN contacted Mr Sanna Love. He states he has not had more episodes since this morning's call. He is agreeable to call being connected to [facility] to schedule an appt.      Call connected to [facility] and they were able to schedule him to be seen tomorrow at the [facility] office by Dr Cindy Matt at 2:45PM.    Heather Recinos, RN  Care Transition Nurse  [phone] mobile    Future Appointments   Date Time Provider \[facility] [place] [facility]\""   5/11/2022  2:45 PM Alec Bentley MD P CLER CAR [address] [place] [address]   5/17/2022 11:20 AM Kary Curran MD AND PULM [address]   5/19/2022  8:00 AM AICHA Carson - CNP [facility] [place] - DYD   6/1/2022  3:00 PM MD KAVYA Em   6/22/2022 12:30 PM Christy Strauss MD P CLER CAR [facility]

## 2022-05-11 PROBLEM — I50.9 ACUTE CONGESTIVE HEART FAILURE (HCC): Status: ACTIVE | Noted: 2022-01-01

## 2022-05-11 PROBLEM — I50.33 ACUTE ON CHRONIC DIASTOLIC CHF (CONGESTIVE HEART FAILURE) (HCC): Status: ACTIVE | Noted: 2022-01-01

## 2022-05-11 NOTE — H&P
Hospital Medicine History & Physical      PCP: AICHA Easley CNP    Date of Admission: 5/11/2022    Date of Service: Pt seen/examined on 5/11/2022     Chief Complaint:  Shortness of breath. History Of Present Illness: The patient is a 80 y.o. male with DM, h/o CVA, bilateral carotid artery occlusions, hypertension, hyperlipidemia, GERD, a-fib, CHF, CAD S/p CABG, who presents to [facility] with c/o shortness of breath, BLE edema. He states he was admitted 4/29-4/30. He should have stayed, but wanted to leave and go home. Since then his weight has been increasing and he has had worsening shortness of breath. He also reports swelling to BLE's and abdomen. Seen in Dr. Mari Lucas office today. They have attempted to diurese the patient outpatient without success. Directly admitted to med-surg on telemetry. Cardiology consulted.       Past Medical History:        Diagnosis Date    Allergic rhinitis     Diabetes mellitus ([facility]     Family history of factor V deficiency     daughter and grand daughter    GERD (gastroesophageal reflux disease)     Hyponatremia 05/09/2019    admitted; secondary to metolazone    Ischemic cerebrovascular accident (CVA) of frontal lobe ([facility] [address] 04/2016    TIA in 1997    Pleural effusion, bilateral 7/13/2021    Routine health maintenance     refuses immunizations; declines PSA    Tinnitus     Vasovagal reaction     to needles    Vitamin D deficiency disease 12/15       Past Surgical History:        Procedure Laterality Date    COLONOSCOPY N/A 4/20/2022    COLONOSCOPY POLYPECTOMY SNARE/COLD BIOPSY performed by Frannie Kraft MD at [address] [place] [address] 4/20/2022    COLONOSCOPY CONTROL HEMORRHAGE performed by Frannie Kraft MD at [address] [place] [address]  08/14/2000    RX Tristar 2.5 x 13 mCX  RX Tristar 2.5 x 13 pCX    CORONARY ANGIOPLASTY WITH STENT PLACEMENT  10/19/2000    Tetra 2.75 x 18 CX  Tetra 3.0 x 13 CX    CORONARY ARTERY BYPASS GRAFT N/A 01/31/2018    OTHER SURGICAL HISTORY  01/23/2012    phaco emulsification cataract right eye    TONSILLECTOMY      TOOTH EXTRACTION      UPPER GASTROINTESTINAL ENDOSCOPY N/A 4/20/2022    EGD BIOPSY performed by Lore Pina MD at [address] [facility]       Medications Prior to Admission:    Prior to Admission medications    Medication Sig Start Date End Date Taking?  Authorizing Provider   torsemide (DEMADEX) 20 MG tablet Take 40 mg in morning and 20 mg at noon 5/6/22   Marjorie Burr MD   apixaban (ELIQUIS) 2.5 MG TABS tablet Take 1 tablet by mouth 2 times daily 4/30/22   Elizabet Ramos MD   metoprolol tartrate (LOPRESSOR) 50 MG tablet TAKE 1 TABLET BY MOUTH  TWICE DAILY 4/8/22   Cassie Nguyen MD   pravastatin (PRAVACHOL) 40 MG tablet TAKE 1 TABLET BY MOUTH  DAILY AT NIGHT 4/1/22   AICHA Pandey CNP   pantoprazole (PROTONIX) 40 MG tablet TAKE 1 BY MOUTH ONCE DAILY IN THE MORNING 30 MINUTES TO AN HOUR BEFORE BREAKFAST 3/10/22   Historical Provider, MD   ipratropium (ATROVENT) 0.03 % nasal spray 2 sprays by Each Nostril route 4 times daily 3/15/22   Narda Melton DO   losartan (COZAAR) 100 MG tablet TAKE 1 TABLET BY MOUTH  DAILY 2/21/22   Mark Clemens MD   ciclopirox [facility]) 8 % solution  1/4/22   Historical Provider, MD   Ascorbic Acid (VITAMIN C) 250 MG tablet Take 250 mg by mouth daily    Historical Provider, MD   metFORMIN (GLUCOPHAGE) 1000 MG tablet TAKE 1 TABLET BY MOUTH  TWICE DAILY WITH MEALS 1/6/22   AICHA Pandey CNP   albuterol sulfate HFA (PROAIR HFA) 108 (90 Base) MCG/ACT inhaler Inhale 2 puffs into the lungs every 6 hours as needed for Wheezing 7/13/21   Pio Millan MD   levothyroxine (SYNTHROID) 88 MCG tablet TAKE 1 TABLET BY MOUTH  DAILY 6/21/21   AICHA Pandey CNP   Omega-3 Fatty Acids (FISH OIL PO) Take by mouth    Historical Provider, MD   Multiple Vitamin (MULTI-VITAMIN DAILY PO) Take by mouth    Historical Provider, MD   Coenzyme Q10 (COQ10 PO) Take by mouth    Historical Provider, MD   guaiFENesin 400 MG tablet Take 400 mg by mouth daily     Historical Provider, MD   magnesium oxide (MAG-OX) 400 MG tablet Take 400 mg by mouth daily    Historical Provider, MD   ferrous sulfate 325 (65 Fe) MG tablet Take 325 mg by mouth daily (with breakfast)     Historical Provider, MD   aspirin 81 MG EC tablet Take 1 tablet by mouth daily 3/5/18   Vianey Saldana MD   Cholecalciferol (VITAMIN D) 2000 UNITS CAPS capsule Take  by mouth daily. Historical Provider, MD       Allergies:  Metolazone, Lipitor, and Simvastatin    Social History:    TOBACCO:   reports that he quit smoking about 47 years ago. His smoking use included cigarettes. He started smoking about 65 years ago. He has a 40.00 pack-year smoking history. He has never used smokeless tobacco.  ETOH:   reports no history of alcohol use. Family History:   Positive as follows:        Problem Relation Age of Onset    Emphysema Mother     Stroke Father     Other Daughter         Factor V deficiency    Other Grandchild         Factor V deficiency       REVIEW OF SYSTEMS:       Constitutional: Negative for fever   Respiratory: Negative for cough + dyspnea, orthopnea  Cardiovascular: Negative for chest pain + BLE edema  Gastrointestinal: Negative for vomiting, diarrhea   Genitourinary: Negative for hematuria   Musculoskeletal: Negative for arthralgias   Skin: Negative for rash   Neurological: Negative for syncope   Psychiatric/Behavorial: Negative for anxiety    PHYSICAL EXAM:    There were no vitals taken for this visit. Gen: No distress. Alert. Eyes: PERRL. No sclera icterus. No conjunctival injection. ENT: No discharge. Pharynx clear. Neck: Trachea midline. Resp: No accessory muscle use. + RLL, LLL crackles. No wheezes. No rhonchi. CV: Irregular rate. Irregular rhythm. No murmur. No rub. +3 BLE edema. GI: Non-tender. Non-distended. Normal bowel sounds. No hernia. Skin: Warm and dry. No nodule on exposed extremities. No rash on exposed extremities. M/S: No cyanosis. No joint deformity. No clubbing. Neuro: Awake. Grossly nonfocal    Psych: Oriented x 3. No anxiety or agitation. CBC:   Recent Labs     05/11/22  1817   WBC 7.8   HGB 10.5*   HCT 31.6*   MCV 86.7        BMP: No results for input(s): NA, K, CL, CO2, PHOS, BUN, CREATININE, CA in the last 72 hours. LIVER PROFILE: No results for input(s): AST, ALT, LIPASE, BILIDIR, BILITOT, ALKPHOS in the last 72 hours. Invalid input(s): AMYLASE,  ALB  PT/INR: No results for input(s): PROTIME, INR in the last 72 hours. APTT: No results for input(s): APTT in the last 72 hours. UA:No results for input(s): NITRITE, COLORU, PHUR, LABCAST, WBCUA, RBCUA, MUCUS, TRICHOMONAS, YEAST, BACTERIA, CLARITYU, SPECGRAV, LEUKOCYTESUR, UROBILINOGEN, BILIRUBINUR, BLOODU, GLUCOSEU, AMORPHOUS in the last 72 hours. Invalid input(s): KETONESU         CARDIAC ENZYMES  No results for input(s): CKTOTAL, CKMB, CKMBINDEX, TROPONINI in the last 72 hours. U/A:    Lab Results   Component Value Date    NITRITE n 02/15/2013    COLORU Yellow 03/08/2022    CLARITYU Clear 03/08/2022    SPECGRAV 1.010 03/08/2022    LEUKOCYTESUR Negative 03/08/2022    BLOODU Negative 03/08/2022    GLUCOSEU Negative 03/08/2022       ABG    Lab Results   Component Value Date    YBK9TZA 20.6 01/31/2018    BEART -2 01/31/2018    X9WMVTVC 100 01/31/2018    PHART 7.555 01/31/2018    MYS6JED 23.3 01/31/2018    PO2ART 136.2 01/31/2018    IMM8KXT 21 01/31/2018       CULTURES  COVID: pending     EKG:  I have reviewed the EKG with the following interpretation:   Ordered    RADIOLOGY  CXR ordered    Echo 2/16/22   Summary   LV systolic function is normal with EF estimated at 55%. No obvious segmental wall motion abnormalities. There is mild systolic and diastolic septal flattening c/w RV pressure and   volume overload.    There is mild concentric left ventricular hypertrophy. Sigmoid-shaped basal septum. Grade III diastolic dysfunction with elevated filing pressure. Mild biatrial enlargement. Mild mitral, aortic, and pulmonic regurgitation. Moderate tricuspid regurgitation. Systolic pulmonary artery pressure (SPAP) estimated at 59 mmHg (RAP 8 mmHg),   c/w moderate pulm HTN. There is a pleural effusion. Active Problems:    Acute on chronic diastolic CHF (congestive heart failure) (HCC)    Acute congestive heart failure (HCC)  Resolved Problems:    * No resolved hospital problems. *        ASSESSMENT/PLAN:  Acute on chronic Diastolic CHF, HFpEF  Pulmonary hypertension, Moderate TR  - admitted for further management/care. Cardiology consulted.   -daily weights; I&O's  -Low sodium diet; fluid restriction  -IV Lasix 40 mg BID    A-fib  -EKG  -continue   -AC: Eliquis    H/o CVA  H/o Bilateral carotid occlusion  -continue aspirin, Statin, Eliquis    CAD  -S/p CABG  -continue aspirin, Statin, BB    DM type 2  -monitor glucose. SSI Coverage    Hypertension  -BP stable. Continue losartan, lopressor    Hyperlipidemia   -on Pravastatin    GERD  -On PPI    Iron deficiency anemia  -continue ferrous sulfate    Hypothyroidism  -home dose of synthroid 88 mcg     DVT Prophylaxis: Eliquis  Diet: ADULT DIET; Regular; 4 carb choices (60 gm/meal);  Low Sodium (2 gm); 1800 ml  Code Status: Full Code    Magen Leung [facility]  5/11/2022

## 2022-05-11 NOTE — PROGRESS NOTES
Patient admitted to room 226 from dirct admit. Patient oriented to room, call light, bed rails, phone, lights and bathroom. Patient instructed about the schedule of the day including: vital sign frequency, lab draws, possible tests, frequency of MD and staff rounds, daily weights, I &O's and prescribed diet. Bed alarm Telemetry box in place, patient aware of placement and reason. Bed locked, in lowest position, side rails up 2/4, call light within reach. Recliner Assessment:     Patient is not able to demonstrated the ability to move from a reclining position to an upright position within the recliner. however patient is alert, oriented and able to provide informed consent       4 Eyes Skin Assessment     The patient is being assess for   Admission    I agree that 2 RN's have performed a thorough Head to Toe Skin Assessment on the patient. ALL assessment sites listed below have been assessed. Areas assessed for pressure by both nurses:   [x]   Head, Face, and Ears   [x]   Shoulders, Back, and Chest, Abdomen  [x]   Arms, Elbows, and Hands   [x]   Coccyx, Sacrum, and Ischium  [x]   Legs, Feet, and Heels        Skin Assessed Under all Medical Devices by both nurses:  NA              All Mepilex Borders were peeled back and area peeked at by both nurses:  No: na  Please list where Mepilex Borders are located:  NA             **SHARE this note so that the co-signing nurse is able to place an eSignature**    Co-signer eSignature: Electronically signed by Pham Marques RN on 5/11/22 at 7:39 PM EDT    Does the Patient have Skin Breakdown related to pressure?   No              [place] Prevention initiated:  NA   Wound Care Orders initiated:  NA      [facility] nurse consulted for Pressure Injury (Stage 3,4, Unstageable, DTI, NWPT, Complex wounds)and New or Established Ostomies:  NA      Primary Nurse eSignature: Electronically signed by Luci Florian RN on 5/11/22 at 7:32 PM EDT

## 2022-05-11 NOTE — PATIENT INSTRUCTIONS
PLAN:  1. Monitor salt intake, read labels. Goal is to have 2,000 mg or less daily. Try to eat frozen or fresh fruits or vegetables. Grilled/baked lean meats. Fish and chicken. 2. Monitor daily weights. 3. Take medications as directed. 4. ER/hospital today       Follow up with Dr. Grover Burk in June as scheduled.

## 2022-05-11 NOTE — PROGRESS NOTES
Direct admit to med-surg, tele from Dr. Gilford Rivet office for CHF exacerbation. Failed outpatient management.     Mario Ramires FNP-C  5/11/2022

## 2022-05-11 NOTE — PROGRESS NOTES
CARDIOLOGY FOLLOW UP        Patient Name: Chitra Hyde  Primary Care physician: AICHA Gu CNP    Reason for Referral/Chief Complaint: Chitra Hyde is a 80 y.o. patient who is referred to cardiology clinic today for evaluation and treatment of chest pain. History of Present Illness:   Mr. Malu Zaldivar is a 80 y.o. 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 Herson Bhagat, hypertension, diabetes mellitus, carotid disease, hyperlipidemia with history of intolerance to multiple lines of statins with the exception of Pravachol, and hypothyroidism, who presents today for interim follow-up for worsening palpitations dyspnea with exertion and concern for fluid overload. Patient was admitted to the hospital 4/2022 and treated for volume overload, atrial fibrillation versus atrial tachycardia with rapid rates, elevated troponin. of AFIB, embolic R. CVA in 1/71, CAD s/p 4 stents prior (most recent 10/00), s/p 4V CABG 1/18 with Dr. Herson Bhagat, HTN, DM, pleural effusion s/p left thoracentesis 9/20, mild carotid artery disease, HLD, and hypothyroidism. Possible COVID in 2019. Hx of myalgias with statins but tolerates pravachol with coenzyme Q10. 12/3/21 OV with Dr. Eladio Gil . Then on 04/2022 went to hospital with complaints of right leg cramp while doing his pulmonary rehab admitted to hospital  with afib with new onset VS atrial tach and elevated troponin. Patient was seen by my partner Dr. Paulette Aguiar. Patient admits that he likely forced his way on the hospital too soon. He was treated with diuretic and released on torsemide 40 mg oral daily. Treated with Eliquis and metoprolol 50 mg twice daily for atrial fibrillation. He was seen in transitional care clinic 5/2 and noted to have gained 4 pounds despite diuretic.   He was increased to torsemide 40 mg every morning, 20 mg every afternoon by Dr. Gilmar Montalvo in the interim. Despite this weight is gone from 158 pounds at last discharge to 171 pounds today. Today, patient is here with spouse. Presents in wheelchair. Says has not sleep well for a couple of weeks. Progressive shortness of breath with exertion. Having significant difficulty breathing while laying flat/positive orthopnea. Positive PND. Endorses central chest squeezing feeling while lying flat. He does note increased heart racing and diaphoresis with exertion over the past week or so. His weight by home scale today was 169. 04/30/22 weight was 158. Notes he has been taking torsemide 20 mg 2 tabs in am and afternoon has increased dosage. Does not feel that his urine output really increased. Notes abdominal bloating at times. Has monitored oxygen level at home sat has not dropped to the upper 80's. He admits to dietary indiscretions including eating armstrong, sausage, big boy as well as chicken noodle soup and many frozen pizzas over this past week. . Has on heart monitor today. The patient is compliant with medications. Cost of medications is affordable. No endorsed side effects. 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 Surgical History:   has a past surgical history that includes Tooth Extraction; Coronary angioplasty with stent (08/14/2000); other surgical history (01/23/2012); Coronary angioplasty with stent (10/19/2000); Tonsillectomy; Coronary artery bypass graft (N/A, 01/31/2018); Upper gastrointestinal endoscopy (N/A, 4/20/2022);  Colonoscopy (N/A, 4/20/2022); and Colonoscopy (N/A, 4/20/2022). Social History:   reports that he quit smoking about 47 years ago. His smoking use included cigarettes. He started smoking about 65 years ago. He has a 40.00 pack-year smoking history. He has never used smokeless tobacco. He reports that he does not drink alcohol and does not use drugs. Family History:  family history includes Emphysema in his mother; Other in his daughter and grandchild; Stroke in his father. Home Medications:  Were reviewed and are listed in nursing record and/or below  Prior to Admission medications    Medication Sig Start Date End Date Taking?  Authorizing Provider   torsemide (DEMADEX) 20 MG tablet Take 40 mg in morning and 20 mg at noon 5/6/22  Yes Jesusita Santana MD   apixaban (ELIQUIS) 2.5 MG TABS tablet Take 1 tablet by mouth 2 times daily 4/30/22  Yes Giuseppe Gallego MD   metoprolol tartrate (LOPRESSOR) 50 MG tablet TAKE 1 TABLET BY MOUTH  TWICE DAILY 4/8/22  Yes Mayco Sanchez MD   pravastatin (PRAVACHOL) 40 MG tablet TAKE 1 TABLET BY MOUTH  DAILY AT NIGHT 4/1/22  Yes AICHA Gu CNP   pantoprazole (PROTONIX) 40 MG tablet TAKE 1 BY MOUTH ONCE DAILY IN THE MORNING 30 MINUTES TO AN HOUR BEFORE BREAKFAST 3/10/22  Yes Historical Provider, MD   ipratropium (ATROVENT) 0.03 % nasal spray 2 sprays by Each Nostril route 4 times daily 3/15/22  Yes Duncan Gerard,    losartan (COZAAR) 100 MG tablet TAKE 1 TABLET BY MOUTH  DAILY 2/21/22  Yes Blanco Garcia MD   ciclopirox [facility]) 8 % solution  1/4/22  Yes Historical Provider, MD   Ascorbic Acid (VITAMIN C) 250 MG tablet Take 250 mg by mouth daily   Yes Historical Provider, MD   metFORMIN (GLUCOPHAGE) 1000 MG tablet TAKE 1 TABLET BY MOUTH  TWICE DAILY WITH MEALS 1/6/22  Yes AICHA Gu CNP   albuterol sulfate HFA (PROAIR HFA) 108 (90 Base) MCG/ACT inhaler Inhale 2 puffs into the lungs every 6 hours as needed for Wheezing 7/13/21  Yes Jing Elliott MD   levothyroxine (SYNTHROID) 88 MCG tablet TAKE 1 TABLET BY MOUTH  DAILY 6/21/21  Yes Alyssa Roberts APRN - CNP   Omega-3 Fatty Acids (FISH OIL PO) Take by mouth   Yes Historical Provider, MD   Multiple Vitamin (MULTI-VITAMIN DAILY PO) Take by mouth   Yes Historical Provider, MD   Coenzyme Q10 (COQ10 PO) Take by mouth   Yes Historical Provider, MD   guaiFENesin 400 MG tablet Take 400 mg by mouth daily    Yes Historical Provider, MD   magnesium oxide (MAG-OX) 400 MG tablet Take 400 mg by mouth daily   Yes Historical Provider, MD   ferrous sulfate 325 (65 Fe) MG tablet Take 325 mg by mouth daily (with breakfast)    Yes Historical Provider, MD   aspirin 81 MG EC tablet Take 1 tablet by mouth daily 3/5/18  Yes Geronimo Asencio MD   Cholecalciferol (VITAMIN D) 2000 UNITS CAPS capsule Take  by mouth daily. Yes Historical Provider, MD        CURRENT Medications:  No current facility-administered medications for this visit. Allergies:  Metolazone, Lipitor, and Simvastatin     Review of Systems:   A 14 point review of symptoms completed. Pertinent positives identified in the HPI, all other review of symptoms negative as below. Objective:     Vitals:    05/11/22 1451   BP: 110/60   Pulse: 60   Temp: 97.9 °F (36.6 °C)   SpO2: 95%    Weight: 171 lb 12.8 oz (77.9 kg)       PHYSICAL EXAM:    General:   Elderly man, mildly tachypneic, no overt distress   Head:  Normocephalic, atraumatic   Eyes:  Conjunctiva/corneas clear, anicteric sclerae    Nose: Nares normal, no drainage or sinus tenderness   Throat: No abnormalities of the lips, oral mucosa or tongue. Neck: Trachea midline. Neck supple with no lymphadenopathy, thyroid not enlarged, symmetric, no tenderness/mass/nodules, elevated jugular venous pressure in the upright position.   Positive HPJ   Lungs:   Crackles bilaterally, no wheezes, no rales, no respiratory distress   Chest Wall:  No deformity or tenderness to palpation   Heart:   Irregular, variable S1, normal S2, no murmur, no rub, no S3/S4, PMI non-palpable. Abdomen:    Firm, non-tender, with normoactive bowel sounds. No masses, no hepatosplenomegaly   Extremities: No cyanosis, clubbing. 1+ pitting edema bilateral lower extremity. Vascular: 2+ radial, 2+ dorsalis pedis and posterior tibial pulses bilaterally. Brisk carotid upstrokes without carotid bruit. Skin: Skin color, texture, turgor are normal with no rashes or ulceration. Pysch: Euthymic mood, appropriate affect   Neurologic: Oriented to person, place and time. No slurred speech or facial asymmetry. No motor or sensory deficits on gross examination.        Wt Readings from Last 3 Encounters:   22 171 lb 12.8 oz (77.9 kg)   22 168 lb (76.2 kg)   22 158 lb 1 oz (71.7 kg)     Labs:   CBC:   Lab Results   Component Value Date    WBC 7.7 2022    RBC 3.96 2022    HGB 11.5 2022    HCT 34.6 2022    MCV 87.3 2022    RDW 16.7 2022     2022     CMP:  Lab Results   Component Value Date     2022    K 4.6 2022    CL 93 2022    CO2 26 2022    BUN 39 2022    CREATININE 1.6 2022    CREATININE 1.1 2012    GFRAA 50 2022    GFRAA >60 2012    AGRATIO 1.4 2022    LABGLOM 42 2022    GLUCOSE 110 2022    PROT 7.6 2022    PROT 6.8 2013    CALCIUM 9.7 2022    BILITOT 0.3 2022    ALKPHOS 154 2022    AST 18 2022    ALT 19 2022     PT/INR:  No results found for: PTINR  HgBA1c:  Lab Results   Component Value Date    LABA1C 6.7 2022     Lab Results   Component Value Date    TROPONINI 0.02 (H) 2022         Cardiac Data:     EK22 atrial tachycardia with variable AV block  Left axis deviation  Left ventricular hypertrophy with QRS widening  Cannot rule out Septal infarct (cited on or before 2022)  Abnormal ECG  When compared with ECG of 2022 14:43,  No significant change was found    CT Chest Pulmonary: 04/29/22 Impression 1. No evidence of acute pulmonary embolism or acute aortic disease. 2. Redemonstration of lymphadenopathy which is slightly more pronounced compared to the previous evaluation in the mediastinum. 3. Redemonstration of bilateral pleural effusions partly loculated which are mild to moderate as well as lower lobe atelectatic and/or consolidative changes. These were previously seen as well. 4. Cholelithiasis but no acute cholecystitis. Echo: 02/16/2022  Summary   LV systolic function is normal with EF estimated at 55%. No obvious segmental wall motion abnormalities. There is mild systolic and diastolic septal flattening c/w RV pressure and   volume overload. There is mild concentric left ventricular hypertrophy. Sigmoid-shaped basal septum. Grade III diastolic dysfunction with elevated filing pressure. Mild biatrial enlargement. Mild mitral, aortic, and pulmonic regurgitation. Moderate tricuspid regurgitation. Systolic pulmonary artery pressure (SPAP) estimated at 59 mmHg (RAP 8 mmHg),   c/w moderate pulm HTN. There is a pleural effusion. Cardiac Cath: 12/29/2020  Findings:  1. Hemodynamics:  A. Right heart catheterization                   1. RA: 7 mmHg                   2. RV: 38/8 mmHg                   3. PA: 35/18 (24) mmHg                   4. PCWP: 13 mmHg                   5. Saturations: RA 64%, PA 63%, AO 93%                   6. Genoveva CO: 5.27 L/min                   7. Genoveva CI: 2.68 L/min*m2                   8. Genoveva SVR: 1,032 dyne*sec/cm5                   9. Genoveva PVR: 76 dyne*sec/cm5              B. Opening arterial pressure: 127/55 (74) mmHg              C. LVEDP: 13 mmHg     2. Coronary anatomy:  A. Left main artery: The left main artery bifurcates into the left anterior descending artery and left circumflex artery. The left main artery has a mid-distal 20-30% stenosis.   B. Left anterior descending artery: The LAD gives rise to one large diagonal artery and then is occluded in the mid-vessel. The diagonal artery has a 90% proximal stenosis. C. Left circumflex artery: Non-dominant vessel that gives rise to 2 obtuse marginal arteries. The LCx has a 95% proximal stenosis and 90% mid-vessel stenosis. The OM1 is a small vessel with a high origin and has an 80% proximal stenosis. The OM2 is a large vessel with a 100% proximal in-stent restenosis. D. Right coronary artery: Dominant vessel. The RCA has a 90% proximal stenosis and 100% mid-vessel stenosis. 3. Bypass graft anatomy:  A. Left subclavian artery: Patent with mild disease. B. LIMA to LAD: Patent. There is a 40-50% stenosis in the apical LAD. C. SVG to diagonal with sequential to OM2: Patent. Large size mismatch between vein graft and native diagonal artery. D. SVG to distal RCA: Patent. 4. Right femoral angiography  A. The right common femoral artery is patent and bifurcates into the right superficial femoral artery and right profunda femoris artery over the middle-third of the femoral head. The sheath enters the right common femoral artery directly above the bifurcation. Impression:  1. Severe native 3-vessel coronary artery disease. 2. Patent LIMA to LAD. 3. Patent SVG to diagonal with sequential to OM2.  4. Patent SVG to distal RCA. 5. Normal left-sided cardiac filling pressures. 6. Mildly elevated right-sided cardiac filling pressures. 7. Normal pulmonary artery pressure. 8. Normal Genoveva cardiac output and index. Stress Test: 08/25/2020  Summary There is normal isotope uptake at stress and rest. There is no evidence of  myocardial ischemia or scar. Hyperdynamic LV systolic function with SK>66% with uniform wall motion. Low risk study. ECHO: 08/25/2020  Summary   Left ventricular systolic function is low normal with a visually estimated   ejection fraction of 50-55%. There is mild hypokinesis of the basal inferior   and inferoseptal walls.    Grade III diastolic dysfunction with elevated LV pressure. The left atrium is mildly dilated. Mild posterior mitral annular calcification. Mild mitral, tricuspid and aortic regurgitation. Systolic pulmonary artery pressure (SPAP) is normal and estimated at 35 mmHg   (right atrial pressure 3 mmHg). There is a large left pleural effusion.     Carotid artery occlusion: Carotid doppler 9/19/19 moderate plaque <50% bilateral carotid arteries (no change from 6/2107 study)         Impression and Plan:     Acute on chronic HFpEF, Restrictive filling, NYHA IV   - failing OP diuresis with increasing torsemide   -Will admit directly to 2W with plan for IV diuresis with 40 mg IV Lasix BID to start  -Strict I/O with external/internal catheter placement to accomplish this on case by case basis, daily standing weights, low salt/caridac diet, and CHF education recommended and discussed with the patient to guide our therapy in the inpatient setting.    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  Diabetes mellitus  Carotid disease  Hyperlipidemia with history of intolerance to multiple lines of statins with the exception of Pravachol  Hypothyroidism      Patient Active Problem List   Diagnosis    Bilateral carotid artery occlusion    Essential hypertension    Hypothyroidism, acquired, autoimmune    Vitamin D deficiency disease    Type 2 diabetes mellitus without complication, without long-term current use of insulin ([address]    Coronary artery disease    Pure hypercholesterolemia    History of CVA (cerebrovascular accident)    Chronic diastolic congestive heart failure (HCC)    S/P CABG x 4    Hyponatremia    Allergic sinusitis    History of tobacco abuse  SOB (shortness of breath)    Chronic eczematous otitis externa of both ears    Restrictive lung disease    Pulmonary infiltrates    Mediastinal adenopathy    Simple chronic bronchitis (HCC)    Allergic rhinitis    Acute on chronic heart failure (HCC)    Iron deficiency anemia    Atrial fibrillation ([address]         Follow up with Dr. Sugar Barbosa in June as scheduled. This note is scribed in the presence of Aren Dugan by Bismark Santamaria RN    The scribes documentation has been prepared under my direction and personally reviewed by me in its entirety. I confirm that the note above accurately reflects all work, treatment, procedures, and medical decision making performed by me. Rakan Palomares MD, personally performed the services described in this documentation as scribed by  Bismark Santamaria RN in my presence, and it is both accurate and complete to the best of our ability. I will address the patient's cardiac risk factors and adjusted pharmacologic treatment as needed. In addition, I have reinforced the need for patient directed risk factor modification. All questions and concerns were addressed to the patient/family. Alternatives to my treatment were discussed. Thank you for allowing us to participate in the care of Gabriela Frost. Please call me with any questions [phone].     Aren Dugan MD, [facility] [place]  Cardiovascular Disease  [facility]  [phone] [facility]  [phone] [address] [place] [address]  5/11/2022 2:58 PM

## 2022-05-12 NOTE — PROGRESS NOTES
Inpatient Occupational Therapy  Evaluation and Treatment    Unit: [address] [place]  Date:  5/12/2022  Patient Name:    Armando Peña  Admitting diagnosis:  Acute on chronic diastolic CHF (congestive heart failure) ([facility] [I50.33]  Acute congestive heart failure, unspecified heart failure type [facility]) [I50.9]  Admit Date:  5/11/2022  Precautions/Restrictions/WB Status/ Lines/ Wounds/ Oxygen: Fall risk, Bed/chair alarm and Telemetry    Treatment Time:  8351-3606   Treatment Number: 1   Timed code treatment minutes 35 minutes   Total Treatment minutes:   45  minutes    Patient Goals for Therapy:  \" go home  \"      Discharge Recommendations: Home PRN assist   DME needs for discharge: foam wedge or adjustable bed to elevate UB        Therapy recommendations for staff:   Stand by assist with use of No AD for all ambulation to/from chair  to/from bathroom    History of Present Illness: Per H&P on 5-11 [place]    80 y.o. male with DM, h/o CVA, bilateral carotid artery occlusions, hypertension, hyperlipidemia, GERD, a-fib, CHF, CAD S/p CABG, who presents to [facility] with c/o shortness of breath, BLE edema. He states he was admitted 4/29-4/30. He should have stayed, but wanted to leave and go home. Since then his weight has been increasing and he has had worsening shortness of breath. He also reports swelling to BLE's and abdomen. Seen in Dr. Amaya  office today. They have attempted to diurese the patient outpatient without success. Directly admitted to med-surg on telemetry. Cardiology consulted. Home Health S4 Level Recommendation:  NA  AM-PAC Score: 22  Preadmission Environment    Pt.  Lives with family (wife and step son )   Home environment:  two story home- can stay on the first floor    Steps to enter first floor: 4 steps with railings  steps to enter  Steps to second floor: Full flight of 12-13  Bathroom: tub/shower unit, grab bars, standard height commode and shower seat   Equipment owned: rollator, srinivasan (MAnual), shower chair/bench, SPC and reacher    Preadmission Status:  Pt. Able to drive: Yes  Pt Fully independent with ADLs: Yes  Pt. Required assistance from family for: Cleaning, Cooking and Laundry   Pt. independent for transfers and gait and walked with Hua Sandhu  History of falls No    Pain  No      Cognition    A&O x4   Able to follow 2 step commands    Subjective  Patient lying supine in bed with no family present. Pt agreeable to this OT eval & tx. Upper Extremity ROM:    WFL    Upper Extremity Strength:      WFL      Upper Extremity Sensation    WFL    Upper Extremity Proprioception:  WFL    Coordination and Tone  WFL    Balance  Functional Sitting Balance:  WFL  Functional Standing Balance:WFL    Bed mobility:    Supine to sit: Independent  Sit to supine:   Not Tested  Rolling:    Not Tested  Scooting in sitting:  Independent  Scooting to head of bed:   Not Tested    Bridging:   Not Tested    Transfers:    Sit to stand:  Supervision- IND  Stand to sit:  Supervision- IND   Bed to chair:   Supervision  Standard toilet: Not Tested  Bed to [facility]:  Not Tested  Pt stood at the commode to urinate for one to two mins . Pts limited in ambulation distance due to the pt reports he gets cramping in LEs if he ambulates too long a distance. Dressing:      UE:   Not Tested  LE:    Supervision- IND donning pants     Bathing:    UE:  Not Tested  LE:  Not Tested    Eating:   Not Tested    Toileting:  Supervision-IND    Activity Tolerance   Pt completed therapy session with decreased endurance   On room air   Spo2 96%, HR74   Sp02 after ambulation 92% HR 89   Positioning Needs:   Pt up in chair, alarm set, positioned in proper neutral alignment and pressure relief provided. Exercise / Activities Initiated:   N/A    Patient/Family Education:   Role of OT  Energy conservation techniques    Assessment of Deficits: Pt seen for Occupational therapy evaluation in acute care setting.   Pt demonstrated decreased Activity tolerance and Transfers. Pt functioning below baseline and will likely benefit from skilled occupational therapy services to maximize safety and independence. Goal(s) : To be met in 3 Visits:  1). Bed to toilet/BSC: Independent    To be met in 5 Visits:  1). Supine to/from Sit:  Independent  2). Upper Body Bathing:   Independent  3). Lower Body Bathing:   Independent  4). Upper Body Dressing:  Independent  5). Lower Body Dressing:  Independent  6). Pt to demonstrate UE exs x 15 reps with minimal cues  7) instruct in energy conservation     Rehabilitation Potential:  Good for goals listed above. Strengths for achieving goals include: Pt cooperative  Barriers to achieving goals include:  Complexity of condition     Plan: To be seen for 1-2 visits while in acute care setting for therapeutic exercises, bed mobility, transfers, dressing, bathing, family/patient education, ADL/IADL retraining, energy conservation training.      Lalo Felipe OTR/L [id number]          If patient discharges from this facility prior to next visit, this note will serve as the Discharge Summary

## 2022-05-12 NOTE — FLOWSHEET NOTE
05/12/22 0845   Vital Signs   Temp 97 °F (36.1 °C)   Temp Source Oral   Pulse 84   Heart Rate Source Monitor   Resp 16   BP (!) 115/57   BP Location Left upper arm   MAP (Calculated) 76.33   Patient Position Semi fowlers   Level of Consciousness Alert (0)   MEWS Score 1   Pain Assessment   Pain Assessment None - Denies Pain   Opioid-Induced Sedation   POSS Score 1   Oxygen Therapy   SpO2 93 %   O2 Device None (Room air)   AM assessment completed, see flow sheet. Pt is alert and oriented. Vital signs are WNL. Respirations are even & easy. No complaints voiced. Pt. Is having a CHF exacerbation receiving lasix. Pt denies needs at this time. SR up x 2, and bed in low position. Call light is within reach. Bedside Mobility Assessment Tool (BMAT):     Assessment Level 1- Sit and Shake    1. From a semi-reclined position, ask patient to sit up and rotate to a seated position at the side of the bed. Can use the bedrail. 2. Ask patient to reach out and grab your hand and shake making sure patient reaches across his/her midline. Pass- Patient is able to come to a seated position, maintain core strength. Maintains seated balance while reaching across midline. Move on to Assessment Level 2. Assessment Level 2- Stretch and Point   1. With patient in seated position at the side of the bed, have patient place both feet on the floor (or stool) with knees no higher than hips. 2. Ask patient to stretch one leg and straighten the knee, then bend the ankle/flex and point the toes. If appropriate, repeat with the other leg. Pass- Patient is able to demonstrate appropriate quad strength on intended weight bearing limb(s). Move onto Assessment Level 3. Assessment Level 3- Stand   1. Ask patient to elevate off the bed or chair (seated to standing) using an assistive device (cane, bedrail). 2. Patient should be able to raise buttocks off be and hold for a count of five. May repeat once.    Pass- Patient maintains standing stability for at least 5 seconds, proceed to assessment level 4. Assessment Level 4- Walk   1. Ask patient to march in place at bedside. 2. Then ask patient to advance step and return each foot. Some medical conditions may render a patient from stepping backwards, use your best clinical judgement. Fail- Patient not able to complete tasks OR requires use of assistive device. Patient is MOBILITY LEVEL 3. Mobility Level- 3    Patient is able to demonstrate the ability to move from a reclining position to an upright position within the recliner.

## 2022-05-12 NOTE — CONSULTS
CARDIOLOGY CONSULT        Patient Name: Armando Peña  Primary Care physician: AICHA Tavares CNP    Reason for Referral/Chief Complaint: Armando Peña is a 80 y.o. patient who is referred to cardiology clinic today for evaluation and treatment of chest pain. History of Present Illness:   Mr. Mitul Liang is a 80 y.o. 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 Promise Carrillo, hypertension, diabetes mellitus, carotid disease, hyperlipidemia with history of intolerance to multiple lines of statins with the exception of Pravachol, and hypothyroidism, who presents today for interim follow-up for worsening palpitations dyspnea with exertion and concern for fluid overload. Patient was admitted to the hospital 4/2022 and treated for volume overload, atrial fibrillation versus atrial tachycardia with rapid rates, elevated troponin. of AFIB, embolic R. CVA in 8/98, CAD s/p 4 stents prior (most recent 10/00), s/p 4V CABG 1/18 with Dr. Promise Carrillo, HTN, DM, pleural effusion s/p left thoracentesis 9/20, mild carotid artery disease, HLD, and hypothyroidism. Possible COVID in 2019. Hx of myalgias with statins but tolerates pravachol with coenzyme Q10. 12/3/21 OV with Dr. Noah Hughes . Then on 04/2022 went to hospital with complaints of right leg cramp while doing his pulmonary rehab admitted to hospital  with afib with new onset VS atrial tach and elevated troponin. Patient was seen by my partner Dr. Twan Gallegos. Patient admits that he likely forced his way on the hospital too soon. He was treated with diuretic and released on torsemide 40 mg oral daily. Treated with Eliquis and metoprolol 50 mg twice daily for atrial fibrillation. He was seen in transitional care clinic 5/2 and noted to have gained 4 pounds despite diuretic.   He was increased to torsemide 40 mg every morning, 20 mg every afternoon by Dr. Zahira Hernandez in the interim. Despite this weight is gone from 158 pounds at last discharge to 171 pounds today. OV 5/11 patient noted poor sleep for a couple of weeks. Progressive shortness of breath with exertion. Having significant difficulty breathing while laying flat/positive orthopnea. Positive PND.  +central chest squeezing feeling while lying flat. +heart racing and diaphoresis with exertion over the past week or so. His weight by home scale was 169. 04/30/22 weight was 158. Notes he has been taking torsemide 20 mg 2 tabs in am and afternoon has increased dosage. Did not feel that his urine output really increased. +abdominal bloating at times. O2 upper 80's with exertion. Admitted to dietary indiscretions including eating armstrong, sausage, big boy as well as chicken noodle soup and many frozen pizzas over this past week. Conclusion was failing OP diuresis. Admitted for IV diuretic. Heart monitor was removed with plan to re-place for additional 1 week at [facility]. Today, noted CXR with bibasilar opacities with small to moderate plerual effusions. EKG with AF vs. AT with variable block, prior septal infarct, LVH. Na 125-127, Scr 2.9 from 1.6 baseline, Pro-BNP 9250, Trop 0.02 with flat trend. Standing weight 165 today. Feeling a little better today. Remains with edema, abd bloating, orthopnea. RN is going to track urine output better today to guide diuresis. Occ chest pressure with lying down still. Rates 60's in AF, occ breakthrough -120 bpm, brief. 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     Surgical History:   has a past surgical history that includes Tooth Extraction; Coronary angioplasty with stent (08/14/2000); other surgical history (01/23/2012); Coronary angioplasty with stent (10/19/2000); Tonsillectomy; Coronary artery bypass graft (N/A, 01/31/2018); Upper gastrointestinal endoscopy (N/A, 4/20/2022); Colonoscopy (N/A, 4/20/2022); and Colonoscopy (N/A, 4/20/2022). Social History:   reports that he quit smoking about 47 years ago. His smoking use included cigarettes. He started smoking about 65 years ago. He has a 40.00 pack-year smoking history. He has never used smokeless tobacco. He reports that he does not drink alcohol and does not use drugs. Family History:  family history includes Emphysema in his mother; Other in his daughter and grandchild; Stroke in his father. Home Medications:  Were reviewed and are listed in nursing record and/or below  Prior to Admission medications    Medication Sig Start Date End Date Taking?  Authorizing Provider   torsemide (DEMADEX) 20 MG tablet Take 40 mg in morning and 20 mg at noon 5/6/22   Bianca Washburn MD   apixaban (ELIQUIS) 2.5 MG TABS tablet Take 1 tablet by mouth 2 times daily 4/30/22   Evelio Crane MD   metoprolol tartrate (LOPRESSOR) 50 MG tablet TAKE 1 TABLET BY MOUTH  TWICE DAILY 4/8/22   Valery Romero MD   pravastatin (PRAVACHOL) 40 MG tablet TAKE 1 TABLET BY MOUTH  DAILY AT NIGHT 4/1/22   AICHA Fuchs CNP   pantoprazole (PROTONIX) 40 MG tablet TAKE 1 BY MOUTH ONCE DAILY IN THE MORNING 30 MINUTES TO AN HOUR BEFORE BREAKFAST  Patient not taking: Reported on 5/11/2022 3/10/22   Historical Provider, MD   ipratropium (ATROVENT) 0.03 % nasal spray 2 sprays by Each Nostril route 4 times daily 3/15/22   Sadi Rosales DO   losartan (COZAAR) 100 MG tablet TAKE 1 TABLET BY MOUTH  DAILY 2/21/22   Ras Handy MD   ciclopirox [facility]) 8 % solution  1/4/22   Historical Provider, MD   Ascorbic Acid (VITAMIN C) 250 MG tablet Take 250 mg by mouth daily    Historical Provider, MD   metFORMIN (GLUCOPHAGE) 1000 MG tablet TAKE 1 TABLET BY MOUTH  TWICE DAILY WITH MEALS 1/6/22   AICHA Valentine CNP   albuterol sulfate HFA (PROAIR HFA) 108 (90 Base) MCG/ACT inhaler Inhale 2 puffs into the lungs every 6 hours as needed for Wheezing 7/13/21   Greg Black MD   levothyroxine (SYNTHROID) 88 MCG tablet TAKE 1 TABLET BY MOUTH  DAILY 6/21/21   AICHA Valentine CNP   Omega-3 Fatty Acids (FISH OIL PO) Take by mouth    Historical Provider, MD   Multiple Vitamin (MULTI-VITAMIN DAILY PO) Take by mouth    Historical Provider, MD   Coenzyme Q10 (COQ10 PO) Take by mouth    Historical Provider, MD   guaiFENesin 400 MG tablet Take 400 mg by mouth daily     Historical Provider, MD   magnesium oxide (MAG-OX) 400 MG tablet Take 400 mg by mouth daily    Historical Provider, MD   ferrous sulfate 325 (65 Fe) MG tablet Take 325 mg by mouth daily (with breakfast)     Historical Provider, MD   aspirin 81 MG EC tablet Take 1 tablet by mouth daily 3/5/18   Shelia Matias MD   Cholecalciferol (VITAMIN D) 2000 UNITS CAPS capsule Take  by mouth daily.     Historical Provider, MD        CURRENT Medications:  apixaban (ELIQUIS) tablet 2.5 mg, BID  ascorbic acid (VITAMIN C) tablet 250 mg, Daily  aspirin EC tablet 81 mg, Daily  ferrous sulfate (IRON 325) tablet 325 mg, Daily with breakfast  guaiFENesin (ROBITUSSIN) 100 MG/5ML oral solution 400 mg, Daily  levothyroxine (SYNTHROID) tablet 88 mcg, Daily  losartan (COZAAR) tablet 100 mg, Daily  magnesium oxide (MAG-OX) tablet 400 mg, Daily  metoprolol tartrate (LOPRESSOR) tablet 50 mg, BID  therapeutic multivitamin-minerals 1 tablet, Daily  pantoprazole (PROTONIX) tablet 40 mg, QAM AC  pravastatin (PRAVACHOL) tablet 40 mg, Nightly  sodium chloride flush 0.9 % injection 5-40 mL, 2 times per day  sodium chloride flush 0.9 % injection 5-40 mL, PRN  0.9 % sodium chloride infusion, PRN  ondansetron (ZOFRAN-ODT) disintegrating tablet 4 mg, Q8H PRN   Or  ondansetron (ZOFRAN) injection 4 mg, Q6H PRN  polyethylene glycol (GLYCOLAX) packet 17 g, Daily PRN  acetaminophen (TYLENOL) tablet 650 mg, Q6H PRN   Or  acetaminophen (TYLENOL) suppository 650 mg, Q6H PRN  furosemide (LASIX) injection 40 mg, BID  glucose chewable tablet 16 g, PRN  dextrose bolus 10% 125 mL, PRN   Or  dextrose bolus 10% 250 mL, PRN  glucagon (rDNA) injection 1 mg, PRN  dextrose 5 % solution, PRN  insulin lispro (HUMALOG) injection vial 0-6 Units, TID WC  insulin lispro (HUMALOG) injection vial 0-3 Units, Nightly  albuterol sulfate  (90 Base) MCG/ACT inhaler 2 puff, Q4H PRN  albuterol sulfate  (90 Base) MCG/ACT inhaler 2 puff, BID        Allergies:  Metolazone, Lipitor, and Simvastatin     Review of Systems:   A 14 point review of symptoms completed. Pertinent positives identified in the HPI, all other review of symptoms negative as below. Objective:     Vitals:    05/11/22 2232 05/12/22 0225 05/12/22 0323 05/12/22 0759   BP: 130/65 91/63     Pulse: 91 89     Resp: 16 14  15   Temp: 97 °F (36.1 °C) 96.5 °F (35.8 °C)     TempSrc: Oral Oral     SpO2: 97% 91%  95%   Weight:   165 lb 4.8 oz (75 kg)    Height:              PHYSICAL EXAM:    General:   Elderly man, mildly tachypneic, no overt distress   Head:  Normocephalic, atraumatic   Eyes:  Conjunctiva/corneas clear, anicteric sclerae    Nose: Nares normal, no drainage or sinus tenderness   Throat: No abnormalities of the lips, oral mucosa or tongue. Neck: Trachea midline. Neck supple with no lymphadenopathy, thyroid not enlarged, symmetric, no tenderness/mass/nodules, elevated jugular venous pressure     Lungs:   Crackles bilaterally, no wheezes, +Rales, no respiratory distress   Chest Wall:  No deformity or tenderness to palpation   Heart:   Irregular, variable S1, normal S2, no murmur, no rub, no S3/S4, PMI non-palpable. Abdomen:    Firm, non-tender, with normoactive bowel sounds.  No masses, no hepatosplenomegaly   Extremities: No cyanosis, clubbing. 1+ pitting edema bilateral lower extremity. Vascular: 2+ radial, 2+ dorsalis pedis and posterior tibial pulses bilaterally. Brisk carotid upstrokes without carotid bruit. Skin: Skin color, texture, turgor are normal with no rashes or ulceration. Pysch: Euthymic mood, appropriate affect   Neurologic: Oriented to person, place and time. No slurred speech or facial asymmetry. No motor or sensory deficits on gross examination. Wt Readings from Last 3 Encounters:   22 165 lb 4.8 oz (75 kg)   22 171 lb 12.8 oz (77.9 kg)   22 168 lb (76.2 kg)     Labs:   CBC:   Lab Results   Component Value Date    WBC 6.9 2022    RBC 3.36 2022    HGB 9.6 2022    HCT 29.2 2022    MCV 87.0 2022    RDW 16.6 2022     2022     CMP:  Lab Results   Component Value Date     2022    K 4.8 2022    K 4.6 2022    CL 88 2022    CO2 27 2022    BUN 59 2022    CREATININE 2.7 2022    CREATININE 1.1 2012    GFRAA 28 2022    GFRAA >60 2012    AGRATIO 1.3 2022    LABGLOM 23 2022    GLUCOSE 109 2022    PROT 7.8 2022    PROT 6.8 2013    CALCIUM 9.2 2022    BILITOT 0.3 2022    ALKPHOS 202 2022    AST 19 2022    ALT 21 2022     PT/INR:  No results found for: PTINR  HgBA1c:  Lab Results   Component Value Date    LABA1C 6.7 2022     Lab Results   Component Value Date    TROPONINI 0.02 (H) 2022         Cardiac Data:     EKG this admission personally reviewed as above.      EK22 atrial tachycardia with variable AV block  Left axis deviation  Left ventricular hypertrophy with QRS widening  Cannot rule out Septal infarct (cited on or before 2022)  Abnormal ECG  When compared with ECG of 2022 14:43,  No significant change was found    CT Chest Pulmonary: 22 Impression 1. No evidence of acute pulmonary embolism or acute aortic disease. 2. Redemonstration of lymphadenopathy which is slightly more pronounced compared to the previous evaluation in the mediastinum. 3. Redemonstration of bilateral pleural effusions partly loculated which are mild to moderate as well as lower lobe atelectatic and/or consolidative changes. These were previously seen as well. 4. Cholelithiasis but no acute cholecystitis. Echo: 02/16/2022  Summary   LV systolic function is normal with EF estimated at 55%. No obvious segmental wall motion abnormalities. There is mild systolic and diastolic septal flattening c/w RV pressure and   volume overload. There is mild concentric left ventricular hypertrophy. Sigmoid-shaped basal septum. Grade III diastolic dysfunction with elevated filing pressure. Mild biatrial enlargement. Mild mitral, aortic, and pulmonic regurgitation. Moderate tricuspid regurgitation. Systolic pulmonary artery pressure (SPAP) estimated at 59 mmHg (RAP 8 mmHg),   c/w moderate pulm HTN. There is a pleural effusion. Cardiac Cath: 12/29/2020  Findings:  1. Hemodynamics:  A. Right heart catheterization                   1. RA: 7 mmHg                   2. RV: 38/8 mmHg                   3. PA: 35/18 (24) mmHg                   4. PCWP: 13 mmHg                   5. Saturations: RA 64%, PA 63%, AO 93%                   6. Genoveva CO: 5.27 L/min                   7. Genoveva CI: 2.68 L/min*m2                   8. Genoveva SVR: 1,032 dyne*sec/cm5                   9. Genoveva PVR: 76 dyne*sec/cm5              B. Opening arterial pressure: 127/55 (74) mmHg              C. LVEDP: 13 mmHg     2. Coronary anatomy:  A. Left main artery: The left main artery bifurcates into the left anterior descending artery and left circumflex artery. The left main artery has a mid-distal 20-30% stenosis.   B. Left anterior descending artery: The LAD gives rise to one large diagonal artery and then is occluded in the mid-vessel. The diagonal artery has a 90% proximal stenosis. C. Left circumflex artery: Non-dominant vessel that gives rise to 2 obtuse marginal arteries. The LCx has a 95% proximal stenosis and 90% mid-vessel stenosis. The OM1 is a small vessel with a high origin and has an 80% proximal stenosis. The OM2 is a large vessel with a 100% proximal in-stent restenosis. D. Right coronary artery: Dominant vessel. The RCA has a 90% proximal stenosis and 100% mid-vessel stenosis. 3. Bypass graft anatomy:  A. Left subclavian artery: Patent with mild disease. B. LIMA to LAD: Patent. There is a 40-50% stenosis in the apical LAD. C. SVG to diagonal with sequential to OM2: Patent. Large size mismatch between vein graft and native diagonal artery. D. SVG to distal RCA: Patent. 4. Right femoral angiography  A. The right common femoral artery is patent and bifurcates into the right superficial femoral artery and right profunda femoris artery over the middle-third of the femoral head. The sheath enters the right common femoral artery directly above the bifurcation. Impression:  1. Severe native 3-vessel coronary artery disease. 2. Patent LIMA to LAD. 3. Patent SVG to diagonal with sequential to OM2.  4. Patent SVG to distal RCA. 5. Normal left-sided cardiac filling pressures. 6. Mildly elevated right-sided cardiac filling pressures. 7. Normal pulmonary artery pressure. 8. Normal Genoveva cardiac output and index. Stress Test: 08/25/2020  Summary There is normal isotope uptake at stress and rest. There is no evidence of  myocardial ischemia or scar. Hyperdynamic LV systolic function with WS>39% with uniform wall motion. Low risk study. ECHO: 08/25/2020  Summary   Left ventricular systolic function is low normal with a visually estimated   ejection fraction of 50-55%. There is mild hypokinesis of the basal inferior   and inferoseptal walls.    Grade III diastolic dysfunction with elevated LV pressure. The left atrium is mildly dilated. Mild posterior mitral annular calcification. Mild mitral, tricuspid and aortic regurgitation. Systolic pulmonary artery pressure (SPAP) is normal and estimated at 35 mmHg   (right atrial pressure 3 mmHg). There is a large left pleural effusion. Carotid artery occlusion: Carotid doppler 9/19/19 moderate plaque <50% bilateral carotid arteries (no change from 6/2107 study)         Impression and Plan:     Acute on chronic HFpEF, Restrictive filling, NYHA IV   - failing OP diuresis with increasing torsemide   -40 mg IV Lasix BID today. Track UOP strictly, daily standing weight  -Strict I/O with external/internal catheter placement to accomplish this on case by case basis, daily standing weights, low salt/caridac diet, and CHF education recommended and discussed with the patient to guide our therapy in the inpatient setting.    -dietician/CHF education paramount    Atrial fibrillation, rate controlled  -continue eliquis BID  -BB, HR controlled with occ brief high rates. Monitor for now as we diurese.   -plan to extended previous cardiac monitor study ongoing an additional 1 week at [facility].      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  Diabetes mellitus  Carotid disease  Hyperlipidemia with history of intolerance to multiple lines of statins with the exception of Pravachol  Hypothyroidism      Patient Active Problem List   Diagnosis    Bilateral carotid artery occlusion    Essential hypertension    Hypothyroidism, acquired, autoimmune    Vitamin D deficiency disease    Type 2 diabetes mellitus without complication, without long-term current use of insulin ([address]    Coronary artery disease    Pure hypercholesterolemia    History of CVA (cerebrovascular accident)    Chronic diastolic congestive heart failure (HCC)    S/P CABG x 4    Hyponatremia    Allergic sinusitis    History of tobacco abuse    SOB (shortness of breath)    Chronic eczematous otitis externa of both ears    Restrictive lung disease    Pulmonary infiltrates    Mediastinal adenopathy    Simple chronic bronchitis (HCC)    Allergic rhinitis    Acute on chronic heart failure (HCC)    Iron deficiency anemia    Atrial fibrillation (HCC)    Acute on chronic diastolic CHF (congestive heart failure) (HCC)    Acute congestive heart failure ([address]         Follow up with Dr. Terra Cole in June as scheduled. This note is scribed in the presence of Candie Walker by Ming Coon RN    The scribes documentation has been prepared under my direction and personally reviewed by me in its entirety. I confirm that the note above accurately reflects all work, treatment, procedures, and medical decision making performed by me. Alek Richards MD, personally performed the services described in this documentation as scribed by  Ming Coon RN in my presence, and it is both accurate and complete to the best of our ability. I will address the patient's cardiac risk factors and adjusted pharmacologic treatment as needed. In addition, I have reinforced the need for patient directed risk factor modification. All questions and concerns were addressed to the patient/family. Alternatives to my treatment were discussed. Thank you for allowing us to participate in the care of Thomas Brennan. Please call me with any questions [phone].     Candie Walker MD, [facility]  Cardiovascular Disease  [facility]  [phone] [facility]  [phone] [address] [place] [address]  5/12/2022 8:22 AM

## 2022-05-12 NOTE — PROGRESS NOTES
RT Inhaler-Nebulizer Bronchodilator Protocol Note    There is a bronchodilator order in the chart from a provider indicating to follow the RT Bronchodilator Protocol and there is an Initiate RT Inhaler-Nebulizer Bronchodilator Protocol order as well (see protocol at bottom of note). CXR Findings:  No results found. The findings from the last RT Protocol Assessment were as follows:   History Pulmonary Disease: Smoker 15 pack years or more  Respiratory Pattern: Dyspnea on exertion or RR 21-25 bpm  Breath Sounds: Slightly diminished and/or crackles  Cough: Strong, spontaneous, non-productive  Indication for Bronchodilator Therapy: Decreased or absent breath sounds  Bronchodilator Assessment Score: 5    Aerosolized bronchodilator medication orders have been revised according to the RT Inhaler-Nebulizer Bronchodilator Protocol below. Respiratory Therapist to perform RT Therapy Protocol Assessment initially then follow the protocol. Repeat RT Therapy Protocol Assessment PRN for score 0-3 or on second treatment, BID, and PRN for scores above 3. No Indications  adjust the frequency to every 6 hours PRN wheezing or bronchospasm, if no treatments needed after 48 hours then discontinue using Per Protocol order mode. If indication present, adjust the RT bronchodilator orders based on the Bronchodilator Assessment Score as indicated below. Use Inhaler orders unless patient has one or more of the following: on home nebulizer, not able to hold breath for 10 seconds, is not alert and oriented, cannot activate and use MDI correctly, or respiratory rate 25 breaths per minute or more, then use the equivalent nebulizer order(s) with same Frequency and PRN reasons based on the score. If a patient is on this medication at home then do not decrease Frequency below that used at home.     0-3  enter or revise RT bronchodilator order(s) to equivalent RT Bronchodilator order with Frequency of every 4 hours PRN for wheezing or increased work of breathing using Per Protocol order mode. 4-6  enter or revise RT Bronchodilator order(s) to two equivalent RT bronchodilator orders with one order with BID Frequency and one order with Frequency of every 4 hours PRN wheezing or increased work of breathing using Per Protocol order mode. 7-10  enter or revise RT Bronchodilator order(s) to two equivalent RT bronchodilator orders with one order with TID Frequency and one order with Frequency of every 4 hours PRN wheezing or increased work of breathing using Per Protocol order mode. 11-13  enter or revise RT Bronchodilator order(s) to one equivalent RT bronchodilator order with QID Frequency and an Albuterol order with Frequency of every 4 hours PRN wheezing or increased work of breathing using Per Protocol order mode. Greater than 13  enter or revise RT Bronchodilator order(s) to one equivalent RT bronchodilator order with every 4 hours Frequency and an Albuterol order with Frequency of every 2 hours PRN wheezing or increased work of breathing using Per Protocol order mode.          Electronically signed by Jo Mckenzie RCP on 5/12/2022 at 7:25 PM

## 2022-05-12 NOTE — CARE COORDINATION
Case Management Assessment  Initial Evaluation      Patient Name: Helio Fraga  YOB: 1940  Diagnosis: Acute on chronic diastolic CHF (congestive heart failure) ([facility] [I50.33]  Acute congestive heart failure, unspecified heart failure type [facility]) [I50.9]  Date / Time: 5/11/2022  4:50 PM    Admission status/Date:  05/11/2022  Chart Reviewed: Yes      Patient Interviewed: Yes   Family Interviewed:  No      Hospitalization in the last 30 days:  No      Health Care Decision Maker :   Primary Decision MakerRocío [facility] [phone]    Secondary Decision Maker: Maye Varela  José Luis  [phone]    (CM - must 1st enter selection under Navigator - emergency contact- Health Care Decision Maker Relationship and pick relationship)   Who do you trust or have selected to make healthcare decisions for you    Current PCP: AICHA Mcgrath, CNP  Financial  [facility] Medicare  Precert required for SNF : YES      3 night stay required - NA    ADLS  Support Systems/Care Needs: Spouse/Significant Other,Family Members  Transportation: self    Meal Preparation: self    Housing  Living Arrangements: [facility], lives w/sp in [place].   Steps: 4 steps in w/ bilateral handrails  Intent for return to present living arrangements: Yes  Identified Issues: NA    Home Care Information  Active with [address] : No Agency:(Services)  Type of Home Care Services: None  Passport/Waiver : No  :                      Phone Number:    Passport/Waiver Services: NA          Durable Medical Equiptment   DME Provider: [facility]  Equipment:   Walker___Cane_X__RTS___ BSC___Shower Chair___Hospital Bed___W/C____Other________  02 at ____Liter(s)---wears(frequency)_______ HHN ___ CPAP___ BiPap___   N/A____      Home O2 Use :  No  - currently on supplemental O2, CM will follow and reassess for home O2 needs      Community Service Affiliation  Dialysis:  No    · Agency:  · Location:  · Dialysis Schedule:  · Phone: · Fax: Other Community Services: Outpatient - Pulmonary Rehab @ [facility]    DISCHARGE PLAN: Explained Case Management role/services. Chart reviewed. Met with pt and at bedside and explained the role of the CM. Plans to return home and resume  Outpatient pulmonary rehab at [facility]. Pt does not have home O2 but is requiring supplemental O2 since admission. CM will follow and reassess need for home O2.

## 2022-05-12 NOTE — CARE COORDINATION
CTN notified case management of readmission by direct admit from cardiology office. Recommend consult with dietician. CTN following for disposition.      Marques Bonilla, RN  Care Transition Nurse  [phone] mobile    Future Appointments   Date Time Provider Getachew Cottrell   5/17/2022 11:20 AM Beau Castillo MD AND PUL[facility]   5/19/2022  8:00 AM Kaley Lab, APRN - CNP [facility] [place] - DYD   6/1/2022  3:00 PM MD KAVYA Wright [facility]   6/22/2022 12:30 PM Ilana Jiménez MD P CL[facility]

## 2022-05-12 NOTE — PROGRESS NOTES
Pt is resting quietly in bed. No s/s of distress at this time. Call light and bedside table within reach.

## 2022-05-12 NOTE — ACP (ADVANCE CARE PLANNING)
Advance Care Planning     General Advance Care Planning (ACP) Conversation    Date of Conversation: 5/11/2022  Conducted with: Patient with Decision Making Capacity    Healthcare Decision Maker:    Primary Decision Maker: Teddy Rosalia - [phone]    Secondary Decision Maker: Caitlyn Ordonez - [phone]  Click here to complete Healthcare Decision Makers including selection of the Healthcare Decision Maker Relationship (ie \"Primary\"). Today we documented Decision Maker(s) consistent with Legal Next of Kin hierarchy. Content/Action Overview:   Has ACP document(s) on file - reflects the patient's care preferences  Reviewed DNR/DNI and patient elects Full Code (Attempt Resuscitation)    Length of Voluntary ACP Conversation in minutes:  <16 minutes (Non-Billable)    Armando Ferris RN

## 2022-05-12 NOTE — PROGRESS NOTES
RT Inhaler-Nebulizer Bronchodilator Protocol Note    There is a bronchodilator order in the chart from a provider indicating to follow the RT Bronchodilator Protocol and there is an Initiate RT Inhaler-Nebulizer Bronchodilator Protocol order as well (see protocol at bottom of note). CXR Findings:  No results found. The findings from the last RT Protocol Assessment were as follows:   History Pulmonary Disease: Smoker 15 pack years or more  Respiratory Pattern: Dyspnea on exertion or RR 21-25 bpm  Breath Sounds: Slightly diminished and/or crackles  Cough: Strong, spontaneous, non-productive  Indication for Bronchodilator Therapy: Decreased or absent breath sounds  Bronchodilator Assessment Score: 5    Aerosolized bronchodilator medication orders have been revised according to the RT Inhaler-Nebulizer Bronchodilator Protocol below. Respiratory Therapist to perform RT Therapy Protocol Assessment initially then follow the protocol. Repeat RT Therapy Protocol Assessment PRN for score 0-3 or on second treatment, BID, and PRN for scores above 3. No Indications  adjust the frequency to every 6 hours PRN wheezing or bronchospasm, if no treatments needed after 48 hours then discontinue using Per Protocol order mode. If indication present, adjust the RT bronchodilator orders based on the Bronchodilator Assessment Score as indicated below. Use Inhaler orders unless patient has one or more of the following: on home nebulizer, not able to hold breath for 10 seconds, is not alert and oriented, cannot activate and use MDI correctly, or respiratory rate 25 breaths per minute or more, then use the equivalent nebulizer order(s) with same Frequency and PRN reasons based on the score. If a patient is on this medication at home then do not decrease Frequency below that used at home.     0-3  enter or revise RT bronchodilator order(s) to equivalent RT Bronchodilator order with Frequency of every 4 hours PRN for wheezing or increased work of breathing using Per Protocol order mode. 4-6  enter or revise RT Bronchodilator order(s) to two equivalent RT bronchodilator orders with one order with BID Frequency and one order with Frequency of every 4 hours PRN wheezing or increased work of breathing using Per Protocol order mode. 7-10  enter or revise RT Bronchodilator order(s) to two equivalent RT bronchodilator orders with one order with TID Frequency and one order with Frequency of every 4 hours PRN wheezing or increased work of breathing using Per Protocol order mode. 11-13  enter or revise RT Bronchodilator order(s) to one equivalent RT bronchodilator order with QID Frequency and an Albuterol order with Frequency of every 4 hours PRN wheezing or increased work of breathing using Per Protocol order mode. Greater than 13  enter or revise RT Bronchodilator order(s) to one equivalent RT bronchodilator order with every 4 hours Frequency and an Albuterol order with Frequency of every 2 hours PRN wheezing or increased work of breathing using Per Protocol order mode.          Electronically signed by Ovidio Cole RCP on 5/11/2022 at 8:49 PM

## 2022-05-12 NOTE — PROGRESS NOTES
RT Inhaler-Nebulizer Bronchodilator Protocol Note    There is a bronchodilator order in the chart from a provider indicating to follow the RT Bronchodilator Protocol and there is an Initiate RT Inhaler-Nebulizer Bronchodilator Protocol order as well (see protocol at bottom of note). CXR Findings:  No results found. The findings from the last RT Protocol Assessment were as follows:   History Pulmonary Disease: Smoker 15 pack years or more  Respiratory Pattern: Dyspnea on exertion or RR 21-25 bpm  Breath Sounds: Slightly diminished and/or crackles  Cough: Strong, spontaneous, non-productive  Indication for Bronchodilator Therapy: Decreased or absent breath sounds  Bronchodilator Assessment Score: 5    Aerosolized bronchodilator medication orders have been revised according to the RT Inhaler-Nebulizer Bronchodilator Protocol below. Respiratory Therapist to perform RT Therapy Protocol Assessment initially then follow the protocol. Repeat RT Therapy Protocol Assessment PRN for score 0-3 or on second treatment, BID, and PRN for scores above 3. No Indications  adjust the frequency to every 6 hours PRN wheezing or bronchospasm, if no treatments needed after 48 hours then discontinue using Per Protocol order mode. If indication present, adjust the RT bronchodilator orders based on the Bronchodilator Assessment Score as indicated below. Use Inhaler orders unless patient has one or more of the following: on home nebulizer, not able to hold breath for 10 seconds, is not alert and oriented, cannot activate and use MDI correctly, or respiratory rate 25 breaths per minute or more, then use the equivalent nebulizer order(s) with same Frequency and PRN reasons based on the score. If a patient is on this medication at home then do not decrease Frequency below that used at home.     0-3  enter or revise RT bronchodilator order(s) to equivalent RT Bronchodilator order with Frequency of every 4 hours PRN for wheezing or increased work of breathing using Per Protocol order mode. 4-6  enter or revise RT Bronchodilator order(s) to two equivalent RT bronchodilator orders with one order with BID Frequency and one order with Frequency of every 4 hours PRN wheezing or increased work of breathing using Per Protocol order mode. 7-10  enter or revise RT Bronchodilator order(s) to two equivalent RT bronchodilator orders with one order with TID Frequency and one order with Frequency of every 4 hours PRN wheezing or increased work of breathing using Per Protocol order mode. 11-13  enter or revise RT Bronchodilator order(s) to one equivalent RT bronchodilator order with QID Frequency and an Albuterol order with Frequency of every 4 hours PRN wheezing or increased work of breathing using Per Protocol order mode. Greater than 13  enter or revise RT Bronchodilator order(s) to one equivalent RT bronchodilator order with every 4 hours Frequency and an Albuterol order with Frequency of every 2 hours PRN wheezing or increased work of breathing using Per Protocol order mode. RT to enter RT Home Evaluation for COPD & MDI Assessment order using Per Protocol order mode.     Electronically signed by Rupali Amaral RCP on 5/12/2022 at 8:00 AM

## 2022-05-12 NOTE — PROGRESS NOTES
Pm assessment completed. See flow sheet. Pt denies any needs at this time. Let pt know to call if he needs to get up for any reason. Call light within reach.

## 2022-05-12 NOTE — PROGRESS NOTES
IM Progress Note    Admit Date:  5/11/2022    Patient admitted with CHF. Seen by cardiology. Getting IV Lasix    Subjective:  Mr. Halima Kat feels a little better today. Decreased shortness of breath. Saturations stable on room air. He is still very weak    Objective:   BP (!) 115/57   Pulse 84   Temp 97 °F (36.1 °C) (Oral)   Resp 16   Ht 5' 10\" (1.778 m)   Wt 165 lb 4.8 oz (75 kg)   SpO2 93%   BMI 23.72 kg/m²     Intake/Output Summary (Last 24 hours) at 5/12/2022 1245  Last data filed at 5/12/2022 1159  Gross per 24 hour   Intake    Output 800 ml   Net -800 ml         Physical Exam:   Gen: No distress. Alert. Eyes: PERRL. No sclera icterus. No conjunctival injection. ENT: No discharge. Pharynx clear. Neck: Trachea midline. Resp: No accessory muscle use. Bibasilar crackles. No wheezes. No rhonchi. CV: Irregular rate. Irregular rhythm. No murmur. No rub. BLE edema. GI: Non-tender. Non-distended. Normal bowel sounds. No hernia. Skin: Warm and dry. No nodule on exposed extremities. No rash on exposed extremities. M/S: No cyanosis. No joint deformity. No clubbing. LE edema decreased to 2+  Neuro: Awake. Grossly nonfocal    Psych: Oriented x 3.  No anxiety or agitation.            Medications:   Scheduled Meds:   apixaban  2.5 mg Oral BID    vitamin C  250 mg Oral Daily    aspirin  81 mg Oral Daily    ferrous sulfate  325 mg Oral Daily with breakfast    guaiFENesin  400 mg Oral Daily    levothyroxine  88 mcg Oral Daily    losartan  100 mg Oral Daily    magnesium oxide  400 mg Oral Daily    metoprolol tartrate  50 mg Oral BID    therapeutic multivitamin-minerals  1 tablet Oral Daily    pantoprazole  40 mg Oral QAM AC    pravastatin  40 mg Oral Nightly    sodium chloride flush  5-40 mL IntraVENous 2 times per day    furosemide  40 mg IntraVENous BID    insulin lispro  0-6 Units SubCUTAneous TID WC    insulin lispro  0-3 Units SubCUTAneous Nightly    albuterol sulfate HFA  2 puff Inhalation BID       Continuous Infusions:   sodium chloride      dextrose         Data:  CBC:   Recent Labs     05/11/22 1817 05/12/22  0613   WBC 7.8 6.9   RBC 3.64* 3.36*   HGB 10.5* 9.6*   HCT 31.6* 29.2*   MCV 86.7 87.0   RDW 16.9* 16.6*    235     BMP:   Recent Labs     05/11/22 1817 05/12/22  0613   * 127*   K 4.7 4.8   CL 85* 88*   CO2 23 27   BUN 62* 59*   CREATININE 2.9* 2.7*     BNP: No results for input(s): BNP in the last 72 hours. PT/INR: No results for input(s): PROTIME, INR in the last 72 hours. APTT: No results for input(s): APTT in the last 72 hours. CARDIAC ENZYMES:   Recent Labs     05/11/22 1817 05/12/22  0001 05/12/22  0613   TROPONINI 0.02* 0.02* 0.02*     FASTING LIPID PANEL:  Lab Results   Component Value Date    CHOL 186 02/15/2022    HDL 57 02/15/2022    TRIG 76 02/15/2022     LIVER PROFILE:   Recent Labs     05/11/22 1817   AST 19   ALT 21   BILITOT 0.3   ALKPHOS 202*          Cultures  COVID - not detected    Radiology  XR CHEST (2 VW)   Final Result   Bibasilar opacifications of least small to moderate bilateral pleural   effusions with adjacent opacifications of atelectasis or airspace disease   given differences in technique from prior CT stable to slightly progressed   volume of effusions             Echo 2/16/22   Summary   LV systolic function is normal with EF estimated at 55%.   No obvious segmental wall motion abnormalities.   There is mild systolic and diastolic septal flattening c/w RV pressure and   volume overload.   There is mild concentric left ventricular hypertrophy.   Sigmoid-shaped basal septum.   Grade III diastolic dysfunction with elevated filing pressure.   Mild biatrial enlargement.   Mild mitral, aortic, and pulmonic regurgitation.   Moderate tricuspid regurgitation.   Systolic pulmonary artery pressure (SPAP) estimated at 59 mmHg (RAP 8 mmHg),   c/w moderate pulm HTN.   There is a pleural effusion.       Assessment:  Active Problems: Acute on chronic diastolic CHF (congestive heart failure) (HCC)    Acute congestive heart failure (HCC)  Resolved Problems:    * No resolved hospital problems. *      Plan:    Acute on chronic Diastolic CHF, HFpEF  Pulmonary hypertension, Moderate TR  - admitted for further management/care. Cardiology consulted.   -daily weights; I&O's  Weight is 165 lbs today ; neg fluid balance of 800 ml  -Low sodium diet; fluid restriction  -cont IV Lasix 40 mg BID     A-fib  -EKG  -continue   -AC: Eliquis     H/o CVA  H/o Bilateral carotid occlusion  -continue aspirin, Statin, Eliquis     CAD  -S/p CABG  -continue aspirin, Statin, BB     DM type 2  -monitor glucose. SSI Coverage     Hypertension  -BP stable. Continue losartan, lopressor     Hyperlipidemia   -on Pravastatin    GERD  -On PPI     Iron deficiency anemia  -continue ferrous sulfate     Hypothyroidism  -home dose of synthroid 88 mcg     Hyponatremia  -  Due to fluid overload   -  Na  125-> 127    POLY on CKD III   - baseline cr of 1.2-1.5, likely cardiorenal syndrome  - crtn 2.9 on admit--> 2.7 today    Weakness  - Consult PT OT     DVT Prophylaxis: Eliquis  Diet: ADULT DIET; Regular; 4 carb choices (60 gm/meal);  Low Sodium (2 gm); 1800 ml  Code Status: Full Code          Camilo Page MD   5/12/2022 12:45 PM

## 2022-05-12 NOTE — PLAN OF CARE
Problem: Safety - Adult  Goal: Free from fall injury  Outcome: Progressing     Problem: ABCDS Injury Assessment  Goal: Absence of physical injury  Outcome: Progressing     Problem: Chronic Conditions and Co-morbidities  Goal: Patient's chronic conditions and co-morbidity symptoms are monitored and maintained or improved  Outcome: Progressing

## 2022-05-13 NOTE — PROGRESS NOTES
RT Inhaler-Nebulizer Bronchodilator Protocol Note    There is a bronchodilator order in the chart from a provider indicating to follow the RT Bronchodilator Protocol and there is an Initiate RT Inhaler-Nebulizer Bronchodilator Protocol order as well (see protocol at bottom of note). CXR Findings:  No results found. The findings from the last RT Protocol Assessment were as follows:   History Pulmonary Disease: (P) Smoker 15 pack years or more  Respiratory Pattern: (P) Dyspnea on exertion or RR 21-25 bpm  Breath Sounds: (P) Slightly diminished and/or crackles  Cough: (P) Strong, spontaneous, non-productive  Indication for Bronchodilator Therapy: (P) Decreased or absent breath sounds  Bronchodilator Assessment Score: (P) 5    Aerosolized bronchodilator medication orders have been revised according to the RT Inhaler-Nebulizer Bronchodilator Protocol below. Respiratory Therapist to perform RT Therapy Protocol Assessment initially then follow the protocol. Repeat RT Therapy Protocol Assessment PRN for score 0-3 or on second treatment, BID, and PRN for scores above 3. No Indications  adjust the frequency to every 6 hours PRN wheezing or bronchospasm, if no treatments needed after 48 hours then discontinue using Per Protocol order mode. If indication present, adjust the RT bronchodilator orders based on the Bronchodilator Assessment Score as indicated below. Use Inhaler orders unless patient has one or more of the following: on home nebulizer, not able to hold breath for 10 seconds, is not alert and oriented, cannot activate and use MDI correctly, or respiratory rate 25 breaths per minute or more, then use the equivalent nebulizer order(s) with same Frequency and PRN reasons based on the score. If a patient is on this medication at home then do not decrease Frequency below that used at home.     0-3  enter or revise RT bronchodilator order(s) to equivalent RT Bronchodilator order with Frequency of every 4 hours PRN for wheezing or increased work of breathing using Per Protocol order mode. 4-6  enter or revise RT Bronchodilator order(s) to two equivalent RT bronchodilator orders with one order with BID Frequency and one order with Frequency of every 4 hours PRN wheezing or increased work of breathing using Per Protocol order mode. 7-10  enter or revise RT Bronchodilator order(s) to two equivalent RT bronchodilator orders with one order with TID Frequency and one order with Frequency of every 4 hours PRN wheezing or increased work of breathing using Per Protocol order mode. 11-13  enter or revise RT Bronchodilator order(s) to one equivalent RT bronchodilator order with QID Frequency and an Albuterol order with Frequency of every 4 hours PRN wheezing or increased work of breathing using Per Protocol order mode. Greater than 13  enter or revise RT Bronchodilator order(s) to one equivalent RT bronchodilator order with every 4 hours Frequency and an Albuterol order with Frequency of every 2 hours PRN wheezing or increased work of breathing using Per Protocol order mode. RT to enter RT Home Evaluation for COPD & MDI Assessment order using Per Protocol order mode.     Electronically signed by Christal Lucia RCP on 5/13/2022 at 7:55 AM

## 2022-05-13 NOTE — PROGRESS NOTES
[facility], [place]   Daily Progress Note    Admit Date:  5/11/2022  HPI:  No chief complaint on file. Interval history: Peter Miller is being followed for CHF. Subjective:  Mr. Ti Hawkins feels great today. Ready to go home. Has ambulated without difficulty.      Objective:   BP (!) 99/59   Pulse 85   Temp 97 °F (36.1 °C) (Oral)   Resp 18   Ht 5' 10\" (1.778 m)   Wt 162 lb 11.2 oz (73.8 kg)   SpO2 95%   BMI 23.35 kg/m²     Intake/Output Summary (Last 24 hours) at 5/13/2022 0951  Last data filed at 5/13/2022 0930  Gross per 24 hour   Intake 430 ml   Output 1925 ml   Net -1495 ml       NYHA: III    Physical Exam:  General:  Awake, alert, NAD  Skin:  Warm and dry  Neck:  JVD ~9-10cm h20   Chest:  Clear to auscultation, no wheezes/rhonchi/rales  Cardiovascular:  RRR S1S2, no m/r/g   Abdomen:  Soft, nontender, +bowel sounds  Extremities:  1+  bilateral lower extremity edema    Medications:    apixaban  2.5 mg Oral BID    vitamin C  250 mg Oral Daily    aspirin  81 mg Oral Daily    ferrous sulfate  325 mg Oral Daily with breakfast    guaiFENesin  400 mg Oral Daily    levothyroxine  88 mcg Oral Daily    losartan  100 mg Oral Daily    magnesium oxide  400 mg Oral Daily    metoprolol tartrate  50 mg Oral BID    therapeutic multivitamin-minerals  1 tablet Oral Daily    pantoprazole  40 mg Oral QAM AC    pravastatin  40 mg Oral Nightly    sodium chloride flush  5-40 mL IntraVENous 2 times per day    furosemide  40 mg IntraVENous BID    insulin lispro  0-6 Units SubCUTAneous TID WC    insulin lispro  0-3 Units SubCUTAneous Nightly    albuterol sulfate HFA  2 puff Inhalation BID      sodium chloride      dextrose         Lab Data:  CBC:   Recent Labs     05/11/22 1817 05/12/22  0613 05/13/22  0604   WBC 7.8 6.9 7.2   HGB 10.5* 9.6* 9.9*    235 228     BMP:    Recent Labs     05/11/22 1817 05/12/22  0613 05/13/22  0604   * 127* 130*   K 4.7 4.8 4.3   CO2 23 27 28   BUN 62* 59* 57*   CREATININE 2.9* 2.7* 2.7*     INR:  No results for input(s): INR in the last 72 hours. BNP:    Recent Labs     22  1817   PROBNP 9,250*     Lab Results   Component Value Date    LVEF 55 2022       Testing:  EK22 atrial tachycardia with variable AV block  Left axis deviation  Left ventricular hypertrophy with QRS widening  Cannot rule out Septal infarct (cited on or before 2022)  Abnormal ECG  When compared with ECG of 2022 14:43,  No significant change was found     CT Chest Pulmonary: 22 Impression 1. No evidence of acute pulmonary embolism or acute aortic disease. 2. Redemonstration of lymphadenopathy which is slightly more pronounced compared to the previous evaluation in the mediastinum. 3. Redemonstration of bilateral pleural effusions partly loculated which are mild to moderate as well as lower lobe atelectatic and/or consolidative changes. These were previously seen as well. 4. Cholelithiasis but no acute cholecystitis.        Echo: 2022  Summary   LV systolic function is normal with EF estimated at 55%.   No obvious segmental wall motion abnormalities.   There is mild systolic and diastolic septal flattening c/w RV pressure and   volume overload.   There is mild concentric left ventricular hypertrophy.   Sigmoid-shaped basal septum.   Grade III diastolic dysfunction with elevated filing pressure.   Mild biatrial enlargement.   Mild mitral, aortic, and pulmonic regurgitation.   Moderate tricuspid regurgitation.   Systolic pulmonary artery pressure (SPAP) estimated at 59 mmHg (RAP 8 mmHg),   c/w moderate pulm HTN.   There is a pleural effusion.        Cardiac Cath: 2020  Findings:  1. Hemodynamics:  A. Right heart catheterization                   1. RA: 7 mmHg                   2. RV: 38/8 mmHg                   3. PA: 35/18 (24) mmHg                   4. PCWP: 13 mmHg                   5. Saturations: RA 64%, PA 63%, AO 93%                   6. Genoveva CO: 5.27 L/min                   7. Genoveva CI: 2.68 L/min*m2                   8. Genoveva SVR: 1,032 dyne*sec/cm5                   9. Genoveva PVR: 76 dyne*sec/cm5              B. Opening arterial pressure: 127/55 (74) mmHg              C. LVEDP: 13 mmHg     2.  Coronary anatomy:  A. Left main artery: The left main artery bifurcates into the left anterior descending artery and left circumflex artery.  The left main artery has a mid-distal 20-30% stenosis. B. Left anterior descending artery: The LAD gives rise to one large diagonal artery and then is occluded in the mid-vessel. The diagonal artery has a 90% proximal stenosis. C. Left circumflex artery: Non-dominant vessel that gives rise to 2 obtuse marginal arteries.  The LCx has a 95% proximal stenosis and 90% mid-vessel stenosis.  The OM1 is a small vessel with a high origin and has an 80% proximal stenosis.  The OM2 is a large vessel with a 100% proximal in-stent restenosis. D. Right coronary artery: Dominant vessel.  The RCA has a 90% proximal stenosis and 100% mid-vessel stenosis.     3. Bypass graft anatomy:  A. Left subclavian artery: Patent with mild disease. B. LIMA to LAD: Patent.  There is a 40-50% stenosis in the apical LAD. C. SVG to diagonal with sequential to OM2: Patent.  Large size mismatch between vein graft and native diagonal artery. D. SVG to distal RCA: Patent.     4. Right femoral angiography  A. The right common femoral artery is patent and bifurcates into the right superficial femoral artery and right profunda femoris artery over the middle-third of the femoral head.  The sheath enters the right common femoral artery directly above the bifurcation.     Impression:  1. Severe native 3-vessel coronary artery disease. 2. Patent LIMA to LAD. 3. Patent SVG to diagonal with sequential to OM2.  4. Patent SVG to distal RCA. 5. Normal left-sided cardiac filling pressures. 6. Mildly elevated right-sided cardiac filling pressures.   7. Normal pulmonary artery pressure. 8. Normal Genoveva cardiac output and index.     Stress Test: 08/25/2020  Summary There is normal isotope uptake at stress and rest. There is no evidence of  myocardial ischemia or scar. Hyperdynamic LV systolic function with JQ>39% DFPP uniform wall motion. Low risk study.      ECHO: 08/25/2020  Summary   Left ventricular systolic function is low normal with a visually estimated   ejection fraction of 50-55%. There is mild hypokinesis of the basal inferior   and inferoseptal walls.   Grade III diastolic dysfunction with elevated LV pressure.   The left atrium is mildly dilated.   Mild posterior mitral annular calcification.   Mild mitral, tricuspid and aortic regurgitation.   Systolic pulmonary artery pressure (SPAP) is normal and estimated at 35 mmHg   (right atrial pressure 3 mmHg).   There is a large left pleural effusion.     Carotid artery occlusion: Carotid doppler 9/19/19 moderate plaque <50% bilateral carotid arteries (no change from 6/2107 study)       Principal Problem:    Acute on chronic diastolic CHF (congestive heart failure) (HCC)  Active Problems:    Acute congestive heart failure (HCC)    Primary hypertension    Acute kidney injury superimposed on CKD ([facility] [address]  Resolved Problems:    * No resolved hospital problems. *      Assessment:  Acute on chronic HFpEF   -dietary indiscretions  Afib  CAD s/p CABG 2018   -C 12/2020  Mod-severe PH  Moderate TR  HTN  Hx of CVA  Dm  Carotid disease  HLD- intolerant to statin except pravachol  Hyponatremia- improving   Anemia  POLY on CKD- cardiorenal     Plan:  Daily BMP, daily weights  eliquis  Add on iron levels - this admission; if iron sat low would recommend outpatient IV iron. Patient wants to go home today. His labs are improving with diuresis and renal function starting to improve (although higher than his baseline).  With some adjustments to the losartan and the metoprolol to allow for his blood pressures to increase which will help with pressures naturesis, will likely have more room to titrate the torsemide. HOld losartan due to POLY   Reduced metoprolol 25mg BID    If discharges home today, recommend repeat BMP and BNP on Monday - he verbalized that he will go for labs on Monday     Extend his cardiac monitor at discharge for an additional week. Discussed dietary indiscretions - he plans to change his diet.      Oriana Credit, AICHA - CNP,  5/13/2022, 2:53 PM

## 2022-05-13 NOTE — FLOWSHEET NOTE
05/13/22 0845   Vital Signs   Temp 97 °F (36.1 °C)   Temp Source Oral   Pulse 85   Heart Rate Source Monitor   Resp 18   BP (!) 99/59   BP Location Right upper arm   MAP (Calculated) 72.33   Level of Consciousness Alert (0)   MEWS Score 2   Pain Assessment   Pain Assessment None - Denies Pain   Oxygen Therapy   SpO2 95 %   O2 Device None (Room air)     Patient sitting up in chair. Lung sounds diminshed. VSS. Respirations easy and even. Fed self 100% of breakfast. Voiding clear yellow urine. BLE 1+ pitting edema. Bed in low position, call bell within reach bed alarm on. Will continue to monitor.

## 2022-05-13 NOTE — DISCHARGE INSTR - COC
Continuity of Care Form    Patient Name: Armando Peña   :  1940  MRN:  [id number]    Admit date:  2022  Discharge date:  ***    Code Status Order: Full Code   Advance Directives:      Admitting Physician:  Dangelo Edward MD  PCP: AICHA Tavares CNP    Discharging Nurse: [facility] Unit/Room#: 0226/0226-01  Discharging Unit Phone Number: ***    Emergency Contact:   Extended Emergency Contact Information  Primary Emergency Contact: Traci Blanc  Address: [address] [place], [place] [address], [address] Phone: [phone]  Mobile Phone: [phone]  Relation: Spouse  Secondary Emergency Contact:  [address] [place] [address] Phone: [phone]  Mobile Phone: [phone]  Relation: Child    Past Surgical History:  Past Surgical History:   Procedure Laterality Date    COLONOSCOPY N/A 2022    COLONOSCOPY POLYPECTOMY SNARE/COLD BIOPSY performed by Laura Levin MD at [address] [facility]  2022    COLONOSCOPY CONTROL HEMORRHAGE performed by Laura Levin MD at [facility]  2000    RX Tristar 2.5 x 13 mCX  RX Tristar 2.5 x 13 pCX    CORONARY ANGIOPLASTY WITH STENT PLACEMENT  10/19/2000    Tetra 2.75 x 18 CX  Tetra 3.0 x 13 CX    CORONARY ARTERY BYPASS GRAFT N/A 2018    OTHER SURGICAL HISTORY  2012    phaco emulsification cataract right eye    TONSILLECTOMY      TOOTH EXTRACTION      UPPER GASTROINTESTINAL ENDOSCOPY N/A 2022    EGD BIOPSY performed by Laura Levin MD at [address] 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    Vitamin D deficiency disease E55.9    Type 2 diabetes mellitus without complication, without long-term current use of insulin (HCC) E11.9    Coronary artery disease I25.10    Pure hypercholesterolemia E78.00    History of CVA (cerebrovascular accident) Z86.73    Chronic diastolic congestive heart failure (HCC) I50.32    S/P CABG x 4 Z95.1    Hyponatremia E87.1    Allergic sinusitis J30.9    History of tobacco abuse Z87.891    SOB (shortness of breath) R06.02    Chronic eczematous otitis externa of both ears H60.8X3    Restrictive lung disease J98.4    Pulmonary infiltrates R91.8    Mediastinal adenopathy R59.0    Simple chronic bronchitis ([facility]) J41.0    Allergic rhinitis J30.9    Acute on chronic heart failure ([facility]) I50.9    Iron deficiency anemia D50.9    Atrial fibrillation ([facility]) I48.91    Acute on chronic diastolic CHF (congestive heart failure) ([facility]) I50.33    Acute congestive heart failure (HCC) I50.9    Primary hypertension I10    Acute kidney injury superimposed on CKD ([facility] [address] N17.9, N18.9       Isolation/Infection:   Isolation            No Isolation          Patient Infection Status       Infection Onset Added Last Indicated Last Indicated By Review Planned Expiration Resolved Resolved By    None active    Resolved    COVID-19 (Rule Out) 04/29/22 04/29/22 04/29/22 COVID-19 & Influenza Combo (Ordered)   04/29/22 Rule-Out Test Resulted    COVID-19 (Rule Out) 02/14/22 02/14/22 02/14/22 COVID-19 & Influenza Combo (Ordered)   02/14/22 Rule-Out Test Resulted    COVID-19 (Rule Out) 02/11/22 02/11/22 02/11/22 COVID-19 (Ordered)   02/11/22 Rule-Out Test Resulted            Nurse Assessment:  Last Vital Signs: BP (!) 94/57   Pulse 63   Temp 96.5 °F (35.8 °C) (Oral)   Resp 16   Ht 5' 10\" (1.778 m)   Wt 162 lb 11.2 oz (73.8 kg)   SpO2 99%   BMI 23.35 kg/m²     Last documented pain score (0-10 scale):    Last Weight:   Wt Readings from Last 1 Encounters:   22 162 lb 11.2 oz (73.8 kg)     Mental Status:  {IP PT MENTAL STATUS:}    IV Access:  { GI IV ACCESS:712528063}    Nursing Mobility/ADLs:  Walking   {CHP DME OGZZ:049330027}  Transfer  {CHP DME KNLE:502664822}  Bathing  {CHP DME LGUU:091863279}  Dressing  {CHP DME EHWB:929364706}  Toileting  {CHP DME AOMO:399337767}  Feeding  {CHP DME SXUI:557021729}  Med Admin  {CHP DME TUCW:969295084}  Med Delivery   { GI MED Delivery:607638389}    Wound Care Documentation and Therapy:        Elimination:  Continence: Bowel: {YES / TS:03820}  Bladder: {YES / ID:79382}  Urinary Catheter: {Urinary Catheter:458302290}   Colostomy/Ileostomy/Ileal Conduit: {YES / J}       Date of Last BM: ***    Intake/Output Summary (Last 24 hours) at 2022 1512  Last data filed at 2022 1202  Gross per 24 hour   Intake 730 ml   Output 2025 ml   Net -1295 ml     I/O last 3 completed shifts:   In: 240 [P.O.:240]  Out:  [Urine:]    Safety Concerns:     [address] Concerns:791286254}    Impairments/Disabilities:      [address] Impairments/Disabilities:907796654}    Nutrition Therapy:  Current Nutrition Therapy:   [address] Diet List:913173790}    Routes of Feeding: {P DME Other Feedings:003192733}  Liquids: {Slp liquid thickness:01894}  Daily Fluid Restriction: {CHP DME Yes amt example:471668888}  Last Modified Barium Swallow with Video (Video Swallowing Test): {Done Not Done URCX:325818347}    Treatments at the Time of Hospital Discharge:   Respiratory Treatments: ***  Oxygen Therapy:  {Therapy; copd oxygen:90616}  Ventilator:    [address] Vent BVPR:084031721}    Rehab Therapies: Physical Therapy, Occupational Therapy, and Nurse  Weight Bearing Status/Restrictions: [address]  Weight Bearin}  Other Medical Equipment (for information only, NOT a DME order):  {EQUIPMENT:097582530}  Other Treatments: ***    Patient's personal belongings (please select all that are sent with patient):  {CHP DME Belongings:198884875}    RN SIGNATURE:  {Esignature:336588928}    CASE MANAGEMENT/SOCIAL WORK SECTION    Inpatient Status Date: ***    Readmission Risk Assessment Score:  Readmission Risk              Risk of Unplanned Readmission:  28           Discharging to Facility/ [address]   25 [address]   [phone]     Dialysis Facility (if applicable)   Name:  Address:  Dialysis Schedule:  Phone:  Fax:    / signature: {Esignature:707629271}    PHYSICIAN SECTION    Prognosis: {Prognosis:4199935566}    Condition at Discharge: [address] [place] [address] Patient Condition:839901973}    Rehab Potential (if transferring to Rehab): {Prognosis:0096924527}    Recommended Labs or Other Treatments After Discharge: ***    Physician Certification: I certify the above information and transfer of Gayle Ledesma  is necessary for the continuing treatment of the diagnosis listed and that he requires {Admit to Appropriate Level of Care:27598} for {GREATER/LESS:426880897} 30 days.      Update Admission H&P: {CHP DME Changes in EJEXX:631193812}    PHYSICIAN SIGNATURE:  {Esignature:341041460}

## 2022-05-13 NOTE — PROGRESS NOTES
Patient and Spouse given discharge instructions both verbally and written along with follow up appointments. Patient and wife expressed full understanding.

## 2022-05-13 NOTE — PROGRESS NOTES
Patient's EF (Ejection Fraction) is greater than 40%    Patient's weights and intake/output reviewed:    Patient's Last Weight: 162 lbs 11.2 oz obtained by bed scale. Difference of 3 lbs 11.2 oz lbs less than last documented weight. Intake/Output Summary (Last 24 hours) at 5/13/2022 1051  Last data filed at 5/13/2022 1000  Gross per 24 hour   Intake 430 ml   Output 1725 ml   Net -1295 ml         Pt is currently on 0 L O2. Pt without shortness of breath. Pt with pitting lower extremity edema. Patient and/or Family's stated Goal of Care this Admission: reduce lower extremity edema and unable to assess, will attempt again prior to discharge      Comorbidities Reviewed Yes  Patient has a past medical history of Allergic rhinitis, Diabetes mellitus ([address] Family history of factor V deficiency, GERD (gastroesophageal reflux disease), Hyponatremia, Ischemic cerebrovascular accident (CVA) of frontal lobe ([address] Pleural effusion, bilateral, Routine health maintenance, Tinnitus, Vasovagal reaction, and Vitamin D deficiency disease.          >>For CHF and Comorbidity documentation on Education Time and Topics, please see Education Tab

## 2022-05-13 NOTE — PLAN OF CARE
Problem: Discharge Planning  Goal: Discharge to home or other facility with appropriate resources  5/13/2022 1048 by Anita Colvin RN  Outcome: Progressing  5/13/2022 0117 by Leila Pink RN  Outcome: Progressing     Problem: Safety - Adult  Goal: Free from fall injury  5/13/2022 1048 by Anita Colvin RN  Outcome: Progressing  Flowsheets  Taken 5/13/2022 1046 by Anita Colvin RN  Free From Fall Injury:   Instruct family/caregiver on patient safety   Based on caregiver fall risk screen, instruct family/caregiver to ask for assistance with transferring infant if caregiver noted to have fall risk factors  Taken 5/13/2022 0127 by Leila Pink RN  Free From Fall Injury: Instruct family/caregiver on patient safety  5/13/2022 0117 by Leila Pink RN  Outcome: Progressing  [address] (Taken 5/12/2022 1241 by Janine Venegas RN)  Free From Fall Injury: Instruct family/caregiver on patient safety     Problem: ABCDS Injury Assessment  Goal: Absence of physical injury  5/13/2022 1048 by Anita Colvin RN  Outcome: Progressing  Flowsheets  Taken 5/13/2022 1046 by Anita Colvin RN  Absence of Physical Injury: Implement safety measures based on patient assessment  Taken 5/13/2022 0127 by Leila Pink RN  Absence of Physical Injury: Implement safety measures based on patient assessment  5/13/2022 0117 by Leila Pink RN  Outcome: Progressing  Flowsheets (Taken 5/12/2022 1241 by Janine Venegas RN)  Absence of Physical Injury: Implement safety measures based on patient assessment     Problem: Chronic Conditions and Co-morbidities  Goal: Patient's chronic conditions and co-morbidity symptoms are monitored and maintained or improved  5/13/2022 1048 by Anita Colvin RN  Outcome: Progressing  5/13/2022 0117 by Leila Pink RN  Outcome: Progressing  Flowsheets (Taken 5/12/2022 2104)  Care Plan - Patient's Chronic Conditions and Co-Morbidity Symptoms are Monitored and Maintained or Improved: Monitor and assess patient's chronic conditions and comorbid symptoms for stability, deterioration, or improvement   Update acute care plan with appropriate goals if chronic or comorbid symptoms are exacerbated and prevent overall improvement and discharge   Collaborate with multidisciplinary team to address chronic and comorbid conditions and prevent exacerbation or deterioration

## 2022-05-13 NOTE — PLAN OF CARE
Problem: Discharge Planning  Goal: Discharge to home or other facility with appropriate resources  5/13/2022 1638 by Anita Colvin RN  Outcome: Completed  5/13/2022 1048 by Anita Colvin RN  Outcome: Progressing     Problem: Safety - Adult  Goal: Free from fall injury  5/13/2022 1638 by Anita Colvin RN  Outcome: Completed  5/13/2022 1048 by Anita Colvin RN  Outcome: Progressing  Flowsheets  Taken 5/13/2022 1046 by Anita Colvin RN  Free From Fall Injury:   Instruct family/caregiver on patient safety   Based on caregiver fall risk screen, instruct family/caregiver to ask for assistance with transferring infant if caregiver noted to have fall risk factors  Taken 5/13/2022 0127 by Leila Pink RN  Free From Fall Injury: Instruct family/caregiver on patient safety     Problem: ABCDS Injury Assessment  Goal: Absence of physical injury  5/13/2022 1638 by Anita Colvin RN  Outcome: Completed  5/13/2022 1048 by Anita Colvin RN  Outcome: Progressing  Flowsheets  Taken 5/13/2022 1046 by Anita Colvin RN  Absence of Physical Injury: Implement safety measures based on patient assessment  Taken 5/13/2022 0127 by Leila Pink RN  Absence of Physical Injury: Implement safety measures based on patient assessment     Problem: Chronic Conditions and Co-morbidities  Goal: Patient's chronic conditions and co-morbidity symptoms are monitored and maintained or improved  5/13/2022 1638 by Anita Colvin RN  Outcome: Completed  5/13/2022 1048 by Anita Colvin RN  Outcome: Progressing

## 2022-05-13 NOTE — CARE COORDINATION
DISCHARGE ORDER  Date/Time 2022 4:01 PM  Completed by: Keegan Cerrato RN, Case Management    Patient Name: Deepti Omer      : 1940  Admitting Diagnosis: Acute on chronic diastolic CHF (congestive heart failure) ([facility] [address] [I50.33]  Acute congestive heart failure, unspecified heart failure type ([facility] [address] [I50.9]      Admit order Date and Status:INPT 22  (verify MD's last order for status of admission)      Noted discharge order. If applicable PT/OT recommendation at Discharge: home with prn assist  DME recommendation by PT/OT:n/a  Confirmed discharge plan : Yes  with whom__with pt_____________  If pt confirmed DC plan does family need to be contacted by CM  if yes who_pt wife at bedside_____  Discharge Plan: pt cont to plan to return home at discharge and would like a referral called to [facility] for SN as pt has used their services before. Referral called to Lynnette Devries with [facility] at this time and left a vmm. Pt denies further needs. Reviewed chart. Role of discharge planner explained and patient verbalized understanding. Discharge order is noted. Has Home O2 in place on admit:  No  Informed of need to bring portable home O2 tank on day of discharge for nursing to connect prior to leaving:   Not Indicated  Verbalized agreement/Understanding:   Not Indicated  Pt is being d/c'd to home today. Pt's O2 sats are 99% on roomair. Discharge timeout done with Helena Horta. All discharge needs and concerns addressed.

## 2022-05-13 NOTE — PROGRESS NOTES
Potassium this AM 3.0. Dr. Sascha Tirado notified per secure message awaiting orders. Potasium was drawn at 6:05 AM but the results were not in the computer until 8:30 AM. Will continue to monitor.

## 2022-05-13 NOTE — CARE COORDINATION
[facility]    Referral received from  to follow for home care services.    [facility] unable to staff    Patient has no preference in agency  Referral sent to [address] [place] [address] [facility] accepted by Dara David RN, BSN CTN  [facility] [phone]

## 2022-05-13 NOTE — PROGRESS NOTES
Inpatient Physical Therapy Evaluation and Treatment    Unit: [address]  Date:  5/13/2022  Patient Name:    Gayle Ledesma  Admitting diagnosis:  Acute on chronic diastolic CHF (congestive heart failure) ([facility] [I50.33]  Acute congestive heart failure, unspecified heart failure type [facility]) [I50.9]  Admit Date:  5/11/2022  Precautions/Restrictions/WB Status/ Lines/ Wounds/ Oxygen: Fall risk and Telemetry   RN notified that pt /s chair/bed alarm. Treatment Time:  11:40-12:07  Treatment Number:  1   Timed Code Treatment Minutes: 27 minutes  Total Treatment Minutes:  17  minutes    Patient Goals for Therapy: Pt reports desire to return home. Discharge Recommendations: Home PRN assist   DME needs for discharge: Needs Met       Therapy recommendation for EMS Transport: can transport by wheelchair    Therapy recommendations for staff: Independent with use of Single point cane  and gait belt for all ambulation to/from chair  to/from bathroom  within room    History of Present Illness: Per H&P on 5-11 Teresa courtney male with DM, h/o CVA, bilateral carotid artery occlusions, hypertension, hyperlipidemia, GERD, a-fib, CHF, CAD S/p CABG, who presents to [facility] with c/o shortness of breath, BLE edema.  He states he was admitted 4/29-4/30. [facility] should have stayed, but wanted to leave and go home.  Since then his weight has been increasing and he has had worsening shortness of breath.  He also reports swelling to BLE's and abdomen.  Seen in Dr. Musa Lanza office today. Manasa Beaver have attempted to diurese the patient outpatient without success.  Directly admitted to med-surg on telemetry.  Cardiology consulted. \"    Home Health S4 Level Recommendation:  NA  AM-PAC Mobility Score    AM-PAC Inpatient Mobility Raw Score : 23       Preadmission Environment    Pt.  Lives with family (wife and step son )   Home environment:    two story home- can stay on the first floor    Steps to enter first floor: 4 steps to enter /c hand rails  Steps to second floor: Full flight of 12-13  Bathroom: tub/shower unit, grab bars, standard height commode and shower seat   Equipment owned: rollator, wc (Manual), shower chair/bench, SPC and reacher     Preadmission Status:  Pt. Able to drive: Yes  Pt Fully independent with ADLs: Yes  Pt. Required assistance from family for: Cleaning, Cooking and Laundry   Pt. independent for transfers and gait and walked with Redgie Blocker  History of falls No    Pain   No  Location: NA  Rating: NA   Pain Medicine Status: No request made    Cognition    A&O x4   Able to follow 2 step commands    Subjective  Patient lying supine in bed with spouse present. Pt agreeable to this PT eval & tx. Upper Extremity ROM/Strength  Please see OT evaluation. Lower Extremity ROM / Strength   AROM WFL: Yes  ROM limitations: NA    BLE strength WFL, but not formally assessed with MMT.    R LE   Quad   WFL   Ant Tib  WFL   Hamstring WFL   Iliopsoas WFL  L LE  Quad   WFL   Ant Tib  WFL   Hamstring WFL   Iliopsoas WFL    Lower Extremity Sensation    WFL    Lower Extremity Proprioception:   NT    Coordination and Tone  NT    Balance  Sitting:  Good ; Modified Independent   Comments: occasional use of bed rail and/or BUE on bed to widen HARRY    Standing: Good ; Supervision /c SBA during toileting in standing  Comments: /c SPC    Bed Mobility   Supine to Sit:    Independent   Sit to Supine:   Not Tested   (up to chair to end session)   Rolling:   Not Tested  Scooting in sitting: Independent  Scooting in supine:  Not Tested    Transfer Training     Sit to stand:   Modified Independent /c SPC  Stand to sit:   Modified Independent /c SPC and/or BUE to assist in lowering  Bed to Chair:   Modified Independent with use of Single point cane     Gait gait completed as indicated below  Distance:      50 ft + 100 ft + 15 ft  Deviations (firm surface/linoleum):  decreased theo, forward flexed posture, step through pattern and decreased step length bilaterally  Assistive Device Used:    gait belt and Single point cane   Level of Assist:    Supervision   Comment: completed laps within the room. Pt experienced minimal SOB after gait training and recovered after ~3 min. Stair Training stairs completed as indicated below  # of Steps:   5  Level of Assist:  SBA  UE Support:  bilateral  Assistive Device:  N/A  Pattern:   non-reciprocal pattern  Comments: use of single step stool for repeated stepping. Raised bed and bedrail to simulate handrails at home. Pt completed quick /c minimal SOB. Activity Tolerance   Pt completed therapy session with SOB noted with gait and stair training  HR = 105 after completion of stair training BPM   SpO2 = after gait trainin%, recovered to 93% after ~3 min of seated rest  SpO2 = after stair trainin%    Positioning Needs   Pt up in chair, no alarm needed, positioned in proper neutral alignment and pressure relief provided. Call light provided and all needs within reach   RN consulted on lack of need for chair alarm. Exercises Initiated  Wagner deferred secondary to treatment focus on functional mobility  NA    Other  None. Patient/Family Education   Pt educated on role of inpatient PT, POC, importance of continued activity, DC recommendations, energy conservation, pacing activity and HEP. Encouraged pt to remain active throughout the day beginning with frequent short bursts of activity /c rest inbetween that can progress to longer bouts of activity as tolerated. Assessment  Pt seen for Physical Therapy evaluation in acute care setting. Pt demonstrated decreased Activity tolerance as well as decreased independence with Ambulation. Pt demonstrated ambulation and stair climbing /c SPC /c minimal SOB and decr activity tolerance. Pt reports decr leg cramping today compared to previous days. Provided pt with SBA at beginning of gait training that progressed to supervision and SBA for stair training.  Overall, pt expressed good understanding of his decr endurance capacity and knowing to take rest breaks when feeling SOB. Family present and engaged in this session; able to assist PRN at home. Recommending Home PRN assist upon discharge as patient functioning close to baseline level and strong family support. Goals : To be met in 3 visits:  1). Independent with LE Ex x 10 reps    To be met in 6 visits:  1). Supine to/from sit: Independent  2). Sit to/from stand: Modified Independent /c SPC  3). Bed to chair: Modified Independent /c SPC  4). Gait: Ambulate 300 ft.   with  Supervision and use of Single point cane   5). Tolerate B LE exercises 3 sets of 10-15 reps  6). Ascend/descend 4 steps with Supervision with use of hand rail bilateral and Single point cane     Rehabilitation Potential: Good  Strengths for achieving goals include:   Pt motivated, PLOF, Family Support and Pt cooperative   Barriers to achieving goals include:    presence of CHF exacerbation    Plan    To be seen 2-3 x / week  while in acute care setting for therapeutic exercises, bed mobility, transfers, progressive gait training, balance training, and family/patient education. Signature: Guillermo London, SPT  Co-Signature: Don Dueñas, PT, DPT    If patient discharges from this facility prior to next visit, this note will serve as the Discharge Summary.

## 2022-05-13 NOTE — DISCHARGE SUMMARY
Name:  Melida Cogan  Room:  2049/4086-90  MRN:    [id number]    Discharge Summary      This discharge summary is in conjunction with a complete physical exam done on the day of discharge. Discharging Physician: Dr. Francie Palaciows: 5/11/2022  Discharge:  5/13/2022    HPI taken from admission H&P:    The patient is a 80 y.o. male with DM, h/o CVA, bilateral carotid artery occlusions, hypertension, hyperlipidemia, GERD, a-fib, CHF, CAD S/p CABG, who presents to [facility] with c/o shortness of breath, BLE edema. He states he was admitted 4/29-4/30. He should have stayed, but wanted to leave and go home. Since then his weight has been increasing and he has had worsening shortness of breath. He also reports swelling to BLE's and abdomen. Seen in Dr. Phoenix Millan office today. They have attempted to diurese the patient outpatient without success. Directly admitted to med-surg on telemetry. Cardiology consulted. Diagnoses this Admission and Hospital Course:    Acute on chronic Diastolic CHF, HFpEF  Pulmonary hypertension, Moderate TR  - admitted for further management/care.  Cardiology consulted. - started on IV lasix 40 mg BID   - monitored daily weights; I&O's  Weight is 165 -> 162 lbs today ; neg fluid balance of 2.29L  -Low sodium diet; fluid restriction  -good diuresis on IV lasix  -->  Switch back to PO torsemide 40 mg BID -> d/c home on oral diuretics   -follow up with cardiology      Hypokalemia   - repleted    A-fib  - HR controlled . Metoprolol dose decreased   -AC: Eliquis     H/o CVA  H/o Bilateral carotid occlusion  -continue aspirin, Statin, Eliquis     CAD  -S/p CABG  -continue aspirin, Statin, BB     DM type 2  -monitor glucose. SSI Coverage  Stop metformin      Hypertension  -BP stable. Continue  Lopressor - dose decreased.  Losartan discontinued      Hyperlipidemia   -on Pravastatin    GERD  -On PPI     Iron deficiency anemia  -continue ferrous sulfate     Hypothyroidism  -home dose of synthroid 88 mcg      Hyponatremia  -  Due to fluid overload   -  Na  125-> 127     POLY on CKD III   - baseline cr of 1.2-1.5, likely cardiorenal syndrome  - crtn 2.9 on admit--> 2.7 today     Weakness  - Consulted PT OT    Procedures (Please Review Full Report for Details)  N/A    Consults    Cardiology       Physical Exam at Discharge:    BP (!) 94/57   Pulse 63   Temp 96.5 °F (35.8 °C) (Oral)   Resp 16   Ht 5' 10\" (1.778 m)   Wt 162 lb 11.2 oz (73.8 kg)   SpO2 99%   BMI 23.35 kg/m²   Gen: No distress. Alert. Eyes: PERRL. No sclera icterus. No conjunctival injection. ENT: No discharge. Pharynx clear. Neck: Trachea midline. Resp: No accessory muscle use. No  crackles. No wheezes. No rhonchi. CV: Irregular rate. Irregular rhythm. No murmur.  No rub. BLE edema. GI: Non-tender. Non-distended. Normal bowel sounds. No hernia. Skin: Warm and dry. No nodule on exposed extremities. No rash on exposed extremities. M/S: No cyanosis. No joint deformity. No clubbing. LE edema decreased to 1+  Neuro: Awake. Grossly nonfocal    Psych: Oriented x 3.  No anxiety or agitation.      Lab Results   Component Value Date    WBC 7.2 05/13/2022    HGB 9.9 (L) 05/13/2022    HCT 29.0 (L) 05/13/2022    MCV 86.5 05/13/2022     05/13/2022     Lab Results   Component Value Date     (L) 05/13/2022    K 4.3 05/13/2022    CL 90 (L) 05/13/2022    CO2 28 05/13/2022    BUN 57 (H) 05/13/2022    CREATININE 2.7 (H) 05/13/2022    GLUCOSE 108 (H) 05/13/2022    CALCIUM 9.1 05/13/2022    PROT 7.8 05/11/2022    LABALBU 4.4 05/11/2022    BILITOT 0.3 05/11/2022    ALKPHOS 202 (H) 05/11/2022    AST 19 05/11/2022    ALT 21 05/11/2022    LABGLOM 23 (A) 05/13/2022    GFRAA 28 (A) 05/13/2022    AGRATIO 1.3 05/11/2022    GLOB 3.8 05/17/2021         CULTURES  Covid not detected     RADIOLOGY  XR CHEST (2 VW)   Final Result   Bibasilar opacifications of least small to moderate bilateral pleural   effusions with adjacent opacifications of atelectasis or airspace disease   given differences in technique from prior CT stable to slightly progressed   volume of effusions               Discharge Medications     Medication List      CHANGE how you take these medications    metoprolol tartrate 50 MG tablet  Commonly known as: LOPRESSOR  Take 0.5 tablets by mouth 2 times daily TAKE 1 TABLET BY MOUTH  TWICE DAILY  What changed:   · how much to take  · how to take this  · when to take this     torsemide 20 MG tablet  Commonly known as: DEMADEX  Take 40 mg in morning and 40 mg at noon  What changed: additional instructions        CONTINUE taking these medications    albuterol sulfate  (90 Base) MCG/ACT inhaler  Commonly known as: ProAir HFA  Inhale 2 puffs into the lungs every 6 hours as needed for Wheezing     apixaban 2.5 MG Tabs tablet  Commonly known as: ELIQUIS  Take 1 tablet by mouth 2 times daily     aspirin 81 MG EC tablet  Take 1 tablet by mouth daily     ciclopirox 8 % solution  Commonly known as: PENLAC     COQ10 PO     ferrous sulfate 325 (65 Fe) MG tablet  Commonly known as: IRON 325     FISH OIL PO     guaiFENesin 400 MG tablet     ipratropium 0.03 % nasal spray  Commonly known as: ATROVENT  2 sprays by Each Nostril route 4 times daily     levothyroxine 88 MCG tablet  Commonly known as: SYNTHROID  TAKE 1 TABLET BY MOUTH  DAILY     magnesium oxide 400 MG tablet  Commonly known as: MAG-OX     MULTI-VITAMIN DAILY PO     pantoprazole 40 MG tablet  Commonly known as: PROTONIX     pravastatin 40 MG tablet  Commonly known as: PRAVACHOL  TAKE 1 TABLET BY MOUTH  DAILY AT NIGHT     vitamin C 250 MG tablet     vitamin D 50 MCG (2000 UT) Caps capsule        STOP taking these medications    losartan 100 MG tablet  Commonly known as: COZAAR     metFORMIN 1000 MG tablet  Commonly known as: GLUCOPHAGE           Where to Get Your Medications      These medications were sent to [address] [place] [address] [phone] Angel Lund [phone]  [facility], [address] [place] [address]    Phone: [phone]   · torsemide 20 MG tablet     Information about where to get these medications is not yet available    Ask your nurse or doctor about these medications  · metoprolol tartrate 50 MG tablet           Discharged in stable condition to home with Cosme Solomon. Total time 35 minutes. > 50%  dominated by counseling and coordination of care. D/W cardiology team       Follow Up:   Follow up with PCP in 1 week and cardiology         Lisette Mcdonnell MD

## 2022-05-13 NOTE — PLAN OF CARE
Problem: Discharge Planning  Goal: Discharge to home or other facility with appropriate resources  Outcome: Progressing     Problem: Safety - Adult  Goal: Free from fall injury  5/13/2022 0117 by Kalia Kwong RN  Outcome: Progressing  Flowsheets (Taken 5/12/2022 1241 by Caitlin Buchanan RN)  Free From Fall Injury: Instruct family/caregiver on patient safety  5/12/2022 1240 by Caitlin Buchanan RN  Outcome: Progressing     Problem: ABCDS Injury Assessment  Goal: Absence of physical injury  5/13/2022 0117 by Kalia Kwong RN  Outcome: Progressing  Flowsheets (Taken 5/12/2022 1241 by Caitlin Buchanan RN)  Absence of Physical Injury: Implement safety measures based on patient assessment  5/12/2022 1240 by Caitlin Buchanan RN  Outcome: Progressing     Problem: Chronic Conditions and Co-morbidities  Goal: Patient's chronic conditions and co-morbidity symptoms are monitored and maintained or improved  5/13/2022 0117 by Kalia Kwong RN  Outcome: Progressing  Flowsheets (Taken 5/12/2022 2104)  Care Plan - Patient's Chronic Conditions and Co-Morbidity Symptoms are Monitored and Maintained or Improved:   Monitor and assess patient's chronic conditions and comorbid symptoms for stability, deterioration, or improvement   Update acute care plan with appropriate goals if chronic or comorbid symptoms are exacerbated and prevent overall improvement and discharge   Collaborate with multidisciplinary team to address chronic and comorbid conditions and prevent exacerbation or deterioration  5/12/2022 1240 by Caitlin Buchanan RN  Outcome: Progressing

## 2022-05-16 NOTE — TELEPHONE ENCOUNTER
Patient informed and VU. He would prefer to have this done @ [facility]. Orders will be faxed after signature.

## 2022-05-16 NOTE — CARE COORDINATION
[facility] Transitions Initial Follow Up Call    Call within 2 business days of discharge: Yes    Patient: Jose Luis Conde Patient : 1940   MRN: [id number]  Reason for Admission: acute on chronic dCHF, pulm hypertension, moderate TR, A Fib on Eliquis, hx CVA, hx bilateral carotid occlusion, CAD s/p CABG, DM2, HTN, HLD, GERD, iron deficiency anemia, hypothyroidism, hyponatremia, POLY on CKD3, weakness -> home with [facility] (SN only - requesting PT/OT eval order )  Discharge Date: 22 RARS: Readmission Risk Score: 21.6 ( )      Last Discharge [facility]       Complaint Diagnosis Description Type Department Provider    22  Acute kidney injury superimposed on CKD ([facility] . .. Admission (Discharged) 6963 Ifrah Kennedy MD        Spoke with: Jose Luis Conde (patient)    Facility: [facility]    Non-face-to-face services provided:  Obtained and reviewed discharge summary and/or continuity of care documents  Communication with home health agencies or other community services the patient is currently using-  Education of patient/family/caregiver/guardian to support self-management-s/s monitor; daily weights; CHF education  Assessment and support for treatment adherence and medication management-1111F completed     Was this an external facility discharge? No Discharge Facility: NA    Challenges to be reviewed by the provider   Additional needs identified to be addressed with provider: Yes  home health care-please add additional order for PT/OT evaluation and fax to [address] [address] ([phone])       Method of communication with provider : chart routing    Advance Care Planning:   Does patient have an Advance Directive: reviewed and current. Care Transition Nurse (CTN) contacted the patient by telephone to perform post hospital discharge assessment. Provided introduction to self, and explanation of the CTN role.      CTN reviewed discharge instructions, medical action plan and red flags with patient who verbalized understanding. Patient given an opportunity to ask questions and does not have any further questions or concerns at this time. Were discharge instructions available to patient? Yes. Reviewed appropriate site of care based on symptoms and resources available to patient including: PCP  Specialist  Home health. The patient agrees to contact the PCP office for questions related to their healthcare. Medication reconciliation was performed with patient, who verbalizes understanding of administration of home medications. Advised obtaining a 90-day supply of all daily and as-needed medications. Was patient discharged with a pulse oximeter? no    Just home from having labwork drawn. Feels \"the same\". Today's weight 163# (stable x 2 days)    Taking metoprolol 50mg half tablet BID (was taking whole tablet). Aware of change in torsemide and has been taking since Friday at higher frequency (40mg BID). He is agreeable to speaking with care transition team dietician to help aid in changing his diet to be compliant with low sodium, restricted fluids and low carbs. CTN will make referral at close of call. He has not heard from [address] [place] [address]. Asked about PT/OT which he is now agreeable to. CTN will follow up with [facility]. and see if PCP will send orders to add PT/OT eval as well. Denies needs otherwise. Reviewed appointments this week and he is aware. States he was told the doctor will call him once the labwork results. CTN provided contact information. CTN contacted Maribel Loyd with [facility]. Message left. Per chart review, confirmed HC order for SN only. Called [facility] office, they ask for PT/OT order to be faxed and say SN [facility] scheduled for today. Chart routing to PCP office requesting addition of PT/OT eval for [facility] to be faxed to them. Plan for follow-up call in 3-5 days based on severity of symptoms and risk factors.   Plan for next call: symptom management-CHF  follow up appointment-pulm and PCP  referrals-[facility], dietician    Care Transitions 24 Hour Call    Schedule Follow Up Appointment with PCP: Completed  Do you have a copy of your discharge instructions?: Yes  Do you have all of your prescriptions and are they filled?: Yes  Have you been contacted by a [address]?: No  Have you scheduled your follow up appointment?: Yes  How are you going to get to your appointment?: Car - family or friend to transport  Do you have support at home?: Partner/Spouse/SO  Do you feel like you have everything you need to keep you well at home?: Yes  Are you an active caregiver in your home?: No  Care Transitions Interventions  No Identified Needs       Follow Up  Future Appointments   Date Time Provider Getachew Cottrell   5/17/2022 11:20 AM Mariya Atkins MD AND [facility]   5/19/2022  8:00 AM Paul Rojas APRN - [id number]   6/1/2022  3:00 PM MD KAVYA Aguilar   6/22/2022 12:30 PM Gely Brown MD NO CLESLIM CAR ISADORA Johnson RN

## 2022-05-16 NOTE — TELEPHONE ENCOUNTER
----- Message from AICHA James CNP sent at 5/16/2022 11:29 AM EDT -----  Iron levels low, recommend IV iron infusions injectafer x2 doses at the infusion center.

## 2022-05-16 NOTE — CARE COORDINATION
Contacted Millie Rosas regarding Dietitian referral. Pt answered, RD explained reason for call and role in care. Pt requested RD call back on a different day- patient prefers a call on Wednesday 5/18 or Thursday 5/19. RD will contact patient as requested and follow up as appropriate.      [facility], [address] [place] [address]

## 2022-05-16 NOTE — TELEPHONE ENCOUNTER
[place] calling for verbal order for home care, patient was discharged from [place] [address] on 5/13, he does have appt on 5/19

## 2022-05-16 NOTE — TELEPHONE ENCOUNTER
----- Message from AICHA Still CNP sent at 5/16/2022 11:29 AM EDT -----  Please notify patient results. Kidney function is stable and improved from the hospital but remains abnormal. Electrolytes are stable, improved sodium number  Pro-BNP (heart failure number) is improved compared to the hospital numbers. Please get an update on his weights and symptoms.

## 2022-05-17 PROBLEM — K80.20 CALCULUS OF GALLBLADDER WITHOUT CHOLECYSTITIS WITHOUT OBSTRUCTION: Status: ACTIVE | Noted: 2022-01-01

## 2022-05-17 PROBLEM — J90 LOCULATED PLEURAL EFFUSION: Status: ACTIVE | Noted: 2022-01-01

## 2022-05-17 PROBLEM — I51.89 GRADE III DIASTOLIC DYSFUNCTION: Status: ACTIVE | Noted: 2022-01-01

## 2022-05-17 PROBLEM — N18.4 STAGE 4 CHRONIC KIDNEY DISEASE (HCC): Status: ACTIVE | Noted: 2022-01-01

## 2022-05-17 PROBLEM — I27.20 PULMONARY HTN (HCC): Status: ACTIVE | Noted: 2022-01-01

## 2022-05-17 NOTE — PROGRESS NOTES
C/O Pulmonary follow up and to discuss the clinical status      Patient has come back to the office for a pulmonary follow-up, patient states that he was recently in the hospital because of increasing shortness of breath and leg swelling and patient states that he was treated for the same and patient states that he still has shortness of breath especially when he lies down he feels that there is a weight on his chest, patient also has been having some trouble on lying on the back, patient also takes 2 pillows while sleeping, patient does have some coughing with scanty mucoid expectoration, patient does not have any chest pain or palpitations, no wheezing, patient has no increasing abdominal symptoms, patient states that his water pills have been increased now he is takes 4 water pills a day, patient states that he had gained 13 pounds prior to the hospitalization but after he was hospitalized he has lost 78 pounds out of that, patient tried Trelegy Ellipta but patient did not have any significant relief with that, patient does not have any significant confusion lethargy or any sleep fragmentation, patient's PCP treating him for hypothyroidism, patient does not have any other pertinent review of system of concern    Previous HPIPatient is an 61-year-old male who has come to the office for pulmonary follow-up, patient states that he continues to have breathing issues and patient states that his symptoms are somewhat worse, patient states that he has been using albuterol inhaler twice a day with improvement, patient was given Trelegy Ellipta sample to try and patient has not tried it so far, patient when asked why he did not try it patient states that he has heard that it is very expensive, patient also has been having some sinus congestion and postnasal drainage which is significant and patient had gone to ENT and patient was told that he has sinus infection and patient was given antibiotics, patient's continues to have some sinus congestion, patient has been using nasal spray, patient does not have any increasing cough or expectoration per se, no chest pain or palpitations, no fever no chills, patient does not have any significant abdominal discomfort of concern, patient states that he cannot take a deep breath, patient has occasional wheezing once in a while but not on regular basis, patient also was referred to pulm rehab but patient has not gone there patient states that he tried to call there twice but nobody picked up the phone and for that reason he did stop trying it, patient has an electric base heat, patient has started using a vaporizer at nighttime, patient also has 2 cats with exposure to animal dander, patient does not have any confusion lethargy, no other pertinent review of system of course     : Patient has come to the office for a pulmonary follow-up, patient used to follow Dr. Hannah Gaona, patient was subjected to a CT of the chest along with PFT and patient was told to follow-up, patient continues to have shortness of breath, patient's exercise tolerance is limited, patient does not have any increasing cough or expectoration, patient does have some sinus congestion and patient has been given Astelin nasal spray with limited success, patient does not have any sore throat or difficulty in swallowing, no coughing or choking while eating, patient does not have any obvious odynophagia or dysphagia, patient does not have any increasing wheezing, patient does not have any palpitations or diaphoresis, no chest pain per se patient does not have any fever or chills, patient has occasional chest tightness, patient does not have any increasing abdominal discomfort nausea vomiting, patient takes water pill on a regular basis, patient does not have any significant dysuria hematuria or any hematochezia or melena, patient does not have any epistaxis or hemoptysis, patient sees cardiology on regular basis, patient has had thoracentesis done in the past, patient does not have any sleep fragmentation, patient's thyroid function is controlled with Synthroid, patient does not have any change in the ambient environment any sick contacts, no other pertinent review of system of concern          Review of Systems same as abovbe    Physical Exam:  Blood pressure 132/81, pulse 106, temperature 98.2 °F (36.8 °C), temperature source Temporal, resp. rate 18, height 5' 10\" (1.778 m), weight 165 lb (74.8 kg), SpO2 93 %.'  Constitutional:  No acute distress. HENT:  Oropharynx is clear and moist. Nothyromegaly. Eyes:  Conjunctivae are normal. Pupils equal, round, and reactive to light. No scleral icterus. Neck: . No tracheal deviation present. No obviousthyroid mass. Cardiovascular: Normal rate, regular rhythm, normal heart sounds. No right ventricular heave. Some lower extremity edema. Pulmonary/Chest: No wheezes. Minimal scattered  rales. Chest wall is not dull to percussion. No accessory muscle usage or stridor. Decreased BSI  Abdominal: Soft. Bowel sounds present. No distension or hernia. No tenderness. Musculoskeletal: No cyanosis. No clubbing. No obvious joint deformity. Lymphadenopathy: No cervical or supraclavicularadenopathy. Skin: Skin is warm and dry. No rash or nodules on the exposed extremities. Psychiatric: Normal mood and affect. Behavior is normal.  No anxiety. Neurologic : Alert, awake and oriented. PERRL. Speechfluent          Data:     Imaging:  I have reviewed radiology images personally. No orders to display     CT CHEST WO CONTRAST    Result Date: 7/1/2021  EXAMINATION: CT OF THE CHEST WITHOUT CONTRAST 7/1/2021 11:05 am TECHNIQUE: CT of the chest was performed without the administration of intravenous contrast. Multiplanar reformatted images are provided for review.  Dose modulation, iterative reconstruction, and/or weight based adjustment of the mA/kV was utilized to reduce the radiation dose to as low as reasonably achievable. COMPARISON: September 2020 HISTORY: ORDERING SYSTEM PROVIDED HISTORY: Bilateral pleural effusion TECHNOLOGIST PROVIDED HISTORY: Reason for Exam: sob; bilateral pleural effusion; Acuity: Acute Type of Exam: Initial Relevant Medical/Surgical History: 4 way bypass 2018; former smoker; FINDINGS: Mediastinum: Thyroid gland appears normal.  Coronary artery calcification is seen. No pericardial effusion is seen. Aortic valve calcification is seen. Small hiatal hernia seen. There is nonspecific thickening at the GE junction. .  Small mildly enlarged mediastinal and hilar nodes are noted. Index precarinal node seen previously measures 2.4 cm by 1.4 cm, unchanged from prior. Lungs/pleura: Small pleural effusion is seen on the left. Curvilinear nodular opacity in the lingula is unchanged. Curvilinear and nodular opacities posteriorly in the left lower lobe are unchanged. On the right, small right-sided pleural effusion is seen. Curvilinear opacity is seen in the right middle lobe. Curvilinear opacity in the right lower lobe is unchanged. There is chronic thickening of the pleura bilaterally. Non dependent portions of pleural fluid are seen on the right, compatible with loculation. Scattered superimpose reticular opacities are seen throughout the lungs, similar to prior Upper Abdomen: Adrenal glands appear unchanged. Atherosclerotic change seen in abdominal aorta. Gallstones are seen. Mild stool load is seen in the colon. Soft Tissues/Bones: Spurring is seen in the spine. Spurring is seen in the shoulder joints. Stable chest CT. No change in chronic pleural effusions, right greater than left, with chronic consolidative changes in both the right and left lung.  Small mildly enlarged mediastinal nodes are noted,, similar compared to prior Scattered superimpose reticular opacities are seen throughout the lungs similar to prior, either due to scarring or mild fibrosis     PFT INTERPRETATION     The patient is an 60-year-old male who underwent a PFT for shortness of  breath. Spirometry shows FVC to be 40%, FEV1 to be 40%, FEV1 to FVC  ratio was 100%, JDD14-51% was 35%. The patient had significant  postbronchodilator improvement on the study. The patient's lung volume  shows the total capacity was significantly reduced. The patient also  has some air trapping. Along with that, the patient's diffusion  capacity when adjusted for volume was normal.  The patient's flow-volume  loop was suggestive of restrictive pattern.     The patient also underwent a 6-minute walk test which shows baseline  oxygen saturation of 98% at room air at rest with a heart rate of 62,  respiratory rate of 18, dyspnea-modified Javier scale of 0,  fatigue-modified Javier scale of 0. The patient walked 1000 feet. The  patient did not have any exertional hypoxemia on the study. Total  distance walked was 1000 feet. Total expected 6-minute walk distance  was 1694 feet. The patient achieved 59% of the expected distance. The  patient had severe back pain after the walking. The patient also had a  PFT done in 2018, at that time, the patient had FVC of 88%, FEV1 of 95%  and FEV1 to FVC ratio was 106% at that time. The patient had FSR45-33%  was 120% at that time. The patient's total lung capacity was 67% at  that time and diffusion capacity when adjusted for volume was normal at  that time. On the basis of this PFT, the patient has severe restrictive  lung disease along with reactive airway disease. The patient also has  air trapping suggestive of some tendency towards emphysema as compared  to 2018, the patient's FVC, FEV1, LFX30-03% has significantly reduced. Also the patient has reduction in total lung capacity significantly as  compared to 2018. The patient does not have any exertional hypoxemia on  suboptimal exercise. Please correlate clinically.     TSH 3.70        CTA OF THE CHEST 4/29/2022 3:54 pm     TECHNIQUE:   CTA of the chest was performed after the administration of intravenous   contrast.  Multiplanar reformatted images are provided for review.  MIP   images are provided for review. Dose modulation, iterative reconstruction,   and/or weight based adjustment of the mA/kV was utilized to reduce the   radiation dose to as low as reasonably achievable.       COMPARISON:   CT chest from July 1, 2021.       HISTORY:   ORDERING SYSTEM PROVIDED HISTORY: SOB, new onset A-fib. TECHNOLOGIST PROVIDED HISTORY:   Reason for exam:->SOB, new onset A-fib. Decision Support Exception - unselect if not a suspected or confirmed   emergency medical condition->Emergency Medical Condition (MA)   Reason for Exam: Atrial Fibrillation (pt at cardiac rehab and experienced new   onset A-fib).       FINDINGS:   Pulmonary Arteries: Pulmonary arteries are adequately opacified for   evaluation.  No evidence of intraluminal filling defect to suggest pulmonary   embolism.  Main pulmonary artery is normal in caliber.       Mediastinum: Paratracheal nodes measure up to 1.6 cm.  Precarinal nodes   measure up to 1.8 cm.  AP window nodes measure 1 cm.  Subcarinal nodes   measure up to 1.3 cm.  Lymphadenopathy slightly more pronounced compared to   previous evaluation.  The heart and pericardium demonstrate no acute   abnormality.  There is no acute abnormality of the thoracic aorta.       Lungs/pleura: There is evidence of bilateral pleural effusions which are   likely partly loculated.  Effusions are mild-to-moderate.  Evidence of   bilateral lower lobe atelectatic and/or consolidative changes.  No evidence   of pulmonary edema.  Some of these findings were previously seen as well and   there is no dramatic change compared to the previous evaluation.       Upper Abdomen: Cholelithiasis but no acute cholecystitis.       Soft Tissues/Bones: Moderate multilevel degenerative disc disease.    Postthoracotomy changes.  No other acute abnormality.           Impression   1. No evidence of acute pulmonary embolism or acute aortic disease. 2. Redemonstration of lymphadenopathy which is slightly more pronounced   compared to the previous evaluation in the mediastinum. 3. Redemonstration of bilateral pleural effusions partly loculated which are   mild to moderate as well as lower lobe atelectatic and/or consolidative   changes.  These were previously seen as well. 4. Cholelithiasis but no acute cholecystitis     ECHO-LV systolic function is normal with EF estimated at 55%. No obvious segmental wall motion abnormalities. There is mild systolic and diastolic septal flattening c/w RV pressure and volume overload. There is mild concentric left ventricular hypertrophy. Sigmoid-shaped basal septum. Grade III diastolic dysfunction with elevated filing pressure. Mild biatrial enlargement. Mild mitral, aortic, and pulmonic regurgitation. Moderate tricuspid regurgitation. Systolic pulmonary artery pressure estimated at 59 mmHg (RAP 8 mmHg),   c/w moderate pulm HTN. There is a pleural effusion.     Component Ref Range & Units 5/16/22 1021 5/13/22 0604 5/12/22 0613 5/11/22 1817 4/29/22 1455 4/20/22 0848 3/8/22 1242   Sodium 136 - 145 mmol/L 133 Low   130 Low   127 Low   125 Low   135 Low   129 Low   136    Potassium 3.5 - 5.1 mmol/L 4.0  4.3  4.8  4.7  4.6  4.7  4.4    Chloride 99 - 110 mmol/L 89 Low   90 Low   88 Low   85 Low   93 Low   91 Low   95 Low     CO2 21 - 32 mmol/L 30  28  27  23  26  26  29    Anion Gap 3 - 16 14  12  12  17 High   16  12  12    Glucose 70 - 99 mg/dL 115 High   108 High   109 High   117 High   110 High   99  90    BUN 7 - 20 mg/dL 39 High   57 High   59 High   62 High   39 High   31 High   38 High     CREATININE 0.8 - 1.3 mg/dL 2.4 High   2.7 High   2.7 High   2.9 High   1.6 High   1.4 High   1.6 High     GFR Non- >60 26 Abnormal   23 Abnormal  CM  23 Abnormal  CM  21 Abnormal  CM  42 Abnormal  CM Assessment:    1. Shortness of breath        2. Allergic sinusitis      3. Restrictive lung disease      4. Chronic diastolic congestive heart failure ([address]    5. Loculated pleural effusion    6. Mediastinal adenopathy    7. Diastolic III dysfunction    8. Pulmonary hypertension    9.   CKD 4                Plan:   · Patient's review of system were discussed  · Patient and family were told about the auscultatory findings and its implications  · Patient and family have been shown the CT scan of the chest along with interpretation/differential diagnosis/implications/options  · Patient does have significant loculated pleural effusion along with that patient has diastolic dysfunction along with pulmonary hypertension which may be contributing to patient's symptomatology specially when he lies down he has more shortness of breath and the etiology for that discussed in detail  · Patient to continue with diuretics as given by cardiology  · Patient needs to weigh himself on a daily basis and if he gains more than 2 pounds in 24 hours time or more than 4 pounds in a week then patient may require extra diuresis after discussion with cardiology/internal  medicine  · Patient Synthroid as per PCP as per TSH to continue  · Salt and fluid restrictions discussed  · Patient's PFT results were interpreted along with interpretation/implications and options  · Patient's lab work also shows patient to have a CKD 4  · Some sugar-free lozenges may also help  · Patient did not get any benefit from a long-acting inhaler  · Patient to take albuterol inhaler 2 puffs every 6 and whenever necessary basis  · Patient may benefit from a pulmonary rehab and after discussion patient was agreeable and patient was referred to [facility] rehab for the same as it is closer to his house-reinforced to try that again  · Patient to follow-up in 3 months time or earlier if required  · Patient and family were made clear about his overall clinical condition and possible etiologies causing him to have symptoms  · Patient to continue with nasal sprays as given by ENT  · Diet and lifestyle modification discussed  · Patient was told a humidifier around-the-clock may help  · Patient and family were also told her to discuss all each other about the course of care, DURABLE POWER OF  and CODE STATUS-if for any reason patient has to go on a respirator patient may have less chance of a meaningful recovery which was discussed

## 2022-05-17 NOTE — PROGRESS NOTES
MA Communication:   The following orders are received by verbal communication from Dangelo Durán MD    Orders include:    1 year: 06/08/2023 11:40 am

## 2022-05-17 NOTE — PATIENT INSTRUCTIONS
Remember to bring a list of pulmonary medications and any CPAP or BiPAP machines to your next appointment with the office. Please keep all of your future appointments scheduled by [address], [facility] office. Out of respect for other patients and providers, you may be asked to reschedule your appointment if you arrive later than your scheduled appointment time. Appointments cancelled less than 24hrs in advance will be considered a no show. Patients with three missed appointments within 1 year or four missed appointments within 2 years can be dismissed from the practice. Please be aware that our physicians are required to work in the Intensive Care Unit at [facility].  Your appointment may need to be rescheduled if they are designated to work during your appointment time. You may receive a survey regarding the care you received during your visit. Your input is valuable to us. We encourage you to complete and return your survey. We hope you will choose us in the future for your healthcare needs. Pt instructed of all future appointment dates & times, including radiology, labs, procedures & referrals. If procedures were scheduled preparation instructions provided. Instructions on future appointments with [facility] were given.

## 2022-05-18 PROBLEM — K90.9 MALABSORPTION OF IRON: Status: ACTIVE | Noted: 2022-01-01

## 2022-05-18 NOTE — CARE COORDINATION
Contacted Alessia Corley and left voicemail regarding Dietitian referral. Left call back number and will follow up as appropriate.          [address] [facility], [address] [place] [address]

## 2022-05-18 NOTE — CARE COORDINATION
Take 1 tablet by mouth daily 30 tablet 0    Cholecalciferol (VITAMIN D) 2000 UNITS CAPS capsule Take  by mouth daily. No current facility-administered medications for this visit. Biochemical Data, Medical Tests and Procedures:    Lab Results   Component Value Date    LABA1C 6.5 05/11/2022     Lab Results   Component Value Date    .9 05/11/2022       Lab Results   Component Value Date    CHOL 186 02/15/2022    CHOL 184 12/29/2020    CHOL 150 06/02/2020     Lab Results   Component Value Date    TRIG 76 02/15/2022    TRIG 91 12/29/2020    TRIG 87 06/02/2020     Lab Results   Component Value Date    HDL 57 02/15/2022    HDL 49 05/17/2021    HDL 52 12/29/2020     Lab Results   Component Value Date    LDLCALC 114 (H) 02/15/2022    LDLCALC 92 05/17/2021    LDLCALC 114 (H) 12/29/2020     Lab Results   Component Value Date    LABVLDL 15 02/15/2022    LABVLDL 20 05/17/2021    LABVLDL 18 12/29/2020     Anthropometric Measurements:    Height: 70 inches (177.8 cm)  Weight: 165 lb (74.8 kg)  BMI: 23.68 (normal)  IBW: 166 lb (75.5 kg) +-10%  %IBW: 99.1%     Physical Exam Findings:  Deferred    Nutrition Interview: RD called pt, explained reason for call and role in care. Pt states appetite is \"pretty good\", typically eats 2-3 meals/day. See food recall below. RD explained the importance of watching sodium to prevent body from holding extra fluid. RD explained the nutrition plan for heart failure usually limits the sodium from food and beverages to no more than 2000 mg per day. Discussed avoiding the salt shaker when eating/cooking- RD encouraged pt to put it in a place that is not accessible and to use alternatives such as Mrs. Dash, black pepper, rosemary, etc. Explained how sodium is hidden in a lot of foods and the importance of reading food labels-choosing foods with 140 mg of sodium or less per serving. Discussed draining/rinsing canned goods to decrease sodium content.  Explained restaurant foods tend to address. 24-Hour Diet Recall  Breakfast  Consumed: multi grain cereal, toast with butter, red grapes, cherries and coffee    Lunch  Consumed: piper beans    Dinner  Consumed: fish and coleslaw    Beverages: water and coffee- pt states he was instructed to limit fluids to 50 ounces per day     Frequency of meals away from home: once a week     Blood sugar checks: pt states this AM  mg/dL     Nutrition Diagnosis:  #1 Problem Food and nutrition-related knowledge deficit       Etiology related to lack of prior nutrition related education       Signs/Symptoms as evidenced by referral for nutrition education regarding CHF and DM     Nutrition Intervention:     Estimated Needs  cardiac diet and diabetic diet providing 2000 kcals to promote wt maintenance ([address] based on CBW). Estimated daily CHO Needs: 275 g (based on 45-65% total calorie intake)  Estimated daily Protein Needs: 60-75 g (based on 0.8-1.0 g/kg based on CBW)  Estimated daily Fluid Needs: per MD 50 ounces    #1 Nutrition Information: Provided patient with Fluid Restricted Diet, Heart Failure Nutrition Therapy and Meal Planner DM handouts. For reinforcement of concepts discussed during nutrition interview. #2 Nutrition Counseling: Used open-ended questions to assess patients perceived susceptibility and severity of disease state. Discussed potential impact of health threat on patient's lifestyle. Used open-ended questions to assess patient's perception regarding benefits of and barriers to implementation of nutrition therapy. #3 Nutrition Education: Clearly defined the benefits of nutrition therapy. Summarized and affirmed positive aspects of current nutrition patterns. Provided education regarding value of adherence to cardiac diet and diabetic diet. Discussed ways to establish applying concepts of alternatives and choices regarding implementation of diet. Explored ideas for small, incremental goals to initiate behavior change. Nutrition Monitoring and Evaluation:     Indicator/Goal Criteria   #1 Eat balanced meals consistently throughout the day. #1 Focus on eating 3 meals/day and make these meals balanced using the MyPlate [id number]-1/3 plate fruits and/or vegetables, 1/4 plate protein and 1/4 plate starchy carbohydrates with 8 oz glass of low fat milk if desired. #2 Monitor daily sodium intake. Keep sodium from food and beverages to no more than 2000 mg/day. #2 Avoid the salt shaker. Read food labels to help choose lower sodium options (<140 mg of sodium per serving). #3 Monitor daily weights and check BS daily. #3 Weigh self daily and notify MD of sudden weight gain. Check BS daily and keep a log. Follow Up: RD will call pt in 2-3 weeks to follow up and make sure pt received handouts in mail. RD will answer any nutrition related questions at this time.      [address] [facility] [address]

## 2022-05-18 NOTE — PROGRESS NOTES
CHIEF COMPLAINT  Chief Complaint   Patient presents with    Check-Up     HTN, DM2        HPI   Marisel Mora is a 80 y.o. male who presents to the office for checkup. Patient continues to follow with nephrology, cardiology and pulmonology on a regular basis. Patient reports that he is currently receiving iron infusions, first one is today. Is accompanied by family. Patient reports that he is doing well. Patient reports chronic shortness of breath but no new or worsening symptoms. Patient reports weighing himself daily as well as checking his blood sugar and blood pressure. Patient does report some recent changes in his medications. Patient's family concerned with redness in his right eye onset 1 day. Patient denies any pain, visual disturbances. Patient denies any excessive coughing, vomiting or sneezing to his acknowledgment. Patient denies any itching or drainage from right eye. Patient reports taking his medications as prescribed. No other complaints, modifying factors or associated symptoms. Nursing notes reviewed.    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    Pleural effusion, bilateral 7/13/2021    Primary hypertension     Routine health maintenance     refuses immunizations; declines PSA    Tinnitus     Vasovagal reaction     to needles    Vitamin D deficiency disease 12/15     Past Surgical History:   Procedure Laterality Date    COLONOSCOPY N/A 4/20/2022    COLONOSCOPY POLYPECTOMY SNARE/COLD BIOPSY performed by Ruel Anaya MD at [address] [facility] COLONOSCOPY N/A 2022    COLONOSCOPY CONTROL HEMORRHAGE performed by Ruel Anaya MD at [address] [place] [address]  2000    RX Tristar 2.5 x 13 mCX  RX Tristar 2.5 x 13 pCX    CORONARY ANGIOPLASTY WITH STENT PLACEMENT  10/19/2000    Tetra 2.75 x 18 CX  Tetra 3.0 x 13 CX    CORONARY ARTERY BYPASS GRAFT N/A 2018    OTHER SURGICAL HISTORY  2012    phaco emulsification cataract right eye    TONSILLECTOMY      TOOTH EXTRACTION      UPPER GASTROINTESTINAL ENDOSCOPY N/A 2022    EGD BIOPSY performed by Ruel Anaya MD at [address] History   Problem Relation Age of Onset    Emphysema Mother     Stroke Father     Other Daughter         Factor V deficiency    Other Grandchild         Factor V deficiency     Social History     Socioeconomic History    Marital status:      Spouse name: Not on file    Number of children: 3    Years of education: Not on file    Highest education level: Not on file   Occupational History    Not on file   Tobacco Use    Smoking status: Former Smoker     Packs/day: 2.00     Years: 20.00     Pack years: 40.00     Types: Cigarettes     Start date: 1956     Quit date: 1975     Years since quittin.4    Smokeless tobacco: Never Used   Vaping Use    Vaping Use: Never used   Substance and Sexual Activity    Alcohol use: No    Drug use: No    Sexual activity: Yes     Partners: Female   Other Topics Concern    Not on file   Social History Narrative    Not on file     Social Determinants of Health     Financial Resource Strain:     Difficulty of Paying Living Expenses: Not on file   Food Insecurity:     Worried About Running Out of Food in the Last Year: Not on file    [place]bin of Food in the Last Year: Not on file   Transportation Needs:     Lack of Transportation (Medical): Not on file    Lack of Transportation (Non-Medical):  Not on file   Physical Activity:     Days of Exercise per Week: Not on file    Minutes of Exercise per Session: Not on file   Stress:     Feeling of Stress : Not on file   Social Connections:     Frequency of Communication with Friends and Family: Not on file    Frequency of Social Gatherings with Friends and Family: Not on file    Attends Taoism Services: Not on file    Active Member of [address] [place] [address] or Organizations: Not on file    Attends Club or Organization Meetings: Not on file    Marital Status: Not on file   Intimate Partner Violence:     Fear of Current or Ex-Partner: Not on file    Emotionally Abused: Not on file    Physically Abused: Not on file    Sexually Abused: Not on file   Housing Stability:     Unable to Pay for Housing in the Last Year: Not on file    Number of [place] in the Last Year: Not on file    Unstable Housing in the Last Year: Not on file     Current Outpatient Medications   Medication Sig Dispense Refill    metoprolol tartrate (LOPRESSOR) 50 MG tablet Take 0.5 tablets by mouth 2 times daily TAKE 1 TABLET BY MOUTH  TWICE DAILY 180 tablet 3    torsemide (DEMADEX) 20 MG tablet Take 40 mg in morning and 40 mg at noon 120 tablet 0    apixaban (ELIQUIS) 2.5 MG TABS tablet Take 1 tablet by mouth 2 times daily 60 tablet 2    pravastatin (PRAVACHOL) 40 MG tablet TAKE 1 TABLET BY MOUTH  DAILY AT NIGHT 90 tablet 3    pantoprazole (PROTONIX) 40 MG tablet TAKE 1 BY MOUTH ONCE DAILY IN THE MORNING 30 MINUTES TO AN HOUR BEFORE BREAKFAST      ipratropium (ATROVENT) 0.03 % nasal spray 2 sprays by Each Nostril route 4 times daily 1 each 1    ciclopirox (PENLAC) 8 % solution       Ascorbic Acid (VITAMIN C) 250 MG tablet Take 250 mg by mouth daily      albuterol sulfate HFA (PROAIR HFA) 108 (90 Base) MCG/ACT inhaler Inhale 2 puffs into the lungs every 6 hours as needed for Wheezing 1 Inhaler 5    levothyroxine (SYNTHROID) 88 MCG tablet TAKE 1 TABLET BY MOUTH  DAILY 90 tablet 3    Omega-3 Fatty Acids (FISH OIL PO) Take by mouth      Multiple Vitamin (MULTI-VITAMIN DAILY PO) Take by mouth      Coenzyme Q10 (COQ10 PO) Take by mouth      guaiFENesin 400 MG tablet Take 400 mg by mouth daily       magnesium oxide (MAG-OX) 400 MG tablet Take 400 mg by mouth daily      ferrous sulfate 325 (65 Fe) MG tablet Take 325 mg by mouth daily (with breakfast)       aspirin 81 MG EC tablet Take 1 tablet by mouth daily 30 tablet 0    Cholecalciferol (VITAMIN D) 2000 UNITS CAPS capsule Take  by mouth daily. No current facility-administered medications for this visit. Allergies   Allergen Reactions    Metolazone      Sodium 120 on 5/19, was on metolazone, no hypovolemic synptoms on presentation    Lipitor      Myalgias at 20 mg    Simvastatin      myalgias       REVIEW OF SYSTEMS  Review of Systems   Constitutional: Negative for activity change, appetite change, chills and fever. HENT: Negative for congestion, rhinorrhea and sore throat. Eyes: Negative for visual disturbance. Redness to right eye   Respiratory: Positive for cough and shortness of breath. Negative for chest tightness and wheezing. Cardiovascular: Negative for chest pain and leg swelling. Gastrointestinal: Negative for abdominal pain, diarrhea, nausea and vomiting. Genitourinary: Negative for dysuria, frequency, hematuria and urgency. Musculoskeletal: Positive for myalgias. Negative for gait problem. Skin: Negative for rash. Neurological: Negative for dizziness, weakness, light-headedness, numbness and headaches. Psychiatric/Behavioral: Negative for sleep disturbance. PHYSICAL EXAM  /73   Pulse 75   Temp 97.9 °F (36.6 °C) (Oral)   Wt 165 lb (74.8 kg)   SpO2 94%   BMI 23.68 kg/m²   Physical Exam  Vitals reviewed. Constitutional:       Appearance: Normal appearance. He is not toxic-appearing.    HENT: Head: Normocephalic and atraumatic. Right Ear: External ear normal.      Left Ear: External ear normal.      Nose: Nose normal.      Mouth/Throat:      Mouth: Mucous membranes are moist.      Pharynx: Oropharynx is clear. Eyes:      Extraocular Movements: Extraocular movements intact. Conjunctiva/sclera:      Right eye: Hemorrhage present. Pupils: Pupils are equal, round, and reactive to light. Cardiovascular:      Rate and Rhythm: Normal rate. Pulses: Normal pulses. Pulmonary:      Effort: Pulmonary effort is normal.      Breath sounds: Normal breath sounds. Abdominal:      Palpations: Abdomen is soft. Tenderness: There is no guarding. Musculoskeletal:         General: Normal range of motion. Cervical back: Normal range of motion. Comments: Compression stockings on, trace edema noted bilateral lower extremities. Skin:     General: Skin is warm and dry. Capillary Refill: Capillary refill takes less than 2 seconds. Neurological:      Mental Status: He is alert and oriented to person, place, and time. ASSESSMENT/PLAN:   1. Acute on chronic diastolic CHF (congestive heart failure) ([facility]  Stable Patient managed by cardiologist.  Patient reports recent visit last week. Patient reports multiple changes to his medications at that time. Patient reports metoprolol 25 mg twice a day, furosemide 40 mg twice a day, pravastatin 40 mg daily, Eliquis 2.5 mg twice a day. Patient reports chronic shortness of breath but no new or worsening symptoms. Patient has recently seen pulmonologist Dr. Milla Ny he does not have a follow-up with him until 1 year. Patient reports that he is going to receive iron infusions first 1 being today. Continue with current treatment management follow-up in 6 months, sooner for new or worsening symptoms. 2. Paroxysmal atrial fibrillation ([facility]  See above #1    3.  Coronary artery disease involving native coronary artery of native heart without angina pectoris  See above #1    4. Essential hypertension  Stable. Patient reports checking blood pressure daily. Patient reports that his blood pressure at home today was 124/87, heart rate 96. Patient denies any episodes of dizziness or lightheadedness. No chest pain or chest tightness. Patient compliant with current medications. Follow-up in 6 months, sooner for new or worsening symptoms. 5. Hypothyroidism, acquired, autoimmune  Stable. Recent labs reviewed and within ranges. Patient compliant with levothyroxine 88 mcg daily. Continue with current treatment management follow-up in 6 months, sooner for new or worsening symptoms. 6. Type 2 diabetes mellitus without complication, without long-term current use of insulin (HCC)  Stable. Recent A1c 6.5. Patient reports checking blood sugar daily. Patient reports this morning his blood sugar was 130. He reports that he has an upcoming appointment with podiatrist, Dr. Sheri Medina and will have his foot exam done there at that time. Will obtain records/reports. Patient recently discontinued off metformin. Patient reports doing well at this time. Continue with current treatment management at this time follow-up in 6 months, sooner for new or worsening symptoms. 7. Stage 4 chronic kidney disease (HCC)  Stable. Managed by nephrology. Patient denies any recent changes in treatment or medical regimen. Patient was recently admitted for POLY. Encouraged to follow-up with nephrologist as recommended. Patient verbalized acknowledges. 8. Restrictive lung disease  Stable. Managed by pulmonologist.  Patient reports using inhaler as prescribed. No new or worsening shortness of breath. Continue with current treatment management follow-up in 6 months, sooner for new or worsening symptoms. 9. Vitamin D deficiency disease  Stable. Patient compliant with vitamin D supplement.   Continue with current treatment management follow-up in 6 months, sooner for new or worsening symptoms. 10. Iron deficiency anemia, unspecified iron deficiency anemia type  Stable. Patient reports that he will be receiving his first iron infusion today. Patient reports that he is taking iron supplement p.o. as well. Patient is on chronic anticoagulation therapy. Patient's family concerned because of conjunctival hemorrhage noted in his right eye symptoms were noticed within the past 24 hours. Patient asymptomatic of any pain, discomfort, irritation, drainage or visual disturbances. We did discuss side effects of chronic anticoagulation therapy. Continue to monitor and follow-up in 6 months, sooner for new or worsening symptoms. 11. Need for pneumococcal vaccine  Preventative vaccination administered at today's visit. - Pneumococcal Conjugate PCV20, PF (Prevnar 20)         The note was completed using Dragon voice recognition transcription. Every effort was made to ensure accuracy; however, inadvertent  transcription errors may be present despite my best efforts to edit errors.     Corine Cortés, AICHA - CNP

## 2022-05-19 PROBLEM — I50.9 ACUTE CONGESTIVE HEART FAILURE (HCC): Status: RESOLVED | Noted: 2022-01-01 | Resolved: 2022-01-01

## 2022-05-19 PROBLEM — I50.32 CHRONIC DIASTOLIC CONGESTIVE HEART FAILURE (HCC): Status: RESOLVED | Noted: 2018-01-11 | Resolved: 2022-01-01

## 2022-05-19 NOTE — PROGRESS NOTES
Injectafer infused and tubing was flushed with NS  IV tubing was disconnected from pt and INT was left intact  INT site unremarkable  No changes noted  Pt without c/o's  Will continue to monitor  wife at side

## 2022-05-19 NOTE — PROGRESS NOTES
Pt reports that he is feeling ok  No changes noted since arrival  resp easy and even  Lungs CTA ,diminished  IV removed without difficulty  Cath tip intact  Pressure then pressure dressing was applied and secured with a coban dressing  IV site unremarkable  Discharge instructions reviewed with pt  Copy was given  Understanding was verbalized  pt discharged ambulatory in stable condition with his wife

## 2022-05-19 NOTE — PROGRESS NOTES
Pt here ambulatory using a cane for an injectafer infusion  Pt without c/o's at present time  Discussed benefits and possible adverse reactions with pt and his wife  Both verbalized understanding and deny any further questions or concerns about the infusion today  Pt declined any printed information on the injectafer infusion today and he is aware of 30 min monitoring time after the infusion is completed to monitor for signs of adverse reactions  IV # 22  was started in RFA on 1st attempt per myself  Pt roverto well  Injectafer 750 mg IVPB was started without difficulty  IV site unremarkable  Will monitor

## 2022-05-19 NOTE — PATIENT INSTRUCTIONS
Please read the healthy family handout that you were given and share it with your family. Please compare this printed medication list with your medications at home to be sure they are the same. If you have any medications that are different please contact us immediately at [phone]. Also review your allergies that we have listed, these may also include medications that you have not been able to tolerate, make sure everything listed is correct. If you have any allergies that are different please contact us immediately at [phone].

## 2022-05-20 NOTE — CARE COORDINATION
[facility] Transitions Follow Up Call    2022    Patient: Gayle Ledesma  Patient : 1940   MRN: [id number]  Reason for Admission: acute on chronic dCHF, pulm hypertension, moderate TR, A Fib on Eliquis, hx CVA, hx bilateral carotid occlusion, CAD s/p CABG, DM2, HTN, HLD, GERD, iron deficiency anemia, hypothyroidism, hyponatremia, POLY on CKD3, weakness -> home with [facility] G[facility]  Discharge Date: 22 RARS: Readmission Risk Score: 21.6 ( )      Spoke with: Gayle Ledesma (patient)    Needs to be reviewed by the provider   Additional needs identified to be addressed with provider: No         Method of communication with provider : none    Care Transition Nurse (CTN) contacted the patient by telephone to follow up after recent hospital admission. Addressed changes since last contact: reviewed all notes prior to outreach  Discussed follow-up appointments. If no appointment was previously scheduled, appointment scheduling offered: No.   Non-[facility] follow up appointment(s): NA    Discussed appropriate site of care based on symptoms and resources available to patient including: PCP  Specialist  Home health. The patient agrees to contact the PCP office for questions related to their healthcare. Patients top risk factors for readmission: functional physical ability  medical condition-see above  Interventions to address risk factors: CHF education    160# on   162# on  - skipped morning diuretic yesterday because of appointments but back on normal dosing today. Bleed in R eye slightly improved today (noted in OV note with PCP yesterday). Had 1 of 2 iron infusions yesterday. SOB continues \"doesn't change much\". Working with home care - SN PT OT. Had hour long PT visit today. Goal is two PT sessions/week but that is not approved yet. He will continue to work on HEP on non-PT visit days. Denies needs going into weekend. Reviewed diet restrictions, weight and symptom monitoring.  CTN provided contact information. Plan for follow-up call in 3-5 days based on severity of symptoms and risk factors.   Plan for next call: symptom management-CHF daily weights     Follow Up  Future Appointments   Date Time Provider Getachew Cottrell   5/27/2022  8:30 AM Brendon Motta [address] [facility] [place]   6/1/2022  3:00 PM Linda Lerma MD [place] [facility]   6/22/2022 12:30 PM Linda Lerma MD [facility] CLER CAR [facility]   6/6/2023 11:40 AM Riky Thompson MD AND PULM [facility]       Laurie Collins RN

## 2022-05-24 NOTE — PROGRESS NOTES
Physician Progress Note      PATIENT:               Jose Goldberg  CSN #:                  [id number]  :                       1940  ADMIT DATE:       2022 2:40 PM  [facility] DATE:        2022 11:58 AM  RESPONDING  PROVIDER #:        Kenneth Morse MD          QUERY TEXT:    Patient admitted with possible new onset afib, noted to have [id number]   score 7 per ER provider, started on Eliquis. If possible, please document in   progress notes and discharge summary if you are evaluating and/or treating any   of the following: The medical record reflects the following:    Risk Factors: 80 y.o. male w/ HTN, DM, CHF, prior CVA, CAD s/p CABG    Clinical Indicators:  [id number] score 7 per ER provider    Treatment: Eliquis, ASA, labs, supportive care and monitoring,    thank you,  [email or web address]  Options provided:  -- Secondary hypercoagulable state in a patient with atrial fibrillation  -- Other - I will add my own diagnosis  -- Disagree - Not applicable / Not valid  -- Disagree - Clinically unable to determine / Unknown  -- Refer to Clinical Documentation Reviewer    PROVIDER RESPONSE TEXT:    Provider is clinically unable to determine a response to this query. Query created by:  Katherine Villagomez on 2022 7:23 AM      Electronically signed by:  Kenneth Morse MD 2022 2:19 PM

## 2022-05-25 NOTE — CARE COORDINATION
[facility] Transitions Follow Up Call    2022    Patient: Chitra Hyde  Patient : 1940   MRN: [id number]  Reason for Admission: acute on chronic dCHF, pulm hypertension, moderate TR, A Fib on Eliquis, hx CVA, hx bilateral carotid occlusion, CAD s/p CABG, DM2, HTN, HLD, GERD, iron deficiency anemia, hypothyroidism, hyponatremia, POLY on CKD3, weakness -> home with [facility]  Discharge Date: 22 RARS: Readmission Risk Score: 21.6 ( )      Spoke with: Chitra Hyde (patient)    Needs to be reviewed by the provider   Additional needs identified to be addressed with provider: No         Method of communication with provider : none    Care Transition Nurse (CTN) contacted the patient by telephone to follow up after recent hospital admission. Addressed changes since last contact: none  Discussed follow-up appointments. If no appointment was previously scheduled, appointment scheduling offered: No.   Non-[facility] follow up appointment(s): NA    Discussed appropriate site of care based on symptoms and resources available to patient including:  PCP  Specialist  Home health  CTN, "[employer]. The patient agrees to contact the PCP office for questions related to their healthcare. Patients top risk factors for readmission: lack of knowledge about disease  medical condition-diet compliance     Interventions to address risk factors: Education of patient/family/caregiver/guardian to support self-management-CHF education    Still having trouble breathing - \"yesterday was pretty good but today is worse\". Weight holding at 161.5#. States he has been good with diet up until yesterday when he ate half of a 3-way chili and some crackers. CTN looked up nutrition facts for this and reviewed with him that this 3-way has 1620mg sodium and even half is still way more than he should be eating in a serving and likely why today he has worse SOB.  PT evaluated yesterday, said she had good suggestions but unsure when she will be back because of holiday. Has 2nd iron infusion this Friday. Still not noticing energy from the initial one. CTN reviewed s/s and resources available: HC, PCP, CTN, [facility]. He voices understanding. CTN reminded him to avoid restaurant foods and added salt. He voices understanding. Sees cardiology next week on 6/1. CTN provided contact information. Plan for follow-up call in 5-7 days based on severity of symptoms and risk factors.   Plan for next call: symptom management-SOB     Follow Up  Future Appointments   Date Time Provider Getachew Cottrell   5/27/2022  8:30 AM Brendon Motta [address] 1 ELY Olivares   6/1/2022  3:00 PM MD KAVYA Mae   6/22/2022 12:30 PM Geronimo Asencio MD P CLER CAR ISADORA Lazcano RN

## 2022-05-27 NOTE — PROGRESS NOTES
Pt here ambulatory using a cane for an injectafer infusion  Pt without c/o's at present time  Pt denies any questions or concerns about the infusion today He is aware of 30 min monitoring time after the infusion is completed to monitor for signs of adverse reactions  IV # 22  was started in RFA on 1st attempt per myself  Pt roverto well  Injectafer 750 mg IVPB was started without difficulty  IV site unremarkable  Will monitor

## 2022-05-27 NOTE — TELEPHONE ENCOUNTER
Called pt and he said his weight has been steady at 162 since the hospital, and when he checked it this morning, he weighed 157. Pt states he feels fine but he was up more than normal last night to urinate. Pt states he is taking torsemide 40 mg BID, and has had no med changes.

## 2022-05-27 NOTE — TELEPHONE ENCOUNTER
Agree with Dr. Jonathan Villanueva, he was still volume overloaded a little when he left the hospital.   I feel like 157lbs should be a good weight for him. REcommend he repeat a BMP and BNP next week. Continue current regimen.

## 2022-05-27 NOTE — PROGRESS NOTES
Injectafer infused and tubing was flushed with NS  IV tubing was disconnected from pt and INT was left intact  INT site unremarkable  No changes noted  Pt without c/o's  Will continue to [employer]

## 2022-05-27 NOTE — TELEPHONE ENCOUNTER
Appears taknig 40 mg torsemide BID. Please weigh today and report back. He was let go from hospital at 162#. Last labs indicated persistent volume overload.      Sandrine Winters MD, [address] [facility]  [phone] [facility]  [phone] [address] [place] [address]

## 2022-05-27 NOTE — PROGRESS NOTES
Pt reports that he is feeling ok  No changes noted since arrival  resp easy and even  Lungs CTA   IV removed without difficulty  Cath tip intact  Pressure then pressure dressing was applied and secured with a coban dressing  IV site unremarkable  Discharge instructions reviewed with pt  Copy was given and understanding was verbalized  Pt discharged ambulatory in stable condition

## 2022-05-31 NOTE — TELEPHONE ENCOUNTER
What is his current weight? On d/c recently weight 162#. NP [facility] felt ideal weight around 157#. Is he taking torsemide 40mg po BID? Any other diuretics? Make sure he is avoiding salt as much as possible and fluid restrict < 2L. His diet reported as very high in salt per Dr. Estela Vera recently. If O2 sats < 90% consistently he needs to be evaluated in ER. I agree. Thanks.

## 2022-05-31 NOTE — TELEPHONE ENCOUNTER
Spoke with patient about rescheduling tomorrow's appt since he just saw Dr. Dori Taylor this month. He stated he has two appts with you and his next one is June 22nd at [facility] where he usually sees you. He stated that about 2 weeks ago his oxygen dropped to 79-88% and he has been having difficulty breathing. He stated he called his pulmonologist who said he could just follow up in a year. I advised him to go to the ED to be evaluated and that his oxygen levels were too low. He stated that he wasn't sure that he wanted to do that. I strongly recommended that he go to the ED and that if you saw him in office tomorrow you would send him to the ED also. He stated he would think about it and he would call his pulmonologist again and explain the situation. He will follow up with you June 22nd as scheduled.

## 2022-06-01 NOTE — DISCHARGE SUMMARY
Hospital Medicine Discharge Summary    Patient ID: Morena Carlin      Patient's PCP: Ewa Ruff APRN - CNP    Admit Date: 4/29/2022     Discharge Date: 4/30/2022      Admitting Provider: Lisy Palomo MD     Discharge Provider: Dutch Everett MD     Discharge Diagnoses: Active Hospital Problems    Diagnosis     Atrial fibrillation [facility]) [I48.91]      Priority: Medium       The patient was seen and examined on day of discharge and this discharge summary is in conjunction with any daily progress note from day of discharge. Hospital Course: The patient is an 57-year-old male with history of hypertension, hyperlipidemia, type 2 diabetes mellitus, CVA, bilat carotid occlusion, CAD, s/p CABG presenting with dyspnea on exertion and fast . Patient was in pulmonary rehab, when he became dyspneic and tachycardic with heart rates up to 120.  Noted to be in rapid A. Fib. On arrival to the ER heart rate stabilized in the 60s, patient still in A. Fib. Does not appear to have Hx of a fib. Denies CP, lightheadedness. Not particularly SOB at rest but does have some IBARRA w/ exertion. Card rec admission and heparin gtt. 1. A fib RVR, apparently new onset. Rates at rehab 120's. Rate improved since arrival to ED. Extensive cardiac/vascular Hx. Heparin gtt, trops,tele. Cont ASA, BB, ARB. Cards c/s. 2. Hypothyroidism, continue home levothyroxine, TSH 4.37, f/u T4.   3. DM2, hold oral Rx, add Low SSI. 4. GERD, PPI. 5. HLD, continue statin  6. Indeterminate troponin, initial 0.02, 0.01 x2 since. Likely 2/2 tachycardia.      Cardiology seen pt, to cont BB, started on eliquis 2.5mg BID and ASA 81mg, ok to dc per cardio    Physical Exam Performed:     /88   Pulse 117   Temp 98.1 °F (36.7 °C) (Oral)   Resp 16   Ht 5' 10\" (1.778 m)   Wt 158 lb 1 oz (71.7 kg)   SpO2 96%   BMI 22.68 kg/m²       General appearance:  No apparent distress, appears stated age and cooperative.   HEENT: Normal cephalic, atraumatic without obvious deformity. Pupils equal, round, and reactive to light. Extra ocular muscles intact. Conjunctivae/corneas clear. Neck: Supple, with full range of motion. No jugular venous distention. Trachea midline. Respiratory:  Normal respiratory effort. Clear to auscultation, bilaterally without Rales/Wheezes/Rhonchi. Cardiovascular:  Regular rate and rhythm with normal S1/S2 without murmurs, rubs or gallops. Abdomen: Soft, non-tender, non-distended with normal bowel sounds. Musculoskeletal:  No clubbing, cyanosis or edema bilaterally. Full range of motion without deformity. Skin: Skin color, texture, turgor normal.  No rashes or lesions. Neurologic:  Neurovascularly intact without any focal sensory/motor deficits. Cranial nerves: II-XII intact, grossly non-focal.  Psychiatric:  Alert and oriented, thought content appropriate, normal insight  Capillary Refill: Brisk,< 3 seconds   Peripheral Pulses: +2 palpable, equal bilaterally       Labs: For convenience and continuity at follow-up the following most recent labs are provided:      CBC:    Lab Results   Component Value Date    WBC 7.2 05/13/2022    HGB 9.9 05/13/2022    HCT 29.0 05/13/2022     05/13/2022       Renal:    Lab Results   Component Value Date     05/31/2022    K 3.7 05/31/2022    K 4.6 04/29/2022    CL 89 05/31/2022    CO2 31 05/31/2022    BUN 33 05/31/2022    CREATININE 1.9 05/31/2022    CREATININE 1.1 02/02/2012    CALCIUM 9.1 05/31/2022    PHOS 3.3 03/08/2022         Significant Diagnostic Studies    Radiology:   CT CHEST PULMONARY EMBOLISM W CONTRAST   Final Result   1. No evidence of acute pulmonary embolism or acute aortic disease. 2. Redemonstration of lymphadenopathy which is slightly more pronounced   compared to the previous evaluation in the mediastinum.    3. Redemonstration of bilateral pleural effusions partly loculated which are   mild to moderate as well as lower lobe atelectatic and/or consolidative   changes. These were previously seen as well. 4. Cholelithiasis but no acute cholecystitis.          XR CHEST PORTABLE   Final Result   Persistent bilateral pleural effusions and bibasilar opacities which could   represent atelectasis or infection                Consults:     IP CONSULT TO CARDIOLOGY  PHARMACY TO DOSE MEDICATION  IP CONSULT TO CARDIOLOGY    Disposition:  home     Condition at Discharge: Stable    Discharge Instructions/Follow-up:  pcp in 1 week    Code Status:  Prior     Activity: activity as tolerated    Diet: regular diet      Discharge Medications:     Discharge Medication List as of 4/30/2022 12:24 PM           Details   apixaban (ELIQUIS) 2.5 MG TABS tablet Take 1 tablet by mouth 2 times daily, Disp-60 tablet, R-2Normal              Details   metoprolol tartrate (LOPRESSOR) 50 MG tablet TAKE 1 TABLET BY MOUTH  TWICE DAILY, Disp-180 tablet, R-3Requesting 1 year supplyNormal      pravastatin (PRAVACHOL) 40 MG tablet TAKE 1 TABLET BY MOUTH  DAILY AT NIGHT, Disp-90 tablet, R-3Requesting 1 year supplyNormal      pantoprazole (PROTONIX) 40 MG tablet TAKE 1 BY MOUTH ONCE DAILY IN THE MORNING 30 MINUTES TO AN HOUR BEFORE BREAKFASTHistorical Med      ipratropium (ATROVENT) 0.03 % nasal spray 2 sprays by Each Nostril route 4 times daily, Disp-1 each, R-1Normal      losartan (COZAAR) 100 MG tablet TAKE 1 TABLET BY MOUTH  DAILY, Disp-90 tablet, R-3Requesting 1 year supplyNormal      ciclopirox (PENLAC) 8 % solution Historical Med      torsemide (DEMADEX) 20 MG tablet Take 2 tablets by mouth daily, Disp-60 tablet, R-1Requesting 1 year supplyNormal      Ascorbic Acid (VITAMIN C) 250 MG tablet Take 250 mg by mouth dailyHistorical Med      metFORMIN (GLUCOPHAGE) 1000 MG tablet TAKE 1 TABLET BY MOUTH  TWICE DAILY WITH MEALS, Disp-180 tablet, R-3Requesting 1 year supplyNormal      albuterol sulfate HFA (PROAIR HFA) 108 (90 Base) MCG/ACT inhaler Inhale 2 puffs into the lungs every 6 hours as needed for Wheezing, Disp-1 Inhaler, R-5Normal      levothyroxine (SYNTHROID) 88 MCG tablet TAKE 1 TABLET BY MOUTH  DAILY, Disp-90 tablet, R-3Requesting 1 year supplyNormal      Omega-3 Fatty Acids (FISH OIL PO) Take by mouthHistorical Med      Multiple Vitamin (MULTI-VITAMIN DAILY PO) Take by mouthHistorical Med      Coenzyme Q10 (COQ10 PO) Take by mouthHistorical Med      guaiFENesin 400 MG tablet Take 400 mg by mouth as needed for Cough Historical Med      magnesium oxide (MAG-OX) 400 MG tablet Take 400 mg by mouth dailyHistorical Med      ferrous sulfate 325 (65 Fe) MG tablet Take 325 mg by mouth daily (with breakfast) Historical Med      aspirin 81 MG EC tablet Take 1 tablet by mouth daily, Disp-30 tablet, R-0Adjust Sig      Cholecalciferol (VITAMIN D) 2000 UNITS CAPS capsule Take  by mouth daily. Time Spent on discharge is more than 30 minutes in the examination, evaluation, counseling and review of medications and discharge plan. Signed:    Krysta Nelson MD   6/1/2022      Thank you AICHA Perez CNP for the opportunity to be involved in this patient's care. If you have any questions or concerns, please feel free to contact me at [phone].

## 2022-06-02 NOTE — TELEPHONE ENCOUNTER
Weight today-163.5#  He is taking torsemide 40mg BID and no other diuretic  He states that his O2 does go down to 88% with exertion but when he rests it recovers. I told him to contact his pulmonologist to let him know. He is not on supplemental O2.

## 2022-06-02 NOTE — TELEPHONE ENCOUNTER
Renal function improved today and weight is not bad. I would continue torsemide 40mg BID and add metolazone 5mg to take scheduled one day per week  (start the day he gets medication refilled). Would take metolazone scheduled 1x per week and then he can use PRN up to 3 x per week IF weight gain >3-4 # or increased swelling or increased SOB. Weigh himself daily. Ideally want weight to decrease 3-4 # if possible. He should call pulm doc about O2 sat dropping < 90% with exertion. He likely needs supplemental O2. Thanks.

## 2022-06-02 NOTE — PROGRESS NOTES
Physician Progress Note      PATIENT:               Roger Jolly  [facility] #:                  [id number]  :                       1940  ADMIT DATE:       2022 2:40 PM  Tracy Clark DATE:        2022 11:58 AM  RESPONDING  PROVIDER #:        Isabell Fairchild MD          QUERY TEXT:    Patient admitted with possible new onset afib, noted to have [id number]   score 7 per ER provider, started on Eliquis. If possible, please document in   progress notes and discharge summary if you are evaluating and/or treating any   of the following: The medical record reflects the following:    Risk Factors: 80 y.o. male w/ HTN, DM, CHF, prior CVA, CAD s/p CABG    Clinical Indicators:  [id number] score 7 per ER provider    Treatment: Eliquis, ASA, labs, supportive care and monitoring,    thank you,  [email or web address]  Options provided:  -- Secondary hypercoagulable state in a patient with atrial fibrillation  -- Other - I will add my own diagnosis  -- Disagree - Not applicable / Not valid  -- Disagree - Clinically unable to determine / Unknown  -- Refer to Clinical Documentation Reviewer    PROVIDER RESPONSE TEXT:    This patient has secondary hypercoagulable state related to atrial   fibrillation.     Query created by: Mumtaz Workman on 2022 7:56 AM      Electronically signed by:  Isabell Fairchild MD 2022 11:36 PM

## 2022-06-02 NOTE — CARE COORDINATION
[facility] Transitions Follow Up Call    2022    Patient: Helio Fraga  Patient : 1940   MRN: [id number]  Reason for Admission: acute on chronic dCHF, pulm hypertension, moderate TR, A Fib on Eliquis, hx CVA, hx bilateral carotid occlusion, CAD s/p CABG, DM2, HTN, HLD, GERD, iron deficiency anemia, hypothyroidism, hyponatremia, POLY on CKD3, weakness -> home with [facility]  Discharge Date: 22 RARS: Readmission Risk Score: 21.6 ( )       CTN attempted follow-up outreach to patient. Message left including CTN contact information for return call.      Follow Up  Future Appointments   Date Time Provider Getachew Cottrell   2022 12:30 PM Jennifer Nicole MD [facility] CLER ANNA Zepeda RN

## 2022-06-03 NOTE — TELEPHONE ENCOUNTER
Catarino Henderson from the [facility] Transition contacted the [facility] office asking about the metolazone 5mg that [facility] ordered for the pt. I informed Osiris Gallardo the message was sent to Ryan.  Please fill medication as soon as possible to pt's pharmacy [address] [facility], [address] [place] [address],[address]   [facility], [address] [address]   Phone:  [phone]  Fax:  [phone]

## 2022-06-03 NOTE — TELEPHONE ENCOUNTER
Call received from Care Coordinator at [facility]. Patient needs to have an evaluation to see if supplemental oxygen is needed as his oxygen seems to be dropping with walking around. Patient last seen on 05/17/2022. Ok to schedule for 6MW?

## 2022-06-03 NOTE — CARE COORDINATION
[facility] Transitions Follow Up Call    6/3/2022    Patient: Gayle Ledesma  Patient : 1940   MRN: [id number]  Reason for Admission: acute on chronic dCHF, pulm hypertension, moderate TR, A Fib on Eliquis, hx CVA, hx bilateral carotid occlusion, CAD s/p CABG, DM2, HTN, HLD, GERD, iron deficiency anemia, hypothyroidism, hyponatremia, POLY on CKD3, weakness -> home with [facility]  Discharge Date: 22 RARS: Readmission Risk Score: 21.6 ( )       Spoke with: Gayle Ledesma (patient)    CTN received inbound call from patient. States he spoke with Dr Lesley Alvarenga yesterday and has additional questions, but his appointment not until 2022. He states he knows he has A Fib but feels occasional \"kick\". States he wore the cardiac monitor and hoped they would be able to find information from that. States his SaO2 drops to 85% when he walks across the room but rebounds to 95% with rest and DB. States Dr Lesley Alvarenga and Dr Musa Lanza both told him he needs to be evaluated for supplemental O2. Mr Halima Kat saw his pulmonologist Dr Homero Ying on 2022 but was not evaluated at that time for O2. Said he didn't want to see him for follow up for one year. CTN will contact pulm office to request testing for oxygen need. He will look for call from them to schedule appointment. They canceled home care after the PT no showed them yesterday. CTN encouraged he continue the HEP. Reviewed low sodium and fluid restricted diet. He states they have both changed their diet making foods at home with no added salt. They are not eating out as often. They have the education from the care transition team dietician. CTN reviewed 2000mg/day sodium max. Wife states working on recipes. Made a pot of soup without salt. Today's weight 164.5#.    Reviewed Dr Lesley Alvarenga note from their conversation yesterday. There is no rx for metolazone prescribed yet. He will need this sent to [facility].  Educated Mr Halima Kat on this medication and the instructions for taking it. CTN will follow up with Dr Chasidy Lind office to request rx. He will watch for rx to pharmacy. CTN contacted cardiology office. Requested the metolazone to [address] They have note for [facility] to send rx. CTN will follow chart today to confirm rx sent before close of business day. CTN contacted pulm office. Spoke with Dr Larry Bishop. She will send message to provider to ask about 6MWT and contact patient directly with recommendation/plan. Patient notified and voices understanding. ADDENDUM: 1550 - patient notified metolazone rx sent to pharmacy.      Follow Up  Future Appointments   Date Time Provider Getachew Cottrell   6/22/2022 12:30 PM Mark Clemens MD [facility] ROSHAN Mora RN

## 2022-06-06 NOTE — CARE COORDINATION
Palma Aj  6/6/2022    Registered Dietitian Progress Note for Care Coordination    Assessment: Reid Manzanares is a 80 y.o. male. RD referred for CHF and DM. RD spoke with patient for initial nutrition assessment on 5/18. RD called to follow up with pt today 6/6. Pt states he has not yet received the educational handouts in the mail- RD verified address and will resend information. RD discussed previous goals with pt. Patient states things are going \"real good. \" Patient is eating 3 meals/day. Reviewed the components of a balanced meal. Patient states he is trying to stay away from salt as best he can. Reviewed the importance of avoiding the salt shaker and reading food labels. Patient is monitoring his daily weight- pt states this AM weight 161 lb, no sudden weight gain reported per pt. Pt is checking BS daily- pt states this AM  mg/dL. Pt has no nutrition related questions or concerns at this time. Nutrition Monitoring and Evaluation  Indicator/Goal Criteria Progress   #1 Eat balanced meals consistently throughout the day #1 Focus on eating 3 meals/day and make these meals balanced using the MyPlate [id number]-4/2 plate fruits and/or vegetables, 1/4 plate protein and 1/4 plate starchy carbohydrates with 8 oz glass of low fat milk if desired. #1 Pt is eating 3 meals/day and making these meals balanced. #2  Monitor daily sodium intake. Keep sodium from food and beverages to no more than 2000 mg/day. #2 Avoid the salt shaker. Read food labels to help choose lower sodium options (<140 mg of sodium per serving). #2 Pt is following a low sodium diet. #3  Monitor daily weights and check BS daily.  #3 Weigh self daily and notify MD of sudden weight gain. Check BS daily and keep a log. #3 Pt is weighing self daily- per pt this AM weight 161 lb. Pt states this AM  mg/dL. Plan of Care:  RD encouraged pt to keep working toward goals set.  RD mailed educational handouts, pt has not yet received them. RD will follow up with pt to ensure handouts were received, discuss any questions pt has and check the progress toward goals. Follow Up:    RD will call pt in 2-3 weeks to follow up and answer any nutrition related questions at that time.      [address] [facility], [address] [place] [address]

## 2022-06-08 NOTE — TELEPHONE ENCOUNTER
Received a call from the  that patient passed away this morning at 5:55 am from natural causes.   She is wanting to see if you will sign his death certificate

## 2022-06-15 NOTE — TELEPHONE ENCOUNTER
FYI- patients wife called this morning to let us know that her  passed away on 06/08/2022. She wanted to thank all of [facility] for everything.

## 2022-06-24 RX ORDER — LEVOTHYROXINE SODIUM 88 UG/1
88 TABLET ORAL DAILY
Qty: 90 TABLET | Refills: 3 | OUTPATIENT
Start: 2022-06-24

## 2022-06-30 PROBLEM — N18.9 CHRONIC KIDNEY DISEASE: Status: ACTIVE | Noted: 2022-01-01

## (undated) DEVICE — SNARE VASC L240CM LOOP W10MM SHTH DIA2.4MM RND STIFF CLD

## (undated) DEVICE — CONMED SCOPE SAVER BITE BLOCK, 20X27 MM: Brand: SCOPE SAVER

## (undated) DEVICE — Device: Brand: DISPOSABLE ELECTROSURGICAL SNARE

## (undated) DEVICE — FORCEP BX STD CAP 240CM RAD JAW 4

## (undated) DEVICE — YANKAUER,BULB TIP,W/O VENT,RIGID,STERILE: Brand: MEDLINE

## (undated) DEVICE — TRAP POLYP ETRAP

## (undated) DEVICE — ELECTRODE,RADIOTRANSLUCENT,FOAM,3PK: Brand: MEDLINE

## (undated) DEVICE — CANNULA,OXY,ADULT,SUPERSOFT,W/7'TUB,SC: Brand: MEDLINE INDUSTRIES, INC.

## (undated) DEVICE — ENDO CARRY-ON PROCEDURE KIT INCLUDES SUCTION TUBING, LUBRICANT, GAUZE, BIOHAZARD STICKER, TRANSPORT PAD AND INTERCEPT BEDSIDE KIT.: Brand: ENDO CARRY-ON PROCEDURE KIT

## (undated) DEVICE — ENDOSCOPIC KIT 2 12 FT OP4 DE2 GWN SYR